# Patient Record
Sex: MALE | Race: WHITE | NOT HISPANIC OR LATINO | Employment: OTHER | URBAN - METROPOLITAN AREA
[De-identification: names, ages, dates, MRNs, and addresses within clinical notes are randomized per-mention and may not be internally consistent; named-entity substitution may affect disease eponyms.]

---

## 2017-01-04 ENCOUNTER — TELEPHONE (OUTPATIENT)
Dept: ORTHOPEDIC SURGERY | Facility: CLINIC | Age: 57
End: 2017-01-04

## 2017-01-04 ENCOUNTER — OFFICE VISIT (OUTPATIENT)
Dept: ORTHOPEDIC SURGERY | Facility: CLINIC | Age: 57
End: 2017-01-04

## 2017-01-04 VITALS — BODY MASS INDEX: 36.54 KG/M2 | HEIGHT: 71 IN | TEMPERATURE: 98.9 F | WEIGHT: 261 LBS

## 2017-01-04 DIAGNOSIS — Z96.652 STATUS POST TOTAL LEFT KNEE REPLACEMENT: Primary | ICD-10-CM

## 2017-01-04 PROCEDURE — 99024 POSTOP FOLLOW-UP VISIT: CPT | Performed by: ORTHOPAEDIC SURGERY

## 2017-01-04 PROCEDURE — 77077 JOINT SURVEY SINGLE VIEW: CPT | Performed by: ORTHOPAEDIC SURGERY

## 2017-01-04 PROCEDURE — 73560 X-RAY EXAM OF KNEE 1 OR 2: CPT | Performed by: ORTHOPAEDIC SURGERY

## 2017-01-04 RX ORDER — OXYCODONE AND ACETAMINOPHEN 10; 325 MG/1; MG/1
1 TABLET ORAL EVERY 4 HOURS PRN
Qty: 50 TABLET | Refills: 0 | Status: SHIPPED | OUTPATIENT
Start: 2017-01-04 | End: 2017-02-01 | Stop reason: SDUPTHER

## 2017-01-04 NOTE — PROGRESS NOTES
Aaron Latif     : 1960     MRN: [id number]     DATE: 2017    DIAGNOSIS: 2 week follow up left TKA    SUBJECTIVE:  Patient returns today for 2 week follow up of left total knee replacement. Patient reports doing well with no unusual complaints. Appears to be progressing appropriately.    OBJECTIVE:     Exam:. The incision is healing appropriately. No sign of infection. Range of motion is progressing but a little slow, 5 to 90. The calf is soft and nontender with a negative Homans sign.    DIAGNOSTIC STUDIES  Xrays: 2 views of the left knee (AP full length and lateral) were ordered and reviewed for evaluation of recent knee replacement. They demonstrate a well positioned, well aligned knee replacement without complicating factors noted. In comparison with previous films there has been interval implant placement.    ASSESSMENT: 2 week status post left knee replacement.    PLAN:   1) Order given for PT   2) Discontinue CURT hose   3) Continue ice PRN   4) aspirin 325 mg orally every day for 6 weeks   5) Follow up in 3 weeks with repeat Xrays of left knee (3views)    Layton Anderson MD  2017

## 2017-01-04 NOTE — MR AVS SNAPSHOT
Aaron Latif   1/4/2017 8:00 AM   Office Visit    Dept Phone:  [phone]   Encounter #:  [id number]    Provider:  Layton Anderson MD   Department:  [facility]                Your Full Care Plan              Today's Medication Changes          These changes are accurate as of: 1/4/17  8:52 AM.  If you have any questions, ask your nurse or doctor.               Medication(s)that have changed:     * aspirin 325 MG EC tablet   Take 1 tablet by mouth Daily.   What changed:  Another medication with the same name was added. Make sure you understand how and when to take each.       * aspirin 325 MG EC tablet   Take 1 tablet by mouth Daily.   What changed:  You were already taking a medication with the same name, and this prescription was added. Make sure you understand how and when to take each.   Changed by:  Layton Anderson MD       * oxyCODONE-acetaminophen 7.5-325 MG per tablet   Commonly known as:  PERCOCET   Take 1-2 tabs po q 4 hours prn pain   What changed:  Another medication with the same name was added. Make sure you understand how and when to take each.   Changed by:  Cielo Brito MA       * oxyCODONE-acetaminophen  MG per tablet   Commonly known as:  PERCOCET   Take 1 tablet by mouth Every 4 (Four) Hours As Needed for moderate pain (4-6).   What changed:  You were already taking a medication with the same name, and this prescription was added. Make sure you understand how and when to take each.   Changed by:  Layton Anderson MD       * Notice:  This list has 4 medication(s) that are the same as other medications prescribed for you. Read the directions carefully, and ask your doctor or other care provider to review them with you.         Where to Get Your Medications      These medications were sent to [address] [place] [address] AT [facility] [address] - [phone]  -  [phone] [place] [address]     Phone:  [phone]     aspirin 325 MG EC tablet         You can get these medications from any pharmacy     Bring a paper prescription for each of these medications     oxyCODONE-acetaminophen  MG per tablet                  Your Updated Medication List          This list is accurate as of: 1/4/17  8:52 AM.  Always use your most recent med list.                * aspirin 325 MG EC tablet   Take 1 tablet by mouth Daily.       * aspirin 325 MG EC tablet   Take 1 tablet by mouth Daily.       atorvastatin 80 MG tablet   Commonly known as:  LIPITOR   Take 1 tablet by mouth daily.        MG capsule   Take 100 mg by mouth 2 (Two) Times a Day.       ergocalciferol 20098 UNITS capsule   Commonly known as:  ERGOCALCIFEROL   Take 2 capsules by mouth 1 (one) time per week.       FARXIGA 10 MG tablet   Generic drug:  Dapagliflozin Propanediol       fenofibrate micronized 130 MG capsule   Commonly known as:  ANTARA   Take 1 capsule by mouth every morning before breakfast.       hydrochlorothiazide 25 MG tablet   Commonly known as:  HYDRODIURIL       Insulin Glargine 300 UNIT/ML solution pen-injector   Commonly known as:  TOUJEO SOLOSTAR   Inject 75 Units under the skin daily.       Insulin Pen Needle 31G X 8 MM misc   Use once a day       KOMBIGLYZE XR 2.5-1000 MG tablet sustained-release 24 hour   Generic drug:  SAXagliptin-MetFORMIN ER       LYRICA 200 MG capsule   Generic drug:  pregabalin       metoprolol succinate XL 50 MG 24 hr tablet   Commonly known as:  TOPROL-XL       ONE TOUCH ULTRA TEST test strip   Generic drug:  glucose blood   TEST 4 TIMES DAILY       ONEANNA DELICA LANCETS 33G misc       * oxyCODONE-acetaminophen 7.5-325 MG per tablet   Commonly known as:  PERCOCET   Take 1-2 tabs po q 4 hours prn pain       * oxyCODONE-acetaminophen  MG per tablet   Commonly known as:  PERCOCET   Take 1 tablet by mouth Every 4 (Four) Hours As Needed  "for moderate pain (4-6).       pioglitazone 30 MG tablet   Commonly known as:  ACTOS   Take 1 tablet by mouth daily.       Syringe/Needle (Disp) 23G X 1\" 3 ML misc       VASCEPA 1 G capsule capsule   Generic drug:  icosapent ethyl       verapamil 240 MG 24 hr capsule   Commonly known as:  VERELAN       * Notice:  This list has 4 medication(s) that are the same as other medications prescribed for you. Read the directions carefully, and ask your doctor or other care provider to review them with you.            We Performed the Following     Ambulatory Referral to Physical Therapy Evaluate and treat     XR Joint Survey AP 2+ Joints     XR Knee 1 or 2 View Left       You Were Diagnosed With        Codes Comments    Status post total left knee replacement    -  Primary ICD-10-CM: Z96.652  ICD-9-CM: V43.65       Instructions     None    Patient Instructions History      Upcoming Appointments     Visit Type Date Time Department    POST-OP 1/4/2017  8:00 AM [facility]    FOLLOW UP 1/27/2017  8:00 AM [facility]    [facility] 5/11/2017  8:10 AM [facility]    OFFICE VISIT 5/25/2017 10:40 AM [facility]      MyChart Signup     Our records indicate that you have declined [employer] signup. If you would like to sign up for [employer], please email [email or web address] or call [phone] to obtain an activation code.             Other Info from Your Visit           Your Appointments     Jan 27, 2017  8:00 AM EST   Follow Up with Layton Anderson MD   [facility] (--)    [address] [place] [address]   [phone]           Arrive 15 minutes prior to appointment.            May 11, 2017  8:10 AM EDT   [facility], [facility]   [facility] ENDOCRINOLOGY (--)    [address] [address] [phone]            May 25, 2017 10:40 AM EDT   Office Visit with " "Parris Luna MD   [facility] ENDOCRINOLOGY (--)    [address] [place] [address] [phone]           Arrive 15 minutes prior to appointment.              Allergies     Codeine  Other (See Comments)    UNKNOWN      Reason for Visit     Left Knee - Post-op Knee, Pain           Vital Signs     Temperature Height Weight Body Mass Index Smoking Status       98.9 °F (37.2 °C) (Temporal Artery ) 71\" (180.3 cm) 261 lb (118 kg) 36.4 kg/m2 Never Smoker       Problems and Diagnoses Noted     Status post total left knee replacement    -  Primary        "

## 2017-01-19 ENCOUNTER — TELEPHONE (OUTPATIENT)
Dept: INTERNAL MEDICINE | Facility: CLINIC | Age: 57
End: 2017-01-19

## 2017-01-19 NOTE — TELEPHONE ENCOUNTER
----- Message from Jeniffer Davenport sent at 1/19/2017 12:10 PM EST -----  Contact: Nolvia @[facility]  Pat @[facility] calling to make us aware the pts frequency for controlled Rx.    Pt was given  OXYCODONE-acetaminophen (PERCOCET) 7.5-325 MG per tablet   Quantity:  100 tablet Refills:  0   Then was given OXYCODONE-acetaminophen (PERCOCET)  MG per tablet QTY 50   By Dr. Layton Anderson  Pt presented today Rx that Dr Hall wrote on 12/29/2016 for OXYCODONE-acetaminophen (PERCOCET) 7.5-325 MG per tablet 100 tablet    They just want to be sure everyone is aware  [place] [phone]

## 2017-01-20 ENCOUNTER — TELEPHONE (OUTPATIENT)
Dept: INTERNAL MEDICINE | Facility: CLINIC | Age: 57
End: 2017-01-20

## 2017-01-20 DIAGNOSIS — R35.1 NOCTURIA: Primary | ICD-10-CM

## 2017-01-20 NOTE — TELEPHONE ENCOUNTER
----- Message from Isabella Guajardo MA sent at 1/20/2017  8:44 AM EST -----  Pt has appt 4/10 but would like a referral to Urologist for frequent urination and nocturia.    Pt#[phone]

## 2017-01-26 ENCOUNTER — TELEPHONE (OUTPATIENT)
Dept: INTERNAL MEDICINE | Facility: CLINIC | Age: 57
End: 2017-01-26

## 2017-01-26 NOTE — TELEPHONE ENCOUNTER
He needs to get pain meds from Dr Anderson only while under his care. He cannot get pain meds from both physicians.

## 2017-01-26 NOTE — TELEPHONE ENCOUNTER
----- Message from America Angulo sent at 1/26/2017 11:25 AM EST -----  Contact: Patient  Patient called requesting refill on     oxyCODONE-acetaminophen (PERCOCET)  MG per tablet    He also requests this be called in for more than a 10-day supply.  Please advise.     Please call when ready to be picked up    [phone]

## 2017-01-27 ENCOUNTER — OFFICE VISIT (OUTPATIENT)
Dept: ORTHOPEDIC SURGERY | Facility: CLINIC | Age: 57
End: 2017-01-27

## 2017-01-27 VITALS — BODY MASS INDEX: 36.54 KG/M2 | TEMPERATURE: 97.2 F | HEIGHT: 71 IN | WEIGHT: 261 LBS

## 2017-01-27 DIAGNOSIS — Z96.652 STATUS POST TOTAL LEFT KNEE REPLACEMENT: Primary | ICD-10-CM

## 2017-01-27 PROCEDURE — 73562 X-RAY EXAM OF KNEE 3: CPT | Performed by: ORTHOPAEDIC SURGERY

## 2017-01-27 PROCEDURE — 99024 POSTOP FOLLOW-UP VISIT: CPT | Performed by: ORTHOPAEDIC SURGERY

## 2017-01-27 RX ORDER — OXYCODONE AND ACETAMINOPHEN 10; 325 MG/1; MG/1
1 TABLET ORAL EVERY 4 HOURS PRN
Qty: 50 TABLET | Refills: 0 | Status: SHIPPED | OUTPATIENT
Start: 2017-01-27 | End: 2017-02-01 | Stop reason: SDUPTHER

## 2017-01-27 RX ORDER — DOCUSATE SODIUM 100 MG/1
100 CAPSULE, LIQUID FILLED ORAL 2 TIMES DAILY
Qty: 60 CAPSULE | Refills: 0 | Status: SHIPPED | OUTPATIENT
Start: 2017-01-27 | End: 2017-07-06

## 2017-01-27 NOTE — PROGRESS NOTES
Aaron Latif : 1960 MRN: [id number] DATE: 2017    DIAGNOSIS: 6 week follow up left TKA      SUBJECTIVE:  Patient returns today for 6 week follow up of left total knee replacement. Patient reports doing well with no unusual complaints. Appears to be progressing appropriately.    Vitals:    17 1024   Temp: 97.2 °F (36.2 °C)         Current Outpatient Prescriptions:   •  aspirin 325 MG EC tablet, Take 1 tablet by mouth Daily., Disp: 30 tablet, Rfl: 0  •  aspirin  MG EC tablet, Take 1 tablet by mouth Daily., Disp: 30 tablet, Rfl: 0  •  atorvastatin (LIPITOR) 80 MG tablet, Take 1 tablet by mouth daily. (Patient taking differently: Take 80 mg by mouth every morning.), Disp: 90 tablet, Rfl: 3  •  Dapagliflozin Propanediol (FARXIGA) 10 MG tablet, Take 10 mg by mouth Every Morning., Disp: , Rfl:   •  docusate sodium (COLACE) 100 MG capsule, Take 1 capsule by mouth 2 (Two) Times a Day., Disp: 60 capsule, Rfl: 0  •  docusate sodium 100 MG capsule, Take 100 mg by mouth 2 (Two) Times a Day., Disp: 60 capsule, Rfl: 0  •  ergocalciferol (ERGOCALCIFEROL) 95887 UNITS capsule, Take 2 capsules by mouth 1 (one) time per week. (Patient taking differently: Take 2 capsules by mouth 1 (one) time per week. ), Disp: 26 capsule, Rfl: 3  •  fenofibrate micronized (ANTARA) 130 MG capsule, Take 1 capsule by mouth every morning before breakfast., Disp: 30 capsule, Rfl: 11  •  hydrochlorothiazide (HYDRODIURIL) 25 MG tablet, Take 25 mg by mouth Every Morning., Disp: , Rfl:   •  icosapent ethyl (VASCEPA) 1 G capsule capsule, Take 2 g by mouth 2 (Two) Times a Day With Meals., Disp: , Rfl:   •  Insulin Glargine (TOUJEO SOLOSTAR) 300 UNIT/ML solution pen-injector, Inject 75 Units under the skin daily. (Patient taking differently: Inject 75 Units under the skin every morning.), Disp: 6 pen, Rfl: 5  •  Insulin Pen Needle 31G X 8 MM misc, Use once a day, Disp: 100 each, Rfl: 0  •  metoprolol succinate XL (TOPROL-XL) 50  "MG 24 hr tablet, Take 50 mg by mouth Every Morning., Disp: , Rfl:   •  ONE TOUCH ULTRA TEST test strip, TEST 4 TIMES DAILY, Disp: 400 each, Rfl: 1  •  ONETOUCH DELICA LANCETS 33G misc, 3 (three) times a day., Disp: , Rfl:   •  oxyCODONE-acetaminophen (PERCOCET)  MG per tablet, Take 1 tablet by mouth Every 4 (Four) Hours As Needed for moderate pain (4-6)., Disp: 50 tablet, Rfl: 0  •  oxyCODONE-acetaminophen (PERCOCET)  MG per tablet, Take 1 tablet by mouth Every 4 (Four) Hours As Needed for moderate pain (4-6)., Disp: 50 tablet, Rfl: 0  •  oxyCODONE-acetaminophen (PERCOCET) 7.5-325 MG per tablet, Take 1-2 tabs po q 4 hours prn pain, Disp: 100 tablet, Rfl: 0  •  pioglitazone (ACTOS) 30 MG tablet, Take 1 tablet by mouth daily. (Patient taking differently: Take 30 mg by mouth every morning.), Disp: 30 tablet, Rfl: 5  •  pregabalin (LYRICA) 200 MG capsule, Take 200 mg by mouth 2 (Two) Times a Day., Disp: , Rfl:   •  SAXagliptin-MetFORMIN ER (KOMBIGLYZE XR) 2.5-1000 MG tablet sustained-release 24 hour, Take 1 tablet by mouth 2 (Two) Times a Day., Disp: , Rfl:   •  Syringe/Needle, Disp, 23G X 1\" 3 ML misc, 4 (four) times a day., Disp: , Rfl:   •  verapamil (VERELAN) 240 MG 24 hr capsule, Take 240 mg by mouth every morning., Disp: , Rfl:     Past Medical History   Diagnosis Date   • Anxiety    • Arthritis    • Diabetic peripheral neuropathy      TYPE 2   • Erectile dysfunction    • Fatigue    • Fracture of rib of right side    • High cholesterol    • Hypertension    • Low testosterone    • Meniscus tear      LEFT  KNEEhad surgery   • Neuropathy    • Sleep apnea      DOES NOT WEAR CPAP   • Type 2 diabetes mellitus    • Vitamin D deficiency        Past Surgical History   Procedure Laterality Date   • Hernia repair     • Achilles tendon repair Left    • Eye surgery Left    • Knee arthroscopy Left 7/26/2016     Procedure: KNEE ARTHROSCOPY, PARTIAL medial MENISCECTOMY;  Surgeon: Layton Anderson MD;  Location: [facility]" OR [facility];  Service:    • Pr total knee arthroplasty Left 12/15/2016     Procedure: TOTAL KNEE ARTHROPLASTY;  Surgeon: Layton Anderson MD;  Location: [facility] MAIN OR;  Service: Orthopedics       Family History   Problem Relation Age of Onset   • Diabetes Mother    • Hypertension Mother    • Heart disease Father    • Diabetes Brother    • Hypertension Brother    • Hypertension Paternal Uncle        Social History     Social History   • Marital status:      Spouse name: N/A   • Number of children: N/A   • Years of education: N/A     Occupational History   • Not on file.     Social History Main Topics   • Smoking status: Never Smoker   • Smokeless tobacco: Never Used   • Alcohol use Yes      Comment: SOCIAL   • Drug use: Yes     Special: Oxycodone      Comment: prescribed by md   • Sexual activity: Defer     Other Topics Concern   • Not on file     Social History Narrative       OBJECTIVE:     Exam:  The incision is well healed. No sign of infection. Range of motion is measured at 5 degrees shy of full extension to 105 flexion. The calf is soft and nontender with a negative Homans sign. Strength is progressing and the patient is ambulating appropriately.    DIAGNOSTIC STUDIES    Xrays: 3 views of the left knee (AP, lateral, and sunrise) were ordered and reviewed for evaluation of recent knee replacement. They demonstrate a well positioned, well aligned knee replacement without complicating factors noted. In comparison with previous films there has been no change.    ASSESSMENT:  6 week status post left knee replacement.    PLAN: 1) Continue with PT exercises as prescribed   2) Follow up in 6 weeks    3)  I have agreed to refill his pain meds.  Risks of the medicine were discussed.    Layton Anderson MD  1/27/2017

## 2017-01-27 NOTE — MR AVS SNAPSHOT
Aaron Latif   1/27/2017 10:00 AM   Office Visit    Dept Phone:  [phone]   Encounter #:  [id number]    Provider:  Layton Anderson MD   Department:  [facility]                Your Full Care Plan              Today's Medication Changes          These changes are accurate as of: 1/27/17 10:55 AM.  If you have any questions, ask your nurse or doctor.               Medication(s)that have changed:     *  MG capsule   Take 100 mg by mouth 2 (Two) Times a Day.   What changed:  Another medication with the same name was added. Make sure you understand how and when to take each.       * docusate sodium 100 MG capsule   Commonly known as:  COLACE   Take 1 capsule by mouth 2 (Two) Times a Day.   What changed:  You were already taking a medication with the same name, and this prescription was added. Make sure you understand how and when to take each.   Changed by:  Layton Anderson MD       * oxyCODONE-acetaminophen 7.5-325 MG per tablet   Commonly known as:  PERCOCET   Take 1-2 tabs po q 4 hours prn pain   What changed:  Another medication with the same name was added. Make sure you understand how and when to take each.   Changed by:  Cielo Brito MA       * oxyCODONE-acetaminophen  MG per tablet   Commonly known as:  PERCOCET   Take 1 tablet by mouth Every 4 (Four) Hours As Needed for moderate pain (4-6).   What changed:  Another medication with the same name was added. Make sure you understand how and when to take each.   Changed by:  Layton Anderson MD       * oxyCODONE-acetaminophen  MG per tablet   Commonly known as:  PERCOCET   Take 1 tablet by mouth Every 4 (Four) Hours As Needed for moderate pain (4-6).   What changed:  You were already taking a medication with the same name, and this prescription was added. Make sure you understand how and when to take each.   Changed by:  Layton Anderson MD       *  Notice:  This list has 5 medication(s) that are the same as other medications prescribed for you. Read the directions carefully, and ask your doctor or other care provider to review them with you.         Where to Get Your Medications      These medications were sent to [facility] [address] [place] [address] - [address] - [phone]  - [phone]   [address] [place] [id number]     Phone:  [phone]     docusate sodium 100 MG capsule         You can get these medications from any pharmacy     Bring a paper prescription for each of these medications     oxyCODONE-acetaminophen  MG per tablet                  Your Updated Medication List          This list is accurate as of: 1/27/17 10:55 AM.  Always use your most recent med list.                * aspirin 325 MG EC tablet   Take 1 tablet by mouth Daily.       * aspirin 325 MG EC tablet   Take 1 tablet by mouth Daily.       atorvastatin 80 MG tablet   Commonly known as:  LIPITOR   Take 1 tablet by mouth daily.       *  MG capsule   Take 100 mg by mouth 2 (Two) Times a Day.       * docusate sodium 100 MG capsule   Commonly known as:  COLACE   Take 1 capsule by mouth 2 (Two) Times a Day.       ergocalciferol 95891 UNITS capsule   Commonly known as:  ERGOCALCIFEROL   Take 2 capsules by mouth 1 (one) time per week.       FARXIGA 10 MG tablet   Generic drug:  Dapagliflozin Propanediol       fenofibrate micronized 130 MG capsule   Commonly known as:  ANTARA   Take 1 capsule by mouth every morning before breakfast.       hydrochlorothiazide 25 MG tablet   Commonly known as:  HYDRODIURIL       Insulin Glargine 300 UNIT/ML solution pen-injector   Commonly known as:  TOUJEO SOLOSTAR   Inject 75 Units under the skin daily.       Insulin Pen Needle 31G X 8 MM misc   Use once a day       KOMBIGLYZE XR 2.5-1000 MG tablet sustained-release 24 hour   Generic drug:  SAXagliptin-MetFORMIN ER       LYRICA  "200 MG capsule   Generic drug:  pregabalin       metoprolol succinate XL 50 MG 24 hr tablet   Commonly known as:  TOPROL-XL       ONE TOUCH ULTRA TEST test strip   Generic drug:  glucose blood   TEST 4 TIMES DAILY       ONETOUCH DELICA LANCETS 33G misc       * oxyCODONE-acetaminophen 7.5-325 MG per tablet   Commonly known as:  PERCOCET   Take 1-2 tabs po q 4 hours prn pain       * oxyCODONE-acetaminophen  MG per tablet   Commonly known as:  PERCOCET   Take 1 tablet by mouth Every 4 (Four) Hours As Needed for moderate pain (4-6).       * oxyCODONE-acetaminophen  MG per tablet   Commonly known as:  PERCOCET   Take 1 tablet by mouth Every 4 (Four) Hours As Needed for moderate pain (4-6).       pioglitazone 30 MG tablet   Commonly known as:  ACTOS   Take 1 tablet by mouth daily.       Syringe/Needle (Disp) 23G X 1\" 3 ML misc       VASCEPA 1 G capsule capsule   Generic drug:  icosapent ethyl       verapamil 240 MG 24 hr capsule   Commonly known as:  VERELAN       * Notice:  This list has 7 medication(s) that are the same as other medications prescribed for you. Read the directions carefully, and ask your doctor or other care provider to review them with you.            We Performed the Following     XR Knee 1 or 2 View Bilateral     XR Knee 1 or 2 View Left       You Were Diagnosed With        Codes Comments    Status post total left knee replacement    -  Primary ICD-10-CM: Z96.652  ICD-9-CM: V43.65       Instructions     None    Patient Instructions History      Upcoming Appointments     Visit Type Date Time Department    FOLLOW UP 1/27/2017 10:00 AM [facility]    POST-OP 3/10/2017  4:10 PM [facility]    FOLLOW UP 4/14/2017  3:00 PM [facility]    [facility] 5/11/2017  8:10 AM [facility]    OFFICE VISIT 5/25/2017 10:40 AM [facility]      MyChart Signup     Our records indicate that you have declined [facility] MyChart signup. If you would like to sign up for [employer], please " "email [email or web address] or call [phone] to obtain an activation code.             Other Info from Your Visit           Your Appointments     Mar 10, 2017  4:10 PM EST   Post-Op with Layton Anderson MD   [facility] (--)    [address] [facility] [address]  [facility] [address]   [phone]            Apr 14, 2017  3:00 PM EDT   Follow Up with Je Hall MD   [facility] INTERNAL MEDICINE (--)    [address] [facility]  [place] [id number] [phone]           Arrive 15 minutes prior to appointment.            May 11, 2017  8:10 AM EDT   [facility]   [facility] ENDOCRINOLOGY (--)    [address] [facility]  [place] [address] [phone]            May 25, 2017 10:40 AM EDT   Office Visit with Parris Luna MD   [facility] ENDOCRINOLOGY (--)    [address] [facility]  [place] [address] [phone]           Arrive 15 minutes prior to appointment.              Allergies     Codeine  Other (See Comments)    UNKNOWN      Reason for Visit     Left Knee - Follow-up           Vital Signs     Temperature Height Weight Body Mass Index Smoking Status       97.2 °F (36.2 °C) 71\" (180.3 cm) 261 lb (118 kg) 36.4 kg/m2 Never Smoker       Problems and Diagnoses Noted     Status post total left knee replacement    -  Primary        "

## 2017-02-01 DIAGNOSIS — Z96.652 STATUS POST TOTAL LEFT KNEE REPLACEMENT: Primary | ICD-10-CM

## 2017-02-01 RX ORDER — OXYCODONE AND ACETAMINOPHEN 10; 325 MG/1; MG/1
1 TABLET ORAL EVERY 6 HOURS PRN
Qty: 50 TABLET | Refills: 0 | Status: SHIPPED | OUTPATIENT
Start: 2017-02-01 | End: 2017-02-01 | Stop reason: DRUGHIGH

## 2017-02-01 RX ORDER — OXYCODONE AND ACETAMINOPHEN 7.5; 325 MG/1; MG/1
1 TABLET ORAL EVERY 4 HOURS PRN
Qty: 40 TABLET | Refills: 0 | Status: SHIPPED | OUTPATIENT
Start: 2017-02-01 | End: 2017-02-07 | Stop reason: SDUPTHER

## 2017-02-07 DIAGNOSIS — Z96.652 STATUS POST TOTAL LEFT KNEE REPLACEMENT: ICD-10-CM

## 2017-02-07 RX ORDER — ASPIRIN 325 MG
TABLET, DELAYED RELEASE (ENTERIC COATED) ORAL
Qty: 30 TABLET | Refills: 0 | Status: SHIPPED | OUTPATIENT
Start: 2017-02-07 | End: 2017-04-14 | Stop reason: SDUPTHER

## 2017-02-08 RX ORDER — OXYCODONE AND ACETAMINOPHEN 7.5; 325 MG/1; MG/1
1 TABLET ORAL EVERY 4 HOURS PRN
Qty: 40 TABLET | Refills: 0 | Status: SHIPPED | OUTPATIENT
Start: 2017-02-08 | End: 2017-02-17 | Stop reason: SDUPTHER

## 2017-02-08 NOTE — TELEPHONE ENCOUNTER
Patient has been notified that pain rx is ready to be picked up from the  with a photo id, rx placed inside narcotic bin/knj

## 2017-02-17 ENCOUNTER — TELEPHONE (OUTPATIENT)
Dept: ORTHOPEDIC SURGERY | Facility: CLINIC | Age: 57
End: 2017-02-17

## 2017-02-17 DIAGNOSIS — Z96.652 STATUS POST TOTAL LEFT KNEE REPLACEMENT: ICD-10-CM

## 2017-02-17 RX ORDER — OXYCODONE AND ACETAMINOPHEN 7.5; 325 MG/1; MG/1
1 TABLET ORAL EVERY 4 HOURS PRN
Qty: 60 TABLET | Refills: 0 | Status: SHIPPED | OUTPATIENT
Start: 2017-02-17 | End: 2017-02-24 | Stop reason: SDUPTHER

## 2017-02-17 RX ORDER — OXYCODONE AND ACETAMINOPHEN 7.5; 325 MG/1; MG/1
1 TABLET ORAL EVERY 4 HOURS PRN
Qty: 40 TABLET | Refills: 0 | Status: SHIPPED | OUTPATIENT
Start: 2017-02-17 | End: 2017-02-17 | Stop reason: SDUPTHER

## 2017-02-17 NOTE — TELEPHONE ENCOUNTER
Rx ready at the ; spoke with pt and informed.  I also told him [facility] would not increase the strength or directions of the med but he did increase the qty so the rx would last longer.

## 2017-02-24 DIAGNOSIS — Z96.652 STATUS POST TOTAL LEFT KNEE REPLACEMENT: ICD-10-CM

## 2017-02-27 RX ORDER — OXYCODONE AND ACETAMINOPHEN 7.5; 325 MG/1; MG/1
1 TABLET ORAL EVERY 4 HOURS PRN
Qty: 60 TABLET | Refills: 0 | Status: SHIPPED | OUTPATIENT
Start: 2017-02-27 | End: 2017-04-07

## 2017-03-01 DIAGNOSIS — N52.8 OTHER MALE ERECTILE DYSFUNCTION: ICD-10-CM

## 2017-03-01 DIAGNOSIS — R79.89 LOW TESTOSTERONE: ICD-10-CM

## 2017-03-01 DIAGNOSIS — E55.9 VITAMIN D DEFICIENCY: ICD-10-CM

## 2017-03-01 DIAGNOSIS — E66.01 MORBID OBESITY DUE TO EXCESS CALORIES (HCC): ICD-10-CM

## 2017-03-01 DIAGNOSIS — E11.9 DIABETES MELLITUS TYPE 2, CONTROLLED, WITHOUT COMPLICATIONS (HCC): ICD-10-CM

## 2017-03-01 DIAGNOSIS — R53.82 CHRONIC FATIGUE: ICD-10-CM

## 2017-03-01 DIAGNOSIS — I10 ACCELERATED HYPERTENSION: ICD-10-CM

## 2017-03-01 DIAGNOSIS — E78.5 DYSLIPIDEMIA: ICD-10-CM

## 2017-03-01 RX ORDER — INSULIN GLARGINE 300 U/ML
INJECTION, SOLUTION SUBCUTANEOUS
Qty: 9 PEN | Refills: 4 | Status: SHIPPED | OUTPATIENT
Start: 2017-03-01 | End: 2017-03-30

## 2017-03-08 DIAGNOSIS — E55.9 VITAMIN D DEFICIENCY: ICD-10-CM

## 2017-03-08 DIAGNOSIS — R53.82 CHRONIC FATIGUE: ICD-10-CM

## 2017-03-08 DIAGNOSIS — N52.8 OTHER MALE ERECTILE DYSFUNCTION: ICD-10-CM

## 2017-03-08 DIAGNOSIS — E11.9 DIABETES MELLITUS TYPE 2, CONTROLLED, WITHOUT COMPLICATIONS (HCC): ICD-10-CM

## 2017-03-08 DIAGNOSIS — R79.89 LOW TESTOSTERONE: ICD-10-CM

## 2017-03-08 DIAGNOSIS — E66.01 MORBID OBESITY DUE TO EXCESS CALORIES (HCC): ICD-10-CM

## 2017-03-08 DIAGNOSIS — E78.5 DYSLIPIDEMIA: ICD-10-CM

## 2017-03-08 DIAGNOSIS — I10 ACCELERATED HYPERTENSION: ICD-10-CM

## 2017-03-09 RX ORDER — FENOFIBRATE 130 MG/1
CAPSULE ORAL
Qty: 30 CAPSULE | Refills: 0 | Status: SHIPPED | OUTPATIENT
Start: 2017-03-09 | End: 2017-04-01 | Stop reason: SDUPTHER

## 2017-03-09 RX ORDER — ERGOCALCIFEROL 1.25 MG/1
CAPSULE ORAL
Qty: 26 CAPSULE | Refills: 2 | Status: SHIPPED | OUTPATIENT
Start: 2017-03-09 | End: 2017-07-06 | Stop reason: SDUPTHER

## 2017-03-09 RX ORDER — ICOSAPENT ETHYL 1000 MG/1
CAPSULE ORAL
Qty: 120 CAPSULE | Refills: 0 | Status: SHIPPED | OUTPATIENT
Start: 2017-03-09 | End: 2017-04-27 | Stop reason: SDUPTHER

## 2017-03-09 RX ORDER — PIOGLITAZONEHYDROCHLORIDE 30 MG/1
TABLET ORAL
Qty: 30 TABLET | Refills: 0 | Status: SHIPPED | OUTPATIENT
Start: 2017-03-09 | End: 2017-04-01 | Stop reason: SDUPTHER

## 2017-03-10 ENCOUNTER — OFFICE VISIT (OUTPATIENT)
Dept: ORTHOPEDIC SURGERY | Facility: CLINIC | Age: 57
End: 2017-03-10

## 2017-03-10 VITALS — BODY MASS INDEX: 36.54 KG/M2 | HEIGHT: 71 IN | WEIGHT: 261 LBS

## 2017-03-10 DIAGNOSIS — Z96.652 STATUS POST TOTAL LEFT KNEE REPLACEMENT: ICD-10-CM

## 2017-03-10 DIAGNOSIS — M17.12 PRIMARY OSTEOARTHRITIS OF LEFT KNEE: Primary | ICD-10-CM

## 2017-03-10 DIAGNOSIS — Z96.651 STATUS POST TOTAL RIGHT KNEE REPLACEMENT: ICD-10-CM

## 2017-03-10 PROCEDURE — 99024 POSTOP FOLLOW-UP VISIT: CPT | Performed by: ORTHOPAEDIC SURGERY

## 2017-03-10 PROCEDURE — 73560 X-RAY EXAM OF KNEE 1 OR 2: CPT | Performed by: ORTHOPAEDIC SURGERY

## 2017-03-10 RX ORDER — MELOXICAM 15 MG/1
15 TABLET ORAL DAILY PRN
Qty: 30 TABLET | Refills: 2 | Status: SHIPPED | OUTPATIENT
Start: 2017-03-10 | End: 2017-05-21 | Stop reason: SDUPTHER

## 2017-03-10 RX ORDER — OXYCODONE AND ACETAMINOPHEN 10; 325 MG/1; MG/1
1 TABLET ORAL EVERY 8 HOURS PRN
Qty: 100 TABLET | Refills: 0 | Status: SHIPPED | OUTPATIENT
Start: 2017-03-10 | End: 2017-04-07

## 2017-03-12 NOTE — PROGRESS NOTES
Mikey is now roughly 3 months out from his left total knee.  He tells me that he still has soreness and swelling.  He does feel that things are steadily getting better though.  He is happy with his motion.  He is ambulatory status is improved although certainly not normal yet.    His incision is healed.  The wound itself looks great.  He still has a mild to moderate effusion.  There is no erythema.  Mild tenderness medially.  Motion is from near full extension to 120° of flexion.  No instability.  His gait is mildly antalgic.    X-rays including AP and lateral views of the left knee are ordered and reviewed.  These are compared to previous x-rays.  The components appear well fixed and appropriately positioned.    Assessment: 3 months status post left total knee arthroplasty    Plan: We discussed options for him.  I keep waiting for him to turn a corner here.  Overall, he is clearly doing better but his progress has been slow.  I counseled him that I don't see any evidence for a complicating process at this point and I think it is just going to take time.  It is encouraging that he is progressing even though his progress has been slow.  His motion is great and at this point it is really just a pain issue.  I have agreed to refill his pain medicine.  I will see him back in another 3 months.  I've encouraged him to try to be patient.  If he notices any changes or any concerning new signs or symptoms, I want to see him back sooner.

## 2017-03-24 ENCOUNTER — TELEPHONE (OUTPATIENT)
Dept: ORTHOPEDIC SURGERY | Facility: CLINIC | Age: 57
End: 2017-03-24

## 2017-03-24 ENCOUNTER — OFFICE VISIT (OUTPATIENT)
Dept: ORTHOPEDIC SURGERY | Facility: CLINIC | Age: 57
End: 2017-03-24

## 2017-03-24 VITALS — WEIGHT: 261 LBS | HEIGHT: 71 IN | BODY MASS INDEX: 36.54 KG/M2

## 2017-03-24 DIAGNOSIS — Z09 SURGERY FOLLOW-UP: Primary | ICD-10-CM

## 2017-03-24 PROCEDURE — 99212 OFFICE O/P EST SF 10 MIN: CPT | Performed by: ORTHOPAEDIC SURGERY

## 2017-03-24 RX ORDER — CYCLOBENZAPRINE HCL 10 MG
10 TABLET ORAL 2 TIMES DAILY PRN
Qty: 30 TABLET | Refills: 0 | Status: SHIPPED | OUTPATIENT
Start: 2017-03-24 | End: 2017-07-06

## 2017-03-24 RX ORDER — OXYCODONE AND ACETAMINOPHEN 10; 325 MG/1; MG/1
1 TABLET ORAL EVERY 4 HOURS PRN
Qty: 100 TABLET | Refills: 0 | Status: SHIPPED | OUTPATIENT
Start: 2017-03-24 | End: 2017-04-07 | Stop reason: SDUPTHER

## 2017-03-24 NOTE — PROGRESS NOTES
"Chief Complaint:  Left knee swelling status post total knee arthroplasty    HPI:  Mikey comes in today for reevaluation of his left knee.  He tells me it has been doing better and better but he started noticing increased swelling yesterday.  He does admit that he was doing quite a bit of walking when this happened.  He has been having increased pain associated with it.  He cannot recall any specific inciting event or factor.  Denies any fevers, chills, sweats, or other associated symptoms.  He tells me the swelling came on rather suddenly.  He has had intermittent swelling ever since surgery.  His pain has never completely subsided but he does assure me that it seems to be getting better and better up until yesterday.    Vitals:    03/24/17 1550   Weight: 261 lb (118 kg)   Height: 71\" (180.3 cm)       Exam:  On exam, his incision is healed.  He does have a moderate effusion along with some increased warmth.  There is no erythema.  His motion is from full extension to 110° of flexion.  Calf is soft.    Imaging:  None taken today    Assessment:  Effusion status post left total knee arthroplasty    Plan:  He does not look acutely infected.  If this is a manifestation of infection, it is likely a chronic infection.  My impression is that he has been quite active and that that is what precipitated his effusion.  I am going to manage him expectantly for now.  We talked about the pros and cons of aspirating the knee and after this discussion, he has elected to hold off on the aspiration for now.  I'm going to bring him back in in a couple of weeks and reevaluate.  At that time, if the effusion is still present, we will plan to aspirate him.  If he starts to develop increasing pain, fevers, or any other evidence for infection, I told him to let me know immediately and I will bring him back in and aspirate the knee.  At this point, I do not feel there is is any rush to aspirate him because, again, if he has an infection, my " impression is that this is a chronic infection.  I have agreed to refill his pain medicine and also add a muscle relaxer.  The risk of these medicines were discussed.    Layton Anderson MD  03/24/2017

## 2017-03-30 RX ORDER — INSULIN DEGLUDEC 200 U/ML
75 INJECTION, SOLUTION SUBCUTANEOUS DAILY
Qty: 6 PEN | Refills: 5 | Status: SHIPPED | OUTPATIENT
Start: 2017-03-30 | End: 2017-07-06

## 2017-04-01 DIAGNOSIS — E66.01 MORBID OBESITY DUE TO EXCESS CALORIES (HCC): ICD-10-CM

## 2017-04-01 DIAGNOSIS — R79.89 LOW TESTOSTERONE: ICD-10-CM

## 2017-04-01 DIAGNOSIS — E11.9 DIABETES MELLITUS TYPE 2, CONTROLLED, WITHOUT COMPLICATIONS (HCC): ICD-10-CM

## 2017-04-01 DIAGNOSIS — E78.5 DYSLIPIDEMIA: ICD-10-CM

## 2017-04-01 DIAGNOSIS — E55.9 VITAMIN D DEFICIENCY: ICD-10-CM

## 2017-04-01 DIAGNOSIS — I10 ACCELERATED HYPERTENSION: ICD-10-CM

## 2017-04-01 DIAGNOSIS — N52.8 OTHER MALE ERECTILE DYSFUNCTION: ICD-10-CM

## 2017-04-01 DIAGNOSIS — R53.82 CHRONIC FATIGUE: ICD-10-CM

## 2017-04-01 RX ORDER — DAPAGLIFLOZIN 10 MG/1
TABLET, FILM COATED ORAL
Qty: 30 TABLET | Refills: 1 | Status: SHIPPED | OUTPATIENT
Start: 2017-04-01 | End: 2017-05-21 | Stop reason: SDUPTHER

## 2017-04-01 RX ORDER — PIOGLITAZONEHYDROCHLORIDE 30 MG/1
TABLET ORAL
Qty: 30 TABLET | Refills: 0 | Status: SHIPPED | OUTPATIENT
Start: 2017-04-01 | End: 2017-04-27 | Stop reason: SDUPTHER

## 2017-04-01 RX ORDER — FENOFIBRATE 130 MG/1
CAPSULE ORAL
Qty: 30 CAPSULE | Refills: 0 | Status: SHIPPED | OUTPATIENT
Start: 2017-04-01 | End: 2017-04-27 | Stop reason: SDUPTHER

## 2017-04-01 RX ORDER — ATORVASTATIN CALCIUM 80 MG/1
TABLET, FILM COATED ORAL
Qty: 90 TABLET | Refills: 0 | Status: SHIPPED | OUTPATIENT
Start: 2017-04-01 | End: 2017-06-17 | Stop reason: SDUPTHER

## 2017-04-03 RX ORDER — HYDROCHLOROTHIAZIDE 25 MG/1
TABLET ORAL
Qty: 30 TABLET | Refills: 1 | Status: SHIPPED | OUTPATIENT
Start: 2017-04-03 | End: 2017-05-21 | Stop reason: SDUPTHER

## 2017-04-03 RX ORDER — VERAPAMIL HYDROCHLORIDE 240 MG/1
TABLET, FILM COATED, EXTENDED RELEASE ORAL
Qty: 30 TABLET | Refills: 1 | Status: SHIPPED | OUTPATIENT
Start: 2017-04-03 | End: 2017-05-21 | Stop reason: SDUPTHER

## 2017-04-07 RX ORDER — OXYCODONE AND ACETAMINOPHEN 10; 325 MG/1; MG/1
1 TABLET ORAL EVERY 4 HOURS PRN
Qty: 100 TABLET | Refills: 0 | Status: SHIPPED | OUTPATIENT
Start: 2017-04-07 | End: 2017-04-28 | Stop reason: SDUPTHER

## 2017-04-11 ENCOUNTER — TELEPHONE (OUTPATIENT)
Dept: ORTHOPEDIC SURGERY | Facility: CLINIC | Age: 57
End: 2017-04-11

## 2017-04-14 ENCOUNTER — OFFICE VISIT (OUTPATIENT)
Dept: ORTHOPEDIC SURGERY | Facility: CLINIC | Age: 57
End: 2017-04-14

## 2017-04-14 VITALS — WEIGHT: 264 LBS | TEMPERATURE: 98.7 F | BODY MASS INDEX: 36.96 KG/M2 | HEIGHT: 71 IN

## 2017-04-14 DIAGNOSIS — Z96.652 STATUS POST TOTAL LEFT KNEE REPLACEMENT: Primary | ICD-10-CM

## 2017-04-14 PROCEDURE — 73560 X-RAY EXAM OF KNEE 1 OR 2: CPT | Performed by: ORTHOPAEDIC SURGERY

## 2017-04-14 PROCEDURE — 99212 OFFICE O/P EST SF 10 MIN: CPT | Performed by: ORTHOPAEDIC SURGERY

## 2017-04-14 RX ORDER — IBUPROFEN 800 MG/1
800 TABLET ORAL 3 TIMES DAILY
Qty: 90 TABLET | Refills: 0 | Status: SHIPPED | OUTPATIENT
Start: 2017-04-14 | End: 2017-07-06

## 2017-04-16 NOTE — PROGRESS NOTES
" Mikey comes in today for follow-up.   He tells me that the knee is modestly improved.  His swelling is better but he still has pain.  Overall, he tells me that his pain continues to get better and better day by day.  Denies any redness, fevers, chills, sweats, or other symptoms.    On exam, his incision is well-healed.  He still has a large effusion.  No erythema.  Slight increased warmth.  Motion is from full extension to 120° of flexion.  No instability.    X-rays including AP and lateral views of the left knee are ordered and reviewed.  The components appear in unchanged position and alignment relative to previous films.  No complicating process noted.    Assessment: Effusion and persistent pain status post left total knee arthroplasty    Plan: I'm concerned about the possibility of a chronic infection.  I recommended an aspiration.  I had a lengthy conversation with him and his wife about this.  They tell me that their daughter is getting  in October and, even if he is infected, they want to hold off on any intervention until after that unless absolutely necessary.  In light of that, they want to hold off on the aspiration.  As he states it \"doc, I just think that I don't want to know the answer to that question right now\".  I will see him back in another 6 weeks unless anything changes.  He understands the risks of waiting and tells me that he will call me if anything changes.    Layton Anderson MD    "

## 2017-04-27 DIAGNOSIS — I10 ACCELERATED HYPERTENSION: ICD-10-CM

## 2017-04-27 DIAGNOSIS — E66.01 MORBID OBESITY DUE TO EXCESS CALORIES (HCC): ICD-10-CM

## 2017-04-27 DIAGNOSIS — N52.8 OTHER MALE ERECTILE DYSFUNCTION: ICD-10-CM

## 2017-04-27 DIAGNOSIS — E78.5 DYSLIPIDEMIA: ICD-10-CM

## 2017-04-27 DIAGNOSIS — R79.89 LOW TESTOSTERONE: ICD-10-CM

## 2017-04-27 DIAGNOSIS — E11.9 DIABETES MELLITUS TYPE 2, CONTROLLED, WITHOUT COMPLICATIONS (HCC): ICD-10-CM

## 2017-04-27 DIAGNOSIS — R53.82 CHRONIC FATIGUE: ICD-10-CM

## 2017-04-27 DIAGNOSIS — E55.9 VITAMIN D DEFICIENCY: ICD-10-CM

## 2017-04-27 RX ORDER — PIOGLITAZONEHYDROCHLORIDE 30 MG/1
TABLET ORAL
Qty: 30 TABLET | Refills: 0 | Status: SHIPPED | OUTPATIENT
Start: 2017-04-27 | End: 2017-05-21 | Stop reason: SDUPTHER

## 2017-04-27 RX ORDER — ICOSAPENT ETHYL 1000 MG/1
CAPSULE ORAL
Qty: 120 CAPSULE | Refills: 0 | Status: SHIPPED | OUTPATIENT
Start: 2017-04-27 | End: 2017-05-21 | Stop reason: SDUPTHER

## 2017-04-27 RX ORDER — FENOFIBRATE 130 MG/1
CAPSULE ORAL
Qty: 30 CAPSULE | Refills: 0 | Status: SHIPPED | OUTPATIENT
Start: 2017-04-27 | End: 2017-05-21 | Stop reason: SDUPTHER

## 2017-04-28 RX ORDER — OXYCODONE AND ACETAMINOPHEN 10; 325 MG/1; MG/1
1 TABLET ORAL EVERY 4 HOURS PRN
Qty: 100 TABLET | Refills: 0 | Status: SHIPPED | OUTPATIENT
Start: 2017-04-28 | End: 2017-06-02 | Stop reason: SDUPTHER

## 2017-05-12 ENCOUNTER — OFFICE VISIT (OUTPATIENT)
Dept: ORTHOPEDIC SURGERY | Facility: CLINIC | Age: 57
End: 2017-05-12

## 2017-05-12 ENCOUNTER — TELEPHONE (OUTPATIENT)
Dept: ORTHOPEDIC SURGERY | Facility: CLINIC | Age: 57
End: 2017-05-12

## 2017-05-12 VITALS — WEIGHT: 262 LBS | TEMPERATURE: 97.5 F | BODY MASS INDEX: 36.68 KG/M2 | HEIGHT: 71 IN

## 2017-05-12 DIAGNOSIS — Z96.652 STATUS POST TOTAL LEFT KNEE REPLACEMENT: Primary | ICD-10-CM

## 2017-05-12 PROCEDURE — 73562 X-RAY EXAM OF KNEE 3: CPT | Performed by: ORTHOPAEDIC SURGERY

## 2017-05-12 PROCEDURE — 87205 SMEAR GRAM STAIN: CPT | Performed by: ORTHOPAEDIC SURGERY

## 2017-05-12 PROCEDURE — 87070 CULTURE OTHR SPECIMN AEROBIC: CPT | Performed by: ORTHOPAEDIC SURGERY

## 2017-05-12 PROCEDURE — 89051 BODY FLUID CELL COUNT: CPT | Performed by: ORTHOPAEDIC SURGERY

## 2017-05-12 PROCEDURE — 20610 DRAIN/INJ JOINT/BURSA W/O US: CPT | Performed by: ORTHOPAEDIC SURGERY

## 2017-05-12 PROCEDURE — 87075 CULTR BACTERIA EXCEPT BLOOD: CPT | Performed by: ORTHOPAEDIC SURGERY

## 2017-05-12 PROCEDURE — 99213 OFFICE O/P EST LOW 20 MIN: CPT | Performed by: ORTHOPAEDIC SURGERY

## 2017-05-12 RX ADMIN — LIDOCAINE HYDROCHLORIDE 2 ML: 10 INJECTION, SOLUTION INFILTRATION; PERINEURAL at 14:19

## 2017-05-14 RX ORDER — LIDOCAINE HYDROCHLORIDE 10 MG/ML
2 INJECTION, SOLUTION INFILTRATION; PERINEURAL
Status: COMPLETED | OUTPATIENT
Start: 2017-05-12 | End: 2017-05-12

## 2017-05-16 ENCOUNTER — RESULTS ENCOUNTER (OUTPATIENT)
Dept: ENDOCRINOLOGY | Age: 57
End: 2017-05-16

## 2017-05-16 DIAGNOSIS — E66.01 MORBID OBESITY DUE TO EXCESS CALORIES (HCC): ICD-10-CM

## 2017-05-16 DIAGNOSIS — R79.89 LOW TESTOSTERONE: ICD-10-CM

## 2017-05-16 DIAGNOSIS — N52.9 ERECTILE DYSFUNCTION, UNSPECIFIED ERECTILE DYSFUNCTION TYPE: ICD-10-CM

## 2017-05-16 DIAGNOSIS — E55.9 VITAMIN D DEFICIENCY: ICD-10-CM

## 2017-05-16 DIAGNOSIS — E11.42 DIABETIC PERIPHERAL NEUROPATHY (HCC): ICD-10-CM

## 2017-05-16 DIAGNOSIS — E78.1 ESSENTIAL HYPERTRIGLYCERIDEMIA: ICD-10-CM

## 2017-05-16 DIAGNOSIS — F52.21 ED (ERECTILE DYSFUNCTION) OF NON-ORGANIC ORIGIN: ICD-10-CM

## 2017-05-16 DIAGNOSIS — E78.5 DYSLIPIDEMIA: ICD-10-CM

## 2017-05-16 DIAGNOSIS — I10 BENIGN ESSENTIAL HTN: ICD-10-CM

## 2017-05-16 DIAGNOSIS — IMO0001 UNCONTROLLED TYPE 2 DIABETES MELLITUS WITHOUT COMPLICATION, WITH LONG-TERM CURRENT USE OF INSULIN: ICD-10-CM

## 2017-05-17 ENCOUNTER — TELEPHONE (OUTPATIENT)
Dept: ORTHOPEDIC SURGERY | Facility: CLINIC | Age: 57
End: 2017-05-17

## 2017-05-17 LAB
APPEARANCE FLD: ABNORMAL
COLOR FLD: ABNORMAL
EOSINOPHIL NFR FLD MANUAL: NORMAL %
LYMPHOCYTES NFR FLD MANUAL: 84 %
METHOD: ABNORMAL
MONOCYTES NFR FLD: 2 %
MONOS+MACROS NFR FLD: 11 %
NEUTROPHILS NFR FLD MANUAL: 3 %
NUC CELL # FLD: 216 /MM3
OTHER CELLS FLUID PER 100/WBCS: 1 /100 WBCS
RBC # FLD AUTO: ABNORMAL /MM3

## 2017-05-21 DIAGNOSIS — R53.82 CHRONIC FATIGUE: ICD-10-CM

## 2017-05-21 DIAGNOSIS — R79.89 LOW TESTOSTERONE: ICD-10-CM

## 2017-05-21 DIAGNOSIS — I10 ACCELERATED HYPERTENSION: ICD-10-CM

## 2017-05-21 DIAGNOSIS — E78.5 DYSLIPIDEMIA: ICD-10-CM

## 2017-05-21 DIAGNOSIS — R52 PAIN: Primary | ICD-10-CM

## 2017-05-21 DIAGNOSIS — E66.01 MORBID OBESITY DUE TO EXCESS CALORIES (HCC): ICD-10-CM

## 2017-05-21 DIAGNOSIS — N52.8 OTHER MALE ERECTILE DYSFUNCTION: ICD-10-CM

## 2017-05-21 DIAGNOSIS — E11.9 DIABETES MELLITUS TYPE 2, CONTROLLED, WITHOUT COMPLICATIONS (HCC): ICD-10-CM

## 2017-05-21 DIAGNOSIS — E55.9 VITAMIN D DEFICIENCY: ICD-10-CM

## 2017-05-22 LAB
BACTERIA SPEC AEROBE CULT: NORMAL
BACTERIA SPEC ANAEROBE CULT: NORMAL
GRAM STN SPEC: NORMAL
GRAM STN SPEC: NORMAL

## 2017-05-22 RX ORDER — VERAPAMIL HYDROCHLORIDE 240 MG/1
TABLET, FILM COATED, EXTENDED RELEASE ORAL
Qty: 30 TABLET | Refills: 0 | Status: SHIPPED | OUTPATIENT
Start: 2017-05-22 | End: 2017-07-01 | Stop reason: SDUPTHER

## 2017-05-22 RX ORDER — MELOXICAM 15 MG/1
TABLET ORAL
Qty: 30 TABLET | Refills: 1 | Status: SHIPPED | OUTPATIENT
Start: 2017-05-22 | End: 2017-07-27 | Stop reason: SDUPTHER

## 2017-05-22 RX ORDER — ICOSAPENT ETHYL 1000 MG/1
CAPSULE ORAL
Qty: 120 CAPSULE | Refills: 0 | Status: SHIPPED | OUTPATIENT
Start: 2017-05-22 | End: 2017-06-17 | Stop reason: SDUPTHER

## 2017-05-22 RX ORDER — SAXAGLIPTIN AND METFORMIN HYDROCHLORIDE 2.5; 1 MG/1; MG/1
TABLET, FILM COATED, EXTENDED RELEASE ORAL
Qty: 60 TABLET | Refills: 2 | Status: SHIPPED | OUTPATIENT
Start: 2017-05-22 | End: 2017-08-27 | Stop reason: SDUPTHER

## 2017-05-22 RX ORDER — PIOGLITAZONEHYDROCHLORIDE 30 MG/1
TABLET ORAL
Qty: 30 TABLET | Refills: 0 | Status: SHIPPED | OUTPATIENT
Start: 2017-05-22 | End: 2017-07-01 | Stop reason: SDUPTHER

## 2017-05-22 RX ORDER — FENOFIBRATE 130 MG/1
CAPSULE ORAL
Qty: 30 CAPSULE | Refills: 0 | Status: SHIPPED | OUTPATIENT
Start: 2017-05-22 | End: 2017-07-01 | Stop reason: SDUPTHER

## 2017-05-22 RX ORDER — HYDROCHLOROTHIAZIDE 25 MG/1
TABLET ORAL
Qty: 30 TABLET | Refills: 0 | Status: SHIPPED | OUTPATIENT
Start: 2017-05-22 | End: 2017-07-01 | Stop reason: SDUPTHER

## 2017-05-22 RX ORDER — DAPAGLIFLOZIN 10 MG/1
TABLET, FILM COATED ORAL
Qty: 30 TABLET | Refills: 0 | Status: SHIPPED | OUTPATIENT
Start: 2017-05-22 | End: 2017-07-01 | Stop reason: SDUPTHER

## 2017-05-23 ENCOUNTER — LAB (OUTPATIENT)
Dept: LAB | Facility: HOSPITAL | Age: 57
End: 2017-05-23

## 2017-05-23 DIAGNOSIS — Z96.652 STATUS POST TOTAL LEFT KNEE REPLACEMENT: ICD-10-CM

## 2017-05-23 LAB
CRP SERPL-MCNC: 0.09 MG/DL (ref 0–0.5)
DEPRECATED RDW RBC AUTO: 49.9 FL (ref 37–54)
EOSINOPHIL # BLD MANUAL: 0.26 10*3/MM3 (ref 0–0.7)
EOSINOPHIL NFR BLD MANUAL: 3 % (ref 0.3–6.2)
ERYTHROCYTE [DISTWIDTH] IN BLOOD BY AUTOMATED COUNT: 15.5 % (ref 11.5–14.5)
ERYTHROCYTE [SEDIMENTATION RATE] IN BLOOD: 5 MM/HR (ref 0–20)
HCT VFR BLD AUTO: 43.5 % (ref 40.4–52.2)
HGB BLD-MCNC: 15.1 G/DL (ref 13.7–17.6)
LYMPHOCYTES # BLD MANUAL: 3.69 10*3/MM3 (ref 0.9–4.8)
LYMPHOCYTES NFR BLD MANUAL: 42 % (ref 19.6–45.3)
LYMPHOCYTES NFR BLD MANUAL: 5 % (ref 5–12)
MCH RBC QN AUTO: 30.8 PG (ref 27–32.7)
MCHC RBC AUTO-ENTMCNC: 34.7 G/DL (ref 32.6–36.4)
MCV RBC AUTO: 88.8 FL (ref 79.8–96.2)
MONOCYTES # BLD AUTO: 0.44 10*3/MM3 (ref 0.2–1.2)
MYELOCYTES NFR BLD MANUAL: 1 % (ref 0–0)
NEUTROPHILS # BLD AUTO: 4.3 10*3/MM3 (ref 1.9–8.1)
NEUTROPHILS NFR BLD MANUAL: 49 % (ref 42.7–76)
PLAT MORPH BLD: NORMAL
PLATELET # BLD AUTO: 229 10*3/MM3 (ref 140–500)
PMV BLD AUTO: 10.1 FL (ref 6–12)
RBC # BLD AUTO: 4.9 10*6/MM3 (ref 4.6–6)
RBC MORPH BLD: NORMAL
WBC MORPH BLD: NORMAL
WBC NRBC COR # BLD: 8.78 10*3/MM3 (ref 4.5–10.7)

## 2017-05-23 PROCEDURE — 85027 COMPLETE CBC AUTOMATED: CPT | Performed by: ORTHOPAEDIC SURGERY

## 2017-05-23 PROCEDURE — 85652 RBC SED RATE AUTOMATED: CPT

## 2017-05-23 PROCEDURE — 86140 C-REACTIVE PROTEIN: CPT

## 2017-05-23 PROCEDURE — 85007 BL SMEAR W/DIFF WBC COUNT: CPT | Performed by: ORTHOPAEDIC SURGERY

## 2017-05-23 PROCEDURE — 36415 COLL VENOUS BLD VENIPUNCTURE: CPT

## 2017-05-24 ENCOUNTER — OFFICE VISIT (OUTPATIENT)
Dept: ORTHOPEDIC SURGERY | Facility: CLINIC | Age: 57
End: 2017-05-24

## 2017-05-24 VITALS — HEIGHT: 71 IN | TEMPERATURE: 97.5 F | BODY MASS INDEX: 36.68 KG/M2 | WEIGHT: 262 LBS

## 2017-05-24 DIAGNOSIS — T84.84XD PAINFUL TOTAL KNEE REPLACEMENT, SUBSEQUENT ENCOUNTER: Primary | ICD-10-CM

## 2017-05-24 DIAGNOSIS — Z96.659 PAINFUL TOTAL KNEE REPLACEMENT, SUBSEQUENT ENCOUNTER: Primary | ICD-10-CM

## 2017-05-24 PROCEDURE — 99213 OFFICE O/P EST LOW 20 MIN: CPT | Performed by: ORTHOPAEDIC SURGERY

## 2017-05-24 RX ORDER — OXYCODONE AND ACETAMINOPHEN 10; 325 MG/1; MG/1
1 TABLET ORAL EVERY 4 HOURS PRN
Qty: 50 TABLET | Refills: 0 | Status: SHIPPED | OUTPATIENT
Start: 2017-05-24 | End: 2017-06-26

## 2017-05-25 ENCOUNTER — OFFICE VISIT (OUTPATIENT)
Dept: ENDOCRINOLOGY | Age: 57
End: 2017-05-25

## 2017-05-25 ENCOUNTER — CONSULT (OUTPATIENT)
Dept: ORTHOPEDIC SURGERY | Facility: CLINIC | Age: 57
End: 2017-05-25

## 2017-05-25 VITALS
RESPIRATION RATE: 16 BRPM | HEIGHT: 70 IN | SYSTOLIC BLOOD PRESSURE: 136 MMHG | DIASTOLIC BLOOD PRESSURE: 84 MMHG | BODY MASS INDEX: 37.71 KG/M2 | WEIGHT: 263.4 LBS

## 2017-05-25 VITALS — HEIGHT: 70 IN | BODY MASS INDEX: 37.22 KG/M2 | WEIGHT: 260 LBS | TEMPERATURE: 98.4 F

## 2017-05-25 DIAGNOSIS — IMO0001 UNCONTROLLED TYPE 2 DIABETES MELLITUS WITHOUT COMPLICATION, WITH LONG-TERM CURRENT USE OF INSULIN: Primary | ICD-10-CM

## 2017-05-25 DIAGNOSIS — N52.9 ERECTILE DYSFUNCTION, UNSPECIFIED ERECTILE DYSFUNCTION TYPE: ICD-10-CM

## 2017-05-25 DIAGNOSIS — E78.5 DYSLIPIDEMIA: ICD-10-CM

## 2017-05-25 DIAGNOSIS — E55.9 VITAMIN D DEFICIENCY: ICD-10-CM

## 2017-05-25 DIAGNOSIS — R79.89 LOW TESTOSTERONE: ICD-10-CM

## 2017-05-25 DIAGNOSIS — E55.9 VITAMIN D DEFICIENCY: Primary | ICD-10-CM

## 2017-05-25 DIAGNOSIS — Z96.652 H/O TOTAL KNEE REPLACEMENT, LEFT: Primary | ICD-10-CM

## 2017-05-25 DIAGNOSIS — I10 BENIGN ESSENTIAL HTN: ICD-10-CM

## 2017-05-25 PROCEDURE — 99214 OFFICE O/P EST MOD 30 MIN: CPT | Performed by: INTERNAL MEDICINE

## 2017-05-25 PROCEDURE — 99214 OFFICE O/P EST MOD 30 MIN: CPT | Performed by: ORTHOPAEDIC SURGERY

## 2017-05-26 LAB — 25(OH)D3+25(OH)D2 SERPL-MCNC: 69.7 NG/ML (ref 30–100)

## 2017-05-27 LAB
ALBUMIN SERPL-MCNC: 4.8 G/DL (ref 3.5–5.2)
ALBUMIN/GLOB SERPL: 1.5 G/DL
ALP SERPL-CCNC: 40 U/L (ref 39–117)
ALT SERPL-CCNC: 67 U/L (ref 1–41)
AST SERPL-CCNC: 70 U/L (ref 1–40)
BILIRUB SERPL-MCNC: 0.7 MG/DL (ref 0.1–1.2)
BUN SERPL-MCNC: 13 MG/DL (ref 6–20)
BUN/CREAT SERPL: 12.1 (ref 7–25)
C PEPTIDE SERPL-MCNC: 6.7 NG/ML (ref 1.1–4.4)
CALCIUM SERPL-MCNC: 10.6 MG/DL (ref 8.6–10.5)
CHLORIDE SERPL-SCNC: 97 MMOL/L (ref 98–107)
CHOLEST SERPL-MCNC: 82 MG/DL (ref 0–200)
CO2 SERPL-SCNC: 29.1 MMOL/L (ref 22–29)
CONV COMMENT: ABNORMAL
CREAT SERPL-MCNC: 1.07 MG/DL (ref 0.76–1.27)
FSH SERPL-ACNC: 11.6 MIU/ML (ref 1.5–12.4)
GLOBULIN SER CALC-MCNC: 3.1 GM/DL
GLUCOSE SERPL-MCNC: 115 MG/DL (ref 65–99)
HBA1C MFR BLD: 8.12 % (ref 4.8–5.6)
HCT VFR BLD AUTO: 46.7 % (ref 40.4–52.2)
HDLC SERPL-MCNC: 35 MG/DL (ref 40–60)
HGB BLD-MCNC: 15.3 G/DL (ref 13.7–17.6)
LDLC SERPL CALC-MCNC: 25 MG/DL (ref 0–100)
LH SERPL-ACNC: 12.8 MIU/ML (ref 1.7–8.6)
MICROALBUMIN UR-MCNC: <3 UG/ML
POTASSIUM SERPL-SCNC: 4.2 MMOL/L (ref 3.5–5.2)
PROT SERPL-MCNC: 7.9 G/DL (ref 6–8.5)
PSA SERPL-MCNC: 0.7 NG/ML (ref 0–4)
SHBG SERPL-SCNC: 61.9 NMOL/L (ref 19.3–76.4)
SODIUM SERPL-SCNC: 142 MMOL/L (ref 136–145)
T4 SERPL-MCNC: 8.49 MCG/DL (ref 4.5–11.7)
TESTOST FREE SERPL-MCNC: 6.1 PG/ML (ref 7.2–24)
TESTOST SERPL-MCNC: 345 NG/DL (ref 348–1197)
TRIGL SERPL-MCNC: 111 MG/DL (ref 0–150)
TSH SERPL DL<=0.005 MIU/L-ACNC: 1.26 MIU/ML (ref 0.27–4.2)
URATE SERPL-MCNC: 5.6 MG/DL (ref 3.4–7)
VLDLC SERPL CALC-MCNC: 22.2 MG/DL (ref 5–40)

## 2017-06-02 RX ORDER — OXYCODONE AND ACETAMINOPHEN 10; 325 MG/1; MG/1
1 TABLET ORAL EVERY 4 HOURS PRN
Qty: 80 TABLET | Refills: 0 | Status: SHIPPED | OUTPATIENT
Start: 2017-06-02 | End: 2017-06-13 | Stop reason: SDUPTHER

## 2017-06-13 DIAGNOSIS — R52 PAIN: Primary | ICD-10-CM

## 2017-06-13 RX ORDER — OXYCODONE AND ACETAMINOPHEN 10; 325 MG/1; MG/1
1 TABLET ORAL EVERY 4 HOURS PRN
Qty: 80 TABLET | Refills: 0 | Status: CANCELLED | OUTPATIENT
Start: 2017-06-13

## 2017-06-13 RX ORDER — OXYCODONE AND ACETAMINOPHEN 10; 325 MG/1; MG/1
1 TABLET ORAL EVERY 4 HOURS PRN
Qty: 80 TABLET | Refills: 0 | Status: SHIPPED | OUTPATIENT
Start: 2017-06-13 | End: 2017-06-26 | Stop reason: SDUPTHER

## 2017-06-13 NOTE — TELEPHONE ENCOUNTER
Called patient letting him know that the requested RX is ready for  @ the  at the [facility] office tomorrow 6/14/17.

## 2017-06-14 ENCOUNTER — TELEPHONE (OUTPATIENT)
Dept: ORTHOPEDIC SURGERY | Facility: CLINIC | Age: 57
End: 2017-06-14

## 2017-06-14 NOTE — TELEPHONE ENCOUNTER
Per [facility], it is ok to fill this RX early.  Spoke with pharmacist and he didn't want to speak with me so I transferred him to [facility] of which he took care of the issue with filling it early.

## 2017-06-17 DIAGNOSIS — E11.9 DIABETES MELLITUS TYPE 2, CONTROLLED, WITHOUT COMPLICATIONS (HCC): ICD-10-CM

## 2017-06-17 DIAGNOSIS — E55.9 VITAMIN D DEFICIENCY: ICD-10-CM

## 2017-06-17 DIAGNOSIS — R53.82 CHRONIC FATIGUE: ICD-10-CM

## 2017-06-17 DIAGNOSIS — R79.89 LOW TESTOSTERONE: ICD-10-CM

## 2017-06-17 DIAGNOSIS — E66.01 MORBID OBESITY DUE TO EXCESS CALORIES (HCC): ICD-10-CM

## 2017-06-17 DIAGNOSIS — E78.5 DYSLIPIDEMIA: ICD-10-CM

## 2017-06-17 DIAGNOSIS — N52.8 OTHER MALE ERECTILE DYSFUNCTION: ICD-10-CM

## 2017-06-17 DIAGNOSIS — I10 ACCELERATED HYPERTENSION: ICD-10-CM

## 2017-06-19 RX ORDER — ATORVASTATIN CALCIUM 80 MG/1
TABLET, FILM COATED ORAL
Qty: 90 TABLET | Refills: 0 | Status: SHIPPED | OUTPATIENT
Start: 2017-06-19 | End: 2017-09-25 | Stop reason: SDUPTHER

## 2017-06-19 RX ORDER — ICOSAPENT ETHYL 1000 MG/1
CAPSULE ORAL
Qty: 120 CAPSULE | Refills: 0 | Status: SHIPPED | OUTPATIENT
Start: 2017-06-19 | End: 2017-07-27 | Stop reason: SDUPTHER

## 2017-06-26 ENCOUNTER — TELEPHONE (OUTPATIENT)
Dept: ORTHOPEDIC SURGERY | Facility: CLINIC | Age: 57
End: 2017-06-26

## 2017-06-26 DIAGNOSIS — R52 PAIN: ICD-10-CM

## 2017-06-26 RX ORDER — OXYCODONE AND ACETAMINOPHEN 10; 325 MG/1; MG/1
1 TABLET ORAL EVERY 4 HOURS PRN
Qty: 80 TABLET | Refills: 0 | Status: SHIPPED | OUTPATIENT
Start: 2017-06-26 | End: 2017-07-05 | Stop reason: SDUPTHER

## 2017-07-01 DIAGNOSIS — I10 ACCELERATED HYPERTENSION: ICD-10-CM

## 2017-07-01 DIAGNOSIS — N52.8 OTHER MALE ERECTILE DYSFUNCTION: ICD-10-CM

## 2017-07-01 DIAGNOSIS — R53.82 CHRONIC FATIGUE: ICD-10-CM

## 2017-07-01 DIAGNOSIS — E11.9 DIABETES MELLITUS TYPE 2, CONTROLLED, WITHOUT COMPLICATIONS (HCC): ICD-10-CM

## 2017-07-01 DIAGNOSIS — E78.5 DYSLIPIDEMIA: ICD-10-CM

## 2017-07-01 DIAGNOSIS — R79.89 LOW TESTOSTERONE: ICD-10-CM

## 2017-07-01 DIAGNOSIS — E66.01 MORBID OBESITY DUE TO EXCESS CALORIES (HCC): ICD-10-CM

## 2017-07-01 DIAGNOSIS — E55.9 VITAMIN D DEFICIENCY: ICD-10-CM

## 2017-07-03 RX ORDER — VERAPAMIL HYDROCHLORIDE 240 MG/1
TABLET, FILM COATED, EXTENDED RELEASE ORAL
Qty: 30 TABLET | Refills: 6 | Status: SHIPPED | OUTPATIENT
Start: 2017-07-03 | End: 2017-12-08

## 2017-07-03 RX ORDER — HYDROCHLOROTHIAZIDE 25 MG/1
TABLET ORAL
Qty: 30 TABLET | Refills: 6 | Status: SHIPPED | OUTPATIENT
Start: 2017-07-03 | End: 2017-12-08

## 2017-07-03 RX ORDER — DAPAGLIFLOZIN 10 MG/1
TABLET, FILM COATED ORAL
Qty: 30 TABLET | Refills: 0 | Status: SHIPPED | OUTPATIENT
Start: 2017-07-03 | End: 2017-07-27 | Stop reason: SDUPTHER

## 2017-07-03 RX ORDER — FENOFIBRATE 130 MG/1
CAPSULE ORAL
Qty: 30 CAPSULE | Refills: 0 | Status: SHIPPED | OUTPATIENT
Start: 2017-07-03 | End: 2017-07-27 | Stop reason: SDUPTHER

## 2017-07-03 RX ORDER — PIOGLITAZONEHYDROCHLORIDE 30 MG/1
TABLET ORAL
Qty: 30 TABLET | Refills: 0 | Status: SHIPPED | OUTPATIENT
Start: 2017-07-03 | End: 2017-07-27 | Stop reason: SDUPTHER

## 2017-07-03 RX ORDER — METOPROLOL SUCCINATE 50 MG/1
TABLET, EXTENDED RELEASE ORAL
Qty: 30 TABLET | Refills: 6 | Status: SHIPPED | OUTPATIENT
Start: 2017-07-03 | End: 2017-12-08

## 2017-07-05 DIAGNOSIS — R52 PAIN: ICD-10-CM

## 2017-07-05 RX ORDER — OXYCODONE AND ACETAMINOPHEN 10; 325 MG/1; MG/1
1 TABLET ORAL EVERY 4 HOURS PRN
Qty: 80 TABLET | Refills: 0 | Status: SHIPPED | OUTPATIENT
Start: 2017-07-05 | End: 2017-07-07 | Stop reason: SDUPTHER

## 2017-07-05 NOTE — TELEPHONE ENCOUNTER
----- Message from America Angulo sent at 7/5/2017  9:15 AM EDT -----  Contact: Patient  Patient requesting refill on     oxyCODONE-acetaminophen (PERCOCET)  MG per tablet    Patient has been rescheduled from Friday to tomorrow, and will be seeing Dr. Caban.  Since Dr. Caban does not know the patient yet, and his script is due, he is asking for refill today to  tomorrow at appt time.  Please advise.      Patient:  [phone]

## 2017-07-05 NOTE — TELEPHONE ENCOUNTER
Last office visit:   10/28/2016  Last fill date:  06/26/2017  Prescribed by Dr. Justin Hung ordered.

## 2017-07-06 ENCOUNTER — OFFICE VISIT (OUTPATIENT)
Dept: INTERNAL MEDICINE | Facility: CLINIC | Age: 57
End: 2017-07-06

## 2017-07-06 VITALS
BODY MASS INDEX: 38.08 KG/M2 | WEIGHT: 266 LBS | DIASTOLIC BLOOD PRESSURE: 84 MMHG | SYSTOLIC BLOOD PRESSURE: 148 MMHG | HEIGHT: 70 IN

## 2017-07-06 DIAGNOSIS — I10 BENIGN ESSENTIAL HTN: ICD-10-CM

## 2017-07-06 DIAGNOSIS — Z96.652 STATUS POST TOTAL KNEE REPLACEMENT, LEFT: Primary | ICD-10-CM

## 2017-07-06 PROCEDURE — 99213 OFFICE O/P EST LOW 20 MIN: CPT | Performed by: FAMILY MEDICINE

## 2017-07-06 NOTE — PROGRESS NOTES
Subjective   Aaron Latif is a 57 y.o. male.   Patient presents with primary complaint of left knee pain.  This has been present since his previous total knee replacement.  This pain is associated with some increased temperature in his left knee.  He has seen Dr. Anderson and Dr. Darling for this problem and they are not sure why he is having this discomfort.  We have discussed possible referral to infectious disease today and options for scanning to rule out occult infection.  History of Present Illness   Patient's present illness is as dictated in the subjective section above.  We have also discussed his hypertension but it is adequately controlled at this time and patient does not desire to change medication.  The following portions of the patient's history were reviewed and updated as appropriate: allergies, current medications, past family history, past medical history, past social history, past surgical history and problem list.    Review of Systems   Constitutional: Negative for activity change, appetite change, fatigue and unexpected weight change.   HENT: Negative for congestion, dental problem, ear pain, hearing loss, mouth sores, postnasal drip, sinus pressure, sore throat, tinnitus and trouble swallowing.    Eyes: Negative for pain, discharge, redness and visual disturbance.   Respiratory: Negative for apnea, cough, shortness of breath, wheezing and stridor.    Cardiovascular: Negative for chest pain, palpitations and leg swelling.   Gastrointestinal: Negative for abdominal distention, abdominal pain, diarrhea, nausea and vomiting.   Endocrine: Negative for cold intolerance, heat intolerance, polydipsia, polyphagia and polyuria.   Genitourinary: Negative for dysuria.   Musculoskeletal: Positive for arthralgias.   Skin: Negative for color change.   Allergic/Immunologic: Negative for environmental allergies and food allergies.   Neurological: Negative for dizziness, weakness and numbness.   Hematological:  Negative for adenopathy.   Psychiatric/Behavioral: Negative for dysphoric mood and sleep disturbance.       Objective   Physical Exam   Constitutional: He is oriented to person, place, and time. He appears well-developed and well-nourished. No distress.   HENT:   Head: Normocephalic.   Nose: Nose normal.   Mouth/Throat: Oropharynx is clear and moist.   Eyes: Conjunctivae are normal. Pupils are equal, round, and reactive to light.   Neck: No JVD present. No tracheal deviation present. No thyromegaly present.   Cardiovascular: Normal rate, regular rhythm and normal heart sounds.  Exam reveals no gallop and no friction rub.    No murmur heard.  Pulmonary/Chest: Effort normal and breath sounds normal. No stridor.   Abdominal: Soft. Bowel sounds are normal. He exhibits no distension. There is no tenderness.   Musculoskeletal: He exhibits no edema or tenderness.   Patient has healed vertical incision in the suprapatellar area and has an area of increased erythema over the medial aspect of the knee.  He has pain on range of motion in any and all direction in his left knee and also has pain with standing.   Lymphadenopathy:     He has no cervical adenopathy.   Neurological: He is alert and oriented to person, place, and time.   Skin: Skin is warm and dry. He is not diaphoretic.   Psychiatric: He has a normal mood and affect.       Assessment/Plan   Aaron was seen today for pain.    Diagnoses and all orders for this visit:    Status post total knee replacement, left  -     Ambulatory Referral to Infectious Disease    Benign essential HTN     patient presents with long-standing pain in the left knee and we need to rule out possible infection in the medial or lateral aspect of his left knee.  We have discussed referral to infectious disease for possible scanning and have advised patient's to follow-up with Dr. Anderson and Dr. Fitzgerald.  We will see him again in 2 weeks.  Zac Caban MD    Addendum to yesterday's note after  extensive chart review on Mr. Latif the patient recently had some laboratory testing which would seem to indicate that there is not an active ongoing infection.  He did have some palpable erythema over the inferior aspect of the left knee.  And I do think that evaluation by infectious disease would be of assistance.  I have also reviewed Dr. Eloy Licea's recent note on Mr. Latif and he suggested repeat aspiration of the knee if problem persists but the patient is concerned about possible introduction of infection with repeat aspiration.  Over with patient and his wife extensively the risks factors of not picking up an infection.  Both patient and his wife stated understanding of these risks and agreed to follow up with infectious disease.  It should also be noted that review of Dr. Fitzgerald's note indicates that he does have some prosthetic implant instability and that at some point a revision procedure would be indicated.  Patient stated yesterday that he would not consider this due to the upcoming upcoming wedding of his daughter.  Both the patient and his wife were encouraged for him to follow closely with us for possible additional testing if his problems persist.  Patient called this morning after above addendum was dictated and another discussion occurred.  He stated that he may want to get a third opinion regarding course of treatment at this time.  We mention several orthopedists in the area that I would consider food choices for a third opinion but have informed him that some orthopedists prefer not to do these type of evaluation.  Patient is encouraged to proceed with further evaluation of his knee at this time.  He stated that this morning that his left knee was warm to touch and he is currently having pain almost all day.  Patient was also encouraged to follow-up with infectious disease due to the erythema in his knee.  He agreed to do so and will call me if he does not hear about the appointment within  the next few days.

## 2017-07-07 DIAGNOSIS — R52 PAIN: ICD-10-CM

## 2017-07-07 RX ORDER — OXYCODONE AND ACETAMINOPHEN 10; 325 MG/1; MG/1
1 TABLET ORAL EVERY 4 HOURS PRN
Qty: 80 TABLET | Refills: 0 | Status: SHIPPED | OUTPATIENT
Start: 2017-07-07 | End: 2017-07-18

## 2017-07-10 ENCOUNTER — TELEPHONE (OUTPATIENT)
Dept: INTERNAL MEDICINE | Facility: CLINIC | Age: 57
End: 2017-07-10

## 2017-07-10 DIAGNOSIS — E55.9 VITAMIN D DEFICIENCY: ICD-10-CM

## 2017-07-10 DIAGNOSIS — E66.01 MORBID OBESITY DUE TO EXCESS CALORIES (HCC): ICD-10-CM

## 2017-07-10 DIAGNOSIS — E78.5 DYSLIPIDEMIA: ICD-10-CM

## 2017-07-10 DIAGNOSIS — R79.89 LOW TESTOSTERONE: ICD-10-CM

## 2017-07-10 DIAGNOSIS — N52.8 OTHER MALE ERECTILE DYSFUNCTION: ICD-10-CM

## 2017-07-10 DIAGNOSIS — I10 ACCELERATED HYPERTENSION: ICD-10-CM

## 2017-07-10 DIAGNOSIS — E11.9 DIABETES MELLITUS TYPE 2, CONTROLLED, WITHOUT COMPLICATIONS (HCC): ICD-10-CM

## 2017-07-10 DIAGNOSIS — R53.82 CHRONIC FATIGUE: ICD-10-CM

## 2017-07-10 RX ORDER — INSULIN GLARGINE 300 U/ML
INJECTION, SOLUTION SUBCUTANEOUS
Qty: 9 PEN | Refills: 3 | Status: SHIPPED | OUTPATIENT
Start: 2017-07-10 | End: 2017-09-25 | Stop reason: SDUPTHER

## 2017-07-10 NOTE — TELEPHONE ENCOUNTER
----- Message from Isabella Guajardo MA sent at 7/10/2017  1:52 PM EDT -----  THIERRY    Pt calling to inform you that his appt with MARCOS Andre is for tomorrow 7/11 @ 1:30  Tosha thanks for the message from the patient.  I have left a phone message with him to let us know what the consultant has to say about his ongoing problem with pain.  I also instructed the patient to inform him that he has some erythema in the knee intermittently and that I am worried about possible occult infection.  It should be noted that Dr. Fitzgerald recommended repeat aspiration of the patient's knee but the patient declined this intervention.  The patient has been fully informed that if infection is missed it can cause life-threatening problems in the long-term.

## 2017-07-11 ENCOUNTER — TELEPHONE (OUTPATIENT)
Dept: INTERNAL MEDICINE | Facility: CLINIC | Age: 57
End: 2017-07-11

## 2017-07-11 ENCOUNTER — OFFICE VISIT (OUTPATIENT)
Dept: INFECTIOUS DISEASES | Facility: CLINIC | Age: 57
End: 2017-07-11

## 2017-07-11 VITALS
SYSTOLIC BLOOD PRESSURE: 113 MMHG | DIASTOLIC BLOOD PRESSURE: 75 MMHG | WEIGHT: 266.2 LBS | HEIGHT: 70 IN | BODY MASS INDEX: 38.11 KG/M2 | HEART RATE: 88 BPM | TEMPERATURE: 97.5 F

## 2017-07-11 DIAGNOSIS — K75.9 HEPATITIS: ICD-10-CM

## 2017-07-11 DIAGNOSIS — E66.09 NON MORBID OBESITY DUE TO EXCESS CALORIES: ICD-10-CM

## 2017-07-11 DIAGNOSIS — Z96.652 STATUS POST TOTAL LEFT KNEE REPLACEMENT: ICD-10-CM

## 2017-07-11 DIAGNOSIS — M25.562 CHRONIC PAIN OF LEFT KNEE: Primary | ICD-10-CM

## 2017-07-11 DIAGNOSIS — G89.29 CHRONIC PAIN OF LEFT KNEE: Primary | ICD-10-CM

## 2017-07-11 PROCEDURE — 99244 OFF/OP CNSLTJ NEW/EST MOD 40: CPT | Performed by: INTERNAL MEDICINE

## 2017-07-11 NOTE — PROGRESS NOTES
Referring Provider: Zac Caban MD  [id number] [address]    Reason for Consultation: left knee pain following arthroplasty    History of present illness:  Mr. Latif is a 56 YO who I am asked to evaluate and give opinion for left knee pain following arthroplasty. History is obtained from the patient, wife, and review of the orthopedics records which I summarize/synthesize as follows: Due to progressive osteoarthritis, on 12/15/16 he underwent left knee replacement by Dr Anderson. He says he has never really become pain free since the surgery. He continues to have some mild-moderate swelling with mild warmth. There is not any erythema. He has had no wound dehiscence. There is no drainage. It was felt that this was unlikely to be infection and likely more related to instability. However, his symptoms persisted so on 5/12/17 he had an arthorecentesis done that had only 216 nucleated cells (3% PMNs). Cultures were negative. CRP around that same time was 0.09 with ESR of only 5. He has not required any antibiotics. He has not had fevers, chills, or sweats. There is the thought he might have to have a revision down the road but I am asked to give an opinion about likelihood of infection causing his pain.    PMH:  DM2  Low T  BC  DM2  Low Vit D  HTN  Rib fracture  ED  HLD    PSH:  Achilles tendor repair  Eye surgery  Hernia surgery  Knee scope  L knee TKA    Social History:    Non smoker    Family History:  No family history of knee infections    Allergies:  Codeine    Medications:    Current Outpatient Prescriptions:   •  aspirin  MG EC tablet, Take 1 tablet by mouth Daily., Disp: 30 tablet, Rfl: 0  •  atorvastatin (LIPITOR) 80 MG tablet, TAKE ONE TABLET BY MOUTH DAILY, Disp: 90 tablet, Rfl: 0  •  ergocalciferol (ERGOCALCIFEROL) 17479 UNITS capsule, Take 2 capsules by mouth 1 (one) time per week. (Patient taking differently: Take 2 capsules by mouth 1 (one) time per week.  "THURSDAYS), Disp: 26 capsule, Rfl: 3  •  FARXIGA 10 MG tablet, TAKE ONE TABLET BY MOUTH DAILY, Disp: 30 tablet, Rfl: 0  •  fenofibrate micronized (ANTARA) 130 MG capsule, TAKE ONE CAPSULE BY MOUTH EVERY MORNING BEFORE BREAKFAST, Disp: 30 capsule, Rfl: 0  •  hydrochlorothiazide (HYDRODIURIL) 25 MG tablet, TAKE ONE TABLET BY MOUTH EVERY MORNING, Disp: 30 tablet, Rfl: 6  •  Insulin Glargine (TOUJEO SOLOSTAR) 300 UNIT/ML solution pen-injector, Inject 75 Units under the skin Every Morning., Disp: , Rfl:   •  Insulin Pen Needle 31G X 8 MM misc, Use once a day, Disp: 100 each, Rfl: 0  •  KOMBIGLYZE XR 2.5-1000 MG tablet sustained-release 24 hour, TAKE ONE TABLET BY MOUTH TWICE DAILY WITH BREAKFAST AND SUPPER, Disp: 60 tablet, Rfl: 2  •  LYRICA 200 MG capsule, TAKE ONE CAPSULE BY MOUTH TWICE A DAY, Disp: 60 capsule, Rfl: 1  •  meloxicam (MOBIC) 15 MG tablet, TAKE ONE TABLET BY MOUTH DAILY AS NEEDED FOR MODERATE PAIN WITH FOOD, Disp: 30 tablet, Rfl: 1  •  metoprolol succinate XL (TOPROL-XL) 50 MG 24 hr tablet, TAKE ONE TABLET BY MOUTH DAILY, Disp: 30 tablet, Rfl: 6  •  ONE TOUCH ULTRA TEST test strip, TEST 4 TIMES DAILY, Disp: 400 each, Rfl: 1  •  ONETOUCH DELICA LANCETS 33G misc, 3 (three) times a day., Disp: , Rfl:   •  oxyCODONE-acetaminophen (PERCOCET)  MG per tablet, Take 1 tablet by mouth Every 4 (Four) Hours As Needed for Moderate Pain (4-6)., Disp: 80 tablet, Rfl: 0  •  pioglitazone (ACTOS) 30 MG tablet, TAKE ONE TABLET BY MOUTH DAILY, Disp: 30 tablet, Rfl: 0  •  Syringe/Needle, Disp, 23G X 1\" 3 ML misc, 4 (four) times a day., Disp: , Rfl:   •  TOUJEO SOLOSTAR 300 UNIT/ML solution pen-injector, INJECT 75 UNITS UNDER THE SKIN DAILY, Disp: 9 pen, Rfl: 3  •  VASCEPA 1 G capsule capsule, TAKE TWO CAPSULES BY MOUTH TWICE A DAY WITH MEALS, Disp: 120 capsule, Rfl: 0  •  verapamil SR (CALAN-SR) 240 MG CR tablet, TAKE ONE TABLET BY MOUTH DAILY, Disp: 30 tablet, Rfl: 6      Review of Systems  All systems were reviewed " "and are negative unless otherwise stated above in the HPI    Objective   Vital Signs     /75  Pulse 88  Temp 97.5 °F (36.4 °C)  Ht 70\" (177.8 cm)  Wt 266 lb 3.2 oz (121 kg)  BMI 38.2 kg/m2      Physical Exam:   General: awake, alert, NAD, very nice  Head: Normocephalic, atraumatic  Eyes: EOMI, no scleral icterus  ENT: MMM, OP clear, no thrush. Good dentition.   Neck: Supple  Cardiovascular: NR,  no LE edema  Respiratory: normal work of breathing on ambient air  GI: Abdomen is obese, soft, non-distended  : no Longoria catheter present  Musculoskeletal: L knee w/ well-healed incision, mild swelling w/ effusion, mild warmth compared to the contralateral side; no erythema; no drainage; abnormal gait  Skin: No rashes, lesions, or embolic phenomenon  Neurological: Alert and oriented x 3, motor strength 5/5 in upper extremities  Psychiatric: Normal mood and affect   Vasc: no cyanosis    Labs:     Lab Results   Component Value Date    WBC 8.78 05/23/2017    HGB 15.3 05/25/2017    HCT 46.7 05/25/2017    MCV 88.8 05/23/2017     05/23/2017       Lab Results   Component Value Date    GLUCOSE 296 (H) 12/06/2016    BUN 13 05/25/2017    CREATININE 1.07 05/25/2017    EGFRIFNONA 71 05/25/2017    EGFRIFAFRI 86 05/25/2017    BCR 12.1 05/25/2017    CO2 29.1 (H) 05/25/2017    CALCIUM 10.6 (H) 05/25/2017    PROTENTOTREF 7.9 05/25/2017    ALBUMIN 4.80 05/25/2017    LABIL2 1.5 05/25/2017    AST 70 (H) 05/25/2017    ALT 67 (H) 05/25/2017     Lab Results   Component Value Date    CREATININE 1.07 05/25/2017     Lab Results   Component Value Date    CRP 0.09 05/23/2017     Lab Results   Component Value Date    SEDRATE 5 05/23/2017     Lab Results   Component Value Date    HGBA1C 8.12 (H) 05/25/2017       Arthrocentesis Labs:  216 nucleated cells (3% PMNs)    Microbiology:  5/12/17 KneeCx:negative    Radiology (personally reviewed images/report):  Knee XR 12/2017: L TKA in place    Assessment/Plan   1. Left knee pain following " total knee arthroplasty  -I think the evidence argues strongly against infection at this time; normal WBC, ESR, CRP, and most importantly there were only 216 nucleated cells (3% PMNs) in his arthrocentesis  -continue follow-up with Coty Anderson and [facility]il[facility]; may need revision in the future for instability per their notes  -he will let me know if he ever has hot, erythematous knee with swelling; in that situation, he should have another arthrocentesis done  -no further labs needed today  -no role for antibiotics    2. Obesity  -I think he would benefit from weight loss    3. Hepatitis  -likely fatty liver; but given birth year, I would advise that his PCP check a Hep C Ab if not done previously    4. Uncontrolled DM2 with A1c > 8%    Thank you for this consult. All of patient's infection-related questions were answered and he has my card to call if further questions arise. RTC as needed

## 2017-07-11 NOTE — TELEPHONE ENCOUNTER
----- Message from America Angulo sent at 7/11/2017  1:12 PM EDT -----  Contact: Patient  Patient called requesting a return call from Dr. Caban.  Patient is wanting to share information from the ID physician.  I did inform him Dr. Caban out of office until Wednesday afternoon, and he's fine with that.  Please advise.  Thanks, Marie    Patient:  [phone]

## 2017-07-12 ENCOUNTER — TELEPHONE (OUTPATIENT)
Dept: INTERNAL MEDICINE | Facility: CLINIC | Age: 57
End: 2017-07-12

## 2017-07-14 ENCOUNTER — TELEPHONE (OUTPATIENT)
Dept: ORTHOPEDIC SURGERY | Facility: CLINIC | Age: 57
End: 2017-07-14

## 2017-07-18 ENCOUNTER — TELEPHONE (OUTPATIENT)
Dept: INTERNAL MEDICINE | Facility: CLINIC | Age: 57
End: 2017-07-18

## 2017-07-18 DIAGNOSIS — Z96.652 STATUS POST TOTAL KNEE REPLACEMENT, LEFT: Primary | ICD-10-CM

## 2017-07-18 RX ORDER — OXYCODONE AND ACETAMINOPHEN 10; 325 MG/1; MG/1
1 TABLET ORAL EVERY 8 HOURS PRN
Qty: 80 TABLET | Refills: 0 | Status: SHIPPED | OUTPATIENT
Start: 2017-07-18 | End: 2017-07-18

## 2017-07-18 RX ORDER — OXYCODONE AND ACETAMINOPHEN 10; 325 MG/1; MG/1
1 TABLET ORAL EVERY 8 HOURS PRN
Qty: 80 TABLET | Refills: 0 | Status: SHIPPED | OUTPATIENT
Start: 2017-07-18 | End: 2017-08-07 | Stop reason: SDUPTHER

## 2017-07-18 NOTE — TELEPHONE ENCOUNTER
PT NEW DR. STATED TO PT TO NOT HAVE A COLOSCOPY DUE TO HIS KNEE PROBLEMS OF SWELLING AND PAIN. PT ASKING IF PT SHOULD WAIT TO DO COLONOSCOPY AFTER KNEE SURGERY?  PLEASE ADVISE     PT WENT TO SEE AN INFECTIOUS DISEASE DR ROMERO  LAST WEEK  AND STATED KNEE IS NOT INFECTED AND REPORT IS IN [facility].

## 2017-07-18 NOTE — TELEPHONE ENCOUNTER
Need to start weaning him off this medicine.  Change to q 8 hours, notify him of the change and let him know that he can pick it up at EP tomorrow.  Please explain that we are going to try to start to taper him off this medicine.  Thanks.

## 2017-07-18 NOTE — TELEPHONE ENCOUNTER
----- Message from Saige Leyva sent at 7/18/2017 10:43 AM EDT -----  Contact:  pt - Dr humphrey' pt - RE: Dr Caban to return call  Pt calling and would like a return call regarding pt's knee. Pt was given recommendations and pt would like to discuss. Could you please call pt to discuss? please advise. Thanks    Pt would like for Dr Caban to return call to pt      Pt # [phone]  Called patient today about his ongoing knee pain.  He tried to get a consultation done at [facility] but was unable to be seen there in a timely fashion.  He has called Dr. Harrell and set up an appointment to see him in 2 weeks to get a second opinion from him and to switch his care to Dr. Harrell.  He feels he will need a revision of his knee replacement but does not want to have this done until after his daughter's wedding in October.  He is advised to keep in contact with us about his ongoing problems and to arrange for ongoing pain medications from Dr. Harrell and to arrange for ongoing pain medications from Dr. Harrell.

## 2017-07-27 DIAGNOSIS — E11.9 DIABETES MELLITUS TYPE 2, CONTROLLED, WITHOUT COMPLICATIONS (HCC): ICD-10-CM

## 2017-07-27 DIAGNOSIS — I10 ACCELERATED HYPERTENSION: ICD-10-CM

## 2017-07-27 DIAGNOSIS — E66.01 MORBID OBESITY DUE TO EXCESS CALORIES (HCC): ICD-10-CM

## 2017-07-27 DIAGNOSIS — R52 PAIN: ICD-10-CM

## 2017-07-27 DIAGNOSIS — R79.89 LOW TESTOSTERONE: ICD-10-CM

## 2017-07-27 DIAGNOSIS — N52.8 OTHER MALE ERECTILE DYSFUNCTION: ICD-10-CM

## 2017-07-27 DIAGNOSIS — E55.9 VITAMIN D DEFICIENCY: ICD-10-CM

## 2017-07-27 DIAGNOSIS — E78.5 DYSLIPIDEMIA: ICD-10-CM

## 2017-07-27 DIAGNOSIS — R53.82 CHRONIC FATIGUE: ICD-10-CM

## 2017-07-28 RX ORDER — PIOGLITAZONEHYDROCHLORIDE 30 MG/1
TABLET ORAL
Qty: 30 TABLET | Refills: 0 | Status: SHIPPED | OUTPATIENT
Start: 2017-07-28 | End: 2017-08-29 | Stop reason: SDUPTHER

## 2017-07-28 RX ORDER — ICOSAPENT ETHYL 1000 MG/1
CAPSULE ORAL
Qty: 120 CAPSULE | Refills: 0 | Status: SHIPPED | OUTPATIENT
Start: 2017-07-28 | End: 2017-08-27 | Stop reason: SDUPTHER

## 2017-07-28 RX ORDER — MELOXICAM 15 MG/1
TABLET ORAL
Qty: 30 TABLET | Refills: 0 | Status: SHIPPED | OUTPATIENT
Start: 2017-07-28 | End: 2017-08-29 | Stop reason: SDUPTHER

## 2017-07-28 RX ORDER — FENOFIBRATE 130 MG/1
CAPSULE ORAL
Qty: 30 CAPSULE | Refills: 0 | Status: SHIPPED | OUTPATIENT
Start: 2017-07-28 | End: 2017-08-29 | Stop reason: SDUPTHER

## 2017-07-28 RX ORDER — DAPAGLIFLOZIN 10 MG/1
TABLET, FILM COATED ORAL
Qty: 30 TABLET | Refills: 0 | Status: SHIPPED | OUTPATIENT
Start: 2017-07-28 | End: 2017-08-29 | Stop reason: SDUPTHER

## 2017-08-01 ENCOUNTER — TELEPHONE (OUTPATIENT)
Dept: INTERNAL MEDICINE | Facility: CLINIC | Age: 57
End: 2017-08-01

## 2017-08-01 NOTE — TELEPHONE ENCOUNTER
----- Message from Saige Leyva sent at 8/1/2017  8:35 AM EDT -----  Contact: pt - Dr Hall' pt - RE: discuss med's  Pt calling and would like a return call regarding pain med's. Pt would like a return call to discuss. Could you please return call to pt? Please advise. Thanks        Pt # [phone]

## 2017-08-07 ENCOUNTER — TELEPHONE (OUTPATIENT)
Dept: ORTHOPEDIC SURGERY | Facility: CLINIC | Age: 57
End: 2017-08-07

## 2017-08-07 DIAGNOSIS — R52 PAIN: Primary | ICD-10-CM

## 2017-08-07 RX ORDER — OXYCODONE AND ACETAMINOPHEN 10; 325 MG/1; MG/1
1 TABLET ORAL EVERY 8 HOURS PRN
Qty: 30 TABLET | Refills: 0 | Status: SHIPPED | OUTPATIENT
Start: 2017-08-07 | End: 2017-08-07 | Stop reason: SDUPTHER

## 2017-08-07 RX ORDER — OXYCODONE AND ACETAMINOPHEN 10; 325 MG/1; MG/1
1 TABLET ORAL EVERY 4 HOURS PRN
Qty: 80 TABLET | Refills: 0 | Status: SHIPPED | OUTPATIENT
Start: 2017-08-07 | End: 2017-08-21 | Stop reason: SDUPTHER

## 2017-08-18 ENCOUNTER — TELEPHONE (OUTPATIENT)
Dept: ORTHOPEDIC SURGERY | Facility: CLINIC | Age: 57
End: 2017-08-18

## 2017-08-18 NOTE — TELEPHONE ENCOUNTER
I spoke with him and had a lengthy discussion.  I counseled him that we need to try to wean him off of the pain medicine.  Our plan at this point is that I will agree to refill his Percocet 10 mg to take every 8 hours for 1 month.  We will then go down to every 12 hours.  We will then switch him to the 7.5's followed by the 5 mg.  After that, he will come off of the medicine.  He agrees to this plan.  He will  a prescription on Monday.

## 2017-08-21 ENCOUNTER — TELEPHONE (OUTPATIENT)
Dept: ORTHOPEDIC SURGERY | Facility: CLINIC | Age: 57
End: 2017-08-21

## 2017-08-21 DIAGNOSIS — R52 PAIN: Primary | ICD-10-CM

## 2017-08-21 RX ORDER — OXYCODONE AND ACETAMINOPHEN 10; 325 MG/1; MG/1
1 TABLET ORAL EVERY 8 HOURS PRN
Qty: 90 TABLET | Refills: 0 | Status: SHIPPED | OUTPATIENT
Start: 2017-08-21 | End: 2017-09-11 | Stop reason: SDUPTHER

## 2017-08-28 RX ORDER — SAXAGLIPTIN AND METFORMIN HYDROCHLORIDE 2.5; 1 MG/1; MG/1
TABLET, FILM COATED, EXTENDED RELEASE ORAL
Qty: 60 TABLET | Refills: 1 | Status: SHIPPED | OUTPATIENT
Start: 2017-08-28 | End: 2017-09-25 | Stop reason: SDUPTHER

## 2017-08-28 RX ORDER — ICOSAPENT ETHYL 1000 MG/1
CAPSULE ORAL
Qty: 120 CAPSULE | Refills: 0 | Status: SHIPPED | OUTPATIENT
Start: 2017-08-28 | End: 2017-09-25 | Stop reason: SDUPTHER

## 2017-08-29 DIAGNOSIS — R52 PAIN: ICD-10-CM

## 2017-08-29 DIAGNOSIS — N52.8 OTHER MALE ERECTILE DYSFUNCTION: ICD-10-CM

## 2017-08-29 DIAGNOSIS — E78.5 DYSLIPIDEMIA: ICD-10-CM

## 2017-08-29 DIAGNOSIS — R79.89 LOW TESTOSTERONE: ICD-10-CM

## 2017-08-29 DIAGNOSIS — R53.82 CHRONIC FATIGUE: ICD-10-CM

## 2017-08-29 DIAGNOSIS — E66.01 MORBID OBESITY DUE TO EXCESS CALORIES (HCC): ICD-10-CM

## 2017-08-29 DIAGNOSIS — E55.9 VITAMIN D DEFICIENCY: ICD-10-CM

## 2017-08-29 DIAGNOSIS — E11.9 DIABETES MELLITUS TYPE 2, CONTROLLED, WITHOUT COMPLICATIONS (HCC): ICD-10-CM

## 2017-08-29 DIAGNOSIS — I10 ACCELERATED HYPERTENSION: ICD-10-CM

## 2017-08-29 RX ORDER — FENOFIBRATE 130 MG/1
CAPSULE ORAL
Qty: 30 CAPSULE | Refills: 0 | Status: SHIPPED | OUTPATIENT
Start: 2017-08-29 | End: 2017-09-25 | Stop reason: SDUPTHER

## 2017-08-29 RX ORDER — PIOGLITAZONEHYDROCHLORIDE 30 MG/1
TABLET ORAL
Qty: 30 TABLET | Refills: 0 | Status: SHIPPED | OUTPATIENT
Start: 2017-08-29 | End: 2017-09-25 | Stop reason: SDUPTHER

## 2017-08-29 RX ORDER — DAPAGLIFLOZIN 10 MG/1
TABLET, FILM COATED ORAL
Qty: 30 TABLET | Refills: 0 | Status: SHIPPED | OUTPATIENT
Start: 2017-08-29 | End: 2017-09-25 | Stop reason: SDUPTHER

## 2017-08-29 RX ORDER — MELOXICAM 15 MG/1
TABLET ORAL
Qty: 30 TABLET | Refills: 0 | Status: SHIPPED | OUTPATIENT
Start: 2017-08-29 | End: 2017-09-23 | Stop reason: SDUPTHER

## 2017-08-29 NOTE — TELEPHONE ENCOUNTER
Pt would like a refill on Mobic Dr. [facility] in OR today can you approve and refill this medication for patient? Please advise thanks-lck

## 2017-09-11 ENCOUNTER — TELEPHONE (OUTPATIENT)
Dept: ORTHOPEDIC SURGERY | Facility: CLINIC | Age: 57
End: 2017-09-11

## 2017-09-11 RX ORDER — OXYCODONE AND ACETAMINOPHEN 10; 325 MG/1; MG/1
1 TABLET ORAL EVERY 12 HOURS
Qty: 80 TABLET | Refills: 0 | Status: SHIPPED | OUTPATIENT
Start: 2017-09-11 | End: 2017-10-26

## 2017-09-11 NOTE — TELEPHONE ENCOUNTER
Incoming call:  Patient request refill: Percocet 10/325mg., si po q 8hrs. prn pain, #90 written on . Says will run out of rx on Friday 9/15.         [facility] LAST FILL OXYCODONE APAP 10/325MG QTY 90

## 2017-09-12 ENCOUNTER — LAB (OUTPATIENT)
Dept: ENDOCRINOLOGY | Age: 57
End: 2017-09-12

## 2017-09-12 DIAGNOSIS — I10 BENIGN ESSENTIAL HTN: ICD-10-CM

## 2017-09-12 DIAGNOSIS — R79.89 LOW TESTOSTERONE: ICD-10-CM

## 2017-09-12 DIAGNOSIS — E78.5 DYSLIPIDEMIA: ICD-10-CM

## 2017-09-12 DIAGNOSIS — E55.9 VITAMIN D DEFICIENCY: ICD-10-CM

## 2017-09-12 DIAGNOSIS — N52.9 ERECTILE DYSFUNCTION, UNSPECIFIED ERECTILE DYSFUNCTION TYPE: ICD-10-CM

## 2017-09-12 DIAGNOSIS — IMO0001 UNCONTROLLED TYPE 2 DIABETES MELLITUS WITHOUT COMPLICATION, WITH LONG-TERM CURRENT USE OF INSULIN: ICD-10-CM

## 2017-09-12 DIAGNOSIS — IMO0002 DIABETES MELLITUS TYPE 2, UNCONTROLLED: ICD-10-CM

## 2017-09-14 LAB
25(OH)D3+25(OH)D2 SERPL-MCNC: 43.4 NG/ML (ref 30–100)
ALBUMIN SERPL-MCNC: 4.5 G/DL (ref 3.5–5.2)
ALBUMIN/GLOB SERPL: 2 G/DL
ALP SERPL-CCNC: 36 U/L (ref 39–117)
ALT SERPL-CCNC: 51 U/L (ref 1–41)
AST SERPL-CCNC: 34 U/L (ref 1–40)
BASOPHILS # BLD AUTO: 0.04 10*3/MM3 (ref 0–0.2)
BASOPHILS NFR BLD AUTO: 0.5 % (ref 0–1.5)
BILIRUB SERPL-MCNC: 0.5 MG/DL (ref 0.1–1.2)
BUN SERPL-MCNC: 18 MG/DL (ref 6–20)
BUN/CREAT SERPL: 17.3 (ref 7–25)
C PEPTIDE SERPL-MCNC: 5.9 NG/ML (ref 1.1–4.4)
CALCIUM SERPL-MCNC: 10 MG/DL (ref 8.6–10.5)
CHLORIDE SERPL-SCNC: 96 MMOL/L (ref 98–107)
CHOLEST SERPL-MCNC: 92 MG/DL (ref 0–200)
CO2 SERPL-SCNC: 27.3 MMOL/L (ref 22–29)
CREAT SERPL-MCNC: 1.04 MG/DL (ref 0.76–1.27)
EOSINOPHIL # BLD AUTO: 0.31 10*3/MM3 (ref 0–0.7)
EOSINOPHIL NFR BLD AUTO: 4.1 % (ref 0.3–6.2)
ERYTHROCYTE [DISTWIDTH] IN BLOOD BY AUTOMATED COUNT: 15.4 % (ref 11.5–14.5)
GLOBULIN SER CALC-MCNC: 2.3 GM/DL
GLUCOSE SERPL-MCNC: 298 MG/DL (ref 65–99)
HBA1C MFR BLD: 8.2 % (ref 4.8–5.6)
HCT VFR BLD AUTO: 44.4 % (ref 40.4–52.2)
HDLC SERPL-MCNC: 27 MG/DL (ref 40–60)
HGB BLD-MCNC: 14.6 G/DL (ref 13.7–17.6)
IMM GRANULOCYTES # BLD: 0.04 10*3/MM3 (ref 0–0.03)
IMM GRANULOCYTES NFR BLD: 0.5 % (ref 0–0.5)
LDLC SERPL CALC-MCNC: 20 MG/DL (ref 0–100)
LYMPHOCYTES # BLD AUTO: 2.95 10*3/MM3 (ref 0.9–4.8)
LYMPHOCYTES NFR BLD AUTO: 38.6 % (ref 19.6–45.3)
MCH RBC QN AUTO: 31.2 PG (ref 27–32.7)
MCHC RBC AUTO-ENTMCNC: 32.9 G/DL (ref 32.6–36.4)
MCV RBC AUTO: 94.9 FL (ref 79.8–96.2)
MICROALBUMIN UR-MCNC: 4 UG/ML
MONOCYTES # BLD AUTO: 0.64 10*3/MM3 (ref 0.2–1.2)
MONOCYTES NFR BLD AUTO: 8.4 % (ref 5–12)
NEUTROPHILS # BLD AUTO: 3.67 10*3/MM3 (ref 1.9–8.1)
NEUTROPHILS NFR BLD AUTO: 47.9 % (ref 42.7–76)
PLATELET # BLD AUTO: 227 10*3/MM3 (ref 140–500)
POTASSIUM SERPL-SCNC: 4.2 MMOL/L (ref 3.5–5.2)
PROT SERPL-MCNC: 6.8 G/DL (ref 6–8.5)
RBC # BLD AUTO: 4.68 10*6/MM3 (ref 4.6–6)
SHBG SERPL-SCNC: 46.4 NMOL/L (ref 19.3–76.4)
SODIUM SERPL-SCNC: 141 MMOL/L (ref 136–145)
T3FREE SERPL-MCNC: 2.8 PG/ML (ref 2–4.4)
T4 FREE SERPL-MCNC: 1.13 NG/DL (ref 0.93–1.7)
T4 SERPL-MCNC: 7.02 MCG/DL (ref 4.5–11.7)
TESTOST FREE SERPL-MCNC: 4.7 PG/ML (ref 7.2–24)
TESTOST SERPL-MCNC: 259 NG/DL (ref 264–916)
TRIGL SERPL-MCNC: 225 MG/DL (ref 0–150)
TSH SERPL DL<=0.005 MIU/L-ACNC: 2.33 MIU/ML (ref 0.27–4.2)
URATE SERPL-MCNC: 5.8 MG/DL (ref 3.4–7)
VLDLC SERPL CALC-MCNC: 45 MG/DL (ref 5–40)
WBC # BLD AUTO: 7.65 10*3/MM3 (ref 4.5–10.7)

## 2017-09-23 DIAGNOSIS — E78.5 DYSLIPIDEMIA: ICD-10-CM

## 2017-09-23 DIAGNOSIS — R53.82 CHRONIC FATIGUE: ICD-10-CM

## 2017-09-23 DIAGNOSIS — E55.9 VITAMIN D DEFICIENCY: ICD-10-CM

## 2017-09-23 DIAGNOSIS — N52.8 OTHER MALE ERECTILE DYSFUNCTION: ICD-10-CM

## 2017-09-23 DIAGNOSIS — E66.01 MORBID OBESITY DUE TO EXCESS CALORIES (HCC): ICD-10-CM

## 2017-09-23 DIAGNOSIS — I10 ACCELERATED HYPERTENSION: ICD-10-CM

## 2017-09-23 DIAGNOSIS — R52 PAIN: ICD-10-CM

## 2017-09-23 DIAGNOSIS — R79.89 LOW TESTOSTERONE: ICD-10-CM

## 2017-09-23 DIAGNOSIS — E11.9 DIABETES MELLITUS TYPE 2, CONTROLLED, WITHOUT COMPLICATIONS (HCC): ICD-10-CM

## 2017-09-23 RX ORDER — FENOFIBRATE 130 MG/1
CAPSULE ORAL
Qty: 30 CAPSULE | Refills: 0 | Status: CANCELLED | OUTPATIENT
Start: 2017-09-23

## 2017-09-23 RX ORDER — ICOSAPENT ETHYL 1000 MG/1
CAPSULE ORAL
Qty: 120 CAPSULE | Refills: 0 | Status: CANCELLED | OUTPATIENT
Start: 2017-09-23

## 2017-09-23 RX ORDER — ATORVASTATIN CALCIUM 80 MG/1
TABLET, FILM COATED ORAL
Qty: 90 TABLET | Refills: 0 | Status: CANCELLED | OUTPATIENT
Start: 2017-09-23

## 2017-09-23 RX ORDER — PIOGLITAZONEHYDROCHLORIDE 30 MG/1
TABLET ORAL
Qty: 30 TABLET | Refills: 0 | Status: CANCELLED | OUTPATIENT
Start: 2017-09-23

## 2017-09-23 RX ORDER — DAPAGLIFLOZIN 10 MG/1
TABLET, FILM COATED ORAL
Qty: 30 TABLET | Refills: 0 | Status: CANCELLED | OUTPATIENT
Start: 2017-09-23

## 2017-09-25 ENCOUNTER — OFFICE VISIT (OUTPATIENT)
Dept: ENDOCRINOLOGY | Age: 57
End: 2017-09-25

## 2017-09-25 VITALS
BODY MASS INDEX: 38.48 KG/M2 | RESPIRATION RATE: 16 BRPM | HEIGHT: 70 IN | DIASTOLIC BLOOD PRESSURE: 70 MMHG | SYSTOLIC BLOOD PRESSURE: 118 MMHG | WEIGHT: 268.8 LBS

## 2017-09-25 DIAGNOSIS — I10 ACCELERATED HYPERTENSION: ICD-10-CM

## 2017-09-25 DIAGNOSIS — R79.89 LOW TESTOSTERONE: ICD-10-CM

## 2017-09-25 DIAGNOSIS — R53.82 CHRONIC FATIGUE: ICD-10-CM

## 2017-09-25 DIAGNOSIS — E55.9 VITAMIN D DEFICIENCY: ICD-10-CM

## 2017-09-25 DIAGNOSIS — E66.01 MORBID OBESITY DUE TO EXCESS CALORIES (HCC): ICD-10-CM

## 2017-09-25 DIAGNOSIS — E78.1 ESSENTIAL HYPERTRIGLYCERIDEMIA: ICD-10-CM

## 2017-09-25 DIAGNOSIS — I10 BENIGN ESSENTIAL HTN: ICD-10-CM

## 2017-09-25 DIAGNOSIS — E78.5 DYSLIPIDEMIA: ICD-10-CM

## 2017-09-25 DIAGNOSIS — N52.8 OTHER MALE ERECTILE DYSFUNCTION: ICD-10-CM

## 2017-09-25 DIAGNOSIS — E11.42 DIABETIC PERIPHERAL NEUROPATHY (HCC): ICD-10-CM

## 2017-09-25 DIAGNOSIS — IMO0001 UNCONTROLLED TYPE 2 DIABETES MELLITUS WITHOUT COMPLICATION, WITH LONG-TERM CURRENT USE OF INSULIN: Primary | ICD-10-CM

## 2017-09-25 DIAGNOSIS — Z79.4 CONTROLLED TYPE 2 DIABETES MELLITUS WITHOUT COMPLICATION, WITH LONG-TERM CURRENT USE OF INSULIN (HCC): ICD-10-CM

## 2017-09-25 DIAGNOSIS — E11.9 CONTROLLED TYPE 2 DIABETES MELLITUS WITHOUT COMPLICATION, WITH LONG-TERM CURRENT USE OF INSULIN (HCC): ICD-10-CM

## 2017-09-25 PROCEDURE — 99214 OFFICE O/P EST MOD 30 MIN: CPT | Performed by: INTERNAL MEDICINE

## 2017-09-25 RX ORDER — TESTOSTERONE CYPIONATE 200 MG/ML
200 INJECTION, SOLUTION INTRAMUSCULAR WEEKLY
Qty: 10 ML | Refills: 3 | Status: SHIPPED | OUTPATIENT
Start: 2017-09-25 | End: 2018-03-26 | Stop reason: SDUPTHER

## 2017-09-25 RX ORDER — SYRINGE W-NEEDLE,DISPOSAB,3 ML 25GX5/8"
SYRINGE, EMPTY DISPOSABLE MISCELLANEOUS
Qty: 10 EACH | Refills: 3 | Status: SHIPPED | OUTPATIENT
Start: 2017-09-25 | End: 2017-09-26 | Stop reason: SDUPTHER

## 2017-09-25 RX ORDER — FENOFIBRATE 130 MG/1
130 CAPSULE ORAL
Qty: 30 CAPSULE | Refills: 5 | Status: SHIPPED | OUTPATIENT
Start: 2017-09-25 | End: 2018-03-26 | Stop reason: SDUPTHER

## 2017-09-25 RX ORDER — ATORVASTATIN CALCIUM 80 MG/1
80 TABLET, FILM COATED ORAL NIGHTLY
Qty: 90 TABLET | Refills: 3 | Status: SHIPPED | OUTPATIENT
Start: 2017-09-25 | End: 2017-12-19 | Stop reason: SDUPTHER

## 2017-09-25 RX ORDER — SAXAGLIPTIN AND METFORMIN HYDROCHLORIDE 2.5; 1 MG/1; MG/1
TABLET, FILM COATED, EXTENDED RELEASE ORAL
Qty: 60 TABLET | Refills: 1 | Status: SHIPPED | OUTPATIENT
Start: 2017-09-25 | End: 2017-12-28 | Stop reason: SDUPTHER

## 2017-09-25 RX ORDER — DAPAGLIFLOZIN 10 MG/1
TABLET, FILM COATED ORAL
Qty: 30 TABLET | Refills: 5 | Status: SHIPPED | OUTPATIENT
Start: 2017-09-25 | End: 2018-03-26 | Stop reason: SDUPTHER

## 2017-09-25 RX ORDER — ICOSAPENT ETHYL 1000 MG/1
2 CAPSULE ORAL 2 TIMES DAILY WITH MEALS
Qty: 120 CAPSULE | Refills: 5 | Status: SHIPPED | OUTPATIENT
Start: 2017-09-25 | End: 2018-02-05

## 2017-09-25 RX ORDER — MELOXICAM 15 MG/1
TABLET ORAL
Qty: 90 TABLET | Refills: 3 | Status: SHIPPED | OUTPATIENT
Start: 2017-09-25 | End: 2017-12-08

## 2017-09-25 RX ORDER — LANCETS 33 GAUGE
EACH MISCELLANEOUS
Qty: 100 EACH | Refills: 5 | Status: SHIPPED | OUTPATIENT
Start: 2017-09-25 | End: 2018-11-01 | Stop reason: SDUPTHER

## 2017-09-25 RX ORDER — QUETIAPINE FUMARATE 25 MG/1
25 TABLET, FILM COATED ORAL NIGHTLY
Qty: 90 TABLET | Refills: 3 | Status: SHIPPED | OUTPATIENT
Start: 2017-09-25 | End: 2017-12-08

## 2017-09-25 RX ORDER — PIOGLITAZONEHYDROCHLORIDE 30 MG/1
30 TABLET ORAL DAILY
Qty: 30 TABLET | Refills: 5 | Status: SHIPPED | OUTPATIENT
Start: 2017-09-25 | End: 2017-10-30 | Stop reason: SDUPTHER

## 2017-09-25 RX ORDER — ERGOCALCIFEROL 1.25 MG/1
2 CAPSULE ORAL WEEKLY
Qty: 26 CAPSULE | Refills: 3 | Status: SHIPPED | OUTPATIENT
Start: 2017-09-25 | End: 2018-11-01 | Stop reason: SDUPTHER

## 2017-09-25 NOTE — PROGRESS NOTES
Subjective   Aaron Latif is a 57 y.o. male seen for follow up for DM2, hyperlipidemia, HTN, vit d deficiency, lab review. Patient is checking BG once a day in the AM and states that his BG is running . He denies any problems or concerns.      History of Present Illness this is a 57-year-old gentleman known patient with type II diabetes hypertension and dyslipidemia as well as vitamin D deficiency and low testosterone and chronic fatigue.  Over the course of last 6 months he has had no significant health problems for which to go to the emergency room or hospital.  He has not taken any testosterone and complains of being very tired and depressed and lacking to zeal and energy to get things done.       Allergies   Allergen Reactions   • Codeine Other (See Comments)     UNKNOWN       Current Outpatient Prescriptions:   •  aspirin  MG EC tablet, Take 1 tablet by mouth Daily., Disp: 30 tablet, Rfl: 0  •  atorvastatin (LIPITOR) 80 MG tablet, TAKE ONE TABLET BY MOUTH DAILY, Disp: 90 tablet, Rfl: 0  •  ergocalciferol (ERGOCALCIFEROL) 82933 UNITS capsule, Take 2 capsules by mouth 1 (one) time per week. (Patient taking differently: Take 2 capsules by mouth 1 (one) time per week. THURSDAYS), Disp: 26 capsule, Rfl: 3  •  FARXIGA 10 MG tablet, TAKE ONE TABLET BY MOUTH DAILY, Disp: 30 tablet, Rfl: 0  •  fenofibrate micronized (ANTARA) 130 MG capsule, TAKE ONE CAPSULE BY MOUTH EVERY MORNING BEFORE BREAKFAST, Disp: 30 capsule, Rfl: 0  •  hydrochlorothiazide (HYDRODIURIL) 25 MG tablet, TAKE ONE TABLET BY MOUTH EVERY MORNING, Disp: 30 tablet, Rfl: 6  •  Insulin Glargine (TOUJEO SOLOSTAR) 300 UNIT/ML solution pen-injector, Inject 75 Units under the skin Every Morning., Disp: , Rfl:   •  Insulin Pen Needle 31G X 8 MM misc, Use once a day, Disp: 100 each, Rfl: 0  •  KOMBIGLYZE XR 2.5-1000 MG tablet sustained-release 24 hour, TAKE ONE TABLET BY MOUTH TWICE A DAY WITH BREAKFAST AND SUPPER, Disp: 60 tablet, Rfl: 1  •  LYRICA  "200 MG capsule, TAKE ONE CAPSULE BY MOUTH TWICE A DAY, Disp: 60 capsule, Rfl: 1  •  meloxicam (MOBIC) 15 MG tablet, TAKE ONE TABLET BY MOUTH DAILY WITH FOOD AS NEEDED FOR MODERATE PAIN, Disp: 90 tablet, Rfl: 3  •  metoprolol succinate XL (TOPROL-XL) 50 MG 24 hr tablet, TAKE ONE TABLET BY MOUTH DAILY, Disp: 30 tablet, Rfl: 6  •  ONE TOUCH ULTRA TEST test strip, TEST 4 TIMES DAILY, Disp: 400 each, Rfl: 1  •  ONETOUCH DELICA LANCETS 33G misc, 3 (three) times a day., Disp: , Rfl:   •  oxyCODONE-acetaminophen (PERCOCET)  MG per tablet, Take 1 tablet by mouth Every 12 (Twelve) Hours., Disp: 80 tablet, Rfl: 0  •  pioglitazone (ACTOS) 30 MG tablet, TAKE ONE TABLET BY MOUTH DAILY, Disp: 30 tablet, Rfl: 0  •  Syringe/Needle, Disp, 23G X 1\" 3 ML misc, 4 (four) times a day., Disp: , Rfl:   •  TOUJEO SOLOSTAR 300 UNIT/ML solution pen-injector, INJECT 75 UNITS UNDER THE SKIN DAILY, Disp: 9 pen, Rfl: 3  •  VASCEPA 1 g capsule capsule, TAKE TWO CAPSULES BY MOUTH TWICE A DAY WITH MEALS, Disp: 120 capsule, Rfl: 0  •  verapamil SR (CALAN-SR) 240 MG CR tablet, TAKE ONE TABLET BY MOUTH DAILY, Disp: 30 tablet, Rfl: 6      The following portions of the patient's history were reviewed and updated as appropriate: allergies, current medications, past family history, past medical history, past social history, past surgical history and problem list.    Review of Systems   Constitutional: Negative.    HENT: Negative.    Eyes: Negative.    Respiratory: Negative.    Cardiovascular: Negative.    Gastrointestinal: Negative.    Endocrine: Negative.    Genitourinary: Negative.    Musculoskeletal: Negative.    Skin: Negative.    Allergic/Immunologic: Negative.    Neurological: Negative.    Hematological: Negative.    Psychiatric/Behavioral: Negative.        Objective   Physical Exam   Constitutional: He appears well-developed and well-nourished. No distress.   HENT:   Head: Normocephalic and atraumatic.   Right Ear: External ear normal.   Left " Ear: External ear normal.   Nose: Nose normal.   Mouth/Throat: Oropharynx is clear and moist. No oropharyngeal exudate.   Eyes: Conjunctivae and EOM are normal. Pupils are equal, round, and reactive to light. Right eye exhibits no discharge. Left eye exhibits no discharge. No scleral icterus.   Neck: Normal range of motion. Neck supple. No JVD present. No tracheal deviation present. No thyromegaly present.   Cardiovascular: Normal rate, regular rhythm, normal heart sounds and intact distal pulses.  Exam reveals no gallop and no friction rub.    No murmur heard.  Pulmonary/Chest: Effort normal and breath sounds normal. No stridor. No respiratory distress. He has no wheezes. He has no rales. He exhibits no tenderness.   Abdominal: Soft. Bowel sounds are normal. He exhibits no distension and no mass. There is no tenderness. There is no rebound and no guarding. No hernia.   Musculoskeletal: Normal range of motion. He exhibits no edema, tenderness or deformity.   Healed the scar of the knee surgery in the anterior aspect of the left knee.   Lymphadenopathy:     He has no cervical adenopathy.   Neurological: He is alert. He has normal reflexes. He displays normal reflexes. No cranial nerve deficit. He exhibits normal muscle tone. Coordination normal.   Skin: Skin is warm and dry. No rash noted. He is not diaphoretic. No erythema. No pallor.   Psychiatric: He has a normal mood and affect. His behavior is normal. Judgment and thought content normal.   Nursing note and vitals reviewed.    Results for orders placed or performed in visit on 09/12/17   T3, Free   Result Value Ref Range    T3, Free 2.8 2.0 - 4.4 pg/mL   T4 & TSH ([facility])   Result Value Ref Range    TSH 2.330 0.270 - 4.200 mIU/mL    T4, Total 7.02 4.50 - 11.70 mcg/dL   T4, Free   Result Value Ref Range    Free T4 1.13 0.93 - 1.70 ng/dL   TestT+TestF+SHBG   Result Value Ref Range    Testosterone, Total 259 (L) 264 - 916 ng/dL    Testosterone, Free 4.7 (L) 7.2 -  24.0 pg/mL    Sex Hormone Binding Globulin 46.4 19.3 - 76.4 nmol/L   Uric Acid   Result Value Ref Range    Uric Acid 5.8 3.4 - 7.0 mg/dL   Vitamin D 25 Hydroxy   Result Value Ref Range    25 Hydroxy, Vitamin D 43.4 30.0 - 100.0 ng/mL   Comprehensive Metabolic Panel   Result Value Ref Range    Glucose 298 (H) 65 - 99 mg/dL    BUN 18 6 - 20 mg/dL    Creatinine 1.04 0.76 - 1.27 mg/dL    eGFR Non African Am 73 >60 mL/min/1.73    eGFR African Am 89 >60 mL/min/1.73    BUN/Creatinine Ratio 17.3 7.0 - 25.0    Sodium 141 136 - 145 mmol/L    Potassium 4.2 3.5 - 5.2 mmol/L    Chloride 96 (L) 98 - 107 mmol/L    Total CO2 27.3 22.0 - 29.0 mmol/L    Calcium 10.0 8.6 - 10.5 mg/dL    Total Protein 6.8 6.0 - 8.5 g/dL    Albumin 4.50 3.50 - 5.20 g/dL    Globulin 2.3 gm/dL    A/G Ratio 2.0 g/dL    Total Bilirubin 0.5 0.1 - 1.2 mg/dL    Alkaline Phosphatase 36 (L) 39 - 117 U/L    AST (SGOT) 34 1 - 40 U/L    ALT (SGPT) 51 (H) 1 - 41 U/L   C-Peptide   Result Value Ref Range    C-Peptide 5.9 (H) 1.1 - 4.4 ng/mL   Hemoglobin A1c   Result Value Ref Range    Hemoglobin A1C 8.20 (H) 4.80 - 5.60 %   Lipid Panel   Result Value Ref Range    Total Cholesterol 92 0 - 200 mg/dL    Triglycerides 225 (H) 0 - 150 mg/dL    HDL Cholesterol 27 (L) 40 - 60 mg/dL    VLDL Cholesterol 45 (H) 5 - 40 mg/dL    LDL Cholesterol  20 0 - 100 mg/dL   MicroAlbumin, Urine, Random   Result Value Ref Range    Microalbumin, Urine 4.0 Not Estab. ug/mL   CBC & Differential   Result Value Ref Range    WBC 7.65 4.50 - 10.70 10*3/mm3    RBC 4.68 4.60 - 6.00 10*6/mm3    Hemoglobin 14.6 13.7 - 17.6 g/dL    Hematocrit 44.4 40.4 - 52.2 %    MCV 94.9 79.8 - 96.2 fL    MCH 31.2 27.0 - 32.7 pg    MCHC 32.9 32.6 - 36.4 g/dL    RDW 15.4 (H) 11.5 - 14.5 %    Platelets 227 140 - 500 10*3/mm3    Neutrophil Rel % 47.9 42.7 - 76.0 %    Lymphocyte Rel % 38.6 19.6 - 45.3 %    Monocyte Rel % 8.4 5.0 - 12.0 %    Eosinophil Rel % 4.1 0.3 - 6.2 %    Basophil Rel % 0.5 0.0 - 1.5 %    Neutrophils  Absolute 3.67 1.90 - 8.10 10*3/mm3    Lymphocytes Absolute 2.95 0.90 - 4.80 10*3/mm3    Monocytes Absolute 0.64 0.20 - 1.20 10*3/mm3    Eosinophils Absolute 0.31 0.00 - 0.70 10*3/mm3    Basophils Absolute 0.04 0.00 - 0.20 10*3/mm3    Immature Granulocyte Rel % 0.5 0.0 - 0.5 %    Immature Grans Absolute 0.04 (H) 0.00 - 0.03 10*3/mm3       Assessment/Plan   Diagnoses and all orders for this visit:    Uncontrolled type 2 diabetes mellitus without complication, with long-term current use of insulin  -     T4 & TSH ([facility]); Future  -     Uric Acid; Future  -     TestT+TestF+SHBG; Future  -     Comprehensive Metabolic Panel; Future  -     C-Peptide; Future  -     Hemoglobin A1c; Future  -     Lipid Panel; Future  -     MicroAlbumin, Urine, Random; Future  -     PSA; Future  -     Hemoglobin & Hematocrit, Blood; Future    Diabetic peripheral neuropathy  -     T4 & TSH ([facility]); Future  -     Uric Acid; Future  -     TestT+TestF+SHBG; Future  -     Comprehensive Metabolic Panel; Future  -     C-Peptide; Future  -     Hemoglobin A1c; Future  -     Lipid Panel; Future  -     MicroAlbumin, Urine, Random; Future  -     PSA; Future  -     Hemoglobin & Hematocrit, Blood; Future    Low testosterone  -     Insulin Glargine (TOUJEO SOLOSTAR) 300 UNIT/ML solution pen-injector; Inject 80 Units under the skin Daily.  -     pioglitazone (ACTOS) 30 MG tablet; Take 1 tablet by mouth Daily.  -     fenofibrate micronized (ANTARA) 130 MG capsule; Take 1 capsule by mouth Every Morning Before Breakfast.  -     ergocalciferol (ERGOCALCIFEROL) 78823 units capsule; Take 2 capsules by mouth 1 (One) Time Per Week.  -     atorvastatin (LIPITOR) 80 MG tablet; Take 1 tablet by mouth Every Night.  -     T4 & TSH ([facility]); Future  -     Uric Acid; Future  -     TestT+TestF+SHBG; Future  -     Comprehensive Metabolic Panel; Future  -     C-Peptide; Future  -     Hemoglobin A1c; Future  -     Lipid Panel; Future  -     MicroAlbumin, Urine, Random;  Future  -     PSA; Future  -     Hemoglobin & Hematocrit, Blood; Future    Benign essential HTN  -     T4 & TSH ([facility]); Future  -     Uric Acid; Future  -     TestT+TestF+SHBG; Future  -     Comprehensive Metabolic Panel; Future  -     C-Peptide; Future  -     Hemoglobin A1c; Future  -     Lipid Panel; Future  -     MicroAlbumin, Urine, Random; Future  -     PSA; Future  -     Hemoglobin & Hematocrit, Blood; Future    Essential hypertriglyceridemia  -     T4 & TSH ([facility]); Future  -     Uric Acid; Future  -     TestT+TestF+SHBG; Future  -     Comprehensive Metabolic Panel; Future  -     C-Peptide; Future  -     Hemoglobin A1c; Future  -     Lipid Panel; Future  -     MicroAlbumin, Urine, Random; Future  -     PSA; Future  -     Hemoglobin & Hematocrit, Blood; Future    Morbid obesity due to excess calories  -     Insulin Glargine (TOUJEO SOLOSTAR) 300 UNIT/ML solution pen-injector; Inject 80 Units under the skin Daily.  -     pioglitazone (ACTOS) 30 MG tablet; Take 1 tablet by mouth Daily.  -     fenofibrate micronized (ANTARA) 130 MG capsule; Take 1 capsule by mouth Every Morning Before Breakfast.  -     ergocalciferol (ERGOCALCIFEROL) 48388 units capsule; Take 2 capsules by mouth 1 (One) Time Per Week.  -     atorvastatin (LIPITOR) 80 MG tablet; Take 1 tablet by mouth Every Night.  -     T4 & TSH ([facility]); Future  -     Uric Acid; Future  -     TestT+TestF+SHBG; Future  -     Comprehensive Metabolic Panel; Future  -     C-Peptide; Future  -     Hemoglobin A1c; Future  -     Lipid Panel; Future  -     MicroAlbumin, Urine, Random; Future  -     PSA; Future  -     Hemoglobin & Hematocrit, Blood; Future    Vitamin D deficiency  -     Insulin Glargine (TOUJEO SOLOSTAR) 300 UNIT/ML solution pen-injector; Inject 80 Units under the skin Daily.  -     pioglitazone (ACTOS) 30 MG tablet; Take 1 tablet by mouth Daily.  -     fenofibrate micronized (ANTARA) 130 MG capsule; Take 1 capsule by mouth Every Morning Before  Breakfast.  -     ergocalciferol (ERGOCALCIFEROL) 96786 units capsule; Take 2 capsules by mouth 1 (One) Time Per Week.  -     atorvastatin (LIPITOR) 80 MG tablet; Take 1 tablet by mouth Every Night.  -     T4 & TSH ([facility]); Future  -     Uric Acid; Future  -     TestT+TestF+SHBG; Future  -     Comprehensive Metabolic Panel; Future  -     C-Peptide; Future  -     Hemoglobin A1c; Future  -     Lipid Panel; Future  -     MicroAlbumin, Urine, Random; Future  -     PSA; Future  -     Hemoglobin & Hematocrit, Blood; Future    Chronic fatigue  -     Insulin Glargine (TOUJEO SOLOSTAR) 300 UNIT/ML solution pen-injector; Inject 80 Units under the skin Daily.  -     pioglitazone (ACTOS) 30 MG tablet; Take 1 tablet by mouth Daily.  -     fenofibrate micronized (ANTARA) 130 MG capsule; Take 1 capsule by mouth Every Morning Before Breakfast.  -     ergocalciferol (ERGOCALCIFEROL) 14554 units capsule; Take 2 capsules by mouth 1 (One) Time Per Week.  -     atorvastatin (LIPITOR) 80 MG tablet; Take 1 tablet by mouth Every Night.  -     T4 & TSH ([facility]); Future  -     Uric Acid; Future  -     TestT+TestF+SHBG; Future  -     Comprehensive Metabolic Panel; Future  -     C-Peptide; Future  -     Hemoglobin A1c; Future  -     Lipid Panel; Future  -     MicroAlbumin, Urine, Random; Future  -     PSA; Future  -     Hemoglobin & Hematocrit, Blood; Future    Controlled type 2 diabetes mellitus without complication, with long-term current use of insulin  -     Insulin Glargine (TOUJEO SOLOSTAR) 300 UNIT/ML solution pen-injector; Inject 80 Units under the skin Daily.  -     pioglitazone (ACTOS) 30 MG tablet; Take 1 tablet by mouth Daily.  -     fenofibrate micronized (ANTARA) 130 MG capsule; Take 1 capsule by mouth Every Morning Before Breakfast.  -     ergocalciferol (ERGOCALCIFEROL) 90282 units capsule; Take 2 capsules by mouth 1 (One) Time Per Week.  -     atorvastatin (LIPITOR) 80 MG tablet; Take 1 tablet by mouth Every Night.  -      T4 & TSH ([facility]); Future  -     Uric Acid; Future  -     TestT+TestF+SHBG; Future  -     Comprehensive Metabolic Panel; Future  -     C-Peptide; Future  -     Hemoglobin A1c; Future  -     Lipid Panel; Future  -     MicroAlbumin, Urine, Random; Future  -     PSA; Future  -     Hemoglobin & Hematocrit, Blood; Future    Accelerated hypertension  -     Insulin Glargine (TOUJEO SOLOSTAR) 300 UNIT/ML solution pen-injector; Inject 80 Units under the skin Daily.  -     pioglitazone (ACTOS) 30 MG tablet; Take 1 tablet by mouth Daily.  -     fenofibrate micronized (ANTARA) 130 MG capsule; Take 1 capsule by mouth Every Morning Before Breakfast.  -     ergocalciferol (ERGOCALCIFEROL) 43378 units capsule; Take 2 capsules by mouth 1 (One) Time Per Week.  -     atorvastatin (LIPITOR) 80 MG tablet; Take 1 tablet by mouth Every Night.  -     T4 & TSH ([facility]); Future  -     Uric Acid; Future  -     TestT+TestF+SHBG; Future  -     Comprehensive Metabolic Panel; Future  -     C-Peptide; Future  -     Hemoglobin A1c; Future  -     Lipid Panel; Future  -     MicroAlbumin, Urine, Random; Future  -     PSA; Future  -     Hemoglobin & Hematocrit, Blood; Future    Dyslipidemia  -     Insulin Glargine (TOUJEO SOLOSTAR) 300 UNIT/ML solution pen-injector; Inject 80 Units under the skin Daily.  -     pioglitazone (ACTOS) 30 MG tablet; Take 1 tablet by mouth Daily.  -     fenofibrate micronized (ANTARA) 130 MG capsule; Take 1 capsule by mouth Every Morning Before Breakfast.  -     ergocalciferol (ERGOCALCIFEROL) 79637 units capsule; Take 2 capsules by mouth 1 (One) Time Per Week.  -     atorvastatin (LIPITOR) 80 MG tablet; Take 1 tablet by mouth Every Night.  -     T4 & TSH ([facility]); Future  -     Uric Acid; Future  -     TestT+TestF+SHBG; Future  -     Comprehensive Metabolic Panel; Future  -     C-Peptide; Future  -     Hemoglobin A1c; Future  -     Lipid Panel; Future  -     MicroAlbumin, Urine, Random; Future  -     PSA; Future  -      "Hemoglobin & Hematocrit, Blood; Future    Other male erectile dysfunction  -     Insulin Glargine (TOUJEO SOLOSTAR) 300 UNIT/ML solution pen-injector; Inject 80 Units under the skin Daily.  -     pioglitazone (ACTOS) 30 MG tablet; Take 1 tablet by mouth Daily.  -     fenofibrate micronized (ANTARA) 130 MG capsule; Take 1 capsule by mouth Every Morning Before Breakfast.  -     ergocalciferol (ERGOCALCIFEROL) 98524 units capsule; Take 2 capsules by mouth 1 (One) Time Per Week.  -     atorvastatin (LIPITOR) 80 MG tablet; Take 1 tablet by mouth Every Night.  -     T4 & TSH ([facility]); Future  -     Uric Acid; Future  -     TestT+TestF+SHBG; Future  -     Comprehensive Metabolic Panel; Future  -     C-Peptide; Future  -     Hemoglobin A1c; Future  -     Lipid Panel; Future  -     MicroAlbumin, Urine, Random; Future  -     PSA; Future  -     Hemoglobin & Hematocrit, Blood; Future    Other orders  -     VASCEPA 1 g capsule capsule; Take 2 g by mouth 2 (Two) Times a Day With Meals.  -     ONETOUCH DELICA LANCETS 33G misc; Check blood glucose 3 times daily  -     glucose blood (ONE TOUCH ULTRA TEST) test strip; 1 each by Other route 3 (Three) Times a Day. Use as instructed  -     LYRICA 200 MG capsule; Take 1 capsule by mouth 2 (Two) Times a Day.  -     KOMBIGLYZE XR 2.5-1000 MG tablet sustained-release 24 hour; 2 tablets by mouth with supper  -     FARXIGA 10 MG tablet; 1 tablet every morning by mouth  -     Testosterone Cypionate (DEPOTESTOTERONE CYPIONATE) 200 MG/ML injection; Inject 1 mL into the shoulder, thigh, or buttocks 1 (One) Time Per Week.  -     Syringe 23G X 1\" 3 ML misc; Use weekly for insulin injection  -     QUEtiapine (SEROQUEL) 25 MG tablet; Take 1 tablet by mouth Every Night.               In summary I saw and examined this 57-year-old gentleman for above-mentioned problems.  I reviewed his laboratory evaluation of the 09/12/2017 and provided him with a hard copy of it. with the exception of low " testosterone and elevated hemoglobin A1c he is overall clinically and metabolically stable and therefore we will increase the dose of his Toujeo to 80 units every morning and start him on testosterone 200 mg or 1 mL once weekly.  I will see him in 4 months or sooner if needed with laboratory evaluation prior to each office visit.

## 2017-09-26 RX ORDER — SYRINGE W-NEEDLE,DISPOSAB,3 ML 25GX5/8"
SYRINGE, EMPTY DISPOSABLE MISCELLANEOUS
Qty: 10 EACH | Refills: 3 | Status: SHIPPED | OUTPATIENT
Start: 2017-09-26 | End: 2018-03-26 | Stop reason: SDUPTHER

## 2017-10-20 ENCOUNTER — OFFICE VISIT (OUTPATIENT)
Dept: ORTHOPEDIC SURGERY | Facility: CLINIC | Age: 57
End: 2017-10-20

## 2017-10-20 VITALS — BODY MASS INDEX: 37.94 KG/M2 | TEMPERATURE: 98.5 F | WEIGHT: 265 LBS | HEIGHT: 70 IN

## 2017-10-20 DIAGNOSIS — Z96.652 STATUS POST TOTAL LEFT KNEE REPLACEMENT: Primary | ICD-10-CM

## 2017-10-20 PROCEDURE — 99212 OFFICE O/P EST SF 10 MIN: CPT | Performed by: ORTHOPAEDIC SURGERY

## 2017-10-20 PROCEDURE — 73562 X-RAY EXAM OF KNEE 3: CPT | Performed by: ORTHOPAEDIC SURGERY

## 2017-10-20 RX ORDER — OXYCODONE AND ACETAMINOPHEN 10; 325 MG/1; MG/1
1 TABLET ORAL 2 TIMES DAILY PRN
Qty: 80 TABLET | Refills: 0 | Status: SHIPPED | OUTPATIENT
Start: 2017-10-20 | End: 2017-11-17 | Stop reason: SDUPTHER

## 2017-10-20 NOTE — PROGRESS NOTES
Chief complaint: Painful left total knee arthroplasty    Mikey comes in today again for follow-up of the left knee.  He continues to have moderate constant aching pain.  He struggles going up and down stairs and even walking is difficult for him.  He says that things are no better than when I last saw him.  He tells me that he cannot continue to live like this and has expressed interest in pursuing surgical options.  He has seen both Dr. Harrell and Dr. Fitzgerald for this and both told him that they recommend revision for flexion instability.  He comes to me wanting to discuss getting that surgery scheduled.    His incision over the left knee is healed.  Mild tenderness over both the medial and lateral joint line.  He still has a moderate effusion but no erythema or increased warmth.  Motion remains quite good.  PCL laxity is unchanged.    Bilateral standing AP views of the knees, bilateral merchants and a left knee lateral x-ray are ordered and reviewed.  I do not see any changes relative to previous films.  The components appear well fixed and appropriately positioned.    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  He tells me that he is interested in pursuing surgical options.  I'm going to discuss this with Dr. Fitzgerald and then come up with a plan.  Of note, I did agree to refill his Norco.  The risks were again discussed.  I explained that I very much want to get him off of this medicine.  He tells me that he is trying to taper off of this medicine but has been unable to do so thus far.  I did explain that I cannot keep him on this indefinitely.

## 2017-10-26 ENCOUNTER — CONSULT (OUTPATIENT)
Dept: ORTHOPEDIC SURGERY | Facility: CLINIC | Age: 57
End: 2017-10-26

## 2017-10-26 VITALS — TEMPERATURE: 97.6 F | WEIGHT: 265 LBS | BODY MASS INDEX: 37.94 KG/M2 | HEIGHT: 70 IN

## 2017-10-26 DIAGNOSIS — M17.12 PRIMARY OSTEOARTHRITIS OF LEFT KNEE: Primary | ICD-10-CM

## 2017-10-26 PROCEDURE — 99214 OFFICE O/P EST MOD 30 MIN: CPT | Performed by: ORTHOPAEDIC SURGERY

## 2017-10-30 DIAGNOSIS — I10 ACCELERATED HYPERTENSION: ICD-10-CM

## 2017-10-30 DIAGNOSIS — E55.9 VITAMIN D DEFICIENCY: ICD-10-CM

## 2017-10-30 DIAGNOSIS — Z79.4 CONTROLLED TYPE 2 DIABETES MELLITUS WITHOUT COMPLICATION, WITH LONG-TERM CURRENT USE OF INSULIN (HCC): ICD-10-CM

## 2017-10-30 DIAGNOSIS — R53.82 CHRONIC FATIGUE: ICD-10-CM

## 2017-10-30 DIAGNOSIS — E11.9 CONTROLLED TYPE 2 DIABETES MELLITUS WITHOUT COMPLICATION, WITH LONG-TERM CURRENT USE OF INSULIN (HCC): ICD-10-CM

## 2017-10-30 DIAGNOSIS — E78.5 DYSLIPIDEMIA: ICD-10-CM

## 2017-10-30 DIAGNOSIS — R79.89 LOW TESTOSTERONE: ICD-10-CM

## 2017-10-30 DIAGNOSIS — E66.01 MORBID OBESITY DUE TO EXCESS CALORIES (HCC): ICD-10-CM

## 2017-10-30 DIAGNOSIS — N52.8 OTHER MALE ERECTILE DYSFUNCTION: ICD-10-CM

## 2017-10-30 RX ORDER — PIOGLITAZONEHYDROCHLORIDE 30 MG/1
30 TABLET ORAL DAILY
Qty: 90 TABLET | Refills: 1 | Status: SHIPPED | OUTPATIENT
Start: 2017-10-30 | End: 2018-03-26

## 2017-11-01 ENCOUNTER — TELEPHONE (OUTPATIENT)
Dept: ORTHOPEDIC SURGERY | Facility: CLINIC | Age: 57
End: 2017-11-01

## 2017-11-05 RX ORDER — MELOXICAM 7.5 MG/1
15 TABLET ORAL ONCE
Status: CANCELLED | OUTPATIENT
Start: 2017-12-20 | End: 2017-11-05

## 2017-11-05 RX ORDER — PREGABALIN 25 MG/1
150 CAPSULE ORAL ONCE
Status: CANCELLED | OUTPATIENT
Start: 2017-12-20 | End: 2017-11-05

## 2017-11-07 PROBLEM — M17.12 PRIMARY OSTEOARTHRITIS OF LEFT KNEE: Status: ACTIVE | Noted: 2017-11-07

## 2017-11-17 DIAGNOSIS — E66.01 MORBID OBESITY DUE TO EXCESS CALORIES (HCC): ICD-10-CM

## 2017-11-17 DIAGNOSIS — I10 ACCELERATED HYPERTENSION: ICD-10-CM

## 2017-11-17 DIAGNOSIS — R79.89 LOW TESTOSTERONE: ICD-10-CM

## 2017-11-17 DIAGNOSIS — E78.5 DYSLIPIDEMIA: ICD-10-CM

## 2017-11-17 DIAGNOSIS — E55.9 VITAMIN D DEFICIENCY: ICD-10-CM

## 2017-11-17 DIAGNOSIS — R53.82 CHRONIC FATIGUE: ICD-10-CM

## 2017-11-17 DIAGNOSIS — N52.8 OTHER MALE ERECTILE DYSFUNCTION: ICD-10-CM

## 2017-11-17 DIAGNOSIS — E11.9 DIABETES MELLITUS TYPE 2, CONTROLLED, WITHOUT COMPLICATIONS (HCC): ICD-10-CM

## 2017-11-17 RX ORDER — OXYCODONE AND ACETAMINOPHEN 10; 325 MG/1; MG/1
1 TABLET ORAL 2 TIMES DAILY PRN
Qty: 80 TABLET | Refills: 0 | Status: ON HOLD | OUTPATIENT
Start: 2017-11-17 | End: 2017-12-20

## 2017-11-20 RX ORDER — INSULIN GLARGINE 300 U/ML
INJECTION, SOLUTION SUBCUTANEOUS
Qty: 9 PEN | Refills: 2 | Status: SHIPPED | OUTPATIENT
Start: 2017-11-20 | End: 2017-12-08

## 2017-11-22 NOTE — TELEPHONE ENCOUNTER
Patient called stating that his RX is a 30 day supply and he cannot get it filled again until the 28 day kary, which would be Tuesday Nov 28 and asked if we would call the pharmacy to authorize them to fill the RX sooner.     I explained to the patient that if he is needing the RX sooner than allowed then that means he is taking too much medication and not taking it as prescribed. He said YES, sometimes his pain is more severe and he has to take more than prescribed. I explained to him that if he is taking it more than prescribed then we cannot accommodate that and override it. He is to be taking the RX as prescribed.     He explained that [facility] has done this for him once before and that he is aware. I explained to him that I will call the pharmacy, but that he needs to know that we will not be able to do this again because he should not be taking more than prescribed. Patient understands and thanked me for my call.

## 2017-12-07 ENCOUNTER — TELEPHONE (OUTPATIENT)
Dept: ENDOCRINOLOGY | Age: 57
End: 2017-12-07

## 2017-12-07 ENCOUNTER — TELEPHONE (OUTPATIENT)
Dept: INTERNAL MEDICINE | Facility: CLINIC | Age: 57
End: 2017-12-07

## 2017-12-07 NOTE — TELEPHONE ENCOUNTER
Pt called today to ask if there had been any cancellations on Dr Hall schedule  Pt has complaints of arm tingling and previously made an appt for 12/21  I advised pt he could schedule with our NP or come in to acute clinic but pt insists on only seeing Dr Hall.  Pt was advised of stroke symptoms and referred to emergency service if they should occur.  Pt verbalized he understood

## 2017-12-07 NOTE — TELEPHONE ENCOUNTER
Patient called in regards to a weight loss drug he would like to start but did not have the name.    I instructed the patient to get the name and he could discuss it during next office visit when medication changes will be made.

## 2017-12-08 ENCOUNTER — APPOINTMENT (OUTPATIENT)
Dept: PREADMISSION TESTING | Facility: HOSPITAL | Age: 57
End: 2017-12-08

## 2017-12-08 VITALS
OXYGEN SATURATION: 96 % | WEIGHT: 278 LBS | BODY MASS INDEX: 39.8 KG/M2 | TEMPERATURE: 98 F | SYSTOLIC BLOOD PRESSURE: 116 MMHG | HEIGHT: 70 IN | RESPIRATION RATE: 16 BRPM | DIASTOLIC BLOOD PRESSURE: 79 MMHG | HEART RATE: 82 BPM

## 2017-12-08 DIAGNOSIS — M17.12 PRIMARY OSTEOARTHRITIS OF LEFT KNEE: ICD-10-CM

## 2017-12-08 LAB
ANION GAP SERPL CALCULATED.3IONS-SCNC: 14.1 MMOL/L
BILIRUB UR QL STRIP: NEGATIVE
BUN BLD-MCNC: 17 MG/DL (ref 6–20)
BUN/CREAT SERPL: 16 (ref 7–25)
CALCIUM SPEC-SCNC: 9.8 MG/DL (ref 8.6–10.5)
CHLORIDE SERPL-SCNC: 98 MMOL/L (ref 98–107)
CLARITY UR: CLEAR
CO2 SERPL-SCNC: 27.9 MMOL/L (ref 22–29)
COLOR UR: YELLOW
CREAT BLD-MCNC: 1.06 MG/DL (ref 0.76–1.27)
DEPRECATED RDW RBC AUTO: 53.2 FL (ref 37–54)
ERYTHROCYTE [DISTWIDTH] IN BLOOD BY AUTOMATED COUNT: 15 % (ref 11.5–14.5)
GFR SERPL CREATININE-BSD FRML MDRD: 72 ML/MIN/1.73
GLUCOSE BLD-MCNC: 214 MG/DL (ref 65–99)
GLUCOSE UR STRIP-MCNC: ABNORMAL MG/DL
HCT VFR BLD AUTO: 50.2 % (ref 40.4–52.2)
HGB BLD-MCNC: 16 G/DL (ref 13.7–17.6)
HGB UR QL STRIP.AUTO: NEGATIVE
KETONES UR QL STRIP: NEGATIVE
LEUKOCYTE ESTERASE UR QL STRIP.AUTO: NEGATIVE
MCH RBC QN AUTO: 30.9 PG (ref 27–32.7)
MCHC RBC AUTO-ENTMCNC: 31.9 G/DL (ref 32.6–36.4)
MCV RBC AUTO: 96.9 FL (ref 79.8–96.2)
NITRITE UR QL STRIP: NEGATIVE
PH UR STRIP.AUTO: 5.5 [PH] (ref 5–8)
PLATELET # BLD AUTO: 221 10*3/MM3 (ref 140–500)
PMV BLD AUTO: 10.6 FL (ref 6–12)
POTASSIUM BLD-SCNC: 3.9 MMOL/L (ref 3.5–5.2)
PROT UR QL STRIP: NEGATIVE
RBC # BLD AUTO: 5.18 10*6/MM3 (ref 4.6–6)
SODIUM BLD-SCNC: 140 MMOL/L (ref 136–145)
SP GR UR STRIP: >=1.03 (ref 1–1.03)
UROBILINOGEN UR QL STRIP: ABNORMAL
WBC NRBC COR # BLD: 7.99 10*3/MM3 (ref 4.5–10.7)

## 2017-12-08 PROCEDURE — 93010 ELECTROCARDIOGRAM REPORT: CPT | Performed by: INTERNAL MEDICINE

## 2017-12-08 PROCEDURE — 81003 URINALYSIS AUTO W/O SCOPE: CPT | Performed by: ORTHOPAEDIC SURGERY

## 2017-12-08 PROCEDURE — 85027 COMPLETE CBC AUTOMATED: CPT | Performed by: ORTHOPAEDIC SURGERY

## 2017-12-08 PROCEDURE — 80048 BASIC METABOLIC PNL TOTAL CA: CPT | Performed by: ORTHOPAEDIC SURGERY

## 2017-12-08 PROCEDURE — 93005 ELECTROCARDIOGRAM TRACING: CPT

## 2017-12-08 PROCEDURE — 36415 COLL VENOUS BLD VENIPUNCTURE: CPT

## 2017-12-08 RX ORDER — HYDROCHLOROTHIAZIDE 25 MG/1
25 TABLET ORAL EVERY MORNING
COMMUNITY
End: 2018-01-26 | Stop reason: SDUPTHER

## 2017-12-08 RX ORDER — VERAPAMIL HYDROCHLORIDE 240 MG/1
240 TABLET, FILM COATED, EXTENDED RELEASE ORAL EVERY MORNING
COMMUNITY
End: 2018-01-26 | Stop reason: SDUPTHER

## 2017-12-08 RX ORDER — METOPROLOL TARTRATE 50 MG/1
50 TABLET, FILM COATED ORAL EVERY MORNING
COMMUNITY
End: 2018-01-26 | Stop reason: SDUPTHER

## 2017-12-08 RX ORDER — MELOXICAM 15 MG/1
15 TABLET ORAL EVERY MORNING
COMMUNITY
End: 2018-05-22 | Stop reason: HOSPADM

## 2017-12-08 ASSESSMENT — KOOS JR
KOOS JR SCORE: 21
KOOS JR SCORE: 34.174

## 2017-12-08 NOTE — DISCHARGE INSTRUCTIONS
SURGERY  12-20-17  ARRIVAL TIME 7:30    Take the following medications the morning of surgery with a small sip of water:    LYRICA, METOPROLOL AND VERAPAMIL        General Instructions:  • Do not eat solid food after midnight the night before surgery.  • You may drink clear liquids day of surgery but must stop at least one hour before your hospital arrival time.  • It is beneficial for you to have a clear drink that contains carbohydrates the day of surgery.  We suggest a 12 to 20 ounce bottle of Gatorade or Powerade for non-diabetic patients or a 12 to 20 ounce bottle of G2 or Powerade Zero for diabetic patients. (Pediatric patients, are not advised to drink a 12 to 20 ounce carbohydrate drink)    Clear liquids are liquids you can see through.  Nothing red in color.     Plain water                               Sports drinks  Sodas                                   Gelatin (Jell-O)  Fruit juices without pulp such as white grape juice and apple juice  Popsicles that contain no fruit or yogurt  Tea or coffee (no cream or milk added)  Gatorade / Powerade  G2 / Powerade Zero    • Infants may have breast milk up to four hours before surgery.  • Infants drinking formula may drink formula up to six hours before surgery.   • Patients who avoid smoking, chewing tobacco and alcohol for 4 weeks prior to surgery have a reduced risk of post-operative complications.  Quit smoking as many days before surgery as you can.  • Do not smoke, use chewing tobacco or drink alcohol the day of surgery.   • If applicable bring your C-PAP/ BI-PAP machine.  • Bring any papers given to you in the doctor’s office.  • Wear clean comfortable clothes and socks.  • Do not wear contact lenses or make-up.  Bring a case for your glasses.   • Bring crutches or walker if applicable.  • Remove all piercings.  Leave jewelry and any other valuables at home.  • The Pre-Admission Testing nurse will instruct you to bring medications if unable to obtain an  accurate list in Pre-Admission Testing.        If you were given a blood bank ID arm band remember to bring it with you the day of surgery.    Preventing a Surgical Site Infection:  • For 2 to 3 days before surgery, avoid shaving with a razor because the razor can irritate skin and make it easier to develop an infection.  • The night prior to surgery sleep in a clean bed with clean clothing.  Do not allow pets to sleep with you.  • Shower on the morning of surgery using a fresh bar of anti-bacterial soap (such as Dial) and clean washcloth.  Dry with a clean towel and dress in clean clothing.  • Ask your surgeon if you will be receiving antibiotics prior to surgery.  • Make sure you, your family, and all healthcare providers clean their hands with soap and water or an alcohol based hand  before caring for you or your wound.    Day of surgery:  Upon arrival, a Pre-op nurse and Anesthesiologist will review your health history, obtain vital signs, and answer questions you may have.  The only belongings needed at this time will be your home medications and if applicable your C-PAP/BI-PAP machine.  If you are staying overnight your family can leave the rest of your belongings in the car and bring them to your room later.  A Pre-op nurse will start an IV and you may receive medication in preparation for surgery, including something to help you relax.  Your family will be able to see you in the Pre-op area.  While you are in surgery your family should notify the waiting room  if they leave the waiting room area and provide a contact phone number.    Please be aware that surgery does come with discomfort.  We want to make every effort to control your discomfort so please discuss any uncontrolled symptoms with your nurse.   Your doctor will most likely have prescribed pain medications.      If you are going home after surgery you will receive individualized written care instructions before being  discharged.  A responsible adult must drive you to and from the hospital on the day of your surgery and stay with you for 24 hours.    If you are staying overnight following surgery, you will be transported to your hospital room following the recovery period.  [facility] has all private rooms.    If you have any questions please call Pre-Admission Testing at [phone].  Deductibles and co-payments are collected on the day of service. Please be prepared to pay the required co-pay, deductible or deposit on the day of service as defined by your plan.USE AS DIRECTED    2% CHLORAHEXIDINE GLUCONATE* CLOTH  Preparing or “prepping” skin before surgery can reduce the risk of infection at the surgical site. To make the process easier, [facility] has chosen disposable cloths moistened with a rinse-free, 2% Chlorhexidine Gluconate (CHG) antiseptic solution. The steps below outline the prepping process and should be carefully followed.        Use the prep cloth on the area that is circled in the diagram             Directions Night before Surgery  1) Shower using a fresh bar of anti-bacterial soap (such as Dial) and clean washcloth.  Use a clean towel to completely dry your skin.  2) Do not use any lotions, oils or creams on your skin.  3) Open the package and remove 1 cloth, wipe your skin for 30 seconds in a circular motion.  Allow to dry for 3 minutes.  4) Repeat #3 with second cloth.  5) Do not touch your eyes, ears, or mouth with the prep cloth.  6) Allow the wet prep solution to air dry.  7) Discard the prep cloth and wash your hands with soap and water.   8) Dress in clean bed clothes and sleep on fresh clean bed sheets.   9) You may experience some temporary itching after the prep.    Directions Day of Surgery  1) Repeat steps 1,2,3,4,5,6,7, and 9.   2) Dress in clean clothes before coming to the hospital.    BACTROBAN NASAL OINTMENT  There are many germs normally in your nose. Bactroban is  an ointment that will help reduce these germs. Please follow these instructions for Bactroban use:    ____Two days before surgery in the evening Date________    ____The day before surgery in the morning  Date________    ____The day before surgery in the evening              Date________    ____The day of surgery in the morning    Date________    **Squirt ½ package of Bactroban Ointment onto a cotton applicator and apply to inside of 1st nostril.  Squirt the remaining Bactroban and apply to the inside of the other nostril.

## 2017-12-14 ENCOUNTER — OFFICE VISIT (OUTPATIENT)
Dept: ORTHOPEDIC SURGERY | Facility: CLINIC | Age: 57
End: 2017-12-14

## 2017-12-14 ENCOUNTER — LAB (OUTPATIENT)
Dept: LAB | Facility: HOSPITAL | Age: 57
End: 2017-12-14

## 2017-12-14 VITALS
HEIGHT: 70 IN | SYSTOLIC BLOOD PRESSURE: 130 MMHG | DIASTOLIC BLOOD PRESSURE: 78 MMHG | BODY MASS INDEX: 39.8 KG/M2 | TEMPERATURE: 98.1 F | WEIGHT: 278 LBS

## 2017-12-14 DIAGNOSIS — Z96.652 HISTORY OF TOTAL KNEE ARTHROPLASTY, LEFT: ICD-10-CM

## 2017-12-14 DIAGNOSIS — Z96.652 HISTORY OF TOTAL KNEE ARTHROPLASTY, LEFT: Primary | ICD-10-CM

## 2017-12-14 LAB — HBA1C MFR BLD: 7.5 % (ref 4.8–5.6)

## 2017-12-14 PROCEDURE — 36415 COLL VENOUS BLD VENIPUNCTURE: CPT

## 2017-12-14 PROCEDURE — S0260 H&P FOR SURGERY: HCPCS | Performed by: NURSE PRACTITIONER

## 2017-12-14 PROCEDURE — 77077 JOINT SURVEY SINGLE VIEW: CPT | Performed by: ORTHOPAEDIC SURGERY

## 2017-12-14 PROCEDURE — 83036 HEMOGLOBIN GLYCOSYLATED A1C: CPT

## 2017-12-14 NOTE — H&P
Patient: Aaron Latif    Date of Admission: 12/20/17    YOB: 1960    Medical Record Number: [id number]    Admitting Physician: Dr. Eloy Fitzgerald    Reason for Admission: End Stage Left Knee OA    History of Present Illness: 57 y.o. male presents with severe end stage knee osteoarthritis which has not been responsive to the full compliment of conservative measures. Despite conservative attempts, there is still severe, constant activity limiting pain. Given the severity of the pain, the patient has elected to proceed with knee replacement.    Allergies:   Allergies   Allergen Reactions   • Codeine Other (See Comments)     UNKNOWN         Current Medications:  Scheduled Meds:  PRN Meds:.    PMH:     Past Medical History:   Diagnosis Date   • Anxiety    • Arthritis    • Diabetic peripheral neuropathy     TYPE 2   • Erectile dysfunction    • Fatigue    • Fracture of rib of right side    • High cholesterol    • Hypertension    • Low testosterone    • Neuropathy    • Sleep apnea     DOES NOT WEAR CPAP   • Type 2 diabetes mellitus    • Vitamin D deficiency        PF/Surg/Soc Hx:     Past Surgical History:   Procedure Laterality Date   • ACHILLES TENDON REPAIR Left    • EYE SURGERY Left    • EYE SURGERY Left     BASKETBALL INJURY YEARS AGO   • HERNIA REPAIR     • KNEE ARTHROSCOPY Left 7/26/2016    Procedure: KNEE ARTHROSCOPY, PARTIAL medial MENISCECTOMY;  Surgeon: Layton Anderson MD;  Location: [facility];  Service:    • NV TOTAL KNEE ARTHROPLASTY Left 12/15/2016    Procedure: TOTAL KNEE ARTHROPLASTY;  Surgeon: Layton Anderson MD;  Location: [facility]  Service: Orthopedics        Social History     Occupational History   • Not on file.     Social History Main Topics   • Smoking status: Never Smoker   • Smokeless tobacco: Never Used   • Alcohol use Yes      Comment: SOCIAL   • Drug use: No      Comment: prescribed by md   • Sexual activity: Not on file      Social History     Social History Narrative  "       Family History   Problem Relation Age of Onset   • Diabetes Mother    • Hypertension Mother    • Heart disease Father    • Diabetes Brother    • Hypertension Brother    • Hypertension Paternal Uncle    • Malig Hyperthermia Neg Hx          Review of Systems:   A 14 point review of systems was performed, pertinent positives discussed above, all other systems are negative    Physical Exam: 57 y.o. male  Vital Signs :   Vitals:    12/14/17 1327   BP: 130/78   BP Location: Left arm   Patient Position: Sitting   Temp: 98.1 °F (36.7 °C)   TempSrc: Temporal Artery    Weight: 126 kg (278 lb)   Height: 177.8 cm (70\")     General: Alert and Oriented x 3, No acute distress.  Psych: mood and affect appropriate; recent and remote memory intact  Eyes: conjunctiva clear; pupils equally round and reactive, sclera nonicteric  CV: no peripheral edema  Resp: normal respiratory effort  Skin: no rashes or wounds; normal turgor  Musculosketetal; pain and crepitance with knee range of motion  Vascular: palpable distal pulses    Xrays:  -3 views (AP, lateral, and sunrise) were reviewed demonstrating end-stage OA with bone on bone articulation.  -A full length AP xray was ordered today for purposes of operative alignment demonstrating end stage arthritic findings. There are no previous full length films for review    Assessment:  End-stage Left knee osteoarthritis. Conservative measures have failed.      Plan:  The plan is to proceed with Left Total Knee Replacement. The patient voiced understanding of the risks, benefits, and alternative forms of treatment that were discussed with Dr Fitzgerald at the time of scheduling. Patient is planning going home with home health next day.  However upon review of prevention testing patient had not had a hemoglobin A1c and over 3 months and last one was greater than 8%.  I advised the patient to Dr. Licea requires his patients hemoglobin A1c be less than 8% in order to proceed with elective surgery.  " Patient verbalized understanding.  I placed an order and he is having to the hospital right now for that.  In addition patient reports occasional numbness tingling in his left arm and his had intermittent episodes of dyspnea over the last couple of months.  Obviously this is concerning given the patient's history of diabetes.  He denies any outright chest pain.  He did see a cardiologist many years ago for similar episode.  Again the patient is completely asymptomatic today chest pain or shortness of breath.  Patient did call his primary care physician's office and is gone to be seen tomorrow for clearance.  In the meantime the patient was advised if he develops any further episodes of shortness of breath he is to call 911 immediately.    Della Carrillo, APRN  12/14/2017

## 2017-12-15 ENCOUNTER — OFFICE VISIT (OUTPATIENT)
Dept: INTERNAL MEDICINE | Facility: CLINIC | Age: 57
End: 2017-12-15

## 2017-12-15 VITALS
DIASTOLIC BLOOD PRESSURE: 82 MMHG | WEIGHT: 278 LBS | BODY MASS INDEX: 39.8 KG/M2 | OXYGEN SATURATION: 98 % | SYSTOLIC BLOOD PRESSURE: 124 MMHG | HEART RATE: 85 BPM | HEIGHT: 70 IN

## 2017-12-15 DIAGNOSIS — E78.5 DYSLIPIDEMIA: ICD-10-CM

## 2017-12-15 DIAGNOSIS — E11.42 DIABETIC PERIPHERAL NEUROPATHY (HCC): ICD-10-CM

## 2017-12-15 DIAGNOSIS — I10 BENIGN ESSENTIAL HTN: ICD-10-CM

## 2017-12-15 DIAGNOSIS — E11.9 CONTROLLED TYPE 2 DIABETES MELLITUS WITHOUT COMPLICATION, WITHOUT LONG-TERM CURRENT USE OF INSULIN (HCC): ICD-10-CM

## 2017-12-15 DIAGNOSIS — E11.9 CONTROLLED TYPE 2 DIABETES MELLITUS WITHOUT COMPLICATION, WITH LONG-TERM CURRENT USE OF INSULIN (HCC): ICD-10-CM

## 2017-12-15 DIAGNOSIS — M17.12 PRIMARY OSTEOARTHRITIS OF LEFT KNEE: Primary | ICD-10-CM

## 2017-12-15 DIAGNOSIS — Z79.4 CONTROLLED TYPE 2 DIABETES MELLITUS WITHOUT COMPLICATION, WITH LONG-TERM CURRENT USE OF INSULIN (HCC): ICD-10-CM

## 2017-12-15 DIAGNOSIS — M54.2 CERVICALGIA: ICD-10-CM

## 2017-12-15 PROCEDURE — 99214 OFFICE O/P EST MOD 30 MIN: CPT | Performed by: NURSE PRACTITIONER

## 2017-12-19 ENCOUNTER — TELEPHONE (OUTPATIENT)
Dept: INTERNAL MEDICINE | Facility: CLINIC | Age: 57
End: 2017-12-19

## 2017-12-19 RX ORDER — ATORVASTATIN CALCIUM 80 MG/1
80 TABLET, FILM COATED ORAL NIGHTLY
Qty: 90 TABLET | Refills: 3
Start: 2017-12-19 | End: 2017-12-29 | Stop reason: SDUPTHER

## 2017-12-19 NOTE — PROGRESS NOTES
Subjective   Aaron Latif is a 57 y.o. male who presents for f/u regarding DM2, left knee pain and intermittent numbness of left arm.    HPI Comments: He is scheduled for left knee replacement 12/20/2017 by Dr. Eloy Fitzgerald. He has a longstanding history of DM2 which has been poorly controlled in the past and was therefore referred to endo. His recent (12/14/17) A1C was 7.5 with no acute abnormalities on labs other than a glucose of 214 and glucosuria. He has a f/u appt with Dr. Luna.  He c/o intermittent numbness of his left arm over the past few days which has been recurrent and has only occurred at night, when lying in awkward positions. He denies chest pain, no shortness of breath. ECG 12/8/17 did not show any acute abnormalities.    Hypertension   This is a chronic problem. The current episode started more than 1 year ago. The problem is unchanged. The problem is controlled. Associated symptoms include anxiety, chest pain (atypical), headaches (occasional), neck pain and shortness of breath (rarely). Pertinent negatives include no blurred vision, orthopnea, palpitations or peripheral edema. There are no associated agents to hypertension. Risk factors for coronary artery disease include diabetes mellitus, family history, male gender, obesity and sedentary lifestyle. Past treatments include ACE inhibitors and calcium channel blockers. The current treatment provides significant improvement. There are no compliance problems.  Hypertensive end-organ damage includes kidney disease. There is no history of CVA or heart failure. Identifiable causes of hypertension include chronic renal disease.   Arthritis   Presents for follow-up visit. He complains of pain and stiffness. He reports no joint swelling. Pertinent negatives include no diarrhea, dysuria, fatigue, fever or weight loss.   Diabetes   He presents for his follow-up diabetic visit. He has type 2 diabetes mellitus. His disease course has been improving.  Hypoglycemia symptoms include headaches (occasional). Associated symptoms include chest pain (atypical). Pertinent negatives for diabetes include no blurred vision, no fatigue, no weakness and no weight loss. Pertinent negatives for diabetic complications include no CVA.        The following portions of the patient's history were reviewed and updated as appropriate: allergies, current medications, past social history and problem list.    Past Medical History:   Diagnosis Date   • Anxiety    • Arthritis    • Diabetic peripheral neuropathy     TYPE 2   • Erectile dysfunction    • Fatigue    • Fracture of rib of right side    • High cholesterol    • Hypertension    • Low testosterone    • Neuropathy    • Sleep apnea     DOES NOT WEAR CPAP   • Type 2 diabetes mellitus    • Vitamin D deficiency          Current Outpatient Prescriptions:   •  atorvastatin (LIPITOR) 80 MG tablet, Take 1 tablet by mouth Every Night., Disp: 90 tablet, Rfl: 3  •  Chlorhexidine Gluconate 2 % pads, Apply  topically. AS DIRECTED, Disp: , Rfl:   •  ergocalciferol (ERGOCALCIFEROL) 98054 units capsule, Take 2 capsules by mouth 1 (One) Time Per Week. (Patient taking differently: Take 2 capsules by mouth 1 (One) Time Per Week. thursday), Disp: 26 capsule, Rfl: 3  •  FARXIGA 10 MG tablet, 1 tablet every morning by mouth (Patient taking differently: Take 1 tablet by mouth Every Morning. 1 tablet every morning by mouth), Disp: 30 tablet, Rfl: 5  •  fenofibrate micronized (ANTARA) 130 MG capsule, Take 1 capsule by mouth Every Morning Before Breakfast., Disp: 30 capsule, Rfl: 5  •  glucose blood (ONE TOUCH ULTRA TEST) test strip, 1 each by Other route 3 (Three) Times a Day. Use as instructed, Disp: 400 each, Rfl: 1  •  hydrochlorothiazide (HYDRODIURIL) 25 MG tablet, Take 25 mg by mouth Every Morning., Disp: , Rfl:   •  Insulin Glargine (TOUJEO SOLOSTAR) 300 UNIT/ML solution pen-injector, Inject 75 Units under the skin Every Morning., Disp: , Rfl:   •   "Insulin Pen Needle 31G X 8 MM misc, Use once a day, Disp: 100 each, Rfl: 0  •  KOMBIGLYZE XR 2.5-1000 MG tablet sustained-release 24 hour, 2 tablets by mouth with supper (Patient taking differently: Take 2 tablets by mouth Every Evening. 2 tablets by mouth with supper), Disp: 60 tablet, Rfl: 1  •  LYRICA 200 MG capsule, Take 1 capsule by mouth 2 (Two) Times a Day., Disp: 60 capsule, Rfl: 5  •  meloxicam (MOBIC) 15 MG tablet, Take 15 mg by mouth Every Morning. TO STOP 1 WK MN SURG, Disp: , Rfl:   •  metoprolol tartrate (LOPRESSOR) 50 MG tablet, Take 50 mg by mouth Every Morning., Disp: , Rfl:   •  ONETOUCH DELICA LANCETS 33G misc, Check blood glucose 3 times daily, Disp: 100 each, Rfl: 5  •  oxyCODONE-acetaminophen (PERCOCET)  MG per tablet, Take 1 tablet by mouth 2 (Two) Times a Day As Needed for Moderate Pain ., Disp: 80 tablet, Rfl: 0  •  pioglitazone (ACTOS) 30 MG tablet, Take 1 tablet by mouth Daily. (Patient taking differently: Take 30 mg by mouth Every Morning.), Disp: 90 tablet, Rfl: 1  •  Syringe 23G X 1\" 3 ML misc, Use weekly for testosterone injection, Disp: 10 each, Rfl: 3  •  Testosterone Cypionate (DEPOTESTOTERONE CYPIONATE) 200 MG/ML injection, Inject 1 mL into the shoulder, thigh, or buttocks 1 (One) Time Per Week. (Patient taking differently: Inject 200 mg into the shoulder, thigh, or buttocks 1 (One) Time Per Week. SATURDAY), Disp: 10 mL, Rfl: 3  •  VASCEPA 1 g capsule capsule, Take 2 g by mouth 2 (Two) Times a Day With Meals., Disp: 120 capsule, Rfl: 5  •  verapamil SR (CALAN-SR) 240 MG CR tablet, Take 240 mg by mouth Every Morning., Disp: , Rfl:     Allergies   Allergen Reactions   • Codeine Other (See Comments)     UNKNOWN       Review of Systems   Constitutional: Negative for chills, fatigue, fever, unexpected weight change and weight loss.   HENT: Negative for congestion, ear pain, postnasal drip, sinus pressure, sore throat and trouble swallowing.    Eyes: Negative for blurred vision and " "visual disturbance.   Respiratory: Positive for shortness of breath (rarely). Negative for cough, chest tightness and wheezing.    Cardiovascular: Positive for chest pain (atypical). Negative for palpitations, orthopnea and leg swelling.   Gastrointestinal: Negative for abdominal pain, blood in stool, diarrhea, nausea and vomiting.   Endocrine: Negative for cold intolerance and heat intolerance.   Genitourinary: Negative for dysuria, frequency and urgency.   Musculoskeletal: Positive for arthritis, neck pain, neck stiffness and stiffness. Negative for arthralgias and joint swelling.   Skin: Negative for color change.   Allergic/Immunologic: Negative for immunocompromised state.   Neurological: Positive for numbness and headaches (occasional). Negative for syncope and weakness.   Hematological: Does not bruise/bleed easily.       Objective   Vitals:    12/15/17 0957   BP: 124/82   BP Location: Left arm   Patient Position: Sitting   Cuff Size: Adult   Pulse: 85   SpO2: 98%   Weight: 126 kg (278 lb)   Height: 177.8 cm (70\")     Physical Exam   Constitutional: He is oriented to person, place, and time. He appears well-developed and well-nourished. No distress.   HENT:   Head: Normocephalic and atraumatic.   Right Ear: External ear normal.   Left Ear: External ear normal.   Eyes: Conjunctivae are normal.   Neck: Normal range of motion. Neck supple. Muscular tenderness present. Normal range of motion present.   Cardiovascular: Normal rate, regular rhythm, normal heart sounds and intact distal pulses.  Exam reveals no gallop and no friction rub.    No murmur heard.  Pulmonary/Chest: Effort normal and breath sounds normal. No respiratory distress.   Lymphadenopathy:     He has no cervical adenopathy.   Neurological: He is alert and oriented to person, place, and time. No sensory deficit.   Skin: Skin is warm and dry. No rash noted. No erythema.   Psychiatric: He has a normal mood and affect. His behavior is normal. "   Nursing note and vitals reviewed.      Assessment/Plan   Aaron was seen today for surgical clearance.    Diagnoses and all orders for this visit:    Primary osteoarthritis of left knee  Comments:  left knee replacement scheduled for 12/20/17    Benign essential HTN  Comments:  stable on current meds    Controlled type 2 diabetes mellitus without complication, without long-term current use of insulin  Comments:  recent A1c 7.5, has f/u with endo    Diabetic peripheral neuropathy  Comments:  managed on Lyrica    Cervicalgia  Comments:  most likely cause of intermittent numbness of left arm (worse with certain positions while lying)    Dyslipidemia  Comments:  denies myalgias with Atorvastatin  Orders:  -     atorvastatin (LIPITOR) 80 MG tablet; Take 1 tablet by mouth Every Night.    Controlled type 2 diabetes mellitus without complication, with long-term current use of insulin    Dyslipidemia  -     atorvastatin (LIPITOR) 80 MG tablet; Take 1 tablet by mouth Every Night.    Reviewed pre-operative labs with patient, he is cleared for surgical procedure 12/2017 by Dr. Fitzgerald.

## 2017-12-19 NOTE — TELEPHONE ENCOUNTER
Faxed OV from 12/15 with clearance note to Dr. Eloy Fitzgerald.  Received confirmation at 10:35 a.m.

## 2017-12-20 ENCOUNTER — ANESTHESIA EVENT (OUTPATIENT)
Dept: PERIOP | Facility: HOSPITAL | Age: 57
End: 2017-12-20

## 2017-12-20 ENCOUNTER — HOSPITAL ENCOUNTER (INPATIENT)
Facility: HOSPITAL | Age: 57
LOS: 1 days | Discharge: HOME | End: 2017-12-20
Attending: ORTHOPAEDIC SURGERY | Admitting: ORTHOPAEDIC SURGERY

## 2017-12-20 ENCOUNTER — APPOINTMENT (OUTPATIENT)
Dept: GENERAL RADIOLOGY | Facility: HOSPITAL | Age: 57
End: 2017-12-20

## 2017-12-20 ENCOUNTER — ANESTHESIA (OUTPATIENT)
Dept: PERIOP | Facility: HOSPITAL | Age: 57
End: 2017-12-20

## 2017-12-20 VITALS
HEART RATE: 71 BPM | DIASTOLIC BLOOD PRESSURE: 75 MMHG | RESPIRATION RATE: 16 BRPM | OXYGEN SATURATION: 98 % | TEMPERATURE: 96.7 F | SYSTOLIC BLOOD PRESSURE: 114 MMHG

## 2017-12-20 DIAGNOSIS — M17.12 PRIMARY OSTEOARTHRITIS OF LEFT KNEE: ICD-10-CM

## 2017-12-20 PROBLEM — M17.9 OA (OSTEOARTHRITIS) OF KNEE: Status: ACTIVE | Noted: 2017-12-20

## 2017-12-20 LAB
APPEARANCE FLD: ABNORMAL
COLOR FLD: YELLOW
GLUCOSE BLDC GLUCOMTR-MCNC: 100 MG/DL (ref 70–130)
GLUCOSE BLDC GLUCOMTR-MCNC: 153 MG/DL (ref 70–130)
LYMPHOCYTES NFR FLD MANUAL: 64 %
MONOCYTES NFR FLD: 21 %
MONOS+MACROS NFR FLD: 14 %
NEUTROPHILS NFR FLD MANUAL: 1 %
RBC # FLD AUTO: ABNORMAL /MM3
WBC # FLD: 90 /MM3

## 2017-12-20 PROCEDURE — 25010000002 DEXAMETHASONE PER 1 MG: Performed by: NURSE ANESTHETIST, CERTIFIED REGISTERED

## 2017-12-20 PROCEDURE — 25010000002 VANCOMYCIN 10 G RECONSTITUTED SOLUTION: Performed by: ORTHOPAEDIC SURGERY

## 2017-12-20 PROCEDURE — 0SUW09Z SUPPLEMENT LEFT KNEE JOINT, TIBIAL SURFACE WITH LINER, OPEN APPROACH: ICD-10-PCS | Performed by: ORTHOPAEDIC SURGERY

## 2017-12-20 PROCEDURE — 27486 REVISE/REPLACE KNEE JOINT: CPT | Performed by: ORTHOPAEDIC SURGERY

## 2017-12-20 PROCEDURE — 25010000002 PROMETHAZINE PER 50 MG: Performed by: NURSE ANESTHETIST, CERTIFIED REGISTERED

## 2017-12-20 PROCEDURE — 97161 PT EVAL LOW COMPLEX 20 MIN: CPT

## 2017-12-20 PROCEDURE — 25010000002 PROPOFOL 10 MG/ML EMULSION: Performed by: NURSE ANESTHETIST, CERTIFIED REGISTERED

## 2017-12-20 PROCEDURE — 25010000002 FENTANYL CITRATE (PF) 100 MCG/2ML SOLUTION: Performed by: NURSE ANESTHETIST, CERTIFIED REGISTERED

## 2017-12-20 PROCEDURE — 25010000002 KETOROLAC TROMETHAMINE PER 15 MG: Performed by: ORTHOPAEDIC SURGERY

## 2017-12-20 PROCEDURE — 87075 CULTR BACTERIA EXCEPT BLOOD: CPT | Performed by: ORTHOPAEDIC SURGERY

## 2017-12-20 PROCEDURE — 25010000002 MIDAZOLAM PER 1 MG: Performed by: ANESTHESIOLOGY

## 2017-12-20 PROCEDURE — 25010000002 FENTANYL CITRATE (PF) 100 MCG/2ML SOLUTION: Performed by: ANESTHESIOLOGY

## 2017-12-20 PROCEDURE — 82962 GLUCOSE BLOOD TEST: CPT

## 2017-12-20 PROCEDURE — 25010000003 CEFAZOLIN PER 500 MG: Performed by: ORTHOPAEDIC SURGERY

## 2017-12-20 PROCEDURE — 25010000002 ONDANSETRON PER 1 MG: Performed by: NURSE ANESTHETIST, CERTIFIED REGISTERED

## 2017-12-20 PROCEDURE — 87206 SMEAR FLUORESCENT/ACID STAI: CPT | Performed by: ORTHOPAEDIC SURGERY

## 2017-12-20 PROCEDURE — 97110 THERAPEUTIC EXERCISES: CPT

## 2017-12-20 PROCEDURE — C1776 JOINT DEVICE (IMPLANTABLE): HCPCS | Performed by: ORTHOPAEDIC SURGERY

## 2017-12-20 PROCEDURE — 0SPD09Z REMOVAL OF LINER FROM LEFT KNEE JOINT, OPEN APPROACH: ICD-10-PCS | Performed by: ORTHOPAEDIC SURGERY

## 2017-12-20 PROCEDURE — 89051 BODY FLUID CELL COUNT: CPT | Performed by: ORTHOPAEDIC SURGERY

## 2017-12-20 PROCEDURE — 73560 X-RAY EXAM OF KNEE 1 OR 2: CPT

## 2017-12-20 PROCEDURE — 87015 SPECIMEN INFECT AGNT CONCNTJ: CPT | Performed by: ORTHOPAEDIC SURGERY

## 2017-12-20 PROCEDURE — 87070 CULTURE OTHR SPECIMN AEROBIC: CPT | Performed by: ORTHOPAEDIC SURGERY

## 2017-12-20 PROCEDURE — 87205 SMEAR GRAM STAIN: CPT | Performed by: ORTHOPAEDIC SURGERY

## 2017-12-20 PROCEDURE — 87116 MYCOBACTERIA CULTURE: CPT | Performed by: ORTHOPAEDIC SURGERY

## 2017-12-20 DEVICE — GENESIS II DISHED ARTICULAR INSERT                                    SIZE 7-8 9MM
Type: IMPLANTABLE DEVICE | Site: KNEE | Status: FUNCTIONAL
Brand: GENESIS II

## 2017-12-20 RX ORDER — PSEUDOEPHEDRINE HCL 30 MG
100 TABLET ORAL 2 TIMES DAILY
Qty: 28 CAPSULE | Refills: 0 | Status: SHIPPED | OUTPATIENT
Start: 2017-12-20 | End: 2018-01-03

## 2017-12-20 RX ORDER — HYDROCHLOROTHIAZIDE 25 MG/1
25 TABLET ORAL DAILY
Status: DISCONTINUED | OUTPATIENT
Start: 2017-12-20 | End: 2017-12-20 | Stop reason: HOSPADM

## 2017-12-20 RX ORDER — MELOXICAM 7.5 MG/1
15 TABLET ORAL ONCE
Status: COMPLETED | OUTPATIENT
Start: 2017-12-20 | End: 2017-12-20

## 2017-12-20 RX ORDER — NALOXONE HCL 0.4 MG/ML
0.2 VIAL (ML) INJECTION AS NEEDED
Status: DISCONTINUED | OUTPATIENT
Start: 2017-12-20 | End: 2017-12-20 | Stop reason: HOSPADM

## 2017-12-20 RX ORDER — PREGABALIN 25 MG/1
150 CAPSULE ORAL ONCE
Status: DISCONTINUED | OUTPATIENT
Start: 2017-12-20 | End: 2017-12-20 | Stop reason: HOSPADM

## 2017-12-20 RX ORDER — ONDANSETRON 4 MG/1
4 TABLET, ORALLY DISINTEGRATING ORAL EVERY 6 HOURS PRN
Status: DISCONTINUED | OUTPATIENT
Start: 2017-12-20 | End: 2017-12-20 | Stop reason: HOSPADM

## 2017-12-20 RX ORDER — DIPHENHYDRAMINE HYDROCHLORIDE 50 MG/ML
12.5 INJECTION INTRAMUSCULAR; INTRAVENOUS
Status: DISCONTINUED | OUTPATIENT
Start: 2017-12-20 | End: 2017-12-20 | Stop reason: HOSPADM

## 2017-12-20 RX ORDER — DOCUSATE SODIUM 100 MG/1
100 CAPSULE, LIQUID FILLED ORAL 2 TIMES DAILY
Status: DISCONTINUED | OUTPATIENT
Start: 2017-12-20 | End: 2017-12-20 | Stop reason: HOSPADM

## 2017-12-20 RX ORDER — LIDOCAINE HYDROCHLORIDE 20 MG/ML
INJECTION, SOLUTION INFILTRATION; PERINEURAL AS NEEDED
Status: DISCONTINUED | OUTPATIENT
Start: 2017-12-20 | End: 2017-12-20 | Stop reason: SURG

## 2017-12-20 RX ORDER — HYDROCODONE BITARTRATE AND ACETAMINOPHEN 7.5; 325 MG/1; MG/1
1 TABLET ORAL EVERY 4 HOURS PRN
Status: DISCONTINUED | OUTPATIENT
Start: 2017-12-20 | End: 2017-12-20 | Stop reason: HOSPADM

## 2017-12-20 RX ORDER — UREA 10 %
1 LOTION (ML) TOPICAL NIGHTLY PRN
Status: DISCONTINUED | OUTPATIENT
Start: 2017-12-20 | End: 2017-12-20 | Stop reason: HOSPADM

## 2017-12-20 RX ORDER — ONDANSETRON 4 MG/1
4 TABLET, FILM COATED ORAL EVERY 6 HOURS PRN
Status: DISCONTINUED | OUTPATIENT
Start: 2017-12-20 | End: 2017-12-20 | Stop reason: HOSPADM

## 2017-12-20 RX ORDER — HYDROCODONE BITARTRATE AND ACETAMINOPHEN 7.5; 325 MG/1; MG/1
2 TABLET ORAL EVERY 4 HOURS PRN
Status: DISCONTINUED | OUTPATIENT
Start: 2017-12-20 | End: 2017-12-20 | Stop reason: SDUPTHER

## 2017-12-20 RX ORDER — PROMETHAZINE HYDROCHLORIDE 25 MG/1
12.5 TABLET ORAL ONCE AS NEEDED
Status: DISCONTINUED | OUTPATIENT
Start: 2017-12-20 | End: 2017-12-20 | Stop reason: HOSPADM

## 2017-12-20 RX ORDER — DEXAMETHASONE SODIUM PHOSPHATE 10 MG/ML
INJECTION INTRAMUSCULAR; INTRAVENOUS AS NEEDED
Status: DISCONTINUED | OUTPATIENT
Start: 2017-12-20 | End: 2017-12-20 | Stop reason: SURG

## 2017-12-20 RX ORDER — FENTANYL CITRATE 50 UG/ML
50 INJECTION, SOLUTION INTRAMUSCULAR; INTRAVENOUS
Status: DISCONTINUED | OUTPATIENT
Start: 2017-12-20 | End: 2017-12-20 | Stop reason: HOSPADM

## 2017-12-20 RX ORDER — EPHEDRINE SULFATE 50 MG/ML
5 INJECTION, SOLUTION INTRAVENOUS ONCE AS NEEDED
Status: DISCONTINUED | OUTPATIENT
Start: 2017-12-20 | End: 2017-12-20 | Stop reason: HOSPADM

## 2017-12-20 RX ORDER — SODIUM CHLORIDE, SODIUM LACTATE, POTASSIUM CHLORIDE, CALCIUM CHLORIDE 600; 310; 30; 20 MG/100ML; MG/100ML; MG/100ML; MG/100ML
9 INJECTION, SOLUTION INTRAVENOUS CONTINUOUS
Status: DISCONTINUED | OUTPATIENT
Start: 2017-12-20 | End: 2017-12-20 | Stop reason: HOSPADM

## 2017-12-20 RX ORDER — FLUMAZENIL 0.1 MG/ML
0.2 INJECTION INTRAVENOUS AS NEEDED
Status: DISCONTINUED | OUTPATIENT
Start: 2017-12-20 | End: 2017-12-20 | Stop reason: HOSPADM

## 2017-12-20 RX ORDER — TRANEXAMIC ACID 100 MG/ML
INJECTION, SOLUTION INTRAVENOUS AS NEEDED
Status: DISCONTINUED | OUTPATIENT
Start: 2017-12-20 | End: 2017-12-20 | Stop reason: SURG

## 2017-12-20 RX ORDER — EPHEDRINE SULFATE 50 MG/ML
INJECTION, SOLUTION INTRAVENOUS AS NEEDED
Status: DISCONTINUED | OUTPATIENT
Start: 2017-12-20 | End: 2017-12-20 | Stop reason: SURG

## 2017-12-20 RX ORDER — ONDANSETRON 2 MG/ML
4 INJECTION INTRAMUSCULAR; INTRAVENOUS EVERY 6 HOURS PRN
Status: DISCONTINUED | OUTPATIENT
Start: 2017-12-20 | End: 2017-12-20 | Stop reason: HOSPADM

## 2017-12-20 RX ORDER — PROMETHAZINE HYDROCHLORIDE 25 MG/1
25 TABLET ORAL ONCE AS NEEDED
Status: COMPLETED | OUTPATIENT
Start: 2017-12-20 | End: 2017-12-20

## 2017-12-20 RX ORDER — ASPIRIN 325 MG
325 TABLET, DELAYED RELEASE (ENTERIC COATED) ORAL 2 TIMES DAILY
Status: DISCONTINUED | OUTPATIENT
Start: 2017-12-21 | End: 2017-12-20 | Stop reason: HOSPADM

## 2017-12-20 RX ORDER — FAMOTIDINE 10 MG/ML
20 INJECTION, SOLUTION INTRAVENOUS ONCE
Status: COMPLETED | OUTPATIENT
Start: 2017-12-20 | End: 2017-12-20

## 2017-12-20 RX ORDER — CEFAZOLIN SODIUM 2 G/100ML
2 INJECTION, SOLUTION INTRAVENOUS EVERY 8 HOURS
Status: DISCONTINUED | OUTPATIENT
Start: 2017-12-20 | End: 2017-12-20 | Stop reason: HOSPADM

## 2017-12-20 RX ORDER — FENTANYL CITRATE 50 UG/ML
INJECTION, SOLUTION INTRAMUSCULAR; INTRAVENOUS AS NEEDED
Status: DISCONTINUED | OUTPATIENT
Start: 2017-12-20 | End: 2017-12-20 | Stop reason: SURG

## 2017-12-20 RX ORDER — ACETAMINOPHEN 10 MG/ML
1000 INJECTION, SOLUTION INTRAVENOUS ONCE
Status: COMPLETED | OUTPATIENT
Start: 2017-12-20 | End: 2017-12-20

## 2017-12-20 RX ORDER — MAGNESIUM HYDROXIDE 1200 MG/15ML
LIQUID ORAL AS NEEDED
Status: DISCONTINUED | OUTPATIENT
Start: 2017-12-20 | End: 2017-12-20 | Stop reason: HOSPADM

## 2017-12-20 RX ORDER — MIDAZOLAM HYDROCHLORIDE 1 MG/ML
1 INJECTION INTRAMUSCULAR; INTRAVENOUS
Status: DISCONTINUED | OUTPATIENT
Start: 2017-12-20 | End: 2017-12-20 | Stop reason: HOSPADM

## 2017-12-20 RX ORDER — PROMETHAZINE HYDROCHLORIDE 25 MG/ML
12.5 INJECTION, SOLUTION INTRAMUSCULAR; INTRAVENOUS ONCE AS NEEDED
Status: COMPLETED | OUTPATIENT
Start: 2017-12-20 | End: 2017-12-20

## 2017-12-20 RX ORDER — PROPOFOL 10 MG/ML
VIAL (ML) INTRAVENOUS AS NEEDED
Status: DISCONTINUED | OUTPATIENT
Start: 2017-12-20 | End: 2017-12-20 | Stop reason: SURG

## 2017-12-20 RX ORDER — MELOXICAM 15 MG/1
15 TABLET ORAL DAILY
Status: DISCONTINUED | OUTPATIENT
Start: 2017-12-21 | End: 2017-12-20 | Stop reason: HOSPADM

## 2017-12-20 RX ORDER — ONDANSETRON 2 MG/ML
4 INJECTION INTRAMUSCULAR; INTRAVENOUS ONCE AS NEEDED
Status: DISCONTINUED | OUTPATIENT
Start: 2017-12-20 | End: 2017-12-20 | Stop reason: HOSPADM

## 2017-12-20 RX ORDER — HYDROCODONE BITARTRATE AND ACETAMINOPHEN 7.5; 325 MG/1; MG/1
1 TABLET ORAL EVERY 4 HOURS PRN
Status: DISCONTINUED | OUTPATIENT
Start: 2017-12-20 | End: 2017-12-20 | Stop reason: SDUPTHER

## 2017-12-20 RX ORDER — MIDAZOLAM HYDROCHLORIDE 1 MG/ML
2 INJECTION INTRAMUSCULAR; INTRAVENOUS
Status: DISCONTINUED | OUTPATIENT
Start: 2017-12-20 | End: 2017-12-20 | Stop reason: HOSPADM

## 2017-12-20 RX ORDER — PIOGLITAZONEHYDROCHLORIDE 30 MG/1
30 TABLET ORAL
Status: DISCONTINUED | OUTPATIENT
Start: 2017-12-21 | End: 2017-12-20 | Stop reason: HOSPADM

## 2017-12-20 RX ORDER — OXYCODONE AND ACETAMINOPHEN 10; 325 MG/1; MG/1
TABLET ORAL
Qty: 100 TABLET | Refills: 0 | Status: SHIPPED | OUTPATIENT
Start: 2017-12-20 | End: 2018-01-02 | Stop reason: SDUPTHER

## 2017-12-20 RX ORDER — ONDANSETRON 2 MG/ML
INJECTION INTRAMUSCULAR; INTRAVENOUS AS NEEDED
Status: DISCONTINUED | OUTPATIENT
Start: 2017-12-20 | End: 2017-12-20 | Stop reason: SURG

## 2017-12-20 RX ORDER — HYDRALAZINE HYDROCHLORIDE 20 MG/ML
5 INJECTION INTRAMUSCULAR; INTRAVENOUS
Status: DISCONTINUED | OUTPATIENT
Start: 2017-12-20 | End: 2017-12-20 | Stop reason: HOSPADM

## 2017-12-20 RX ORDER — ACETAMINOPHEN 325 MG/1
325 TABLET ORAL EVERY 4 HOURS PRN
Status: DISCONTINUED | OUTPATIENT
Start: 2017-12-20 | End: 2017-12-20 | Stop reason: HOSPADM

## 2017-12-20 RX ORDER — PROMETHAZINE HYDROCHLORIDE 25 MG/1
25 SUPPOSITORY RECTAL ONCE AS NEEDED
Status: COMPLETED | OUTPATIENT
Start: 2017-12-20 | End: 2017-12-20

## 2017-12-20 RX ORDER — METOPROLOL TARTRATE 50 MG/1
50 TABLET, FILM COATED ORAL DAILY
Status: DISCONTINUED | OUTPATIENT
Start: 2017-12-21 | End: 2017-12-20 | Stop reason: HOSPADM

## 2017-12-20 RX ORDER — VERAPAMIL HYDROCHLORIDE 240 MG/1
240 TABLET, FILM COATED, EXTENDED RELEASE ORAL DAILY
Status: DISCONTINUED | OUTPATIENT
Start: 2017-12-21 | End: 2017-12-20 | Stop reason: HOSPADM

## 2017-12-20 RX ORDER — KETOROLAC TROMETHAMINE 30 MG/ML
30 INJECTION, SOLUTION INTRAMUSCULAR; INTRAVENOUS EVERY 8 HOURS
Status: DISCONTINUED | OUTPATIENT
Start: 2017-12-20 | End: 2017-12-20 | Stop reason: HOSPADM

## 2017-12-20 RX ORDER — PREGABALIN 100 MG/1
200 CAPSULE ORAL DAILY
Status: DISCONTINUED | OUTPATIENT
Start: 2017-12-21 | End: 2017-12-20 | Stop reason: HOSPADM

## 2017-12-20 RX ORDER — LABETALOL HYDROCHLORIDE 5 MG/ML
5 INJECTION, SOLUTION INTRAVENOUS
Status: DISCONTINUED | OUTPATIENT
Start: 2017-12-20 | End: 2017-12-20 | Stop reason: HOSPADM

## 2017-12-20 RX ORDER — HYDROCODONE BITARTRATE AND ACETAMINOPHEN 7.5; 325 MG/1; MG/1
2 TABLET ORAL EVERY 4 HOURS PRN
Status: DISCONTINUED | OUTPATIENT
Start: 2017-12-20 | End: 2017-12-20 | Stop reason: HOSPADM

## 2017-12-20 RX ORDER — SODIUM CHLORIDE 0.9 % (FLUSH) 0.9 %
1-10 SYRINGE (ML) INJECTION AS NEEDED
Status: DISCONTINUED | OUTPATIENT
Start: 2017-12-20 | End: 2017-12-20 | Stop reason: HOSPADM

## 2017-12-20 RX ADMIN — EPHEDRINE SULFATE 5 MG: 50 INJECTION INTRAMUSCULAR; INTRAVENOUS; SUBCUTANEOUS at 13:20

## 2017-12-20 RX ADMIN — ACETAMINOPHEN 1000 MG: 10 INJECTION, SOLUTION INTRAVENOUS at 12:36

## 2017-12-20 RX ADMIN — SODIUM CHLORIDE, POTASSIUM CHLORIDE, SODIUM LACTATE AND CALCIUM CHLORIDE: 600; 310; 30; 20 INJECTION, SOLUTION INTRAVENOUS at 13:30

## 2017-12-20 RX ADMIN — PROMETHAZINE HYDROCHLORIDE 12.5 MG: 25 INJECTION INTRAMUSCULAR; INTRAVENOUS at 14:35

## 2017-12-20 RX ADMIN — SODIUM CHLORIDE, POTASSIUM CHLORIDE, SODIUM LACTATE AND CALCIUM CHLORIDE: 600; 310; 30; 20 INJECTION, SOLUTION INTRAVENOUS at 12:22

## 2017-12-20 RX ADMIN — KETOROLAC TROMETHAMINE 30 MG: 30 INJECTION, SOLUTION INTRAMUSCULAR at 17:14

## 2017-12-20 RX ADMIN — CEFAZOLIN SODIUM 2 G: 1 INJECTION, POWDER, FOR SOLUTION INTRAMUSCULAR; INTRAVENOUS at 12:29

## 2017-12-20 RX ADMIN — FENTANYL CITRATE 25 MCG: 50 INJECTION INTRAMUSCULAR; INTRAVENOUS at 13:03

## 2017-12-20 RX ADMIN — TRANEXAMIC ACID 1000 MG: 100 INJECTION, SOLUTION INTRAVENOUS at 12:54

## 2017-12-20 RX ADMIN — ONDANSETRON 4 MG: 2 INJECTION INTRAMUSCULAR; INTRAVENOUS at 13:12

## 2017-12-20 RX ADMIN — FENTANYL CITRATE 50 MCG: 50 INJECTION INTRAMUSCULAR; INTRAVENOUS at 14:32

## 2017-12-20 RX ADMIN — FAMOTIDINE 20 MG: 10 INJECTION, SOLUTION INTRAVENOUS at 10:30

## 2017-12-20 RX ADMIN — MELOXICAM 15 MG: 15 TABLET ORAL at 10:14

## 2017-12-20 RX ADMIN — SODIUM CHLORIDE, POTASSIUM CHLORIDE, SODIUM LACTATE AND CALCIUM CHLORIDE 9 ML/HR: 600; 310; 30; 20 INJECTION, SOLUTION INTRAVENOUS at 10:46

## 2017-12-20 RX ADMIN — FENTANYL CITRATE 50 MCG: 50 INJECTION INTRAMUSCULAR; INTRAVENOUS at 12:33

## 2017-12-20 RX ADMIN — LIDOCAINE HYDROCHLORIDE 100 MG: 20 INJECTION, SOLUTION INFILTRATION; PERINEURAL at 12:28

## 2017-12-20 RX ADMIN — FENTANYL CITRATE 50 MCG: 50 INJECTION INTRAMUSCULAR; INTRAVENOUS at 14:45

## 2017-12-20 RX ADMIN — FENTANYL CITRATE 50 MCG: 50 INJECTION INTRAMUSCULAR; INTRAVENOUS at 12:38

## 2017-12-20 RX ADMIN — HYDROCODONE BITARTRATE AND ACETAMINOPHEN 2 TABLET: 7.5; 325 TABLET ORAL at 17:14

## 2017-12-20 RX ADMIN — Medication 2 MG: at 10:29

## 2017-12-20 RX ADMIN — PROPOFOL 150 MG: 10 INJECTION, EMULSION INTRAVENOUS at 12:28

## 2017-12-20 RX ADMIN — DEXAMETHASONE SODIUM PHOSPHATE 8 MG: 10 INJECTION INTRAMUSCULAR; INTRAVENOUS at 12:28

## 2017-12-20 RX ADMIN — FENTANYL CITRATE 50 MCG: 50 INJECTION INTRAMUSCULAR; INTRAVENOUS at 10:30

## 2017-12-20 RX ADMIN — FENTANYL CITRATE 25 MCG: 50 INJECTION INTRAMUSCULAR; INTRAVENOUS at 13:05

## 2017-12-20 RX ADMIN — VANCOMYCIN HYDROCHLORIDE 1750 MG: 100 INJECTION, POWDER, LYOPHILIZED, FOR SOLUTION INTRAVENOUS at 10:10

## 2017-12-20 NOTE — ANESTHESIA POSTPROCEDURE EVALUATION
Patient: Aaron Latif    Procedure Summary     Date Anesthesia Start Anesthesia Stop Room / Location    12/20/17 1222 1356 [facility] / [facility]       Procedure Diagnosis Surgeon Provider    LEFT TOTAL KNEE ARTHROPLASTY poly change (Left Knee) Primary osteoarthritis of left knee  (Primary osteoarthritis of left knee [M17.12]) MD Edgar Diane MD          Anesthesia Type: general  Last vitals  BP   119/74 (12/20/17 1500)   Temp   36.7 °C (98 °F) (12/20/17 1354)   Pulse   68 (12/20/17 1500)   Resp   16 (12/20/17 1500)     SpO2   98 % (12/20/17 1500)     Post Anesthesia Care and Evaluation    Patient location during evaluation: bedside  Patient participation: complete - patient participated  Level of consciousness: awake and alert  Pain management: adequate  Airway patency: patent  Anesthetic complications: No anesthetic complications    Cardiovascular status: acceptable  Respiratory status: acceptable  Hydration status: acceptable    Comments: /74  Pulse 68  Temp 36.7 °C (98 °F) (Oral)   Resp 16  SpO2 98%

## 2017-12-20 NOTE — H&P (VIEW-ONLY)
Patient: Aaron Latif    Date of Admission: 12/20/17    YOB: 1960    Medical Record Number: [id number]    Admitting Physician: Dr. Eloy Fitzgerald    Reason for Admission: End Stage Left Knee OA    History of Present Illness: 57 y.o. male presents with severe end stage knee osteoarthritis which has not been responsive to the full compliment of conservative measures. Despite conservative attempts, there is still severe, constant activity limiting pain. Given the severity of the pain, the patient has elected to proceed with knee replacement.    Allergies:   Allergies   Allergen Reactions   • Codeine Other (See Comments)     UNKNOWN         Current Medications:  Scheduled Meds:  PRN Meds:.    PMH:     Past Medical History:   Diagnosis Date   • Anxiety    • Arthritis    • Diabetic peripheral neuropathy     TYPE 2   • Erectile dysfunction    • Fatigue    • Fracture of rib of right side    • High cholesterol    • Hypertension    • Low testosterone    • Neuropathy    • Sleep apnea     DOES NOT WEAR CPAP   • Type 2 diabetes mellitus    • Vitamin D deficiency        PF/Surg/Soc Hx:     Past Surgical History:   Procedure Laterality Date   • ACHILLES TENDON REPAIR Left    • EYE SURGERY Left    • EYE SURGERY Left     BASKETBALL INJURY YEARS AGO   • HERNIA REPAIR     • KNEE ARTHROSCOPY Left 7/26/2016    Procedure: KNEE ARTHROSCOPY, PARTIAL medial MENISCECTOMY;  Surgeon: Layton Anderson MD;  Location: [facility];  Service:    • MT TOTAL KNEE ARTHROPLASTY Left 12/15/2016    Procedure: TOTAL KNEE ARTHROPLASTY;  Surgeon: Layton Anderson MD;  Location: [facility];  Service: Orthopedics        Social History     Occupational History   • Not on file.     Social History Main Topics   • Smoking status: Never Smoker   • Smokeless tobacco: Never Used   • Alcohol use Yes      Comment: SOCIAL   • Drug use: No      Comment: prescribed by md   • Sexual activity: Not on file      Social History     Social History Narrative  "       Family History   Problem Relation Age of Onset   • Diabetes Mother    • Hypertension Mother    • Heart disease Father    • Diabetes Brother    • Hypertension Brother    • Hypertension Paternal Uncle    • Malig Hyperthermia Neg Hx          Review of Systems:   A 14 point review of systems was performed, pertinent positives discussed above, all other systems are negative    Physical Exam: 57 y.o. male  Vital Signs :   Vitals:    12/14/17 1327   BP: 130/78   BP Location: Left arm   Patient Position: Sitting   Temp: 98.1 °F (36.7 °C)   TempSrc: Temporal Artery    Weight: 126 kg (278 lb)   Height: 177.8 cm (70\")     General: Alert and Oriented x 3, No acute distress.  Psych: mood and affect appropriate; recent and remote memory intact  Eyes: conjunctiva clear; pupils equally round and reactive, sclera nonicteric  CV: no peripheral edema  Resp: normal respiratory effort  Skin: no rashes or wounds; normal turgor  Musculosketetal; pain and crepitance with knee range of motion  Vascular: palpable distal pulses    Xrays:  -3 views (AP, lateral, and sunrise) were reviewed demonstrating end-stage OA with bone on bone articulation.  -A full length AP xray was ordered today for purposes of operative alignment demonstrating end stage arthritic findings. There are no previous full length films for review    Assessment:  End-stage Left knee osteoarthritis. Conservative measures have failed.      Plan:  The plan is to proceed with Left Total Knee Replacement. The patient voiced understanding of the risks, benefits, and alternative forms of treatment that were discussed with Dr Fitzgerald at the time of scheduling. Patient is planning going home with home health next day.  However upon review of prevention testing patient had not had a hemoglobin A1c and over 3 months and last one was greater than 8%.  I advised the patient to Dr. Licea requires his patients hemoglobin A1c be less than 8% in order to proceed with elective surgery.  " Patient verbalized understanding.  I placed an order and he is having to the hospital right now for that.  In addition patient reports occasional numbness tingling in his left arm and his had intermittent episodes of dyspnea over the last couple of months.  Obviously this is concerning given the patient's history of diabetes.  He denies any outright chest pain.  He did see a cardiologist many years ago for similar episode.  Again the patient is completely asymptomatic today chest pain or shortness of breath.  Patient did call his primary care physician's office and is gone to be seen tomorrow for clearance.  In the meantime the patient was advised if he develops any further episodes of shortness of breath he is to call 911 immediately.    Della Carrillo, APRN  12/14/2017

## 2017-12-20 NOTE — ANESTHESIA PREPROCEDURE EVALUATION
Anesthesia Evaluation     Patient summary reviewed and Nursing notes reviewed   no history of anesthetic complications:  NPO Solid Status: > 6 hours  NPO Liquid Status: > 6 hours     Airway   Mallampati: II  TM distance: >3 FB  Neck ROM: full  no difficulty expected  Dental - normal exam     Pulmonary - normal exam    breath sounds clear to auscultation  (+) shortness of breath, sleep apnea,   (-) rhonchi, decreased breath sounds, wheezes, rales, stridor  Cardiovascular - normal exam    ECG reviewed  Rhythm: regular  Rate: normal    (+) hypertension, hyperlipidemia  (-) murmur, weak pulses, friction rub, systolic click, carotid bruits, JVD, peripheral edema      Neuro/Psych- negative ROS  GI/Hepatic/Renal/Endo    (+) obesity, morbid obesity, diabetes mellitus type 2 well controlled,     Musculoskeletal     Abdominal  - normal exam    Abdomen: soft.   Substance History - negative use     OB/GYN negative ob/gyn ROS         Other   (+) arthritis                                     Anesthesia Plan    ASA 3     general   (AC/Femoral nerve Block  )  intravenous induction   Anesthetic plan and risks discussed with patient.

## 2017-12-20 NOTE — PLAN OF CARE
Problem: Patient Care Overview (Adult)  Goal: Plan of Care Review   12/20/17 3324   Outcome Evaluation   Outcome Summary/Follow up Plan patient presents s/p L TKR poly change. Patient anticipates d/c home this evening with follow up in outpatient PT tomorrow. Patient demonstrates good strength and ROM in L knee, able to ambulate with rwx without difficulty. Patient will benefit from continued skilled PT to address strength deficits in L LE and enable patient to return to independent function.

## 2017-12-20 NOTE — ANESTHESIA PROCEDURE NOTES
Peripheral Block    Patient location during procedure: pre-op  Reason for block: at surgeon's request and post-op pain management  Performed by  Anesthesiologist: JEANCARLOS GONZALEZ  Preanesthetic Checklist  Completed: patient identified, site marked, surgical consent, pre-op evaluation, timeout performed, IV checked, risks and benefits discussed and monitors and equipment checked  Prep:  Pt Position: supine  Sterile barriers:cap, gloves and mask  Prep: ChloraPrep  Patient monitoring: blood pressure monitoring, continuous pulse oximetry and EKG  Procedure  Sedation:yes  Performed under: local infiltration  Guidance:ultrasound guided  ULTRASOUND INTERPRETATION. Using ultrasound guidance a 22 G gauge needle was placed in close proximity to the nerve, at which point, under ultrasound guidance anesthetic was injected in the area of the nerve and spread of the anesthesia was seen on ultrasound in close proximity thereto.  There were no abnormalities seen on ultrasound; a digital image was taken; and the patient tolerated the procedure with no complications. Images:still images obtained    Laterality:left  Block Type:adductor canal block and femoral  Injection Technique:single-shot  Needle Type:echogenic  Needle Gauge:22 G  Resistance on Injection: none  Medications  Local Injected:ropivacaine 0.2% and lidocaine 2% with epinephrine Local Amount Injected:25mL  Post Assessment  Injection Assessment: negative aspiration for heme, no paresthesia on injection and incremental injection  Patient Tolerance:comfortable throughout block  Complications:no

## 2017-12-20 NOTE — ANESTHESIA PROCEDURE NOTES
Airway  Urgency: elective    Airway not difficult    General Information and Staff    Patient location during procedure: OR  Anesthesiologist: SABINA WINSLOW  CRNA: ANASTASIA BOWLING    Indications and Patient Condition  Indications for airway management: airway protection    Preoxygenated: yes  MILS maintained throughout  Mask difficulty assessment: 0 - not attempted    Final Airway Details  Final airway type: supraglottic airway      Successful airway: unique  Size 5    Number of attempts at approach: 1    Additional Comments  Dentition as preop. Atraumatic.

## 2017-12-20 NOTE — PLAN OF CARE
Problem: Perioperative Period (Adult)  Goal: Signs and Symptoms of Listed Potential Problems Will be Absent or Manageable (Perioperative Period)  Outcome: Ongoing (interventions implemented as appropriate)   12/20/17 1354   Perioperative Period   Problems Assessed (Perioperative Period) all   Problems Present (Perioperative Period) none

## 2017-12-20 NOTE — OP NOTE
Name: Aaron Latif  YOB: 1960    DATE OF SURGERY:12/20/2017    PREOPERATIVE DIAGNOSIS: Left total knee polyethylene wear    POSTOPERATIVE DIAGNOSIS: Left total knee polyethylene wear    PROCEDURE PERFORMED: Left total knee polyethylene liner exchange    SURGEON: Eloy Fitzgerald M.D.    ASSISTANT: DONAL BROOKS    IMPLANTS:     Implant Name Type Inv. Item Serial No.  Lot No. LRB No. Used   SRINIVASA II DISHED ARTICULAR INSERT SIZE 7-8 9MM       [employer] [id number] Left 1       Estimated Blood Loss: 100cc  Specimens : none  Complications: none    DESCRIPTION OF PROCEDURE: The patient was taken to the operating room and placed in the supine position. A sequential compression device was carefully placed on the non-operative leg. Preoperative antibiotics were administered. Surgical time out was performed. After adequate induction of anesthesia, the leg was prepped and draped in the usual sterile fashion, exsanguinated with an Esmarch bandage and the tourniquet inflated to 250 mmHg. A midline incision was performed through the previous incision followed by a medial parapatellar arthrotomy. There was a small amount of clear noninfectious appearing fluid within the joint. There was evidence of mild inflammatory synovitis And the quad tendon and medial retinaculum appeared somewhat scarred and fairly thickened and relatively noncompliant. The medial retinaculum was then carefully released from the proximal medial tibia with electrocautery staying directly on bone.The patellar tendon scar tissue was gently released from a portion of the proximal tibia down to the level of the patellar tendon insertion to allow for patellar mobilization. The patella was subluxed laterally. The femoral and tibial components appeared to be without damage and appeared well fixed to the bone.    Examination of the knee stability then took place.  The knee came to full extension and appeared stable with a varus  valgus / stress.  In flexion there was moderate flexion instability. We then placed the trial deep dish polyethylene spacer which resulted in full extension and excellent flexion-extension balance.  The flexion stability appeared clearly improved. We placed the final polyethylene spacer. There was excellent stability in both flexion and extension   The knee was then copiously irrigated. The tourniquet was then released. There was excellent hemostasis. We placed a one-eighth inch Hemovac drain. We closed the knee in multiple layers in standard fashion. Sterile dressing were applied. At the end of the case, the sponge and needle counts were reported as being correct. There were no known complications. The patient was then transported to the recovery room.      Eloy Fitzgerald M.D.  12/20/2017

## 2017-12-20 NOTE — THERAPY DISCHARGE NOTE
Acute Care - Physical Therapy Initial Eval/Discharge  [facility]     Patient Name: Aaron Latif  : 1960  MRN: [id number]  Today's Date: 2017   Onset of Illness/Injury or Date of Surgery Date: 17     Referring Physician: Amilcar      Admit Date: 2017    Visit Dx:    ICD-10-CM ICD-9-CM   1. Primary osteoarthritis of left knee M17.12 715.16     Patient Active Problem List   Diagnosis   • Chronic fatigue   • Erectile dysfunction   • Low testosterone   • Morbid obesity due to excess calories   • Vitamin D deficiency   • Dyslipidemia   • Shortness of breath   • Benign essential HTN   • Pityriasis versicolor   • Obstructive sleep apnea   • Umbilical hernia without obstruction or gangrene   • Diabetes mellitus type 2, uncontrolled   • Diabetic peripheral neuropathy   • ED (erectile dysfunction) of non-organic origin   • Essential hypertriglyceridemia   • Osteoarthritis of left knee   • Status post total knee replacement, left   • Primary osteoarthritis of left knee   • OA (osteoarthritis) of knee     Past Medical History:   Diagnosis Date   • Anxiety    • Arthritis    • Diabetic peripheral neuropathy     TYPE 2   • Erectile dysfunction    • Fatigue    • Fracture of rib of right side    • High cholesterol    • Hypertension    • Low testosterone    • Neuropathy    • Sleep apnea     DOES NOT WEAR CPAP   • Type 2 diabetes mellitus    • Vitamin D deficiency      Past Surgical History:   Procedure Laterality Date   • ACHILLES TENDON REPAIR Left    • EYE SURGERY Left    • EYE SURGERY Left     BASKETBALL INJURY YEARS AGO   • HERNIA REPAIR     • KNEE ARTHROSCOPY Left 2016    Procedure: KNEE ARTHROSCOPY, PARTIAL medial MENISCECTOMY;  Surgeon: Layton Anderson MD;  Location: [facility];  Service:    • NE TOTAL KNEE ARTHROPLASTY Left 12/15/2016    Procedure: TOTAL KNEE ARTHROPLASTY;  Surgeon: Layton Anderson MD;  Location: [facility] OR;  Service: Orthopedics          PT ASSESSMENT (last 72 hours)       PT Evaluation       12/20/17 1623 12/20/17 1017    Rehab Evaluation    Document Type evaluation  -EM     Subjective Information agree to therapy;no complaints  -EM     General Information    Onset of Illness/Injury or Date of Surgery Date 12/20/17  -EM     Referring Physician Amilcar  -EM     General Observations male in supine, awake and alert  -EM     Pertinent History Of Current Problem L knee poly change  -EM     Prior Level of Function independent:;community mobility  -EM     Equipment Currently Used at Home none  -EM none  -KM    Living Environment    Lives With spouse;child(nuha), adult  -EM spouse;child(nuha), adult  -KM    Living Arrangements house  -EM house  -KM    Home Accessibility stairs to enter home  -EM     Number of Stairs to Enter Home 4  -EM     Stair Railings at Home outside, present at both sides  -EM     Transportation Available  car  -KM    Clinical Impression    Patient/Family Goals Statement go home   -EM     Criteria for Skilled Therapeutic Interventions Met yes;treatment indicated  -EM     Impairments Found (describe specific impairments) gait, locomotion, and balance;joint integrity and mobility  -EM     Rehab Potential good, to achieve stated therapy goals  -EM     Pain Assessment    Pain Assessment 0-10  -EM     Pain Score 2  -EM     Pain Location Knee  -EM     Pain Orientation Left  -EM     Pain Intervention(s) Medication (See MAR);Cold applied  -EM     Cognitive Assessment/Intervention    Current Cognitive/Communication Assessment functional  -EM     Orientation Status oriented x 4  -EM     Follows Commands/Answers Questions 100% of the time  -EM     Personal Safety WNL/WFL  -EM     ROM (Range of Motion)    General ROM no range of motion deficits identified   x L knee  -EM     MMT (Manual Muscle Testing)    General MMT Assessment no strength deficits identified   generalized post op weakness LLE  -EM     Mobility Assessment/Training    Extremity Weight-Bearing Status left lower  "extremity  -EM     Left Lower Extremity Weight-Bearing weight-bearing as tolerated  -EM     Bed Mobility, Assessment/Treatment    Bed Mob, Supine to Sit, [place] conditional independence  -EM     Transfer Assessment/Treatment    Transfers, Sit-Stand [place] conditional independence  -EM     Transfers, Stand-Sit [place] supervision required  -EM     Transfers, Sit-Stand-Sit, Assist Device rolling walker  -EM     Gait Assessment/Treatment    Gait, [place] Level contact guard assist  -EM     Gait, Assistive Device rolling walker  -EM     Gait, Distance (Feet) 120  -EM     Gait, Gait Deviations forward flexed posture  -EM     Gait, Comment pt c/o feeling \"a little dizzy\" towards end of ambulation   -EM     Stairs Assessment/Treatment    Stairs, Comment pt reviewed technique for stairs  -EM     Therapy Exercises    Exercise Protocols total knee  -EM     Total Knee Exercises left:;10 reps;completed protocol  -EM     Positioning and Restraints    Pre-Treatment Position in bed  -EM     Post Treatment Position chair  -EM     In Chair reclined;call light within reach;with family/caregiver  -EM       User Key  (r) = Recorded By, (t) = Taken By, (c) = Cosigned By    Initials Name Provider Type    EM Jody Amaya, PT Physical Therapist     Ronna Stuart, RN Registered Nurse          Physical Therapy Education     Title: PT OT SLP Therapies (Active)     Topic: Physical Therapy (Active)     Point: Mobility training (Done)    Learning Progress Summary    Learner Readiness Method Response Comment Documented by Status   Patient Acceptance E NIKA CUELLAR  EM 12/20/17 1628 Done               Point: Home exercise program (Done)    Learning Progress Summary    Learner Readiness Method Response Comment Documented by Status   Patient Acceptance E NIKA CUELLAR  EM 12/20/17 1628 Done                      User Key     Initials Effective Dates Name Provider Type Discipline    EM 12/01/15 -  Jody Amaya, PT Physical " Therapist PT                PT Recommendation and Plan  Anticipated Discharge Disposition: home with outpatient services  Demonstrates Need for Referral to Another Service: dietitian  PT Frequency: evaluation only (anticipate d/c home this evening, will f/u with outpt PT )  Plan of Care Review  Outcome Summary/Follow up Plan: patient presents s/p L TKR poly change. Patient anticipates d/c home this evening with follow up in outpatient PT tomorrow. Patient demonstrates good strength and ROM in L knee, able to ambulate with rwx without difficulty. Patient will benefit from continued skilled PT to address strength deficits in L LE and enable patient to return to independent function.               Outcome Measures       12/20/17 1600          How much help from another person do you currently need...    Turning from your back to your side while in flat bed without using bedrails? 4  -EM      Moving from lying on back to sitting on the side of a flat bed without bedrails? 4  -EM      Moving to and from a bed to a chair (including a wheelchair)? 3  -EM      Standing up from a chair using your arms (e.g., wheelchair, bedside chair)? 3  -EM      Climbing 3-5 steps with a railing? 3  -EM      To walk in hospital room? 3  -EM      AM-PAC 6 Clicks Score 20  -EM      Functional Assessment    Outcome Measure Options AM-PAC 6 Clicks Basic Mobility (PT)  -EM        User Key  (r) = Recorded By, (t) = Taken By, (c) = Cosigned By    Initials Name Provider Type    MARITZA Amaya PT Physical Therapist           Time Calculation:         PT Charges       12/20/17 1641 12/20/17 1633       Time Calculation    Start Time  1620  -EM     Stop Time 1641  -EM      PT Received On  12/20/17  -EM       User Key  (r) = Recorded By, (t) = Taken By, (c) = Cosigned By    Initials Name Provider Type    MARITZA Amaya PT Physical Therapist          Therapy Charges for Today     Code Description Service Date Service Provider Modifiers  Qty    65172922420 HC PT EVAL LOW COMPLEXITY 1 12/20/2017 Jody Amaya, PT GP 1    01281510894 HC PT THER PROC EA 15 MIN 12/20/2017 Jody Amaya, PT GP 1          PT G-Codes  Outcome Measure Options: AM-PAC 6 Clicks Basic Mobility (PT)    PT Discharge Summary  Anticipated Discharge Disposition: home with outpatient services    Jody Amaya, PT  12/20/2017

## 2017-12-21 ENCOUNTER — TELEPHONE (OUTPATIENT)
Dept: ORTHOPEDIC SURGERY | Facility: CLINIC | Age: 57
End: 2017-12-21

## 2017-12-21 ENCOUNTER — OFFICE VISIT (OUTPATIENT)
Dept: ORTHOPEDIC SURGERY | Facility: CLINIC | Age: 57
End: 2017-12-21

## 2017-12-21 ENCOUNTER — HOSPITAL ENCOUNTER (EMERGENCY)
Facility: HOSPITAL | Age: 57
Discharge: HOME | End: 2017-12-21
Attending: EMERGENCY MEDICINE | Admitting: EMERGENCY MEDICINE

## 2017-12-21 VITALS
TEMPERATURE: 96.4 F | DIASTOLIC BLOOD PRESSURE: 69 MMHG | BODY MASS INDEX: 38.65 KG/M2 | OXYGEN SATURATION: 95 % | WEIGHT: 270 LBS | RESPIRATION RATE: 16 BRPM | SYSTOLIC BLOOD PRESSURE: 122 MMHG | HEART RATE: 82 BPM | HEIGHT: 70 IN

## 2017-12-21 VITALS — HEIGHT: 71 IN | TEMPERATURE: 99.5 F | WEIGHT: 275 LBS | BODY MASS INDEX: 38.5 KG/M2

## 2017-12-21 DIAGNOSIS — Z96.652 STATUS POST LEFT KNEE REPLACEMENT: Primary | ICD-10-CM

## 2017-12-21 DIAGNOSIS — Z48.89 ENCOUNTER FOR POSTOPERATIVE WOUND CHECK: ICD-10-CM

## 2017-12-21 DIAGNOSIS — G89.18 POST-OP PAIN: Primary | ICD-10-CM

## 2017-12-21 PROCEDURE — 99282 EMERGENCY DEPT VISIT SF MDM: CPT

## 2017-12-21 PROCEDURE — 99024 POSTOP FOLLOW-UP VISIT: CPT | Performed by: ORTHOPAEDIC SURGERY

## 2017-12-21 RX ORDER — SULFAMETHOXAZOLE AND TRIMETHOPRIM 800; 160 MG/1; MG/1
1 TABLET ORAL 2 TIMES DAILY
Qty: 14 TABLET | Refills: 0 | Status: SHIPPED | OUTPATIENT
Start: 2017-12-21 | End: 2017-12-28

## 2017-12-21 NOTE — ED PROVIDER NOTES
Pt presents after L knee surgery done yesterday with bleeding from his around his incision. On exam, his L knee has an incision with staples in plae and a large subcutaneous hematoma. Expressed about 150 mL of blood and clots. Irrigated with saline and betadine. Redressed with pressure dressing and ace bandage and gauze. He will be discharged home with abx.         I supervised care provided by the midlevel provider.   We have discussed this patient's history, physical exam, and treatment plan.  I have reviewed the note and personally saw and examined the patient and agree with the plan of care. See attending note.  Documentation assistance provided by lizy Sousa for Dr. Mireles.  Information recorded by the scribe was done at my direction and has been verified and validated by me.         Octavia Sousa  12/21/17 0103       Mark Anthony Mireles MD  12/21/17 7970

## 2017-12-21 NOTE — ED PROVIDER NOTES
EMERGENCY DEPARTMENT ENCOUNTER    CHIEF COMPLAINT  Chief Complaint: post op problem  History given by: patient, spouse  History limited by: nothing  Room Number: 06/06  PMD: Je Hall MD  Orthopedic Surgeon: Dr. Fitzgerald    HPI:  Pt is a 57 y.o. male, who presents 1 day s/p revision of his L total knee replacement, complaining of increased bleeding from the surgical incision x2 hours. Pt denies any trauma or injury to the incision and states the bleeding spontaneously started while he was sitting on the couch. Pt denies increased pain but states he has been taking percocet as directed since the surgery. Pt has no other complaints at this time.     Duration:  2 hours  Onset: gradual  Timing: constant  Location: L knee  Intensity/Severity: moderate  Progression: unchanged  Associated Symptoms: none  Aggravating Factors: none  Alleviating Factors: none  Previous Episodes: none  Treatment before arrival: none    PAST MEDICAL HISTORY  Active Ambulatory Problems     Diagnosis Date Noted   • Chronic fatigue 02/12/2016   • Erectile dysfunction 02/12/2016   • Low testosterone 02/12/2016   • Morbid obesity due to excess calories 02/12/2016   • Vitamin D deficiency 02/12/2016   • Dyslipidemia 02/12/2016   • Shortness of breath 04/26/2016   • Benign essential HTN 04/26/2016   • Pityriasis versicolor 04/26/2016   • Obstructive sleep apnea 04/26/2016   • Umbilical hernia without obstruction or gangrene 04/26/2016   • Diabetes mellitus type 2, uncontrolled 06/04/2016   • Diabetic peripheral neuropathy 06/04/2016   • ED (erectile dysfunction) of non-organic origin 06/04/2016   • Essential hypertriglyceridemia 06/04/2016   • Osteoarthritis of left knee 12/15/2016   • Status post total knee replacement, left 07/06/2017   • Primary osteoarthritis of left knee 11/07/2017   • OA (osteoarthritis) of knee 12/20/2017     Resolved Ambulatory Problems     Diagnosis Date Noted   • Accelerated hypertension 02/12/2016   • Diabetes  mellitus type 2, controlled, without complications 02/12/2016   • Acute bronchitis 02/12/2016   • 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     Past Medical History:   Diagnosis Date   • Anxiety    • Arthritis    • Diabetic peripheral neuropathy    • Erectile dysfunction    • Fatigue    • Fracture of rib of right side    • High cholesterol    • Hypertension    • Low testosterone    • Neuropathy    • Sleep apnea    • Type 2 diabetes mellitus    • Vitamin D deficiency        PAST SURGICAL HISTORY  Past Surgical History:   Procedure Laterality Date   • ACHILLES TENDON REPAIR Left    • EYE SURGERY Left    • EYE SURGERY Left     BASKETBALL INJURY YEARS AGO   • HERNIA REPAIR     • KNEE ARTHROSCOPY Left 7/26/2016    Procedure: KNEE ARTHROSCOPY, PARTIAL medial MENISCECTOMY;  Surgeon: Layton Anderson MD;  Location: [facility];  Service:    • WI TOTAL KNEE ARTHROPLASTY Left 12/15/2016    Procedure: TOTAL KNEE ARTHROPLASTY;  Surgeon: Layton Anderson MD;  Location: [facility] OR;  Service: Orthopedics       FAMILY HISTORY  Family History   Problem Relation Age of Onset   • Diabetes Mother    • Hypertension Mother    • Heart disease Father    • Diabetes Brother    • Hypertension Brother    • Hypertension Paternal Uncle    • Malig Hyperthermia Neg Hx        SOCIAL HISTORY  Social History     Social History   • Marital status:      Spouse name: N/A   • Number of children: N/A   • Years of education: N/A     Occupational History   • Not on file.     Social History Main Topics   • Smoking status: Never Smoker   • Smokeless tobacco: Never Used   • Alcohol use Yes      Comment: SOCIAL   • Drug use: No      Comment: prescribed by md   • Sexual activity: Not on file     Other Topics Concern   • Not on file     Social History Narrative       ALLERGIES  Codeine    REVIEW OF SYSTEMS  Review of Systems   Constitutional: Negative for chills and fever.   HENT: Negative for sore throat.    Gastrointestinal: Negative for nausea and vomiting.    Genitourinary: Negative for dysuria.   Musculoskeletal: Negative for back pain.   Skin: Positive for wound (stapled incision to L anterior knee with active bleeding). Negative for rash.   Psychiatric/Behavioral: The patient is not nervous/anxious.        PHYSICAL EXAM  ED Triage Vitals   Temp Heart Rate Resp BP SpO2   12/21/17 0022 12/21/17 0022 12/21/17 0024 12/21/17 0105 12/21/17 0022   96.4 °F (35.8 °C) 96 16 115/67 95 %      Temp src Heart Rate Source Patient Position BP Location FiO2 (%)   -- -- -- -- --              Physical Exam   Constitutional: No distress.   HENT:   Head: Normocephalic and atraumatic.   Eyes: EOM are normal.   Neck: Normal range of motion.   Cardiovascular: Normal heart sounds.    Pulmonary/Chest: No respiratory distress.   Abdominal: There is no tenderness.   Musculoskeletal: He exhibits no edema.   Neurological: He is alert.   Skin: Skin is warm and dry.   Stapled incision to the anterior L knee with blood soaked bandages. Hematoma and bleeding noted at inferior margin of incision. No dehiscence, erythema or warmth.   Nursing note and vitals reviewed.      LAB RESULTS  Lab Results (last 24 hours)     Procedure Component Value Units Date/Time    POC Glucose Once [[id number]]  (Normal) Collected:  12/20/17 1001    Specimen:  Blood Updated:  12/20/17 1002     Glucose 100 mg/dL     Narrative:       Meter: [id number] : [id number]    Body Fluid Cell Count With Differential - Body Fluid, Knee, Left [[id number]] Collected:  12/20/17 1305    Specimen:  Body Fluid from Knee, Left Updated:  12/20/17 1429    Narrative:       The following orders were created for panel order Body Fluid Cell Count With Differential - Body Fluid, Knee, Left.  Procedure                               Abnormality         Status                     ---------                               -----------         ------                     Body fluid cell count - ...[[id number]]  Abnormal            Final result                Body fluid differential ...[[id number]]                      Final result                 Please view results for these tests on the individual orders.    Body fluid differential - Body Fluid, [[id number]] Collected:  12/20/17 1305    Specimen:  Body Fluid from Knee, Left Updated:  12/20/17 1429     Neutrophils, Fluid 1 %      Lymphocytes, Fluid 64 %      Monocytes, Fluid 21 %      Mononuclear, Fluid 14 %     Anaerobic Culture - Body Fluid, Knee, Left [[id number]] Collected:  12/20/17 1305    Specimen:  Body Fluid from Knee, Left Updated:  12/20/17 1318    Body Fluid Culture - Body Fluid, Knee, Left [[id number]] Collected:  12/20/17 1305    Specimen:  Body Fluid from Knee, Left Updated:  12/20/17 1318    AFB Culture - Body Fluid, Knee, Left [[id number]] Collected:  12/20/17 1305    Specimen:  Body Fluid from Knee, Left Updated:  12/20/17 1318    Body fluid cell count - Body Fluid, [[id number]]  (Abnormal) Collected:  12/20/17 1305    Specimen:  Body Fluid from Knee, Left Updated:  12/20/17 1416     Color, Fluid Yellow     Appearance, Fluid Cloudy (A)     WBC, Fluid 90 /mm3      RBC, Fluid 54713 /mm3     POC Glucose Once [[id number]]  (Abnormal) Collected:  12/20/17 1651    Specimen:  Blood Updated:  12/20/17 1654     Glucose 153 (H) mg/dL     Narrative:       Meter: [id number] : [id number] Chace BENITEZ I ordered the above labs and reviewed the results      PROCEDURES  Procedures      PROGRESS AND CONSULTS  ED Course     2:17 AM  Discussed with Pt/family that bleeding is likely related to a hematoma from surgery and assured Pt that there is no acute abnormality seen at this time that requires further treatment. Discussed plan to re-wrap knee with pressure dressing and advised Pt to continue post operative care as directed by Dr. Fitzgerald. Pt will be discharged.     2:21 AM  Discussed Pt's case with Dr. Mireles who agrees with the plan of care.       MEDICAL DECISION MAKING  Results were  reviewed/discussed with the patient and they were also made aware of online access. Pt also made aware that some labs, such as cultures, will not be resulted during ER visit and follow up with PMD is necessary.     MDM  Number of Diagnoses or Management Options  Patient Progress  Patient progress: stable         DIAGNOSIS  Final diagnoses:   Post-op pain   Encounter for postoperative wound check       DISPOSITION  Disposition: Discharged.    Patient discharged in stable condition.    Reviewed implications of results, diagnosis, meds, responsibility to follow up, warning signs and symptoms of possible worsening, potential complications and reasons to return to ER.    Patient/Family voiced understanding of above instructions.    Discussed plan for discharge, as there is no emergent indication for admission.  Pt/family is agreeable and understands need for follow up and repeat testing.  Pt is aware that discharge does not mean that nothing is wrong but it indicates no emergency is present and they must continue care with follow-up as given below or physician of their choice.     FOLLOW-UP  Eloy Fitzgerald MD  [address] [address]  [phone]    Go to  as scheduled         Medication List      New Prescriptions          sulfamethoxazole-trimethoprim 800-160 MG per tablet   Commonly known as:  BACTRIM DS,SEPTRA DS   Take 1 tablet by mouth 2 (Two) Times a Day.         Changed          ergocalciferol 01944 units capsule   Commonly known as:  ERGOCALCIFEROL   Take 2 capsules by mouth 1 (One) Time Per Week.   What changed:  additional instructions       FARXIGA 10 MG tablet   Generic drug:  Dapagliflozin Propanediol   1 tablet every morning by mouth   What changed:    - how much to take  - how to take this  - when to take this  - additional instructions       KOMBIGLYZE XR 2.5-1000 MG tablet sustained-release 24 hour   Generic drug:  SAXagliptin-MetFORMIN ER   2 tablets by mouth with supper   What  changed:    - how much to take  - how to take this  - when to take this  - additional instructions       pioglitazone 30 MG tablet   Commonly known as:  ACTOS   Take 1 tablet by mouth Daily.   What changed:  when to take this       Testosterone Cypionate 200 MG/ML injection   Commonly known as:  DEPOTESTOTERONE CYPIONATE   Inject 1 mL into the shoulder, thigh, or buttocks 1 (One) Time Per Week.   What changed:  additional instructions             Latest Documented Vital Signs:  As of 3:42 AM  BP- 122/69 HR- 82 Temp- 96.4 °F (35.8 °C) O2 sat- 95%    --  Documentation assistance provided by lizy Urban for Porter Francisco PA-C.  Information recorded by the scribe was done at my direction and has been verified and validated by me.     Velma Urban  12/21/17 0313       YENNY Sahu  12/21/17 0343

## 2017-12-21 NOTE — PROGRESS NOTES
Mr. Latif presented today.  He is 24 hours out from his left knee poly-change and he had bleeding from the wound which saturated his dressing.  He actually went to the emergency department last night and they performed a dressing change and he is now here.  Overall is feeling okay not lightheaded dizzy or short of breath.    I removed his Ace wraps which hadn't a moderate amount of blood within them.  The incision shows a small amount of drainage at the distal extent of the wound.  There is dark red blood consistent with hematoma.  The calf is soft with a negative Homans sign.    I carefully cleaned the wound with peroxide and applied sterile gauze and a number of AVD pads.  I placed him in a knee immobilizer over the next 24 hours.  I gave my number of recommended that he follow up tomorrow he doesn't feel he can make tomorrow.  If he has any issues she'll call me otherwise I'm going to see him back in 2 weeks.

## 2017-12-21 NOTE — DISCHARGE INSTRUCTIONS
Wear dressing, elevate leg, pain medicine as needed, call first thing in the morning to schedule follow up with your orthopedist.

## 2017-12-25 LAB
BACTERIA FLD CULT: NORMAL
BACTERIA SPEC ANAEROBE CULT: NORMAL
GRAM STN SPEC: NORMAL

## 2017-12-26 DIAGNOSIS — R52 PAIN: Primary | ICD-10-CM

## 2017-12-28 ENCOUNTER — OFFICE VISIT (OUTPATIENT)
Dept: ORTHOPEDIC SURGERY | Facility: CLINIC | Age: 57
End: 2017-12-28

## 2017-12-28 VITALS — BODY MASS INDEX: 38.37 KG/M2 | WEIGHT: 268 LBS | HEIGHT: 70 IN | TEMPERATURE: 99.1 F

## 2017-12-28 DIAGNOSIS — Z96.652 HISTORY OF TOTAL KNEE ARTHROPLASTY, LEFT: Primary | ICD-10-CM

## 2017-12-28 PROCEDURE — 99024 POSTOP FOLLOW-UP VISIT: CPT | Performed by: NURSE PRACTITIONER

## 2017-12-28 RX ORDER — OXYCODONE AND ACETAMINOPHEN 10; 325 MG/1; MG/1
TABLET ORAL
Qty: 100 TABLET | Refills: 0 | OUTPATIENT
Start: 2017-12-28

## 2017-12-28 RX ORDER — SAXAGLIPTIN AND METFORMIN HYDROCHLORIDE 2.5; 1 MG/1; MG/1
TABLET, FILM COATED, EXTENDED RELEASE ORAL
Qty: 60 TABLET | Refills: 0 | Status: SHIPPED | OUTPATIENT
Start: 2017-12-28 | End: 2018-01-24 | Stop reason: SDUPTHER

## 2017-12-28 NOTE — PROGRESS NOTES
Patient: Aaron Latif  YOB: 1960 57 y.o. male  Medical Record Number: [id number]    Chief Complaints:   Chief Complaint   Patient presents with   • Left Knee - Post-op, Pain, Edema       History of Present Illness:Aaron Latif is a 57 y.o. male who presents for follow-up of  Left knee poly-exchange.  Patient's out 8 days from surgery, initially had some bloody drainage coming from the incision which has completely resolved.  Denies any fever chills, motion is improving area    Allergies:   Allergies   Allergen Reactions   • Codeine Other (See Comments)     UNKNOWN       Medications:   Current Outpatient Prescriptions   Medication Sig Dispense Refill   • aspirin  MG EC tablet Take 1 tablet by mouth 2 (Two) Times a Day for 60 doses. 60 tablet 0   • atorvastatin (LIPITOR) 80 MG tablet Take 1 tablet by mouth Every Night. 90 tablet 3   • docusate sodium 100 MG capsule Take 100 mg by mouth 2 (Two) Times a Day for 28 doses. 28 capsule 0   • ergocalciferol (ERGOCALCIFEROL) 26794 units capsule Take 2 capsules by mouth 1 (One) Time Per Week. (Patient taking differently: Take 2 capsules by mouth 1 (One) Time Per Week. thursday) 26 capsule 3   • FARXIGA 10 MG tablet 1 tablet every morning by mouth (Patient taking differently: Take 1 tablet by mouth Every Morning. 1 tablet every morning by mouth) 30 tablet 5   • fenofibrate micronized (ANTARA) 130 MG capsule Take 1 capsule by mouth Every Morning Before Breakfast. 30 capsule 5   • glucose blood (ONE TOUCH ULTRA TEST) test strip 1 each by Other route 3 (Three) Times a Day. Use as instructed 400 each 1   • hydrochlorothiazide (HYDRODIURIL) 25 MG tablet Take 25 mg by mouth Every Morning.     • Insulin Glargine (TOUJEO SOLOSTAR) 300 UNIT/ML solution pen-injector Inject 75 Units under the skin Every Morning.     • Insulin Pen Needle 31G X 8 MM misc Use once a day 100 each 0   • KOMBIGLYZE XR 2.5-1000 MG tablet sustained-release 24 hour 2 tablets by mouth with  "supper (Patient taking differently: Take 2 tablets by mouth Every Evening. 2 tablets by mouth with supper) 60 tablet 1   • LYRICA 200 MG capsule Take 1 capsule by mouth 2 (Two) Times a Day. 60 capsule 5   • meloxicam (MOBIC) 15 MG tablet Take 15 mg by mouth Every Morning. TO STOP 1 WK CT SURG     • metoprolol tartrate (LOPRESSOR) 50 MG tablet Take 50 mg by mouth Every Morning.     • ONETOUCH DELICA LANCETS 33G misc Check blood glucose 3 times daily 100 each 5   • oxyCODONE-acetaminophen (PERCOCET)  MG per tablet 1-2 po q4-6 hr prn pain 100 tablet 0   • pioglitazone (ACTOS) 30 MG tablet Take 1 tablet by mouth Daily. (Patient taking differently: Take 30 mg by mouth Every Morning.) 90 tablet 1   • Syringe 23G X 1\" 3 ML misc Use weekly for testosterone injection 10 each 3   • Testosterone Cypionate (DEPOTESTOTERONE CYPIONATE) 200 MG/ML injection Inject 1 mL into the shoulder, thigh, or buttocks 1 (One) Time Per Week. (Patient taking differently: Inject 200 mg into the shoulder, thigh, or buttocks 1 (One) Time Per Week. SATURDAY) 10 mL 3   • VASCEPA 1 g capsule capsule Take 2 g by mouth 2 (Two) Times a Day With Meals. 120 capsule 5   • verapamil SR (CALAN-SR) 240 MG CR tablet Take 240 mg by mouth Every Morning.       No current facility-administered medications for this visit.          The following portions of the patient's history were reviewed and updated as appropriate: allergies, current medications, past family history, past medical history, past social history, past surgical history and problem list.    Review of Systems:   A 14 point review of systems was performed. All systems negative except pertinent positives/negative listed in HPI above    Physical Exam:   Vitals:    12/28/17 1031   Temp: 99.1 °F (37.3 °C)   TempSrc: Temporal Artery    Weight: 122 kg (268 lb)   Height: 177.8 cm (70\")       General: A and O x 3, ASA, NAD    SCLERA:    Normal    DENTITION:   Normal  Left knee patient has a well-healing " midline surgical incision noted Gordo someplace, he has some mild swelling.  Otherwise good range of motion, calf is soft and nontender.  No signs of drainage or infection.    Assessment/Plan:  Status post left knee poly-exchange    Patient will return the office next week for follow-up to have staples removed.  He will continue with physical therapy, ice, light compression as needed for swelling, and will let us know if he has any problems in the meantime

## 2017-12-29 DIAGNOSIS — E78.5 DYSLIPIDEMIA: ICD-10-CM

## 2017-12-29 RX ORDER — ATORVASTATIN CALCIUM 80 MG/1
80 TABLET, FILM COATED ORAL NIGHTLY
Qty: 90 TABLET | Refills: 1 | Status: SHIPPED | OUTPATIENT
Start: 2017-12-29 | End: 2018-03-26 | Stop reason: SDUPTHER

## 2018-01-02 ENCOUNTER — OFFICE VISIT (OUTPATIENT)
Dept: ORTHOPEDIC SURGERY | Facility: CLINIC | Age: 58
End: 2018-01-02

## 2018-01-02 VITALS — TEMPERATURE: 98.3 F | HEIGHT: 70 IN | BODY MASS INDEX: 38.37 KG/M2 | WEIGHT: 268 LBS

## 2018-01-02 DIAGNOSIS — Z96.652 STATUS POST TOTAL KNEE REPLACEMENT, LEFT: Primary | ICD-10-CM

## 2018-01-02 PROCEDURE — 99024 POSTOP FOLLOW-UP VISIT: CPT | Performed by: NURSE PRACTITIONER

## 2018-01-02 RX ORDER — OXYCODONE AND ACETAMINOPHEN 10; 325 MG/1; MG/1
TABLET ORAL
Qty: 100 TABLET | Refills: 0 | Status: SHIPPED | OUTPATIENT
Start: 2018-01-02 | End: 2018-01-17 | Stop reason: SDUPTHER

## 2018-01-02 NOTE — PATIENT INSTRUCTIONS
DIAGNOSIS: 2 week follow up left total knee arthroplasty    SUBJECTIVE:Patient returns today for 2 week follow up of left total knee replacement. Patient reports doing well with no unusual complaints. Appears to be progressing appropriately.    OBJECTIVE:   Exam:. The incision is healing appropriately. No sign of infection. Range of motion is progressing as expected -4 /108. The calf is soft and nontender with a negative Homans sign.    DIAGNOSTIC STUDIES  Xrays: 2 views of the left knee (AP full length and lateral) were ordered and images were reviewed for evaluation of recent knee replacement. They demonstrate a well positioned, well aligned knee replacement without complicating factors noted. In comparison with previous films there has been interval implant placement.    ASSESSMENT: 2 week status post left knee replacement expected healing.    PLAN: 1) Staples removed and steri strips applied   2) Order given for PT and pain medicine (as needed)   3) Continue CURT hose for four weeks   4) Continue ice PRN   5) Continue EC Aspirin 325mg by mouth once a day until 6 weeks post op.   6) Follow up in 4 weeks with repeat Xrays of left knee (2 views)   7) Continue with antibiotic prophylaxis with dental procedures for 2 yrs post total joint replacement.    Denise Hairston, APRN  1/2/2018     Pain Medications utilized after surgery are narcotics and the law requires that the following information be given to all patients that are prescribed narcotics:    CLASSIFICATION: Pain medications are called Opioids and are narcotics  LEGALITIES: It is illegal to share narcotics with others and to drive within 4 hours of taking narcotics  POTENTIAL SIDE EFFECTS: Potential side effects of opioids include: nausea, vomiting, itching, dizziness, drowsiness, dry mouth, constipation, and difficulty urinating.  POTENTIAL ADVERSE EFFECTS:   Opioid tolerance can develop with use of pain medications and this simply means that it requires more  and more of the medication to control pain; however, this is seen more in patients that use opioids for longer periods of time.  Opioid dependence can develop with use of Opioids and this simply means that to stop the medication can cause withdrawal symptoms so wean gradually (do not stop all at once); however, this is seen more with patients that use opioids for longer periods of time.  Opioid addiction can develop with use of Opioids and the incidence of this is very unlikely in patients who take the medications as ordered and stop the medications as instructed.  Opioid overdose can be dangerous, but is unlikely when the medication is taken as ordered and stopped when ordered. It is important not to mix opioids with alcohol or with and type of sedative such as Benadryl as this can lead to over sedation and respiratory difficulty.    DOSAGE:   Pain medications will need to be taken consistently for the first week to decrease pain and promote adequate pain relief and participation in physical therapy.    After the initial surgical pain begins to resolve, you may begin to decrease the pain medication. By the end of 8 weeks, you should be off of pain medications.    Refills will not be given by the office during evening hours, on weekends.  To seek refills on pain medications during the initial 6 week post-operative period, you must call the office 48 hours in advance to request the refill. The office will then notify you when to  the prescription. DO NOT wait until you are out of the medication to request a refill.

## 2018-01-02 NOTE — PROGRESS NOTES
Aaron Latif : 1960 MRN: [id number] DATE: 2018  Body mass index is 38.45 kg/(m^2).  Vitals:    18 1045   Temp: 98.3 °F (36.8 °C)       DIAGNOSIS: 2 week follow up left total knee arthroplasty    SUBJECTIVE:Patient returns today for 2 week follow up of left total knee replacement. Patient reports doing well with no unusual complaints. Appears to be progressing appropriately.    OBJECTIVE:   Exam:. The incision is healing appropriately. No sign of infection. Range of motion is progressing as expected -. The calf is soft and nontender with a negative Homans sign.    ASSESSMENT: 2 week status post left knee replacement expected healing.    PLAN: 1) Staples removed and steri strips applied   2) Order given for PT and pain medicine (as needed)   3) Continue CURT hose for four weeks   4) Continue ice PRN   5) Continue EC Aspirin 325mg by mouth once a day until 6 weeks post op.   6) Follow up in 4 weeks with repeat Xrays of left knee (2 views)   7) Continue with antibiotic prophylaxis with dental procedures for 2 yrs post total joint replacement.    Denise Hairston, APRN  2018     Pain Medications utilized after surgery are narcotics and the law requires that the following information be given to all patients that are prescribed narcotics:    CLASSIFICATION: Pain medications are called Opioids and are narcotics  LEGALITIES: It is illegal to share narcotics with others and to drive within 4 hours of taking narcotics  POTENTIAL SIDE EFFECTS: Potential side effects of opioids include: nausea, vomiting, itching, dizziness, drowsiness, dry mouth, constipation, and difficulty urinating.  POTENTIAL ADVERSE EFFECTS:   Opioid tolerance can develop with use of pain medications and this simply means that it requires more and more of the medication to control pain; however, this is seen more in patients that use opioids for longer periods of time.  Opioid dependence can develop with use of Opioids and this  simply means that to stop the medication can cause withdrawal symptoms so wean gradually (do not stop all at once); however, this is seen more with patients that use opioids for longer periods of time.  Opioid addiction can develop with use of Opioids and the incidence of this is very unlikely in patients who take the medications as ordered and stop the medications as instructed.  Opioid overdose can be dangerous, but is unlikely when the medication is taken as ordered and stopped when ordered. It is important not to mix opioids with alcohol or with and type of sedative such as Benadryl as this can lead to over sedation and respiratory difficulty.    DOSAGE:   Pain medications will need to be taken consistently for the first week to decrease pain and promote adequate pain relief and participation in physical therapy.    After the initial surgical pain begins to resolve, you may begin to decrease the pain medication. By the end of 8 weeks, you should be off of pain medications.    Refills will not be given by the office during evening hours, on weekends.  To seek refills on pain medications during the initial 6 week post-operative period, you must call the office 48 hours in advance to request the refill. The office will then notify you when to  the prescription. DO NOT wait until you are out of the medication to request a refill.

## 2018-01-10 ENCOUNTER — OFFICE VISIT (OUTPATIENT)
Dept: INTERNAL MEDICINE | Facility: CLINIC | Age: 58
End: 2018-01-10

## 2018-01-10 VITALS
DIASTOLIC BLOOD PRESSURE: 80 MMHG | TEMPERATURE: 98.1 F | BODY MASS INDEX: 39.46 KG/M2 | HEART RATE: 81 BPM | OXYGEN SATURATION: 98 % | WEIGHT: 275 LBS | SYSTOLIC BLOOD PRESSURE: 128 MMHG

## 2018-01-10 DIAGNOSIS — R51.9 ACUTE INTRACTABLE HEADACHE, UNSPECIFIED HEADACHE TYPE: Primary | ICD-10-CM

## 2018-01-10 LAB — ERYTHROCYTE [SEDIMENTATION RATE] IN BLOOD BY WESTERGREN METHOD: 3 MM/HR (ref 0–20)

## 2018-01-10 PROCEDURE — 99213 OFFICE O/P EST LOW 20 MIN: CPT | Performed by: NURSE PRACTITIONER

## 2018-01-10 RX ORDER — GABAPENTIN 300 MG/1
300 CAPSULE ORAL EVERY 8 HOURS PRN
Qty: 30 CAPSULE | Refills: 0 | Status: CANCELLED | OUTPATIENT
Start: 2018-01-10

## 2018-01-10 NOTE — PATIENT INSTRUCTIONS
General Headache Without Cause  A headache is pain or discomfort felt around the head or neck area. The specific cause of a headache may not be found. There are many causes and types of headaches. A few common ones are:  · Tension headaches.  · Migraine headaches.  · Cluster headaches.  · Chronic daily headaches.  HOME CARE INSTRUCTIONS   Watch your condition for any changes. Take these steps to help with your condition:  Managing Pain  · Take over-the-counter and prescription medicines only as told by your health care provider.  · Lie down in a dark, quiet room when you have a headache.  · If directed, apply ice to the head and neck area:  ¨ Put ice in a plastic bag.  ¨ Place a towel between your skin and the bag.  ¨ Leave the ice on for 20 minutes, 2-3 times per day.  · Use a heating pad or hot shower to apply heat to the head and neck area as told by your health care provider.  · Keep lights dim if bright lights bother you or make your headaches worse.  Eating and Drinking  · Eat meals on a regular schedule.  · Limit alcohol use.  · Decrease the amount of caffeine you drink, or stop drinking caffeine.  General Instructions  · Keep all follow-up visits as told by your health care provider. This is important.  · Keep a headache journal to help find out what may trigger your headaches. For example, write down:    What you eat and drink.    How much sleep you get.    Any change to your diet or medicines.  · Try massage or other relaxation techniques.  · Limit stress.  · Sit up straight, and do not tense your muscles.  · Do not use tobacco products, including cigarettes, chewing tobacco, or e-cigarettes. If you need help quitting, ask your health care provider.  · Exercise regularly as told by your health care provider.  · Sleep on a regular schedule. Get 7-9 hours of sleep, or the amount recommended by your health care provider.  SEEK MEDICAL CARE IF:   · Your symptoms are not helped by medicine.  · You have a  headache that is different from the usual headache.  · You have nausea or you vomit.  · You have a fever.  SEEK IMMEDIATE MEDICAL CARE IF:   · Your headache becomes severe.  · You have repeated vomiting.  · You have a stiff neck.  · You have a loss of vision.  · You have problems with speech.  · You have pain in the eye or ear.  · You have muscular weakness or loss of muscle control.  · You lose your balance or have trouble walking.  · You feel faint or pass out.  · You have confusion.     This information is not intended to replace advice given to you by your health care provider. Make sure you discuss any questions you have with your health care provider.     Document Released: 12/18/2006 Document Revised: 09/07/2016 Document Reviewed: 04/11/2016  [employer] Interactive Patient Education ©2017 [employer]

## 2018-01-10 NOTE — PROGRESS NOTES
Subjective   Aaron Latif is a 57 y.o. male.     HPI Comments: It has been several years ago since his last eye exam. He started having symptoms about one week ago. At that time he would have episodes of sharp/stapping head pain once or twice a day. In the last 24 hour the frequency and intensity of pain has increased (occuring several times a hour last several seconds).     Headache    This is a new problem. The current episode started 1 to 4 weeks ago. The pain is located in the frontal region. The pain does not radiate. The pain quality is not similar to prior headaches. The quality of the pain is described as shooting, sharp and stabbing. The pain is at a severity of 9/10. The pain is severe. Associated symptoms include dizziness (started about one month ago ) and muscle aches. Pertinent negatives include no blurred vision, ear pain, eye pain, eye redness, eye watering, fever, hearing loss, loss of balance, nausea, neck pain, numbness, phonophobia, photophobia, sinus pressure, tingling, vomiting or weakness. Nothing aggravates the symptoms. Treatments tried: rest  There is no history of migraines in the family, recent head traumas or sinus disease.        The following portions of the patient's history were reviewed and updated as appropriate: allergies, current medications and problem list.    Review of Systems   Constitutional: Negative for fever.   HENT: Negative for ear pain, hearing loss and sinus pressure.    Eyes: Negative for blurred vision, photophobia, pain and redness.   Gastrointestinal: Negative for nausea and vomiting.   Musculoskeletal: Positive for arthralgias (left knee, recent surgery ). Negative for neck pain and neck stiffness.   Neurological: Positive for dizziness (started about one month ago ) and headaches (sharp intermittent frontal ). Negative for tingling, tremors, speech difficulty, weakness, numbness and loss of balance.       Objective   Physical Exam   Constitutional: He is  oriented to person, place, and time. He appears well-developed and well-nourished.   HENT:   Head: Normocephalic.   Nose: Nose normal.   Eyes: Conjunctivae, EOM and lids are normal. Pupils are equal, round, and reactive to light.   Neck: Full passive range of motion without pain. Carotid bruit is not present. No thyroid mass and no thyromegaly present.   Cardiovascular: Regular rhythm and normal heart sounds.  Exam reveals no S3 and no S4.    No murmur heard.  No pedal edema    Pulmonary/Chest: Effort normal and breath sounds normal. He has no decreased breath sounds. He has no wheezes. He has no rhonchi. He has no rales.   Musculoskeletal: He exhibits no edema.        Left knee: He exhibits decreased range of motion and swelling. Tenderness found.   Left knee with vertical incision with stable    Neurological: He is alert and oriented to person, place, and time. No cranial nerve deficit or sensory deficit. Gait abnormal.   Reflex Scores:       Brachioradialis reflexes are 2+ on the right side and 2+ on the left side.  Increased sensitivity to left scalp   Skin: Skin is warm and dry.   Psychiatric: He has a normal mood and affect.       Assessment/Plan   Aaron was seen today for headache.    Diagnoses and all orders for this visit:    Acute intractable headache, unspecified headache type  -     Sedimentation Rate; Future  -     Cancel: CT angiogram head w contrast; Future  -     Sedimentation Rate  -     MRI brain w wo contrast; Future    Other orders  -     Cancel: gabapentin (NEURONTIN) 300 MG capsule; Take 1 capsule by mouth Every 8 (Eight) Hours As Needed (headache).    Will obtain imaging. He will take current pain medication for treatment. He will to ER if any neurological changes. He is accompanied by spouse.

## 2018-01-13 ENCOUNTER — HOSPITAL ENCOUNTER (OUTPATIENT)
Dept: MRI IMAGING | Facility: HOSPITAL | Age: 58
Discharge: HOME | End: 2018-01-13
Admitting: NURSE PRACTITIONER

## 2018-01-13 DIAGNOSIS — R51.9 ACUTE INTRACTABLE HEADACHE, UNSPECIFIED HEADACHE TYPE: ICD-10-CM

## 2018-01-13 PROCEDURE — 0 GADOBENATE DIMEGLUMINE 529 MG/ML SOLUTION: Performed by: NURSE PRACTITIONER

## 2018-01-13 PROCEDURE — 70553 MRI BRAIN STEM W/O & W/DYE: CPT

## 2018-01-13 PROCEDURE — A9577 INJ MULTIHANCE: HCPCS | Performed by: NURSE PRACTITIONER

## 2018-01-13 PROCEDURE — 82565 ASSAY OF CREATININE: CPT

## 2018-01-13 RX ADMIN — GADOBENATE DIMEGLUMINE 20 ML: 529 INJECTION, SOLUTION INTRAVENOUS at 11:45

## 2018-01-15 ENCOUNTER — TELEPHONE (OUTPATIENT)
Dept: INTERNAL MEDICINE | Facility: CLINIC | Age: 58
End: 2018-01-15

## 2018-01-15 DIAGNOSIS — Z96.652 STATUS POST TOTAL KNEE REPLACEMENT, LEFT: ICD-10-CM

## 2018-01-15 NOTE — TELEPHONE ENCOUNTER
I called patient to inform him of mri findings (no acute findings) . He reports the frequency of pains in head have diminished. He declines any visual changes or nausea or vomiting. He would like to cancel appt for this week. He will return if any changes. He requested percocet however he was recently prescribed by ortho. I explained that I could provide short term only, however he declined at this time.

## 2018-01-16 ENCOUNTER — RESULTS ENCOUNTER (OUTPATIENT)
Dept: ENDOCRINOLOGY | Age: 58
End: 2018-01-16

## 2018-01-16 DIAGNOSIS — I10 BENIGN ESSENTIAL HTN: ICD-10-CM

## 2018-01-16 DIAGNOSIS — E55.9 VITAMIN D DEFICIENCY: ICD-10-CM

## 2018-01-16 DIAGNOSIS — E78.1 ESSENTIAL HYPERTRIGLYCERIDEMIA: ICD-10-CM

## 2018-01-16 DIAGNOSIS — I10 ACCELERATED HYPERTENSION: ICD-10-CM

## 2018-01-16 DIAGNOSIS — Z79.4 CONTROLLED TYPE 2 DIABETES MELLITUS WITHOUT COMPLICATION, WITH LONG-TERM CURRENT USE OF INSULIN (HCC): ICD-10-CM

## 2018-01-16 DIAGNOSIS — R79.89 LOW TESTOSTERONE: ICD-10-CM

## 2018-01-16 DIAGNOSIS — R53.82 CHRONIC FATIGUE: ICD-10-CM

## 2018-01-16 DIAGNOSIS — E66.01 MORBID OBESITY DUE TO EXCESS CALORIES (HCC): ICD-10-CM

## 2018-01-16 DIAGNOSIS — E78.5 DYSLIPIDEMIA: ICD-10-CM

## 2018-01-16 DIAGNOSIS — IMO0001 UNCONTROLLED TYPE 2 DIABETES MELLITUS WITHOUT COMPLICATION, WITH LONG-TERM CURRENT USE OF INSULIN: ICD-10-CM

## 2018-01-16 DIAGNOSIS — E11.42 DIABETIC PERIPHERAL NEUROPATHY (HCC): ICD-10-CM

## 2018-01-16 DIAGNOSIS — E11.9 CONTROLLED TYPE 2 DIABETES MELLITUS WITHOUT COMPLICATION, WITH LONG-TERM CURRENT USE OF INSULIN (HCC): ICD-10-CM

## 2018-01-16 DIAGNOSIS — N52.8 OTHER MALE ERECTILE DYSFUNCTION: ICD-10-CM

## 2018-01-16 LAB — CREAT BLDA-MCNC: 1 MG/DL (ref 0.6–1.3)

## 2018-01-17 DIAGNOSIS — Z96.652 STATUS POST TOTAL KNEE REPLACEMENT, LEFT: ICD-10-CM

## 2018-01-17 RX ORDER — OXYCODONE AND ACETAMINOPHEN 10; 325 MG/1; MG/1
TABLET ORAL
Qty: 100 TABLET | Refills: 0 | Status: CANCELLED | OUTPATIENT
Start: 2018-01-17

## 2018-01-17 NOTE — TELEPHONE ENCOUNTER
Closing out this encounter, will open a new one for [facility] to sign d/t it not being answered when he was in the office @ [facility]

## 2018-01-18 DIAGNOSIS — Z96.652 STATUS POST TOTAL KNEE REPLACEMENT, LEFT: ICD-10-CM

## 2018-01-18 RX ORDER — OXYCODONE AND ACETAMINOPHEN 10; 325 MG/1; MG/1
TABLET ORAL
Qty: 100 TABLET | Refills: 0 | Status: SHIPPED | OUTPATIENT
Start: 2018-01-18 | End: 2018-02-01 | Stop reason: SDUPTHER

## 2018-01-18 RX ORDER — OXYCODONE AND ACETAMINOPHEN 10; 325 MG/1; MG/1
TABLET ORAL
Qty: 100 TABLET | Refills: 0 | Status: SHIPPED | OUTPATIENT
Start: 2018-01-18 | End: 2018-01-18 | Stop reason: SDUPTHER

## 2018-01-25 RX ORDER — SAXAGLIPTIN AND METFORMIN HYDROCHLORIDE 2.5; 1 MG/1; MG/1
TABLET, FILM COATED, EXTENDED RELEASE ORAL
Qty: 60 TABLET | Refills: 0 | Status: SHIPPED | OUTPATIENT
Start: 2018-01-25 | End: 2018-03-12 | Stop reason: SDUPTHER

## 2018-01-26 RX ORDER — VERAPAMIL HYDROCHLORIDE 240 MG/1
240 TABLET, FILM COATED, EXTENDED RELEASE ORAL EVERY MORNING
Qty: 90 TABLET | Refills: 1 | Status: SHIPPED | OUTPATIENT
Start: 2018-01-26 | End: 2018-01-30 | Stop reason: SDUPTHER

## 2018-01-26 RX ORDER — HYDROCHLOROTHIAZIDE 25 MG/1
25 TABLET ORAL EVERY MORNING
Qty: 90 TABLET | Refills: 1 | Status: SHIPPED | OUTPATIENT
Start: 2018-01-26 | End: 2018-07-29 | Stop reason: SDUPTHER

## 2018-01-26 RX ORDER — METOPROLOL TARTRATE 50 MG/1
50 TABLET, FILM COATED ORAL EVERY MORNING
Qty: 90 TABLET | Refills: 1 | Status: SHIPPED | OUTPATIENT
Start: 2018-01-26 | End: 2018-01-30 | Stop reason: SDUPTHER

## 2018-01-29 ENCOUNTER — PRIOR AUTHORIZATION (OUTPATIENT)
Dept: ENDOCRINOLOGY | Age: 58
End: 2018-01-29

## 2018-01-30 ENCOUNTER — TELEPHONE (OUTPATIENT)
Dept: INTERNAL MEDICINE | Facility: CLINIC | Age: 58
End: 2018-01-30

## 2018-01-30 RX ORDER — VERAPAMIL HYDROCHLORIDE 240 MG/1
240 TABLET, FILM COATED, EXTENDED RELEASE ORAL EVERY MORNING
Qty: 90 TABLET | Refills: 1 | Status: SHIPPED | OUTPATIENT
Start: 2018-01-30 | End: 2018-02-01 | Stop reason: SDUPTHER

## 2018-01-30 RX ORDER — METOPROLOL TARTRATE 50 MG/1
50 TABLET, FILM COATED ORAL EVERY MORNING
Qty: 90 TABLET | Refills: 1 | Status: SHIPPED | OUTPATIENT
Start: 2018-01-30 | End: 2018-02-01 | Stop reason: SDUPTHER

## 2018-01-30 NOTE — TELEPHONE ENCOUNTER
----- Message from Winter Levy sent at 1/30/2018  9:31 AM EST -----  Contact: pt  Pt is calling for a refill     FOR  metoprolol tartrate (LOPRESSOR) 50 MG tablet  verapamil SR (CALAN-SR) 240 MG CR tablet    Patient requests RX SENT TO   [facility] [facility] [address] - [phone]  - [phone]      Caller# [phone]    Please and thank you.

## 2018-01-31 LAB
MYCOBACTERIUM SPEC CULT: NORMAL
NIGHT BLUE STAIN TISS: NORMAL

## 2018-02-01 ENCOUNTER — OFFICE VISIT (OUTPATIENT)
Dept: ORTHOPEDIC SURGERY | Facility: CLINIC | Age: 58
End: 2018-02-01

## 2018-02-01 VITALS — BODY MASS INDEX: 38.65 KG/M2 | HEIGHT: 70 IN | TEMPERATURE: 97.9 F | WEIGHT: 270 LBS

## 2018-02-01 DIAGNOSIS — Z96.652 STATUS POST TOTAL KNEE REPLACEMENT, LEFT: Primary | ICD-10-CM

## 2018-02-01 DIAGNOSIS — Z96.652 STATUS POST TOTAL KNEE REPLACEMENT, LEFT: ICD-10-CM

## 2018-02-01 PROCEDURE — 99024 POSTOP FOLLOW-UP VISIT: CPT | Performed by: ORTHOPAEDIC SURGERY

## 2018-02-01 PROCEDURE — 20610 DRAIN/INJ JOINT/BURSA W/O US: CPT | Performed by: ORTHOPAEDIC SURGERY

## 2018-02-01 PROCEDURE — 73562 X-RAY EXAM OF KNEE 3: CPT | Performed by: ORTHOPAEDIC SURGERY

## 2018-02-01 RX ORDER — METOPROLOL TARTRATE 50 MG/1
50 TABLET, FILM COATED ORAL EVERY MORNING
Qty: 90 TABLET | Refills: 1 | Status: SHIPPED | OUTPATIENT
Start: 2018-02-01 | End: 2018-02-06

## 2018-02-01 RX ORDER — OXYCODONE AND ACETAMINOPHEN 10; 325 MG/1; MG/1
TABLET ORAL
Qty: 40 TABLET | Refills: 0 | Status: SHIPPED | OUTPATIENT
Start: 2018-02-01 | End: 2018-02-12 | Stop reason: SDUPTHER

## 2018-02-01 RX ORDER — OXYCODONE AND ACETAMINOPHEN 10; 325 MG/1; MG/1
TABLET ORAL
Qty: 40 TABLET | Refills: 0 | Status: SHIPPED | OUTPATIENT
Start: 2018-02-01 | End: 2018-02-01 | Stop reason: SDUPTHER

## 2018-02-01 RX ORDER — LIDOCAINE HYDROCHLORIDE 10 MG/ML
2 INJECTION, SOLUTION EPIDURAL; INFILTRATION; INTRACAUDAL; PERINEURAL
Status: COMPLETED | OUTPATIENT
Start: 2018-02-01 | End: 2018-02-01

## 2018-02-01 RX ORDER — VERAPAMIL HYDROCHLORIDE 240 MG/1
240 TABLET, FILM COATED, EXTENDED RELEASE ORAL EVERY MORNING
Qty: 90 TABLET | Refills: 1 | Status: SHIPPED | OUTPATIENT
Start: 2018-02-01 | End: 2018-03-26

## 2018-02-01 RX ADMIN — LIDOCAINE HYDROCHLORIDE 2 ML: 10 INJECTION, SOLUTION EPIDURAL; INFILTRATION; INTRACAUDAL; PERINEURAL at 08:30

## 2018-02-01 NOTE — PROGRESS NOTES
Patient: Aaron Latif  YOB: 1960 57 y.o. male  Medical Record Number: [id number]    Chief Complaints:   Chief Complaint   Patient presents with   • Left Knee - Post-op, Follow-up       History of Present Illness:Aaron Latif is a 57 y.o. male who presents for follow-up of  Left knee poly-change and excision of scar tissue.  He is nearing 6 weeks.  He is making some progress with therapy and continues to work with the therapist but he still having problems with knee flexion and has considerable discomfort still even with sedentary activities such as sitting or driving.  He is having difficulties driving for any long period time due to the pain which is positional.    Allergies:   Allergies   Allergen Reactions   • Codeine Other (See Comments)     UNKNOWN       Medications:   Current Outpatient Prescriptions   Medication Sig Dispense Refill   • atorvastatin (LIPITOR) 80 MG tablet Take 1 tablet by mouth Every Night. 90 tablet 1   • ergocalciferol (ERGOCALCIFEROL) 86143 units capsule Take 2 capsules by mouth 1 (One) Time Per Week. (Patient taking differently: Take 2 capsules by mouth 1 (One) Time Per Week. thursday) 26 capsule 3   • FARXIGA 10 MG tablet 1 tablet every morning by mouth (Patient taking differently: Take 1 tablet by mouth Every Morning. 1 tablet every morning by mouth) 30 tablet 5   • fenofibrate micronized (ANTARA) 130 MG capsule Take 1 capsule by mouth Every Morning Before Breakfast. 30 capsule 5   • glucose blood (ONE TOUCH ULTRA TEST) test strip 1 each by Other route 3 (Three) Times a Day. Use as instructed 400 each 1   • hydrochlorothiazide (HYDRODIURIL) 25 MG tablet Take 1 tablet by mouth Every Morning. 90 tablet 1   • Insulin Glargine (TOUJEO SOLOSTAR) 300 UNIT/ML solution pen-injector Inject 75 Units under the skin Every Morning.     • Insulin Pen Needle 31G X 8 MM misc Use once a day 100 each 0   • KOMBIGLYZE XR 2.5-1000 MG tablet sustained-release 24 hour TAKE TWO TABLETS BY  "MOUTH DAILY WITH SUPPER 60 tablet 0   • LYRICA 200 MG capsule Take 1 capsule by mouth 2 (Two) Times a Day. 60 capsule 5   • meloxicam (MOBIC) 15 MG tablet Take 15 mg by mouth Every Morning. TO STOP 1 WK OR SURG     • metoprolol tartrate (LOPRESSOR) 50 MG tablet Take 1 tablet by mouth Every Morning. 90 tablet 1   • ONETOUCH DELICA LANCETS 33G misc Check blood glucose 3 times daily 100 each 5   • oxyCODONE-acetaminophen (PERCOCET)  MG per tablet 1-2 po q4-6 hr prn pain 100 tablet 0   • pioglitazone (ACTOS) 30 MG tablet Take 1 tablet by mouth Daily. (Patient taking differently: Take 30 mg by mouth Every Morning.) 90 tablet 1   • Syringe 23G X 1\" 3 ML misc Use weekly for testosterone injection 10 each 3   • Testosterone Cypionate (DEPOTESTOTERONE CYPIONATE) 200 MG/ML injection Inject 1 mL into the shoulder, thigh, or buttocks 1 (One) Time Per Week. (Patient taking differently: Inject 200 mg into the shoulder, thigh, or buttocks 1 (One) Time Per Week. SATURDAY) 10 mL 3   • VASCEPA 1 g capsule capsule Take 2 g by mouth 2 (Two) Times a Day With Meals. 120 capsule 5   • verapamil SR (CALAN-SR) 240 MG CR tablet Take 1 tablet by mouth Every Morning. 90 tablet 1     No current facility-administered medications for this visit.          The following portions of the patient's history were reviewed and updated as appropriate: allergies, current medications, past family history, past medical history, past social history, past surgical history and problem list.    Review of Systems:   A 14 point review of systems was performed. All systems negative except pertinent positives/negative listed in HPI above    Physical Exam:   Vitals:    02/01/18 0816   Temp: 97.9 °F (36.6 °C)   Weight: 122 kg (270 lb)   Height: 177.8 cm (70\")       General: A and O x 3, ASA, NAD    SCLERA:    Normal    DENTITION:   Normal  Knee range of motion is 0-119.  The he still has fairly large amount of prepatellar bursitis.  Incision is nicely healed and " I cannot appreciate any knee joint effusion.  His flexion and extension stability looked good    Radiology:  Xrays 3views left knee (ap,lateral, sunrise) were ordered and reviewed for evaluation of knee pain demonstratinga well positioned knee replacement without evidence of wear, loosening or osteolysis  todays xrays were compared to previous xrays and demonstrate no change    Assessment/Plan:  6 weeks out from left knee poly-change slowly improving.  He does have considerable prepatellar bursitis and I have aspirated this area and removed about 40 cc of serous blood-tinged fluid it was noninfectious in appearance.  Unfortunately from a work standpoint I don't think he is near being able to return to anything which requires bending or flexing and in fact she's having problems with sedentary duties as well giving his pain even while seated or still.  I do think this will improve over time but I think it could be a fairly prolonged recovery process and I don't have an endpoint and site at this time.    Large Joint Arthrocentesis  Date/Time: 2/1/2018 8:30 AM  Procedure Details  Medications administered: 2 mL lidocaine PF 1% 1 %  Aspirate amount: 40 mL  Aspirate: serous

## 2018-02-05 ENCOUNTER — TELEPHONE (OUTPATIENT)
Dept: INTERNAL MEDICINE | Facility: CLINIC | Age: 58
End: 2018-02-05

## 2018-02-05 RX ORDER — OMEGA-3-ACID ETHYL ESTERS 1 G/1
2 CAPSULE, LIQUID FILLED ORAL 2 TIMES DAILY
Qty: 120 CAPSULE | Refills: 5 | Status: SHIPPED | OUTPATIENT
Start: 2018-02-05 | End: 2018-02-12 | Stop reason: SDUPTHER

## 2018-02-05 NOTE — TELEPHONE ENCOUNTER
----- Message from Winter Levy sent at 2/5/2018 10:14 AM EST -----  Contact: pt  Pt has just had   metoprolol tartrate (LOPRESSOR) 50 MG tablet     prescribed instead of     metoprolol succinate XL (TOPROL-XL) 50 MG 24 hr tablet     pt has been use to and was wondering why the medication was changed.    Phone:  [phone]

## 2018-02-05 NOTE — TELEPHONE ENCOUNTER
telephone encounter from 1/30 requests Metoprolol tartrate.  I do see where he has taken the Succinate previously.      I left message w/pharmacy to ask when he had filled succinate.

## 2018-02-05 NOTE — TELEPHONE ENCOUNTER
Please update meds and refill Metoprolol succinate in chart that he was previously taking. Thanks.

## 2018-02-06 RX ORDER — METOPROLOL SUCCINATE 50 MG/1
50 TABLET, EXTENDED RELEASE ORAL DAILY
Qty: 90 TABLET | Refills: 1 | Status: SHIPPED | OUTPATIENT
Start: 2018-02-06 | End: 2018-03-26 | Stop reason: SDUPTHER

## 2018-02-12 DIAGNOSIS — Z96.652 STATUS POST TOTAL KNEE REPLACEMENT, LEFT: ICD-10-CM

## 2018-02-12 RX ORDER — OMEGA-3-ACID ETHYL ESTERS 1 G/1
2 CAPSULE, LIQUID FILLED ORAL 2 TIMES DAILY
Qty: 120 CAPSULE | Refills: 5 | Status: SHIPPED | OUTPATIENT
Start: 2018-02-12 | End: 2018-07-29 | Stop reason: SDUPTHER

## 2018-02-12 NOTE — TELEPHONE ENCOUNTER
12/20/2017 SX LEFT TOTAL KNEE ARTHROPLASTY poly change    PER DANY- PT LAST FILLED OXYCODONE-APAP 10/325MG QTY 40 02/01/18    LAST APPT W/ [facility] 02/01/18    KOF

## 2018-02-13 RX ORDER — OXYCODONE AND ACETAMINOPHEN 10; 325 MG/1; MG/1
TABLET ORAL
Qty: 40 TABLET | Refills: 0 | Status: SHIPPED | OUTPATIENT
Start: 2018-02-13 | End: 2018-02-23 | Stop reason: SDUPTHER

## 2018-02-19 ENCOUNTER — TELEPHONE (OUTPATIENT)
Dept: ORTHOPEDIC SURGERY | Facility: CLINIC | Age: 58
End: 2018-02-19

## 2018-02-23 DIAGNOSIS — Z96.652 STATUS POST TOTAL KNEE REPLACEMENT, LEFT: ICD-10-CM

## 2018-02-23 RX ORDER — OXYCODONE AND ACETAMINOPHEN 10; 325 MG/1; MG/1
TABLET ORAL
Qty: 40 TABLET | Refills: 0 | Status: SHIPPED | OUTPATIENT
Start: 2018-02-23 | End: 2018-03-12 | Stop reason: DRUGHIGH

## 2018-02-25 DIAGNOSIS — N52.8 OTHER MALE ERECTILE DYSFUNCTION: ICD-10-CM

## 2018-02-25 DIAGNOSIS — E78.5 DYSLIPIDEMIA: ICD-10-CM

## 2018-02-25 DIAGNOSIS — E11.9 CONTROLLED TYPE 2 DIABETES MELLITUS WITHOUT COMPLICATION, WITH LONG-TERM CURRENT USE OF INSULIN (HCC): ICD-10-CM

## 2018-02-25 DIAGNOSIS — R53.82 CHRONIC FATIGUE: ICD-10-CM

## 2018-02-25 DIAGNOSIS — E55.9 VITAMIN D DEFICIENCY: ICD-10-CM

## 2018-02-25 DIAGNOSIS — R79.89 LOW TESTOSTERONE: ICD-10-CM

## 2018-02-25 DIAGNOSIS — E66.01 MORBID OBESITY DUE TO EXCESS CALORIES (HCC): ICD-10-CM

## 2018-02-25 DIAGNOSIS — Z79.4 CONTROLLED TYPE 2 DIABETES MELLITUS WITHOUT COMPLICATION, WITH LONG-TERM CURRENT USE OF INSULIN (HCC): ICD-10-CM

## 2018-02-25 DIAGNOSIS — I10 ACCELERATED HYPERTENSION: ICD-10-CM

## 2018-02-26 RX ORDER — PIOGLITAZONEHYDROCHLORIDE 30 MG/1
TABLET ORAL
Qty: 30 TABLET | Refills: 0 | Status: SHIPPED | OUTPATIENT
Start: 2018-02-26 | End: 2018-03-26 | Stop reason: SDUPTHER

## 2018-03-01 ENCOUNTER — OFFICE VISIT (OUTPATIENT)
Dept: ORTHOPEDIC SURGERY | Facility: CLINIC | Age: 58
End: 2018-03-01

## 2018-03-01 VITALS — TEMPERATURE: 99.1 F | BODY MASS INDEX: 38.38 KG/M2 | WEIGHT: 268.1 LBS | HEIGHT: 70 IN

## 2018-03-01 DIAGNOSIS — M25.562 PAIN AND SWELLING OF KNEE, LEFT: Primary | ICD-10-CM

## 2018-03-01 DIAGNOSIS — M25.462 PAIN AND SWELLING OF KNEE, LEFT: Primary | ICD-10-CM

## 2018-03-01 DIAGNOSIS — Z96.652 STATUS POST TOTAL LEFT KNEE REPLACEMENT: ICD-10-CM

## 2018-03-01 PROCEDURE — 99024 POSTOP FOLLOW-UP VISIT: CPT | Performed by: ORTHOPAEDIC SURGERY

## 2018-03-01 PROCEDURE — 73562 X-RAY EXAM OF KNEE 3: CPT | Performed by: ORTHOPAEDIC SURGERY

## 2018-03-01 RX ORDER — OXYCODONE AND ACETAMINOPHEN 7.5; 325 MG/1; MG/1
1 TABLET ORAL EVERY 4 HOURS PRN
Qty: 30 TABLET | Refills: 0 | Status: SHIPPED | OUTPATIENT
Start: 2018-03-01 | End: 2018-03-01 | Stop reason: SDUPTHER

## 2018-03-01 RX ORDER — OXYCODONE AND ACETAMINOPHEN 7.5; 325 MG/1; MG/1
1 TABLET ORAL EVERY 4 HOURS PRN
Qty: 60 TABLET | Refills: 0 | Status: SHIPPED | OUTPATIENT
Start: 2018-03-01 | End: 2018-03-13 | Stop reason: SDUPTHER

## 2018-03-01 NOTE — PROGRESS NOTES
Patient: Aaron Latif  YOB: 1960 58 y.o. male  Medical Record Number: [id number]    Chief Complaints:   Chief Complaint   Patient presents with   • Left Knee - Post-op, Follow-up, Pain       History of Present Illness:Aaron Latif is a 58 y.o. male who presents for follow-up of  Left knee polyethylene exchange which was performed roughly 8 weeks ago.  He is in today still having persistent severe pain.  He is using a cane.  He is unable to work.  He is unable to perform basic activities of daily living and having difficulty walking even short distances without the cane.  He is still taking pain medication due to the inflammation and irritation.    Allergies:   Allergies   Allergen Reactions   • Codeine Other (See Comments)     UNKNOWN       Medications:   Current Outpatient Prescriptions   Medication Sig Dispense Refill   • atorvastatin (LIPITOR) 80 MG tablet Take 1 tablet by mouth Every Night. 90 tablet 1   • ergocalciferol (ERGOCALCIFEROL) 44811 units capsule Take 2 capsules by mouth 1 (One) Time Per Week. (Patient taking differently: Take 2 capsules by mouth 1 (One) Time Per Week. thursday) 26 capsule 3   • FARXIGA 10 MG tablet 1 tablet every morning by mouth (Patient taking differently: Take 1 tablet by mouth Every Morning. 1 tablet every morning by mouth) 30 tablet 5   • fenofibrate micronized (ANTARA) 130 MG capsule Take 1 capsule by mouth Every Morning Before Breakfast. 30 capsule 5   • glucose blood (ONE TOUCH ULTRA TEST) test strip 1 each by Other route 3 (Three) Times a Day. Use as instructed 400 each 1   • hydrochlorothiazide (HYDRODIURIL) 25 MG tablet Take 1 tablet by mouth Every Morning. 90 tablet 1   • Insulin Glargine (TOUJEO SOLOSTAR) 300 UNIT/ML solution pen-injector Inject 75 Units under the skin Every Morning.     • Insulin Pen Needle 31G X 8 MM misc Use once a day 100 each 0   • KOMBIGLYZE XR 2.5-1000 MG tablet sustained-release 24 hour TAKE TWO TABLETS BY MOUTH DAILY WITH  "SUPPER 60 tablet 0   • LYRICA 200 MG capsule Take 1 capsule by mouth 2 (Two) Times a Day. 60 capsule 5   • meloxicam (MOBIC) 15 MG tablet Take 15 mg by mouth Every Morning. TO STOP 1 WK CO SURG     • metoprolol succinate XL (TOPROL-XL) 50 MG 24 hr tablet Take 1 tablet by mouth Daily. 90 tablet 1   • omega-3 acid ethyl esters (LOVAZA) 1 g capsule Take 2 capsules by mouth 2 (Two) Times a Day. 120 capsule 5   • ONETOUCH DELICA LANCETS 33G misc Check blood glucose 3 times daily 100 each 5   • oxyCODONE-acetaminophen (PERCOCET)  MG per tablet 1-2 po q4-6 hr prn pain 40 tablet 0   • pioglitazone (ACTOS) 30 MG tablet Take 1 tablet by mouth Daily. (Patient taking differently: Take 30 mg by mouth Every Morning.) 90 tablet 1   • pioglitazone (ACTOS) 30 MG tablet TAKE ONE TABLET BY MOUTH DAILY 30 tablet 0   • Syringe 23G X 1\" 3 ML misc Use weekly for testosterone injection 10 each 3   • Testosterone Cypionate (DEPOTESTOTERONE CYPIONATE) 200 MG/ML injection Inject 1 mL into the shoulder, thigh, or buttocks 1 (One) Time Per Week. (Patient taking differently: Inject 200 mg into the shoulder, thigh, or buttocks 1 (One) Time Per Week. SATURDAY) 10 mL 3   • verapamil SR (CALAN-SR) 240 MG CR tablet Take 1 tablet by mouth Every Morning. 90 tablet 1     No current facility-administered medications for this visit.          The following portions of the patient's history were reviewed and updated as appropriate: allergies, current medications, past family history, past medical history, past social history, past surgical history and problem list.    Review of Systems:   A 14 point review of systems was performed. All systems negative except pertinent positives/negative listed in HPI above    Physical Exam:   Vitals:    03/01/18 0759   Temp: 99.1 °F (37.3 °C)   Weight: 122 kg (268 lb 1.6 oz)   Height: 177.8 cm (70\")       General: A and O x 3, ASA, NAD    SCLERA:    Normal    DENTITION:   Normal  His knee shows range of motion from " around 2-115.  He has diffuse anterior soft tissue swelling most prevalent around the anterolateral joint.  There is no appreciable effusion.  He has mild AP laxity at about 90° of flexion but no gross instability.  There is no instability in extension.  He walks with an obvious antalgic gait with a short stride length and uses his cane.  The patella appears to track midline.    Radiology:  Xrays 3views left knee (ap,lateral, sunrise) were ordered and reviewed for evaluation of knee pain demonstratinga well positioned knee replacement without evidence of wear, loosening or osteolysis  todays xrays were compared to previous xrays and demonstrate no change    Assessment/Plan:  Persistent severe pain following left knee replacement and subsequent polyethylene exchange to a constrained insert.  He is about 2 months out and clearly has demonstrated an aggressive soft tissue inflammatory component.  He has elements of what may be RSD.  I explained him that I don't feel further surgery at this point would be helpful in fact it may be harmful in worsening his soft tissue inflammatory issues.  I think we need to continue with physical therapy and time.  Unfortunately I don't think he is anywhere near being able to return to work and I anticipate this will be a fairly long-term recovery process.  I also explained him that I don't anticipate he will have a normal knee but I do expect it to improve.  At this point I have concerns about his ability to return to work anytime in the near future.  I would like to check him back in 3 months.  He will increase his physical therapy frequency to twice a week.  I've instructed ice and quad strengthening.  He'll continue on the meloxicam.  Additionally have given him another prescription for Percocet but have explained the issues with addiction intolerance with narcotics and that we need uses very sparingly and slowly wean off of this over the next month or so if possible.  I'd like to  check him back in 2 months we'll repeat x-rays at that time.

## 2018-03-12 ENCOUNTER — LAB (OUTPATIENT)
Dept: ENDOCRINOLOGY | Age: 58
End: 2018-03-12

## 2018-03-12 DIAGNOSIS — N52.8 OTHER MALE ERECTILE DYSFUNCTION: ICD-10-CM

## 2018-03-12 DIAGNOSIS — R53.82 CHRONIC FATIGUE: ICD-10-CM

## 2018-03-12 DIAGNOSIS — I10 ACCELERATED HYPERTENSION: ICD-10-CM

## 2018-03-12 DIAGNOSIS — E78.5 DYSLIPIDEMIA: ICD-10-CM

## 2018-03-12 DIAGNOSIS — E55.9 VITAMIN D DEFICIENCY: ICD-10-CM

## 2018-03-12 DIAGNOSIS — E11.42 DIABETIC PERIPHERAL NEUROPATHY (HCC): ICD-10-CM

## 2018-03-12 DIAGNOSIS — E66.01 MORBID OBESITY DUE TO EXCESS CALORIES (HCC): ICD-10-CM

## 2018-03-12 DIAGNOSIS — Z79.4 CONTROLLED TYPE 2 DIABETES MELLITUS WITHOUT COMPLICATION, WITH LONG-TERM CURRENT USE OF INSULIN (HCC): ICD-10-CM

## 2018-03-12 DIAGNOSIS — E11.9 CONTROLLED TYPE 2 DIABETES MELLITUS WITHOUT COMPLICATION, WITH LONG-TERM CURRENT USE OF INSULIN (HCC): ICD-10-CM

## 2018-03-12 DIAGNOSIS — IMO0001 UNCONTROLLED TYPE 2 DIABETES MELLITUS WITHOUT COMPLICATION, WITH LONG-TERM CURRENT USE OF INSULIN: ICD-10-CM

## 2018-03-12 DIAGNOSIS — I10 BENIGN ESSENTIAL HTN: ICD-10-CM

## 2018-03-12 DIAGNOSIS — E78.1 ESSENTIAL HYPERTRIGLYCERIDEMIA: ICD-10-CM

## 2018-03-12 DIAGNOSIS — R79.89 LOW TESTOSTERONE: ICD-10-CM

## 2018-03-12 RX ORDER — SAXAGLIPTIN AND METFORMIN HYDROCHLORIDE 2.5; 1 MG/1; MG/1
TABLET, FILM COATED, EXTENDED RELEASE ORAL
Qty: 60 TABLET | Refills: 0 | Status: SHIPPED | OUTPATIENT
Start: 2018-03-12 | End: 2018-03-26 | Stop reason: CLARIF

## 2018-03-12 RX ORDER — OXYCODONE AND ACETAMINOPHEN 7.5; 325 MG/1; MG/1
1 TABLET ORAL EVERY 4 HOURS PRN
Qty: 60 TABLET | Refills: 0 | Status: CANCELLED | OUTPATIENT
Start: 2018-03-12

## 2018-03-13 ENCOUNTER — PRIOR AUTHORIZATION (OUTPATIENT)
Dept: ENDOCRINOLOGY | Age: 58
End: 2018-03-13

## 2018-03-13 RX ORDER — OXYCODONE AND ACETAMINOPHEN 7.5; 325 MG/1; MG/1
1 TABLET ORAL EVERY 4 HOURS PRN
Qty: 40 TABLET | Refills: 0 | Status: SHIPPED | OUTPATIENT
Start: 2018-03-13 | End: 2018-03-27 | Stop reason: SDUPTHER

## 2018-03-13 NOTE — TELEPHONE ENCOUNTER
Patient request refill: Oxycodone 7.5/325mg., si po q 4hrs. prn pain, #60 written on 3/1. Patient says has #4 tabs left.      Last filled

## 2018-03-13 NOTE — TELEPHONE ENCOUNTER
PT'S PA FOR KOMBIGLYZE WAS SUBMITTED TO [facility] ON 3/13/18 AND WAS DENIED ON 3/26/18. PROVIDER WAS NOTIFIED OF ALTERNATIVES.

## 2018-03-13 NOTE — TELEPHONE ENCOUNTER
Closing this encounter to send to [facility] from [facility] office so that RX will print at [facility] and can be signed.

## 2018-03-13 NOTE — TELEPHONE ENCOUNTER
Patient has been notified that RX is approved, signed, and ready to be picked up at our [facility] office.

## 2018-03-14 LAB
ALBUMIN SERPL-MCNC: 4.2 G/DL (ref 3.5–5.2)
ALBUMIN/GLOB SERPL: 1.4 G/DL
ALP SERPL-CCNC: 34 U/L (ref 39–117)
ALT SERPL-CCNC: 47 U/L (ref 1–41)
AST SERPL-CCNC: 31 U/L (ref 1–40)
BILIRUB SERPL-MCNC: 0.6 MG/DL (ref 0.1–1.2)
BUN SERPL-MCNC: 18 MG/DL (ref 8–23)
BUN/CREAT SERPL: 17.3 (ref 7–25)
C PEPTIDE SERPL-MCNC: 5.5 NG/ML (ref 1.1–4.4)
CALCIUM SERPL-MCNC: 9.8 MG/DL (ref 8.6–10.5)
CHLORIDE SERPL-SCNC: 95 MMOL/L (ref 98–107)
CHOLEST SERPL-MCNC: 73 MG/DL (ref 0–200)
CO2 SERPL-SCNC: 29.7 MMOL/L (ref 22–29)
CREAT SERPL-MCNC: 1.04 MG/DL (ref 0.76–1.27)
GFR SERPLBLD CREATININE-BSD FMLA CKD-EPI: 73 ML/MIN/1.73
GFR SERPLBLD CREATININE-BSD FMLA CKD-EPI: 88 ML/MIN/1.73
GLOBULIN SER CALC-MCNC: 2.9 GM/DL
GLUCOSE SERPL-MCNC: 293 MG/DL (ref 65–99)
HBA1C MFR BLD: 7.7 % (ref 4.8–5.6)
HCT VFR BLD AUTO: 48 % (ref 40.4–52.2)
HDLC SERPL-MCNC: 24 MG/DL (ref 40–60)
HGB BLD-MCNC: 15.6 G/DL (ref 13.7–17.6)
INTERPRETATION: NORMAL
LDLC SERPL CALC-MCNC: 12 MG/DL (ref 0–100)
Lab: NORMAL
MICROALBUMIN UR-MCNC: <3 UG/ML
POTASSIUM SERPL-SCNC: 4.3 MMOL/L (ref 3.5–5.2)
PROT SERPL-MCNC: 7.1 G/DL (ref 6–8.5)
PSA SERPL-MCNC: 0.86 NG/ML (ref 0–4)
SHBG SERPL-SCNC: 35 NMOL/L (ref 19.3–76.4)
SODIUM SERPL-SCNC: 140 MMOL/L (ref 136–145)
T4 SERPL-MCNC: 6.19 MCG/DL (ref 4.5–11.7)
TESTOST FREE SERPL-MCNC: 10.7 PG/ML (ref 6.6–18.1)
TESTOST SERPL-MCNC: 424 NG/DL (ref 264–916)
TRIGL SERPL-MCNC: 183 MG/DL (ref 0–150)
TSH SERPL DL<=0.005 MIU/L-ACNC: 1.86 MIU/ML (ref 0.27–4.2)
URATE SERPL-MCNC: 6.2 MG/DL (ref 3.4–7)
VLDLC SERPL CALC-MCNC: 36.6 MG/DL (ref 5–40)

## 2018-03-21 ENCOUNTER — TELEPHONE (OUTPATIENT)
Dept: ORTHOPEDIC SURGERY | Facility: CLINIC | Age: 58
End: 2018-03-21

## 2018-03-21 NOTE — TELEPHONE ENCOUNTER
JANET STEWART CALLED THE PATIENT ASKED TO SPEAK TO THE DENTAL TECH, LUIS @ DR. DA SILVA, [phone].    PER LUIS-  PATIENT DOES NOT NEED [facility] TO ORDER THE ANTIBIOTIC THE DENTAL OFFICE PROVIDED THE MEDICATION TO THE PATIENT.     AMOXICILLIN 500 MG  QTY 4 R F0   TAKE 4 TAB 1 HOUR PRIOR TO DENTAL PROCEDURE    TAKEN BY THE PATIENT @ 1030    SPOKE TO PATIENT HE CONFIRMED TAKING THE MEDICATION.     LARS

## 2018-03-22 RX ORDER — OXYCODONE AND ACETAMINOPHEN 7.5; 325 MG/1; MG/1
1 TABLET ORAL EVERY 4 HOURS PRN
Qty: 40 TABLET | Refills: 0 | Status: CANCELLED | OUTPATIENT
Start: 2018-03-22

## 2018-03-23 NOTE — TELEPHONE ENCOUNTER
SPOKE WITH PT IN ERROR ABOUT HIS RX. IT WAS NOT READY FOR .   THEN SPOKE WITH MARY TO HAVE [facility] APPROVE THIS AND THE PATIENT CONFIRMED THIS WAS OK AND HE WOULD PICK IT UP ON Monday IF READY. MG

## 2018-03-26 ENCOUNTER — OFFICE VISIT (OUTPATIENT)
Dept: ENDOCRINOLOGY | Age: 58
End: 2018-03-26

## 2018-03-26 VITALS
BODY MASS INDEX: 38.94 KG/M2 | SYSTOLIC BLOOD PRESSURE: 128 MMHG | RESPIRATION RATE: 16 BRPM | WEIGHT: 272 LBS | HEIGHT: 70 IN | DIASTOLIC BLOOD PRESSURE: 70 MMHG

## 2018-03-26 DIAGNOSIS — R53.82 CHRONIC FATIGUE: ICD-10-CM

## 2018-03-26 DIAGNOSIS — E78.5 DYSLIPIDEMIA: ICD-10-CM

## 2018-03-26 DIAGNOSIS — Z79.4 CONTROLLED TYPE 2 DIABETES MELLITUS WITHOUT COMPLICATION, WITH LONG-TERM CURRENT USE OF INSULIN (HCC): ICD-10-CM

## 2018-03-26 DIAGNOSIS — E66.01 MORBID OBESITY DUE TO EXCESS CALORIES (HCC): ICD-10-CM

## 2018-03-26 DIAGNOSIS — F52.21 ED (ERECTILE DYSFUNCTION) OF NON-ORGANIC ORIGIN: ICD-10-CM

## 2018-03-26 DIAGNOSIS — I10 ACCELERATED HYPERTENSION: ICD-10-CM

## 2018-03-26 DIAGNOSIS — E11.9 CONTROLLED TYPE 2 DIABETES MELLITUS WITHOUT COMPLICATION, WITH LONG-TERM CURRENT USE OF INSULIN (HCC): ICD-10-CM

## 2018-03-26 DIAGNOSIS — R79.89 LOW TESTOSTERONE: ICD-10-CM

## 2018-03-26 DIAGNOSIS — I10 BENIGN ESSENTIAL HTN: ICD-10-CM

## 2018-03-26 DIAGNOSIS — E55.9 VITAMIN D DEFICIENCY: ICD-10-CM

## 2018-03-26 DIAGNOSIS — E78.1 ESSENTIAL HYPERTRIGLYCERIDEMIA: ICD-10-CM

## 2018-03-26 DIAGNOSIS — N52.8 OTHER MALE ERECTILE DYSFUNCTION: ICD-10-CM

## 2018-03-26 DIAGNOSIS — IMO0001 UNCONTROLLED TYPE 2 DIABETES MELLITUS WITHOUT COMPLICATION, WITH LONG-TERM CURRENT USE OF INSULIN: Primary | ICD-10-CM

## 2018-03-26 PROCEDURE — 99214 OFFICE O/P EST MOD 30 MIN: CPT | Performed by: INTERNAL MEDICINE

## 2018-03-26 RX ORDER — FENOFIBRATE 130 MG/1
130 CAPSULE ORAL
Qty: 30 CAPSULE | Refills: 5 | Status: SHIPPED | OUTPATIENT
Start: 2018-03-26 | End: 2018-09-27 | Stop reason: SDUPTHER

## 2018-03-26 RX ORDER — AMLODIPINE BESYLATE 10 MG/1
10 TABLET ORAL DAILY
Qty: 30 TABLET | Refills: 5 | Status: SHIPPED | OUTPATIENT
Start: 2018-03-26 | End: 2018-11-01 | Stop reason: SDUPTHER

## 2018-03-26 RX ORDER — DAPAGLIFLOZIN 10 MG/1
TABLET, FILM COATED ORAL
Qty: 30 TABLET | Refills: 5 | Status: SHIPPED | OUTPATIENT
Start: 2018-03-26 | End: 2018-09-27 | Stop reason: SDUPTHER

## 2018-03-26 RX ORDER — SITAGLIPTIN AND METFORMIN HYDROCHLORIDE 1000; 50 MG/1; MG/1
2 TABLET, FILM COATED, EXTENDED RELEASE ORAL
Qty: 60 TABLET | Refills: 5 | Status: SHIPPED | OUTPATIENT
Start: 2018-03-26 | End: 2018-09-29 | Stop reason: SDUPTHER

## 2018-03-26 RX ORDER — ATORVASTATIN CALCIUM 80 MG/1
80 TABLET, FILM COATED ORAL NIGHTLY
Qty: 90 TABLET | Refills: 3 | Status: SHIPPED | OUTPATIENT
Start: 2018-03-26 | End: 2018-11-01

## 2018-03-26 RX ORDER — AVANAFIL 200 MG/1
TABLET ORAL
Qty: 10 TABLET | Refills: 5 | Status: SHIPPED | OUTPATIENT
Start: 2018-03-26 | End: 2018-05-18

## 2018-03-26 RX ORDER — METOPROLOL SUCCINATE 50 MG/1
50 TABLET, EXTENDED RELEASE ORAL DAILY
Qty: 90 TABLET | Refills: 1 | Status: SHIPPED | OUTPATIENT
Start: 2018-03-26 | End: 2018-11-01 | Stop reason: SDUPTHER

## 2018-03-26 RX ORDER — AMLODIPINE BESYLATE 10 MG/1
10 TABLET ORAL DAILY
Qty: 30 TABLET | Refills: 11 | Status: SHIPPED | OUTPATIENT
Start: 2018-03-26 | End: 2018-03-26 | Stop reason: SDUPTHER

## 2018-03-26 RX ORDER — PIOGLITAZONEHYDROCHLORIDE 30 MG/1
30 TABLET ORAL DAILY
Qty: 30 TABLET | Refills: 5 | Status: SHIPPED | OUTPATIENT
Start: 2018-03-26 | End: 2018-05-30 | Stop reason: SDUPTHER

## 2018-03-26 RX ORDER — SYRINGE W-NEEDLE,DISPOSAB,3 ML 25GX5/8"
SYRINGE, EMPTY DISPOSABLE MISCELLANEOUS
Qty: 10 EACH | Refills: 3 | Status: SHIPPED | OUTPATIENT
Start: 2018-03-26 | End: 2018-12-03 | Stop reason: SDUPTHER

## 2018-03-26 RX ORDER — TESTOSTERONE CYPIONATE 200 MG/ML
300 INJECTION, SOLUTION INTRAMUSCULAR WEEKLY
Qty: 10 ML | Refills: 5 | Status: SHIPPED | OUTPATIENT
Start: 2018-03-26 | End: 2018-11-01 | Stop reason: SDUPTHER

## 2018-03-26 NOTE — ADDENDUM NOTE
Addended by: SAE GONSALES on: 3/26/2018 03:30 PM     Modules accepted: Orders     Capillary refill less/equal to 2 seconds/Strong peripheral pulses

## 2018-03-26 NOTE — PROGRESS NOTES
"Subjective   Aaron Latif is a 58 y.o. male seen for follow up for DM2, HTN, hyperlipidemia, vit d deficiency, lab review. He is checking BG once a day fasting in the AM. No BG readings. He denies any problems or concerns.     History of Present Illness this is a 58-year-old gentleman known patient with type II diabetes hypertension and dyslipidemia as well as vitamin D deficiency and erectile dysfunction and hypogonadism.  Over the course of last 6 months he has had no significant health problem for which to go to the emergency room or hospital.    /70   Resp 16   Ht 177.8 cm (70\")   Wt 123 kg (272 lb)   BMI 39.03 kg/m²     Allergies   Allergen Reactions   • Codeine Other (See Comments)     UNKNOWN       Current Outpatient Prescriptions:   •  atorvastatin (LIPITOR) 80 MG tablet, Take 1 tablet by mouth Every Night., Disp: 90 tablet, Rfl: 1  •  ergocalciferol (ERGOCALCIFEROL) 28899 units capsule, Take 2 capsules by mouth 1 (One) Time Per Week. (Patient taking differently: Take 2 capsules by mouth 1 (One) Time Per Week. thursday), Disp: 26 capsule, Rfl: 3  •  FARXIGA 10 MG tablet, 1 tablet every morning by mouth (Patient taking differently: Take 1 tablet by mouth Every Morning. 1 tablet every morning by mouth), Disp: 30 tablet, Rfl: 5  •  fenofibrate micronized (ANTARA) 130 MG capsule, Take 1 capsule by mouth Every Morning Before Breakfast., Disp: 30 capsule, Rfl: 5  •  glucose blood (ONE TOUCH ULTRA TEST) test strip, 1 each by Other route 3 (Three) Times a Day. Use as instructed, Disp: 400 each, Rfl: 1  •  hydrochlorothiazide (HYDRODIURIL) 25 MG tablet, Take 1 tablet by mouth Every Morning., Disp: 90 tablet, Rfl: 1  •  Insulin Glargine (TOUJEO SOLOSTAR) 300 UNIT/ML solution pen-injector, Inject 75 Units under the skin Every Morning., Disp: , Rfl:   •  Insulin Pen Needle 31G X 8 MM misc, Use once a day, Disp: 100 each, Rfl: 0  •  JANUMET XR  MG tablet, Take 2 tablets by mouth Daily With Dinner., " "Disp: 60 tablet, Rfl: 5  •  LYRICA 200 MG capsule, Take 1 capsule by mouth 2 (Two) Times a Day., Disp: 60 capsule, Rfl: 5  •  meloxicam (MOBIC) 15 MG tablet, Take 15 mg by mouth Every Morning. TO STOP 1 WK AR SURG, Disp: , Rfl:   •  metoprolol succinate XL (TOPROL-XL) 50 MG 24 hr tablet, Take 1 tablet by mouth Daily., Disp: 90 tablet, Rfl: 1  •  omega-3 acid ethyl esters (LOVAZA) 1 g capsule, Take 2 capsules by mouth 2 (Two) Times a Day., Disp: 120 capsule, Rfl: 5  •  ONETOUCH DELICA LANCETS 33G misc, Check blood glucose 3 times daily, Disp: 100 each, Rfl: 5  •  oxyCODONE-acetaminophen (PERCOCET) 7.5-325 MG per tablet, Take 1 tablet by mouth Every 4 (Four) Hours As Needed for Moderate Pain  for up to 40 doses., Disp: 40 tablet, Rfl: 0  •  pioglitazone (ACTOS) 30 MG tablet, Take 1 tablet by mouth Daily. (Patient taking differently: Take 30 mg by mouth Every Morning.), Disp: 90 tablet, Rfl: 1  •  pioglitazone (ACTOS) 30 MG tablet, TAKE ONE TABLET BY MOUTH DAILY, Disp: 30 tablet, Rfl: 0  •  Syringe 23G X 1\" 3 ML misc, Use weekly for testosterone injection, Disp: 10 each, Rfl: 3  •  Testosterone Cypionate (DEPOTESTOTERONE CYPIONATE) 200 MG/ML injection, Inject 1 mL into the shoulder, thigh, or buttocks 1 (One) Time Per Week. (Patient taking differently: Inject 200 mg into the shoulder, thigh, or buttocks 1 (One) Time Per Week. SATURDAY), Disp: 10 mL, Rfl: 3  •  verapamil SR (CALAN-SR) 240 MG CR tablet, Take 1 tablet by mouth Every Morning., Disp: 90 tablet, Rfl: 1      The following portions of the patient's history were reviewed and updated as appropriate: allergies, current medications, past family history, past medical history, past social history, past surgical history and problem list.    Review of Systems   Constitutional: Negative.    HENT: Negative.    Eyes: Negative.    Respiratory: Negative.    Cardiovascular: Negative.    Gastrointestinal: Negative.    Endocrine: Negative.    Genitourinary: Negative.  "   Musculoskeletal: Negative.    Skin: Negative.    Allergic/Immunologic: Negative.    Neurological: Negative.    Hematological: Negative.    Psychiatric/Behavioral: Negative.        Objective   Physical Exam   Constitutional: He appears well-developed and well-nourished. No distress.   HENT:   Head: Normocephalic and atraumatic.   Right Ear: External ear normal.   Left Ear: External ear normal.   Nose: Nose normal.   Mouth/Throat: Oropharynx is clear and moist. No oropharyngeal exudate.   Eyes: Conjunctivae and EOM are normal. Pupils are equal, round, and reactive to light. Right eye exhibits no discharge. Left eye exhibits no discharge. No scleral icterus.   Neck: Normal range of motion. Neck supple. No JVD present. No tracheal deviation present. No thyromegaly present.   Cardiovascular: Normal rate, regular rhythm, normal heart sounds and intact distal pulses.  Exam reveals no gallop and no friction rub.    No murmur heard.  Pulmonary/Chest: Effort normal and breath sounds normal. No stridor. No respiratory distress. He has no wheezes. He has no rales. He exhibits no tenderness.   Abdominal: Soft. Bowel sounds are normal. He exhibits no distension and no mass. There is no tenderness. There is no rebound and no guarding. No hernia.   Musculoskeletal: Normal range of motion. He exhibits no edema, tenderness or deformity.   Healed the scar of the knee surgery in the anterior aspect of the left knee.   Lymphadenopathy:     He has no cervical adenopathy.   Neurological: He is alert. He has normal reflexes. He displays normal reflexes. No cranial nerve deficit. He exhibits normal muscle tone. Coordination normal.   Skin: Skin is warm and dry. No rash noted. He is not diaphoretic. No erythema. No pallor.   Psychiatric: He has a normal mood and affect. His behavior is normal. Judgment and thought content normal.   Nursing note and vitals reviewed.    Results for orders placed or performed in visit on 01/16/18   T4 & TSH  ([facility])   Result Value Ref Range    TSH 1.860 0.270 - 4.200 mIU/mL    T4, Total 6.19 4.50 - 11.70 mcg/dL   Uric Acid   Result Value Ref Range    Uric Acid 6.2 3.4 - 7.0 mg/dL   TestT+TestF+SHBG   Result Value Ref Range    Testosterone, Total 424 264 - 916 ng/dL    Testosterone, Free 10.7 6.6 - 18.1 pg/mL    Sex Hormone Binding Globulin 35.0 19.3 - 76.4 nmol/L   Comprehensive Metabolic Panel   Result Value Ref Range    Glucose 293 (H) 65 - 99 mg/dL    BUN 18 8 - 23 mg/dL    Creatinine 1.04 0.76 - 1.27 mg/dL    eGFR Non African Am 73 >60 mL/min/1.73    eGFR African Am 88 >60 mL/min/1.73    BUN/Creatinine Ratio 17.3 7.0 - 25.0    Sodium 140 136 - 145 mmol/L    Potassium 4.3 3.5 - 5.2 mmol/L    Chloride 95 (L) 98 - 107 mmol/L    Total CO2 29.7 (H) 22.0 - 29.0 mmol/L    Calcium 9.8 8.6 - 10.5 mg/dL    Total Protein 7.1 6.0 - 8.5 g/dL    Albumin 4.20 3.50 - 5.20 g/dL    Globulin 2.9 gm/dL    A/G Ratio 1.4 g/dL    Total Bilirubin 0.6 0.1 - 1.2 mg/dL    Alkaline Phosphatase 34 (L) 39 - 117 U/L    AST (SGOT) 31 1 - 40 U/L    ALT (SGPT) 47 (H) 1 - 41 U/L   C-Peptide   Result Value Ref Range    C-Peptide 5.5 (H) 1.1 - 4.4 ng/mL   Hemoglobin A1c   Result Value Ref Range    Hemoglobin A1C 7.70 (H) 4.80 - 5.60 %   Lipid Panel   Result Value Ref Range    Total Cholesterol 73 0 - 200 mg/dL    Triglycerides 183 (H) 0 - 150 mg/dL    HDL Cholesterol 24 (L) 40 - 60 mg/dL    VLDL Cholesterol 36.6 5 - 40 mg/dL    LDL Cholesterol  12 0 - 100 mg/dL   PSA   Result Value Ref Range    PSA 0.859 0.000 - 4.000 ng/mL   Hemoglobin & Hematocrit, Blood   Result Value Ref Range    Hemoglobin 15.6 13.7 - 17.6 g/dL    Hematocrit 48.0 40.4 - 52.2 %   MicroAlbumin, Urine, Random   Result Value Ref Range    Microalbumin, Urine <3.0 Not Estab. ug/mL   Cardiovascular Risk Assessment   Result Value Ref Range    Interpretation Note    Diabetes Patient Education   Result Value Ref Range    PDF Image Not applicable          Assessment/Plan   Diagnoses and  all orders for this visit:    Uncontrolled type 2 diabetes mellitus without complication, with long-term current use of insulin  -     T4 & TSH ([facility]); Future  -     TestT+TestF+SHBG; Future  -     Uric Acid; Future  -     Vitamin D 25 Hydroxy; Future  -     Comprehensive Metabolic Panel; Future  -     C-Peptide; Future  -     Hemoglobin A1c; Future  -     Lipid Panel; Future  -     MicroAlbumin, Urine, Random - Urine, Clean Catch; Future  -     Hemoglobin & Hematocrit, Blood; Future    Benign essential HTN  -     T4 & TSH ([facility]); Future  -     TestT+TestF+SHBG; Future  -     Uric Acid; Future  -     Vitamin D 25 Hydroxy; Future  -     Comprehensive Metabolic Panel; Future  -     C-Peptide; Future  -     Hemoglobin A1c; Future  -     Lipid Panel; Future  -     MicroAlbumin, Urine, Random - Urine, Clean Catch; Future  -     Hemoglobin & Hematocrit, Blood; Future    Essential hypertriglyceridemia  -     T4 & TSH ([facility]); Future  -     TestT+TestF+SHBG; Future  -     Uric Acid; Future  -     Vitamin D 25 Hydroxy; Future  -     Comprehensive Metabolic Panel; Future  -     C-Peptide; Future  -     Hemoglobin A1c; Future  -     Lipid Panel; Future  -     MicroAlbumin, Urine, Random - Urine, Clean Catch; Future  -     Hemoglobin & Hematocrit, Blood; Future    Vitamin D deficiency  -     T4 & TSH ([facility]); Future  -     TestT+TestF+SHBG; Future  -     Uric Acid; Future  -     Vitamin D 25 Hydroxy; Future  -     Comprehensive Metabolic Panel; Future  -     C-Peptide; Future  -     Hemoglobin A1c; Future  -     Lipid Panel; Future  -     MicroAlbumin, Urine, Random - Urine, Clean Catch; Future  -     Hemoglobin & Hematocrit, Blood; Future  -     fenofibrate micronized (ANTARA) 130 MG capsule; Take 1 capsule by mouth Every Morning Before Breakfast.  -     pioglitazone (ACTOS) 30 MG tablet; Take 1 tablet by mouth Daily.    Low testosterone  -     T4 & TSH ([facility]); Future  -     TestT+TestF+SHBG; Future  -     Uric  Acid; Future  -     Vitamin D 25 Hydroxy; Future  -     Comprehensive Metabolic Panel; Future  -     C-Peptide; Future  -     Hemoglobin A1c; Future  -     Lipid Panel; Future  -     MicroAlbumin, Urine, Random - Urine, Clean Catch; Future  -     Hemoglobin & Hematocrit, Blood; Future  -     fenofibrate micronized (ANTARA) 130 MG capsule; Take 1 capsule by mouth Every Morning Before Breakfast.  -     pioglitazone (ACTOS) 30 MG tablet; Take 1 tablet by mouth Daily.    Chronic fatigue  -     T4 & TSH ([facility]); Future  -     TestT+TestF+SHBG; Future  -     Uric Acid; Future  -     Vitamin D 25 Hydroxy; Future  -     Comprehensive Metabolic Panel; Future  -     C-Peptide; Future  -     Hemoglobin A1c; Future  -     Lipid Panel; Future  -     MicroAlbumin, Urine, Random - Urine, Clean Catch; Future  -     Hemoglobin & Hematocrit, Blood; Future  -     fenofibrate micronized (ANTARA) 130 MG capsule; Take 1 capsule by mouth Every Morning Before Breakfast.  -     pioglitazone (ACTOS) 30 MG tablet; Take 1 tablet by mouth Daily.    Dyslipidemia  -     T4 & TSH ([facility]); Future  -     TestT+TestF+SHBG; Future  -     Uric Acid; Future  -     Vitamin D 25 Hydroxy; Future  -     Comprehensive Metabolic Panel; Future  -     C-Peptide; Future  -     Hemoglobin A1c; Future  -     Lipid Panel; Future  -     MicroAlbumin, Urine, Random - Urine, Clean Catch; Future  -     Hemoglobin & Hematocrit, Blood; Future  -     atorvastatin (LIPITOR) 80 MG tablet; Take 1 tablet by mouth Every Night.  -     fenofibrate micronized (ANTARA) 130 MG capsule; Take 1 capsule by mouth Every Morning Before Breakfast.  -     pioglitazone (ACTOS) 30 MG tablet; Take 1 tablet by mouth Daily.    ED (erectile dysfunction) of non-organic origin  -     T4 & TSH ([facility]); Future  -     TestT+TestF+SHBG; Future  -     Uric Acid; Future  -     Vitamin D 25 Hydroxy; Future  -     Comprehensive Metabolic Panel; Future  -     C-Peptide; Future  -     Hemoglobin A1c;  Future  -     Lipid Panel; Future  -     MicroAlbumin, Urine, Random - Urine, Clean Catch; Future  -     Hemoglobin & Hematocrit, Blood; Future    Dyslipidemia  Comments:  denies myalgias with Atorvastatin  Orders:  -     T4 & TSH ([facility]); Future  -     TestT+TestF+SHBG; Future  -     Uric Acid; Future  -     Vitamin D 25 Hydroxy; Future  -     Comprehensive Metabolic Panel; Future  -     C-Peptide; Future  -     Hemoglobin A1c; Future  -     Lipid Panel; Future  -     MicroAlbumin, Urine, Random - Urine, Clean Catch; Future  -     Hemoglobin & Hematocrit, Blood; Future  -     atorvastatin (LIPITOR) 80 MG tablet; Take 1 tablet by mouth Every Night.  -     fenofibrate micronized (ANTARA) 130 MG capsule; Take 1 capsule by mouth Every Morning Before Breakfast.  -     pioglitazone (ACTOS) 30 MG tablet; Take 1 tablet by mouth Daily.    Controlled type 2 diabetes mellitus without complication, with long-term current use of insulin  -     fenofibrate micronized (ANTARA) 130 MG capsule; Take 1 capsule by mouth Every Morning Before Breakfast.  -     pioglitazone (ACTOS) 30 MG tablet; Take 1 tablet by mouth Daily.    Accelerated hypertension  -     fenofibrate micronized (ANTARA) 130 MG capsule; Take 1 capsule by mouth Every Morning Before Breakfast.  -     pioglitazone (ACTOS) 30 MG tablet; Take 1 tablet by mouth Daily.    Morbid obesity due to excess calories  -     fenofibrate micronized (ANTARA) 130 MG capsule; Take 1 capsule by mouth Every Morning Before Breakfast.  -     pioglitazone (ACTOS) 30 MG tablet; Take 1 tablet by mouth Daily.    Other male erectile dysfunction  -     fenofibrate micronized (ANTARA) 130 MG capsule; Take 1 capsule by mouth Every Morning Before Breakfast.  -     pioglitazone (ACTOS) 30 MG tablet; Take 1 tablet by mouth Daily.    Other orders  -     Testosterone Cypionate (DEPOTESTOTERONE CYPIONATE) 200 MG/ML injection; Inject 1.5 mL into the shoulder, thigh, or buttocks 1 (One) Time Per Week.  -  "    FARXIGA 10 MG tablet; 1 tablet every morning by mouth  -     metoprolol succinate XL (TOPROL-XL) 50 MG 24 hr tablet; Take 1 tablet by mouth Daily.  -     Syringe 23G X 1\" 3 ML misc; Use weekly for testosterone injection  -     amLODIPine (NORVASC) 10 MG tablet; Take 1 tablet by mouth Daily.  -     STENDRA 200 MG tablet; 1 tablet prior to planned intercourse.               In summary I saw and examined this 58-year-old gentleman for above-mentioned problems.  I reviewed his laboratory evaluation of 03/12/2018 and provided him and his wife who was present during this office visit with a hard copy of it.  Overall he is clinically and metabolically stable and therefore we will go ahead and continue all his current prescriptions.  Because his libido is is still very low I am going to go ahead and increase his testosterone to 300 mg once weekly.  I will see him in 6 months or sooner if needed with laboratory evaluation prior to each office visit.  "

## 2018-03-27 ENCOUNTER — TELEPHONE (OUTPATIENT)
Dept: ORTHOPEDIC SURGERY | Facility: CLINIC | Age: 58
End: 2018-03-27

## 2018-03-27 RX ORDER — OXYCODONE AND ACETAMINOPHEN 7.5; 325 MG/1; MG/1
1 TABLET ORAL EVERY 8 HOURS PRN
Qty: 40 TABLET | Refills: 0 | Status: SHIPPED | OUTPATIENT
Start: 2018-03-27 | End: 2018-04-12 | Stop reason: SDUPTHER

## 2018-03-27 NOTE — TELEPHONE ENCOUNTER
Spoke with Sheree, she is going to look and see if the RX is in the Bin at [facility],if not then I will enter in a refill request to [facility] for approval and if approved it will be at [facility] once the patient is notified.

## 2018-03-27 NOTE — TELEPHONE ENCOUNTER
Called patient letting him know that the requested RX is ready for  @ the  @ [facility].  Also that the qty was reduced to 30 and q8h.

## 2018-04-09 RX ORDER — INSULIN GLARGINE 300 U/ML
INJECTION, SOLUTION SUBCUTANEOUS
Qty: 9 PEN | Refills: 0 | Status: SHIPPED | OUTPATIENT
Start: 2018-04-09 | End: 2018-05-18

## 2018-04-12 DIAGNOSIS — M25.562 CHRONIC PAIN OF LEFT KNEE: Primary | ICD-10-CM

## 2018-04-12 DIAGNOSIS — G89.29 CHRONIC PAIN OF LEFT KNEE: Primary | ICD-10-CM

## 2018-04-12 RX ORDER — OXYCODONE AND ACETAMINOPHEN 7.5; 325 MG/1; MG/1
1 TABLET ORAL EVERY 8 HOURS PRN
Qty: 50 TABLET | Refills: 0 | Status: SHIPPED | OUTPATIENT
Start: 2018-04-12 | End: 2018-04-30

## 2018-04-12 NOTE — TELEPHONE ENCOUNTER
Order entered for last refill of pain medication and referral for pain management has been entered.

## 2018-04-24 ENCOUNTER — TELEPHONE (OUTPATIENT)
Dept: ENDOCRINOLOGY | Age: 58
End: 2018-04-24

## 2018-04-24 NOTE — TELEPHONE ENCOUNTER
SPOKE WITH PATIENT AND EXPLAINED WHY MEDICATION WAS DIFFERENT FOR HIM AND HIS WIFE. HE EXPRESSED UNDERSTANDING.       ----- Message from Kelly Glaser MA sent at 4/23/2018  3:39 PM EDT -----  Patient states that he is under his wife's insurance policy and his Toujeo was approved and her's was not (Marily Latif). He would like this to be looked into because if one is approved then he said the other should be too. They are both on the same insurance plan under his wife. Patient wants a call once you speak to the insurance company.

## 2018-04-30 ENCOUNTER — OFFICE VISIT (OUTPATIENT)
Dept: ORTHOPEDIC SURGERY | Facility: CLINIC | Age: 58
End: 2018-04-30

## 2018-04-30 ENCOUNTER — TELEPHONE (OUTPATIENT)
Dept: ORTHOPEDIC SURGERY | Facility: CLINIC | Age: 58
End: 2018-04-30

## 2018-04-30 VITALS — HEIGHT: 70 IN | TEMPERATURE: 97.8 F | BODY MASS INDEX: 37.94 KG/M2 | WEIGHT: 265 LBS

## 2018-04-30 DIAGNOSIS — M79.672 CHRONIC HEEL PAIN, LEFT: ICD-10-CM

## 2018-04-30 DIAGNOSIS — M65.28 CALCIFIC ACHILLES TENDONITIS: Primary | ICD-10-CM

## 2018-04-30 DIAGNOSIS — M92.62 HAGLUND'S DEFORMITY OF LEFT HEEL: ICD-10-CM

## 2018-04-30 DIAGNOSIS — G89.29 CHRONIC HEEL PAIN, LEFT: ICD-10-CM

## 2018-04-30 PROCEDURE — 73650 X-RAY EXAM OF HEEL: CPT | Performed by: ORTHOPAEDIC SURGERY

## 2018-04-30 PROCEDURE — 99214 OFFICE O/P EST MOD 30 MIN: CPT | Performed by: ORTHOPAEDIC SURGERY

## 2018-04-30 NOTE — PROGRESS NOTES
New Patient Complaint      Patient: Aaron Latif  YOB: 1960 58 y.o. male  Medical Record Number: [id number]    Chief Complaints: My heel hurts    History of Present Illness: Patient describes a several month history with acute exacerbation of last week of severe intermittent stabbing pain with bruising and swelling in the posterior aspect of the left heel.  Symptoms are worse with standing and walking.  He does not recall any specific injury but has a remote history of similar problems on the right that eventually required surgical treatment in 2006          HPI    Allergies:   Allergies   Allergen Reactions   • Codeine Other (See Comments)     UNKNOWN       Medications:   Current Outpatient Prescriptions on File Prior to Visit   Medication Sig   • amLODIPine (NORVASC) 10 MG tablet Take 1 tablet by mouth Daily.   • atorvastatin (LIPITOR) 80 MG tablet Take 1 tablet by mouth Every Night.   • ergocalciferol (ERGOCALCIFEROL) 51164 units capsule Take 2 capsules by mouth 1 (One) Time Per Week. (Patient taking differently: Take 2 capsules by mouth 1 (One) Time Per Week. thursday)   • FARXIGA 10 MG tablet 1 tablet every morning by mouth   • fenofibrate micronized (ANTARA) 130 MG capsule Take 1 capsule by mouth Every Morning Before Breakfast.   • glucose blood (ONE TOUCH ULTRA TEST) test strip 1 each by Other route 3 (Three) Times a Day. Use as instructed   • hydrochlorothiazide (HYDRODIURIL) 25 MG tablet Take 1 tablet by mouth Every Morning.   • Insulin Pen Needle 31G X 8 MM misc Use once a day   • JANUMET XR  MG tablet Take 2 tablets by mouth Daily With Dinner.   • LYRICA 200 MG capsule Take 1 capsule by mouth 2 (Two) Times a Day.   • meloxicam (MOBIC) 15 MG tablet Take 15 mg by mouth Every Morning. TO STOP 1 WK MD SURG   • metoprolol succinate XL (TOPROL-XL) 50 MG 24 hr tablet Take 1 tablet by mouth Daily.   • omega-3 acid ethyl esters (LOVAZA) 1 g capsule Take 2 capsules by mouth 2 (Two) Times a  "Day.   • ONETOUCH DELICA LANCETS 33G misc Check blood glucose 3 times daily   • pioglitazone (ACTOS) 30 MG tablet Take 1 tablet by mouth Daily.   • STENDRA 200 MG tablet 1 tablet prior to planned intercourse.   • Syringe 23G X 1\" 3 ML misc Use weekly for testosterone injection   • Testosterone Cypionate (DEPOTESTOTERONE CYPIONATE) 200 MG/ML injection Inject 1.5 mL into the shoulder, thigh, or buttocks 1 (One) Time Per Week.   • TOUJEO SOLOSTAR 300 UNIT/ML solution pen-injector INJECT 75 UNITS UNDER THE SKIN DAILY   • [DISCONTINUED] oxyCODONE-acetaminophen (PERCOCET) 7.5-325 MG per tablet Take 1 tablet by mouth Every 8 (Eight) Hours As Needed for Moderate Pain  for up to 30 doses.     No current facility-administered medications on file prior to visit.        Past Medical History:   Diagnosis Date   • Anxiety    • Arthritis    • Diabetic peripheral neuropathy     TYPE 2   • Erectile dysfunction    • Fatigue    • Fracture of rib of right side    • High cholesterol    • Hypertension    • Low testosterone    • Neuropathy    • Sleep apnea     DOES NOT WEAR CPAP   • Type 2 diabetes mellitus    • Vitamin D deficiency      Past Surgical History:   Procedure Laterality Date   • ACHILLES TENDON REPAIR Left    • EYE SURGERY Left    • EYE SURGERY Left     BASKETBALL INJURY YEARS AGO   • HERNIA REPAIR     • JOINT REPLACEMENT     • KNEE ARTHROSCOPY Left 7/26/2016    Procedure: KNEE ARTHROSCOPY, PARTIAL medial MENISCECTOMY;  Surgeon: Layton Anderson MD;  Location: [facility];  Service:    • KNEE MINI REVISION Left 12/20/2017    Procedure: LEFT TOTAL KNEE ARTHROPLASTY poly change;  Surgeon: Eloy Fitzgerald MD;  Location: [facility] OR;  Service:    • KNEE SURGERY     • AK TOTAL KNEE ARTHROPLASTY Left 12/15/2016    Procedure: TOTAL KNEE ARTHROPLASTY;  Surgeon: Layton Anderson MD;  Location: [facility] OR;  Service: Orthopedics     Social History     Occupational History   • Not on file.     Social History Main Topics   • " "Smoking status: Never Smoker   • Smokeless tobacco: Never Used   • Alcohol use Yes      Comment: SOCIAL   • Drug use: No      Comment: prescribed by md   • Sexual activity: Not on file      Social History     Social History Narrative   • No narrative on file     Family History   Problem Relation Age of Onset   • Diabetes Mother    • Hypertension Mother    • Heart disease Father    • Diabetes Brother    • Hypertension Brother    • Hypertension Paternal Uncle    • Malig Hyperthermia Neg Hx        Review of Systems: 14 point review of systems performed, positive pertinent findings identified in HPI. All remaining systems negative except dizziness and headaches    Review of Systems      Physical Exam:   Vitals:    04/30/18 1255   Temp: 97.8 °F (36.6 °C)   TempSrc: Temporal Artery    Weight: 120 kg (265 lb)   Height: 177.8 cm (70\")     Physical Exam   Constitutional: pleasant, well developed   Eyes: sclera non icteric  Hearing : adequate for exam  Cardiovascular: palpable pulses in left foot, left calf/ thigh NT without sign of DVT  Respiratoy: breathing unlabored   Neurological: grossly sensate to LT throughout left LE  Psychiatric: oriented with normal mood and affect.   Lymphatic: No palpable popliteal lymphadenopathy left LE  Skin: intact throughout left leg/foot  Musculoskeletal: Antalgic gait.  There is significant discomfort palpation over the insertion of the Achilles with moderate bony prominence and swelling but no overlying warmth erythema or breakdown.  There are no palpable defects.  Pain with resisted plantarflexion.  Physical Exam  Ortho Exam    Radiology: 2 views left heel were ordered to evaluate pain reviewed with him and no prior x-rays immediately available for comparison.  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    He was fitted with a boot with double stage heel lift for protected weightbearing using a walker.  He's already on meloxicam and will continue on such.  We'll get an MRI of his left hindfoot to evaluate involvement to his Achilles as this is likely to require surgical treatment.    I recommend that he see his primary care physician for non-orthopedic related issues outlined in review of symptoms    He will follow-up in 10-14 days no x-rays

## 2018-05-03 ENCOUNTER — OFFICE VISIT (OUTPATIENT)
Dept: ORTHOPEDIC SURGERY | Facility: CLINIC | Age: 58
End: 2018-05-03

## 2018-05-03 ENCOUNTER — TELEPHONE (OUTPATIENT)
Dept: ORTHOPEDIC SURGERY | Facility: CLINIC | Age: 58
End: 2018-05-03

## 2018-05-03 VITALS — BODY MASS INDEX: 37.8 KG/M2 | HEIGHT: 71 IN | WEIGHT: 270 LBS | TEMPERATURE: 97.7 F

## 2018-05-03 DIAGNOSIS — Z96.652 STATUS POST LEFT KNEE REPLACEMENT: Primary | ICD-10-CM

## 2018-05-03 PROCEDURE — 73562 X-RAY EXAM OF KNEE 3: CPT | Performed by: ORTHOPAEDIC SURGERY

## 2018-05-03 PROCEDURE — 99213 OFFICE O/P EST LOW 20 MIN: CPT | Performed by: ORTHOPAEDIC SURGERY

## 2018-05-03 NOTE — PROGRESS NOTES
Patient: Aaron Latif  YOB: 1960 58 y.o. male  Medical Record Number: [id number]    Chief Complaints:   Chief Complaint   Patient presents with   • Left Knee - Post-op, Pain       History of Present Illness:Aaron Latif is a 58 y.o. male who presents for follow-up of  Left knee pain.  He still has persistent pain and is quite disabled by it.  Unfortunately he now has a left ankle issue and is in a boot and this has placed increased stress on the knee.  He describes pain with weightbearing and activity and has intermittent stabbing episodes throughout the entire anterior lateral knee.    Allergies:   Allergies   Allergen Reactions   • Codeine Other (See Comments)     UNKNOWN       Medications:   Current Outpatient Prescriptions   Medication Sig Dispense Refill   • amLODIPine (NORVASC) 10 MG tablet Take 1 tablet by mouth Daily. 30 tablet 5   • atorvastatin (LIPITOR) 80 MG tablet Take 1 tablet by mouth Every Night. 90 tablet 3   • ergocalciferol (ERGOCALCIFEROL) 67148 units capsule Take 2 capsules by mouth 1 (One) Time Per Week. (Patient taking differently: Take 2 capsules by mouth 1 (One) Time Per Week. thursday) 26 capsule 3   • FARXIGA 10 MG tablet 1 tablet every morning by mouth 30 tablet 5   • fenofibrate micronized (ANTARA) 130 MG capsule Take 1 capsule by mouth Every Morning Before Breakfast. 30 capsule 5   • glucose blood (ONE TOUCH ULTRA TEST) test strip 1 each by Other route 3 (Three) Times a Day. Use as instructed 400 each 1   • hydrochlorothiazide (HYDRODIURIL) 25 MG tablet Take 1 tablet by mouth Every Morning. 90 tablet 1   • Insulin Pen Needle 31G X 8 MM misc Use once a day 100 each 0   • JANUMET XR  MG tablet Take 2 tablets by mouth Daily With Dinner. 60 tablet 5   • LYRICA 200 MG capsule Take 1 capsule by mouth 2 (Two) Times a Day. 60 capsule 5   • meloxicam (MOBIC) 15 MG tablet Take 15 mg by mouth Every Morning. TO STOP 1 WK GA SURG     • metoprolol succinate XL (TOPROL-XL) 50  "MG 24 hr tablet Take 1 tablet by mouth Daily. 90 tablet 1   • omega-3 acid ethyl esters (LOVAZA) 1 g capsule Take 2 capsules by mouth 2 (Two) Times a Day. 120 capsule 5   • pioglitazone (ACTOS) 30 MG tablet Take 1 tablet by mouth Daily. 30 tablet 5   • STENDRA 200 MG tablet 1 tablet prior to planned intercourse. 10 tablet 5   • Syringe 23G X 1\" 3 ML misc Use weekly for testosterone injection 10 each 3   • Testosterone Cypionate (DEPOTESTOTERONE CYPIONATE) 200 MG/ML injection Inject 1.5 mL into the shoulder, thigh, or buttocks 1 (One) Time Per Week. 10 mL 5   • TOUJEO SOLOSTAR 300 UNIT/ML solution pen-injector INJECT 75 UNITS UNDER THE SKIN DAILY 9 pen 0   • ONETOUCH DELICA LANCETS 33G misc Check blood glucose 3 times daily 100 each 5     No current facility-administered medications for this visit.          The following portions of the patient's history were reviewed and updated as appropriate: allergies, current medications, past family history, past medical history, past social history, past surgical history and problem list.    Review of Systems:   A 14 point review of systems was performed. All systems negative except pertinent positives/negative listed in HPI above    Physical Exam:   Vitals:    05/03/18 0803   Temp: 97.7 °F (36.5 °C)   TempSrc: Temporal Artery    Weight: 122 kg (270 lb)   Height: 180.3 cm (71\")       General: A and O x 3, ASA, NAD    SCLERA:    Normal    DENTITION:   Normal  There is moderate flexion instability he has a mild-to-moderate effusion and diffuse soft tissue swelling.  His range of motion looks good from about 0-125    Radiology:  Xrays 3views left knee  (ap,lateral, sunrise) were ordered and reviewed for evaluation of knee pain demonstratinga well positioned knee replacement without evidence of wear, loosening or osteolysis  todays xrays were compared to previous xrays and demonstrate no change    Assessment/Plan:  Persistent left knee pain following a polyethylene exchange for " flexion instability.  He appears to have a soft tissue component to his pain and has intermittent stabbing episodes.  During our exam I noted him having these episodes roughly every minute or so where he describes shooting burning pain localized in the soft tissues.  He may have a component of RSD.  It may be that we have to consider a complete revision to a constrained device and even then I explained him that it may not take care of his pain as she has some unusual soft tissue component which would not be addressed with the procedure.  He will continue workup for his ankle with Dr. Chawla.  I will see him back in December for 1 year visit with repeat x-rays.  If we have to consider revision we'll talk about it at that time although I explained him that I could not give him any guarantees about response following that procedure

## 2018-05-10 ENCOUNTER — APPOINTMENT (OUTPATIENT)
Dept: MRI IMAGING | Facility: HOSPITAL | Age: 58
End: 2018-05-10
Attending: ORTHOPAEDIC SURGERY

## 2018-05-16 ENCOUNTER — TELEPHONE (OUTPATIENT)
Dept: ORTHOPEDIC SURGERY | Facility: CLINIC | Age: 58
End: 2018-05-16

## 2018-05-16 ENCOUNTER — OFFICE VISIT (OUTPATIENT)
Dept: ORTHOPEDIC SURGERY | Facility: CLINIC | Age: 58
End: 2018-05-16

## 2018-05-16 VITALS — BODY MASS INDEX: 37.8 KG/M2 | HEIGHT: 71 IN | WEIGHT: 270 LBS | TEMPERATURE: 97.8 F

## 2018-05-16 DIAGNOSIS — S86.012A ACHILLES TENDON TEAR, LEFT, INITIAL ENCOUNTER: ICD-10-CM

## 2018-05-16 DIAGNOSIS — M92.62 HAGLUND'S DEFORMITY OF LEFT HEEL: ICD-10-CM

## 2018-05-16 DIAGNOSIS — M65.28 CALCIFIC ACHILLES TENDONITIS: Primary | ICD-10-CM

## 2018-05-16 PROCEDURE — 99213 OFFICE O/P EST LOW 20 MIN: CPT | Performed by: ORTHOPAEDIC SURGERY

## 2018-05-16 RX ORDER — CEFAZOLIN SODIUM 2 G/100ML
2 INJECTION, SOLUTION INTRAVENOUS ONCE
Status: CANCELLED | OUTPATIENT
Start: 2018-05-22 | End: 2018-05-22

## 2018-05-16 NOTE — TELEPHONE ENCOUNTER
"I returned patient's phone call he like to go ahead and get the surgery done as soon as possible I counseled him that with his upcoming trip in mid July I wouldn't anticipate that without complications he should be into a boot at that time obviously this may be changed if we encounter any postoperative complications.    All of his questions regarding postoperative protocol were answered and I reviewed all this with him in detail to his full satisfaction.  He's going to check with his insurance company about coverage for a \"knee scooter\"    Encouraged him to call if he has any other questions prior to surgery  "

## 2018-05-16 NOTE — PROGRESS NOTES
"Ankle Follow Up      Patient: Aaron Latif    YOB: 1960 58 y.o. male    Chief Complaints: Ankle pain    History of Present Illness: Patient was seen on 4/30/19 with a one-week acute exacerbation of a several month history of severe intermittent stabbing pain with bruising and swelling in the posterior aspect of the left hindfoot.  He was placed into a boot and sent for MRI.  Still complains of significant pain to that area as outlined above with only slight improvement since previous exam.  Symptoms initially were not associated with any specific injury.    HPI    ROS: ankle pain, No numbness or tingling  Past Medical History:   Diagnosis Date   • Anxiety    • Arthritis    • Diabetic peripheral neuropathy     TYPE 2   • Erectile dysfunction    • Fatigue    • Fracture of rib of right side    • High cholesterol    • Hypertension    • Low testosterone    • Neuropathy    • Sleep apnea     DOES NOT WEAR CPAP   • Type 2 diabetes mellitus    • Vitamin D deficiency        Physical Exam:   Vitals:    05/16/18 0804   Temp: 97.8 °F (36.6 °C)   Weight: 122 kg (270 lb)   Height: 180.3 cm (71\")     Well developed with normal mood.Exam is with his wife.  There is moderate bony prominence at the insertion of the Achilles with diffuse discomfort palpation.  Positive Silverskiold test and pain with 5 out of 5 plantarflexion.  He is actively able to plantarflex the great toe.  No palpable defects of the Achilles.  Stable ankle ligamentous exam      Radiology: MRI films and report of the left hindfoot dated 5/10/18 from [facility] show high-grade near complete insertional tear involving at least 90% of the tendon with significant bony deformity including spurring within the Achilles and Tosin deformity.  There is a thickened ATFL and CFL as well as deltoid.      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  2.  Left Tosin deformity    We discussed " operative and nonoperative treatment options including continued use of the boot, stretching, physical therapy etc.  He like to proceed with operative treatment and although no guarantees could be given mutually decision was made to proceed with operative treatment.    We discussed the need for excisional debridement of the Achilles with probable gastroc recession (lengthening at the calf) as well as removal of the bony spurs about the insertion and at the calcaneus as well as probable need for FHL tendon transfer from the great toe into the calcaneus to augment strength.    Risks benefits potential outcomes and complications were reviewed and can include but are not limited to heart attack stroke death pneumonia infection bleeding damage to blood vessels and nerves or tendons blood clots pulmonary embolism persistent or worsening pain stiffness rupture and failure to return to presurgery and precondition levels of activity as well as wound healing complications that could result in long-term wound care or plastics reconstruction or possible even loss of limb.  Also discussed with them at length the need to continue keeping his blood sugars under good control as he says he has been    He understands that this may take up to a year or longer to recover and he will be nonweightbearing for at least 4-6 weeks.    All of he and his  wife's questions were answered to their full satisfaction and we'll likely schedule this sometime later in July after a trip where they are going to see their daughter.    He will continue with his boot in the interim and continue with gentle stretching exercises.  If he decides to do the surgery sooner be happy to accommodate him.    They're encouraged to call if they have any questions prior to surgery

## 2018-05-18 ENCOUNTER — APPOINTMENT (OUTPATIENT)
Dept: PREADMISSION TESTING | Facility: HOSPITAL | Age: 58
End: 2018-05-18

## 2018-05-18 VITALS
WEIGHT: 215.5 LBS | DIASTOLIC BLOOD PRESSURE: 77 MMHG | TEMPERATURE: 97.4 F | RESPIRATION RATE: 16 BRPM | HEIGHT: 70 IN | SYSTOLIC BLOOD PRESSURE: 125 MMHG | OXYGEN SATURATION: 96 % | BODY MASS INDEX: 30.85 KG/M2 | HEART RATE: 88 BPM

## 2018-05-18 LAB
ANION GAP SERPL CALCULATED.3IONS-SCNC: 16.9 MMOL/L
BUN BLD-MCNC: 19 MG/DL (ref 8–23)
BUN/CREAT SERPL: 21.6 (ref 7–25)
CALCIUM SPEC-SCNC: 9.6 MG/DL (ref 8.6–10.5)
CHLORIDE SERPL-SCNC: 98 MMOL/L (ref 98–107)
CO2 SERPL-SCNC: 25.1 MMOL/L (ref 22–29)
CREAT BLD-MCNC: 0.88 MG/DL (ref 0.76–1.27)
DEPRECATED RDW RBC AUTO: 53 FL (ref 37–54)
ERYTHROCYTE [DISTWIDTH] IN BLOOD BY AUTOMATED COUNT: 15.5 % (ref 11.5–14.5)
GFR SERPL CREATININE-BSD FRML MDRD: 88 ML/MIN/1.73
GLUCOSE BLD-MCNC: 285 MG/DL (ref 65–99)
HCT VFR BLD AUTO: 48.2 % (ref 40.4–52.2)
HGB BLD-MCNC: 16.1 G/DL (ref 13.7–17.6)
MCH RBC QN AUTO: 31.6 PG (ref 27–32.7)
MCHC RBC AUTO-ENTMCNC: 33.4 G/DL (ref 32.6–36.4)
MCV RBC AUTO: 94.7 FL (ref 79.8–96.2)
PLATELET # BLD AUTO: 222 10*3/MM3 (ref 140–500)
PMV BLD AUTO: 11.1 FL (ref 6–12)
POTASSIUM BLD-SCNC: 3.9 MMOL/L (ref 3.5–5.2)
RBC # BLD AUTO: 5.09 10*6/MM3 (ref 4.6–6)
SODIUM BLD-SCNC: 140 MMOL/L (ref 136–145)
WBC NRBC COR # BLD: 8.85 10*3/MM3 (ref 4.5–10.7)

## 2018-05-18 PROCEDURE — 80048 BASIC METABOLIC PNL TOTAL CA: CPT | Performed by: ORTHOPAEDIC SURGERY

## 2018-05-18 PROCEDURE — 85027 COMPLETE CBC AUTOMATED: CPT | Performed by: ORTHOPAEDIC SURGERY

## 2018-05-18 PROCEDURE — 36415 COLL VENOUS BLD VENIPUNCTURE: CPT

## 2018-05-18 NOTE — DISCHARGE INSTRUCTIONS
Take the following medications the morning of surgery with a small sip of water:    AMLODIPINE, METOPROLOL, LYRICA    General Instructions:  • Do not eat solid food after midnight the night before surgery.  • You may drink clear liquids day of surgery but must stop at least one hour before your hospital arrival time.  • It is beneficial for you to have a clear drink that contains carbohydrates the day of surgery.  We suggest a 12 to 20 ounce bottle of Gatorade or Powerade for non-diabetic patients or a 12 to 20 ounce bottle of G2 or Powerade Zero for diabetic patients. (Pediatric patients, are not advised to drink a 12 to 20 ounce carbohydrate drink)    Clear liquids are liquids you can see through.  Nothing red in color.     Plain water                               Sports drinks  Sodas                                   Gelatin (Jell-O)  Fruit juices without pulp such as white grape juice and apple juice  Popsicles that contain no fruit or yogurt  Tea or coffee (no cream or milk added)  Gatorade / Powerade  G2 / Powerade Zero    • Infants may have breast milk up to four hours before surgery.  • Infants drinking formula may drink formula up to six hours before surgery.   • Patients who avoid smoking, chewing tobacco and alcohol for 4 weeks prior to surgery have a reduced risk of post-operative complications.  Quit smoking as many days before surgery as you can.  • Do not smoke, use chewing tobacco or drink alcohol the day of surgery.   • If applicable bring your C-PAP/ BI-PAP machine.  • Bring any papers given to you in the doctor’s office.  • Wear clean comfortable clothes and socks.  • Do not wear contact lenses or make-up.  Bring a case for your glasses.   • Bring crutches or walker if applicable.  • Remove all piercings.  Leave jewelry and any other valuables at home.  • Hair extensions with metal clips must be removed prior to surgery.  • The Pre-Admission Testing nurse will instruct you to bring medications if  unable to obtain an accurate list in Pre-Admission Testing.        If you were given a blood bank ID arm band remember to bring it with you the day of surgery.    Preventing a Surgical Site Infection:  • For 2 to 3 days before surgery, avoid shaving with a razor because the razor can irritate skin and make it easier to develop an infection.  • The night prior to surgery sleep in a clean bed with clean clothing.  Do not allow pets to sleep with you.  • Shower on the morning of surgery using a fresh bar of anti-bacterial soap (such as Dial) and clean washcloth.  Dry with a clean towel and dress in clean clothing.  • Ask your surgeon if you will be receiving antibiotics prior to surgery.  • Make sure you, your family, and all healthcare providers clean their hands with soap and water or an alcohol based hand  before caring for you or your wound.    Day of surgery:  Upon arrival, a Pre-op nurse and Anesthesiologist will review your health history, obtain vital signs, and answer questions you may have.  The only belongings needed at this time will be your home medications and if applicable your C-PAP/BI-PAP machine.  If you are staying overnight your family can leave the rest of your belongings in the car and bring them to your room later.  A Pre-op nurse will start an IV and you may receive medication in preparation for surgery, including something to help you relax.  Your family will be able to see you in the Pre-op area.  While you are in surgery your family should notify the waiting room  if they leave the waiting room area and provide a contact phone number.    Please be aware that surgery does come with discomfort.  We want to make every effort to control your discomfort so please discuss any uncontrolled symptoms with your nurse.   Your doctor will most likely have prescribed pain medications.      If you are going home after surgery you will receive individualized written care instructions  before being discharged.  A responsible adult must drive you to and from the hospital on the day of your surgery and stay with you for 24 hours.    If you are staying overnight following surgery, you will be transported to your hospital room following the recovery period.  [facility] has all private rooms.    If you have any questions please call Pre-Admission Testing at [phone].  Deductibles and co-payments are collected on the day of service. Please be prepared to pay the required co-pay, deductible or deposit on the day of service as defined by your plan.

## 2018-05-22 ENCOUNTER — ANESTHESIA EVENT (OUTPATIENT)
Dept: PERIOP | Facility: HOSPITAL | Age: 58
End: 2018-05-22

## 2018-05-22 ENCOUNTER — ANESTHESIA (OUTPATIENT)
Dept: PERIOP | Facility: HOSPITAL | Age: 58
End: 2018-05-22

## 2018-05-22 ENCOUNTER — TELEPHONE (OUTPATIENT)
Dept: ORTHOPEDIC SURGERY | Facility: CLINIC | Age: 58
End: 2018-05-22

## 2018-05-22 ENCOUNTER — HOSPITAL ENCOUNTER (OUTPATIENT)
Facility: HOSPITAL | Age: 58
Setting detail: HOSPITAL OUTPATIENT SURGERY
Discharge: HOME | End: 2018-05-22
Attending: ORTHOPAEDIC SURGERY | Admitting: ORTHOPAEDIC SURGERY

## 2018-05-22 VITALS
RESPIRATION RATE: 18 BRPM | TEMPERATURE: 97.2 F | OXYGEN SATURATION: 96 % | DIASTOLIC BLOOD PRESSURE: 82 MMHG | SYSTOLIC BLOOD PRESSURE: 110 MMHG | HEART RATE: 75 BPM

## 2018-05-22 DIAGNOSIS — M65.28 CALCIFIC ACHILLES TENDONITIS: ICD-10-CM

## 2018-05-22 DIAGNOSIS — S86.012A ACHILLES TENDON TEAR, LEFT, INITIAL ENCOUNTER: ICD-10-CM

## 2018-05-22 DIAGNOSIS — M92.62 HAGLUND'S DEFORMITY OF LEFT HEEL: ICD-10-CM

## 2018-05-22 LAB
GLUCOSE BLDC GLUCOMTR-MCNC: 131 MG/DL (ref 70–130)
GLUCOSE BLDC GLUCOMTR-MCNC: 177 MG/DL (ref 70–130)

## 2018-05-22 PROCEDURE — C1713 ANCHOR/SCREW BN/BN,TIS/BN: HCPCS | Performed by: ORTHOPAEDIC SURGERY

## 2018-05-22 PROCEDURE — 27691 REVISE LOWER LEG TENDON: CPT | Performed by: ORTHOPAEDIC SURGERY

## 2018-05-22 PROCEDURE — 25010000002 KETOROLAC TROMETHAMINE PER 15 MG: Performed by: NURSE ANESTHETIST, CERTIFIED REGISTERED

## 2018-05-22 PROCEDURE — 25010000002 FENTANYL CITRATE (PF) 100 MCG/2ML SOLUTION: Performed by: ANESTHESIOLOGY

## 2018-05-22 PROCEDURE — 25010000002 ROPIVACAINE PER 1 MG: Performed by: ANESTHESIOLOGY

## 2018-05-22 PROCEDURE — 25010000003 CEFAZOLIN IN DEXTROSE 2-4 GM/100ML-% SOLUTION: Performed by: ORTHOPAEDIC SURGERY

## 2018-05-22 PROCEDURE — 25010000002 PROPOFOL 10 MG/ML EMULSION: Performed by: NURSE ANESTHETIST, CERTIFIED REGISTERED

## 2018-05-22 PROCEDURE — 25010000002 ONDANSETRON PER 1 MG: Performed by: NURSE ANESTHETIST, CERTIFIED REGISTERED

## 2018-05-22 PROCEDURE — C1713 ANCHOR/SCREW BN/BN,TIS/BN: HCPCS

## 2018-05-22 PROCEDURE — 27654 REPAIR OF ACHILLES TENDON: CPT | Performed by: ORTHOPAEDIC SURGERY

## 2018-05-22 PROCEDURE — 27687 REVISION OF CALF TENDON: CPT | Performed by: ORTHOPAEDIC SURGERY

## 2018-05-22 PROCEDURE — 25010000002 DEXAMETHASONE PER 1 MG: Performed by: ANESTHESIOLOGY

## 2018-05-22 PROCEDURE — 28120 PART REMOVAL OF ANKLE/HEEL: CPT | Performed by: ORTHOPAEDIC SURGERY

## 2018-05-22 PROCEDURE — 25010000002 MIDAZOLAM PER 1 MG: Performed by: ANESTHESIOLOGY

## 2018-05-22 PROCEDURE — 25010000002 PROMETHAZINE PER 50 MG: Performed by: ANESTHESIOLOGY

## 2018-05-22 PROCEDURE — 25010000002 ONDANSETRON PER 1 MG: Performed by: ANESTHESIOLOGY

## 2018-05-22 PROCEDURE — 82962 GLUCOSE BLOOD TEST: CPT

## 2018-05-22 DEVICE — IMPLANTABLE DEVICE: Type: IMPLANTABLE DEVICE | Status: FUNCTIONAL

## 2018-05-22 DEVICE — SYS SUT/ANCH ACHILLES SPEEDBRIDGE BIOCOMP: Type: IMPLANTABLE DEVICE | Site: ACHILLES TENDON | Status: FUNCTIONAL

## 2018-05-22 RX ORDER — ONDANSETRON 2 MG/ML
INJECTION INTRAMUSCULAR; INTRAVENOUS AS NEEDED
Status: DISCONTINUED | OUTPATIENT
Start: 2018-05-22 | End: 2018-05-22 | Stop reason: SURG

## 2018-05-22 RX ORDER — MAGNESIUM HYDROXIDE 1200 MG/15ML
LIQUID ORAL AS NEEDED
Status: DISCONTINUED | OUTPATIENT
Start: 2018-05-22 | End: 2018-05-22 | Stop reason: HOSPADM

## 2018-05-22 RX ORDER — ACETAMINOPHEN 650 MG
TABLET, EXTENDED RELEASE ORAL AS NEEDED
Status: DISCONTINUED | OUTPATIENT
Start: 2018-05-22 | End: 2018-05-22 | Stop reason: HOSPADM

## 2018-05-22 RX ORDER — DEXAMETHASONE SODIUM PHOSPHATE 4 MG/ML
INJECTION, SOLUTION INTRA-ARTICULAR; INTRALESIONAL; INTRAMUSCULAR; INTRAVENOUS; SOFT TISSUE AS NEEDED
Status: DISCONTINUED | OUTPATIENT
Start: 2018-05-22 | End: 2018-05-22 | Stop reason: SURG

## 2018-05-22 RX ORDER — ROCURONIUM BROMIDE 10 MG/ML
INJECTION, SOLUTION INTRAVENOUS AS NEEDED
Status: DISCONTINUED | OUTPATIENT
Start: 2018-05-22 | End: 2018-05-22 | Stop reason: SURG

## 2018-05-22 RX ORDER — FLUMAZENIL 0.1 MG/ML
0.2 INJECTION INTRAVENOUS AS NEEDED
Status: DISCONTINUED | OUTPATIENT
Start: 2018-05-22 | End: 2018-05-22 | Stop reason: HOSPADM

## 2018-05-22 RX ORDER — PROPOFOL 10 MG/ML
VIAL (ML) INTRAVENOUS AS NEEDED
Status: DISCONTINUED | OUTPATIENT
Start: 2018-05-22 | End: 2018-05-22 | Stop reason: SURG

## 2018-05-22 RX ORDER — HYDRALAZINE HYDROCHLORIDE 20 MG/ML
5 INJECTION INTRAMUSCULAR; INTRAVENOUS
Status: DISCONTINUED | OUTPATIENT
Start: 2018-05-22 | End: 2018-05-22 | Stop reason: HOSPADM

## 2018-05-22 RX ORDER — DIPHENHYDRAMINE HYDROCHLORIDE 50 MG/ML
6.25 INJECTION INTRAMUSCULAR; INTRAVENOUS
Status: DISCONTINUED | OUTPATIENT
Start: 2018-05-22 | End: 2018-05-22 | Stop reason: HOSPADM

## 2018-05-22 RX ORDER — SODIUM CHLORIDE 0.9 % (FLUSH) 0.9 %
1-10 SYRINGE (ML) INJECTION AS NEEDED
Status: DISCONTINUED | OUTPATIENT
Start: 2018-05-22 | End: 2018-05-22 | Stop reason: HOSPADM

## 2018-05-22 RX ORDER — PROMETHAZINE HYDROCHLORIDE 25 MG/ML
6.25 INJECTION, SOLUTION INTRAMUSCULAR; INTRAVENOUS ONCE AS NEEDED
Status: COMPLETED | OUTPATIENT
Start: 2018-05-22 | End: 2018-05-22

## 2018-05-22 RX ORDER — HYDROCODONE BITARTRATE AND ACETAMINOPHEN 7.5; 325 MG/1; MG/1
1 TABLET ORAL ONCE AS NEEDED
Status: DISCONTINUED | OUTPATIENT
Start: 2018-05-22 | End: 2018-05-22 | Stop reason: HOSPADM

## 2018-05-22 RX ORDER — PROMETHAZINE HYDROCHLORIDE 25 MG/1
25 TABLET ORAL ONCE AS NEEDED
Status: COMPLETED | OUTPATIENT
Start: 2018-05-22 | End: 2018-05-22

## 2018-05-22 RX ORDER — GLYCOPYRROLATE 0.2 MG/ML
INJECTION INTRAMUSCULAR; INTRAVENOUS AS NEEDED
Status: DISCONTINUED | OUTPATIENT
Start: 2018-05-22 | End: 2018-05-22 | Stop reason: SURG

## 2018-05-22 RX ORDER — HYDROMORPHONE HYDROCHLORIDE 1 MG/ML
0.5 INJECTION, SOLUTION INTRAMUSCULAR; INTRAVENOUS; SUBCUTANEOUS
Status: DISCONTINUED | OUTPATIENT
Start: 2018-05-22 | End: 2018-05-22 | Stop reason: HOSPADM

## 2018-05-22 RX ORDER — SODIUM CHLORIDE, SODIUM LACTATE, POTASSIUM CHLORIDE, CALCIUM CHLORIDE 600; 310; 30; 20 MG/100ML; MG/100ML; MG/100ML; MG/100ML
9 INJECTION, SOLUTION INTRAVENOUS CONTINUOUS
Status: DISCONTINUED | OUTPATIENT
Start: 2018-05-22 | End: 2018-05-22 | Stop reason: HOSPADM

## 2018-05-22 RX ORDER — CEFAZOLIN SODIUM 2 G/100ML
2 INJECTION, SOLUTION INTRAVENOUS ONCE
Status: COMPLETED | OUTPATIENT
Start: 2018-05-22 | End: 2018-05-22

## 2018-05-22 RX ORDER — MIDAZOLAM HYDROCHLORIDE 1 MG/ML
2 INJECTION INTRAMUSCULAR; INTRAVENOUS
Status: DISCONTINUED | OUTPATIENT
Start: 2018-05-22 | End: 2018-05-22 | Stop reason: HOSPADM

## 2018-05-22 RX ORDER — CEPHALEXIN 500 MG/1
500 CAPSULE ORAL EVERY 6 HOURS
Qty: 8 CAPSULE | Refills: 0 | Status: SHIPPED | OUTPATIENT
Start: 2018-05-22 | End: 2018-05-24

## 2018-05-22 RX ORDER — FAMOTIDINE 10 MG/ML
20 INJECTION, SOLUTION INTRAVENOUS ONCE
Status: COMPLETED | OUTPATIENT
Start: 2018-05-22 | End: 2018-05-22

## 2018-05-22 RX ORDER — LIDOCAINE HYDROCHLORIDE 10 MG/ML
0.5 INJECTION, SOLUTION EPIDURAL; INFILTRATION; INTRACAUDAL; PERINEURAL ONCE AS NEEDED
Status: DISCONTINUED | OUTPATIENT
Start: 2018-05-22 | End: 2018-05-22 | Stop reason: HOSPADM

## 2018-05-22 RX ORDER — OXYCODONE HYDROCHLORIDE AND ACETAMINOPHEN 5; 325 MG/1; MG/1
2 TABLET ORAL ONCE AS NEEDED
Status: DISCONTINUED | OUTPATIENT
Start: 2018-05-22 | End: 2018-05-22 | Stop reason: HOSPADM

## 2018-05-22 RX ORDER — OXYCODONE HYDROCHLORIDE AND ACETAMINOPHEN 5; 325 MG/1; MG/1
1 TABLET ORAL ONCE AS NEEDED
Status: DISCONTINUED | OUTPATIENT
Start: 2018-05-22 | End: 2018-05-22 | Stop reason: HOSPADM

## 2018-05-22 RX ORDER — LIDOCAINE HYDROCHLORIDE 20 MG/ML
INJECTION, SOLUTION INFILTRATION; PERINEURAL AS NEEDED
Status: DISCONTINUED | OUTPATIENT
Start: 2018-05-22 | End: 2018-05-22 | Stop reason: SURG

## 2018-05-22 RX ORDER — FENTANYL CITRATE 50 UG/ML
100 INJECTION, SOLUTION INTRAMUSCULAR; INTRAVENOUS
Status: DISCONTINUED | OUTPATIENT
Start: 2018-05-22 | End: 2018-05-22 | Stop reason: HOSPADM

## 2018-05-22 RX ORDER — KETOROLAC TROMETHAMINE 30 MG/ML
INJECTION, SOLUTION INTRAMUSCULAR; INTRAVENOUS AS NEEDED
Status: DISCONTINUED | OUTPATIENT
Start: 2018-05-22 | End: 2018-05-22 | Stop reason: SURG

## 2018-05-22 RX ORDER — MIDAZOLAM HYDROCHLORIDE 1 MG/ML
1 INJECTION INTRAMUSCULAR; INTRAVENOUS
Status: DISCONTINUED | OUTPATIENT
Start: 2018-05-22 | End: 2018-05-22 | Stop reason: HOSPADM

## 2018-05-22 RX ORDER — ONDANSETRON 2 MG/ML
4 INJECTION INTRAMUSCULAR; INTRAVENOUS ONCE AS NEEDED
Status: COMPLETED | OUTPATIENT
Start: 2018-05-22 | End: 2018-05-22

## 2018-05-22 RX ORDER — PROMETHAZINE HYDROCHLORIDE 25 MG/1
25 SUPPOSITORY RECTAL ONCE AS NEEDED
Status: COMPLETED | OUTPATIENT
Start: 2018-05-22 | End: 2018-05-22

## 2018-05-22 RX ORDER — LABETALOL HYDROCHLORIDE 5 MG/ML
5 INJECTION, SOLUTION INTRAVENOUS
Status: DISCONTINUED | OUTPATIENT
Start: 2018-05-22 | End: 2018-05-22 | Stop reason: HOSPADM

## 2018-05-22 RX ORDER — EPHEDRINE SULFATE 50 MG/ML
5 INJECTION, SOLUTION INTRAVENOUS ONCE AS NEEDED
Status: DISCONTINUED | OUTPATIENT
Start: 2018-05-22 | End: 2018-05-22 | Stop reason: HOSPADM

## 2018-05-22 RX ORDER — OXYCODONE HYDROCHLORIDE AND ACETAMINOPHEN 5; 325 MG/1; MG/1
1-2 TABLET ORAL EVERY 4 HOURS PRN
Qty: 80 TABLET | Refills: 0 | Status: SHIPPED | OUTPATIENT
Start: 2018-05-22 | End: 2018-06-01

## 2018-05-22 RX ORDER — ROPIVACAINE HYDROCHLORIDE 5 MG/ML
INJECTION, SOLUTION EPIDURAL; INFILTRATION; PERINEURAL AS NEEDED
Status: DISCONTINUED | OUTPATIENT
Start: 2018-05-22 | End: 2018-05-22 | Stop reason: SURG

## 2018-05-22 RX ORDER — ONDANSETRON 4 MG/1
4 TABLET, FILM COATED ORAL EVERY 8 HOURS PRN
Qty: 40 TABLET | Refills: 1 | Status: SHIPPED | OUTPATIENT
Start: 2018-05-22 | End: 2018-06-01

## 2018-05-22 RX ORDER — FENTANYL CITRATE 50 UG/ML
50 INJECTION, SOLUTION INTRAMUSCULAR; INTRAVENOUS
Status: DISCONTINUED | OUTPATIENT
Start: 2018-05-22 | End: 2018-05-22 | Stop reason: HOSPADM

## 2018-05-22 RX ADMIN — LIDOCAINE HYDROCHLORIDE 40 MG: 20 INJECTION, SOLUTION INFILTRATION; PERINEURAL at 10:18

## 2018-05-22 RX ADMIN — FENTANYL CITRATE 50 MCG: 50 INJECTION INTRAMUSCULAR; INTRAVENOUS at 08:18

## 2018-05-22 RX ADMIN — ROPIVACAINE HYDROCHLORIDE 25 ML: 5 INJECTION, SOLUTION EPIDURAL; INFILTRATION; PERINEURAL at 08:26

## 2018-05-22 RX ADMIN — SODIUM CHLORIDE, POTASSIUM CHLORIDE, SODIUM LACTATE AND CALCIUM CHLORIDE 9 ML/HR: 600; 310; 30; 20 INJECTION, SOLUTION INTRAVENOUS at 08:10

## 2018-05-22 RX ADMIN — KETOROLAC TROMETHAMINE 30 MG: 30 INJECTION, SOLUTION INTRAMUSCULAR; INTRAVENOUS at 10:39

## 2018-05-22 RX ADMIN — ONDANSETRON 4 MG: 2 INJECTION INTRAMUSCULAR; INTRAVENOUS at 10:39

## 2018-05-22 RX ADMIN — FENTANYL CITRATE 50 MCG: 50 INJECTION INTRAMUSCULAR; INTRAVENOUS at 08:16

## 2018-05-22 RX ADMIN — ROCURONIUM BROMIDE 50 MG: 10 INJECTION INTRAVENOUS at 10:20

## 2018-05-22 RX ADMIN — PROMETHAZINE HYDROCHLORIDE 6.25 MG: 25 INJECTION INTRAMUSCULAR; INTRAVENOUS at 13:30

## 2018-05-22 RX ADMIN — GLYCOPYRROLATE 0.2 MG: 0.2 INJECTION INTRAMUSCULAR; INTRAVENOUS at 10:40

## 2018-05-22 RX ADMIN — DEXAMETHASONE SODIUM PHOSPHATE 4 MG: 4 INJECTION, SOLUTION INTRAMUSCULAR; INTRAVENOUS at 10:39

## 2018-05-22 RX ADMIN — MIDAZOLAM 2 MG: 1 INJECTION INTRAMUSCULAR; INTRAVENOUS at 08:16

## 2018-05-22 RX ADMIN — FENTANYL CITRATE 100 MCG: 50 INJECTION INTRAMUSCULAR; INTRAVENOUS at 12:51

## 2018-05-22 RX ADMIN — DEXAMETHASONE SODIUM PHOSPHATE 4 MG: 4 INJECTION, SOLUTION INTRAMUSCULAR; INTRAVENOUS at 08:26

## 2018-05-22 RX ADMIN — SUGAMMADEX 300 MG: 100 INJECTION, SOLUTION INTRAVENOUS at 12:33

## 2018-05-22 RX ADMIN — PROPOFOL 250 MG: 10 INJECTION, EMULSION INTRAVENOUS at 10:18

## 2018-05-22 RX ADMIN — FENTANYL CITRATE 100 MCG: 50 INJECTION INTRAMUSCULAR; INTRAVENOUS at 10:27

## 2018-05-22 RX ADMIN — SODIUM CHLORIDE, POTASSIUM CHLORIDE, SODIUM LACTATE AND CALCIUM CHLORIDE: 600; 310; 30; 20 INJECTION, SOLUTION INTRAVENOUS at 12:22

## 2018-05-22 RX ADMIN — ONDANSETRON 4 MG: 2 INJECTION INTRAMUSCULAR; INTRAVENOUS at 13:33

## 2018-05-22 RX ADMIN — FAMOTIDINE 20 MG: 10 INJECTION, SOLUTION INTRAVENOUS at 08:27

## 2018-05-22 RX ADMIN — CEFAZOLIN SODIUM 2 G: 2 INJECTION, SOLUTION INTRAVENOUS at 10:18

## 2018-05-22 NOTE — ANESTHESIA PROCEDURE NOTES
Airway  Urgency: elective    Airway not difficult    General Information and Staff    Patient location during procedure: OR  Anesthesiologist: SABINA BOLIVAR  CRNA: BEV MARCOS    Indications and Patient Condition  Indications for airway management: airway protection    Preoxygenated: yes  Mask difficulty assessment: 2 - vent by mask + OA or adjuvant +/- NMBA (oral airway 5. atraumatic placement)    Final Airway Details  Final airway type: endotracheal airway      Successful airway: ETT  Cuffed: yes   Successful intubation technique: direct laryngoscopy  Endotracheal tube insertion site: oral  Blade: Davis  Blade size: #4  ETT size: 7.5 mm  Cormack-Lehane Classification: grade I - full view of glottis  Placement verified by: chest auscultation and capnometry   Measured from: lips  ETT to lips (cm): 23  Number of attempts at approach: 1    Additional Comments  Smooth IV/mask induction/intubation. Easy bag-mask ventilation. Orally intubated, easy, atraumatic, lips/teeth/mouth left intact, as preop. Direct visual of vocal cords. +ETCO2, bilateral breath sounds and equal.

## 2018-05-22 NOTE — ANESTHESIA POSTPROCEDURE EVALUATION
Patient: Aaron Latif    Procedure Summary     Date:  05/22/18 Room / Location:   [facility]  /  [facility]    Anesthesia Start:  1015 Anesthesia Stop:  1310    Procedure:  Left Achilles debridement and repair, Achilles tendon lengthening at the calf, partial excision of calcaneus and tendon transfer from great toe to calcaneus (Left Ankle) Diagnosis:       Calcific Achilles tendonitis      Achilles tendon tear, left, initial encounter      Tosin's deformity of left heel      (Calcific Achilles tendonitis [M65.28])      (Achilles tendon tear, left, initial encounter [S86.012A])      (Tosin's deformity of left heel [M92.62])    Surgeon:  Maycol Chawla MD Provider:  Porter Salinas MD    Anesthesia Type:  general ASA Status:  3          Anesthesia Type: general  Last vitals  BP   113/78 (05/22/18 1400)   Temp   36.2 °C (97.2 °F) (05/22/18 1400)   Pulse   72 (05/22/18 1400)   Resp   18 (05/22/18 1400)     SpO2   98 % (05/22/18 1400)     Post Anesthesia Care and Evaluation    Patient location during evaluation: bedside  Patient participation: complete - patient participated  Level of consciousness: awake  Pain management: adequate  Airway patency: patent    Cardiovascular status: acceptable  Respiratory status: acceptable  Hydration status: acceptable

## 2018-05-22 NOTE — ANESTHESIA PREPROCEDURE EVALUATION
Anesthesia Evaluation     Patient summary reviewed and Nursing notes reviewed   NPO Solid Status: > 8 hours             Airway   Mallampati: II  TM distance: >3 FB  Neck ROM: full  no difficulty expected  Dental - normal exam     Pulmonary - normal exam   (+) sleep apnea,   Cardiovascular - normal exam    (+) hypertension,       Neuro/Psych- negative ROS  GI/Hepatic/Renal/Endo    (+) obesity,   diabetes mellitus,     Musculoskeletal (-) negative ROS    Abdominal  - normal exam   Substance History - negative use     OB/GYN negative ob/gyn ROS         Other                        Anesthesia Plan    ASA 3     general   (Sci / fem popc)  intravenous induction   Anesthetic plan and risks discussed with patient.    Plan discussed with CRNA.

## 2018-05-22 NOTE — ANESTHESIA PROCEDURE NOTES
Peripheral Block    Patient location during procedure: pre-op  Start time: 5/22/2018 8:15 AM  Stop time: 5/22/2018 8:27 AM  Reason for block: at surgeon's request and post-op pain management  Performed by  Anesthesiologist: SABINA BOLIVAR  Preanesthetic Checklist  Completed: patient identified, site marked, surgical consent, pre-op evaluation, timeout performed, IV checked, risks and benefits discussed and monitors and equipment checked  Prep:  Pt Position: supine  Sterile barriers:cap, gloves, mask and sterile barriers  Prep: ChloraPrep  Patient monitoring: blood pressure monitoring, continuous pulse oximetry and EKG  Procedure  Sedation:yes  Performed under: local infiltration  Guidance:ultrasound guided  ULTRASOUND INTERPRETATION.  Using ultrasound guidance a 22 G gauge needle was placed in close proximity to the femoral and sciatic nerve, at which point, under ultrasound guidance anesthetic was injected in the area of the nerve and spread of the anesthesia was seen on ultrasound in close proximity thereto.  There were no abnormalities seen on ultrasound; a digital image was taken; and the patient tolerated the procedure with no complications. Images:still images obtained    Laterality:left  Block Type:adductor canal block, popliteal and sciatic  Injection Technique:single-shot  Needle Type:echogenic  Needle Gauge:22 G  Resistance on Injection: less than 15 psi  Medications  Local Injected:ropivacaine 0.5% without epinephrine Local Amount Injected:25mL  Post Assessment  Injection Assessment: negative aspiration for heme, no paresthesia on injection and incremental injection  Patient Tolerance:comfortable throughout block  Complications:no  Additional Notes  Sciatic - rop 15cc /  dex 4mg  Fem ( add canal ) - rop 10cc

## 2018-05-22 NOTE — TELEPHONE ENCOUNTER
Please give his name and number to [email or web address] to call to get a scooter and let me know when he does thanks

## 2018-05-30 DIAGNOSIS — N52.8 OTHER MALE ERECTILE DYSFUNCTION: ICD-10-CM

## 2018-05-30 DIAGNOSIS — I10 ACCELERATED HYPERTENSION: ICD-10-CM

## 2018-05-30 DIAGNOSIS — R79.89 LOW TESTOSTERONE: ICD-10-CM

## 2018-05-30 DIAGNOSIS — E66.01 MORBID OBESITY DUE TO EXCESS CALORIES (HCC): ICD-10-CM

## 2018-05-30 DIAGNOSIS — R53.82 CHRONIC FATIGUE: ICD-10-CM

## 2018-05-30 DIAGNOSIS — E11.9 CONTROLLED TYPE 2 DIABETES MELLITUS WITHOUT COMPLICATION, WITH LONG-TERM CURRENT USE OF INSULIN (HCC): ICD-10-CM

## 2018-05-30 DIAGNOSIS — E55.9 VITAMIN D DEFICIENCY: ICD-10-CM

## 2018-05-30 DIAGNOSIS — E78.5 DYSLIPIDEMIA: ICD-10-CM

## 2018-05-30 DIAGNOSIS — Z79.4 CONTROLLED TYPE 2 DIABETES MELLITUS WITHOUT COMPLICATION, WITH LONG-TERM CURRENT USE OF INSULIN (HCC): ICD-10-CM

## 2018-05-30 RX ORDER — INSULIN GLARGINE 300 U/ML
INJECTION, SOLUTION SUBCUTANEOUS
Qty: 9 PEN | Refills: 0 | Status: SHIPPED | OUTPATIENT
Start: 2018-05-30 | End: 2018-06-16 | Stop reason: SDUPTHER

## 2018-05-30 RX ORDER — PIOGLITAZONEHYDROCHLORIDE 30 MG/1
TABLET ORAL
Qty: 90 TABLET | Refills: 4 | Status: SHIPPED | OUTPATIENT
Start: 2018-05-30 | End: 2018-11-01 | Stop reason: SDUPTHER

## 2018-06-01 ENCOUNTER — OFFICE VISIT (OUTPATIENT)
Dept: ORTHOPEDIC SURGERY | Facility: CLINIC | Age: 58
End: 2018-06-01

## 2018-06-01 VITALS — TEMPERATURE: 97.8 F | HEIGHT: 70 IN | WEIGHT: 215 LBS | BODY MASS INDEX: 30.78 KG/M2

## 2018-06-01 DIAGNOSIS — M65.28 CALCIFIC ACHILLES TENDONITIS: Primary | ICD-10-CM

## 2018-06-01 DIAGNOSIS — M92.62 HAGLUND'S DEFORMITY OF LEFT HEEL: ICD-10-CM

## 2018-06-01 PROCEDURE — 99024 POSTOP FOLLOW-UP VISIT: CPT | Performed by: ORTHOPAEDIC SURGERY

## 2018-06-01 PROCEDURE — 29405 APPL SHORT LEG CAST: CPT | Performed by: ORTHOPAEDIC SURGERY

## 2018-06-01 RX ORDER — OXYCODONE AND ACETAMINOPHEN 7.5; 325 MG/1; MG/1
TABLET ORAL
Qty: 80 TABLET | Refills: 0 | Status: SHIPPED | OUTPATIENT
Start: 2018-06-01 | End: 2018-06-11 | Stop reason: SDUPTHER

## 2018-06-04 ENCOUNTER — TELEPHONE (OUTPATIENT)
Dept: ORTHOPEDIC SURGERY | Facility: CLINIC | Age: 58
End: 2018-06-04

## 2018-06-11 ENCOUNTER — OFFICE VISIT (OUTPATIENT)
Dept: ORTHOPEDIC SURGERY | Facility: CLINIC | Age: 58
End: 2018-06-11

## 2018-06-11 VITALS — BODY MASS INDEX: 38.65 KG/M2 | TEMPERATURE: 97.5 F | WEIGHT: 270 LBS | HEIGHT: 70 IN

## 2018-06-11 DIAGNOSIS — S86.012A ACHILLES TENDON TEAR, LEFT, INITIAL ENCOUNTER: ICD-10-CM

## 2018-06-11 DIAGNOSIS — M65.28 CALCIFIC ACHILLES TENDONITIS: ICD-10-CM

## 2018-06-11 DIAGNOSIS — M92.62 HAGLUND'S DEFORMITY OF LEFT HEEL: ICD-10-CM

## 2018-06-11 DIAGNOSIS — S86.002D ACHILLES TENDON INJURY, LEFT, SUBSEQUENT ENCOUNTER: Primary | ICD-10-CM

## 2018-06-11 PROCEDURE — 73650 X-RAY EXAM OF HEEL: CPT | Performed by: ORTHOPAEDIC SURGERY

## 2018-06-11 PROCEDURE — 99024 POSTOP FOLLOW-UP VISIT: CPT | Performed by: ORTHOPAEDIC SURGERY

## 2018-06-11 PROCEDURE — 29405 APPL SHORT LEG CAST: CPT | Performed by: ORTHOPAEDIC SURGERY

## 2018-06-11 RX ORDER — OXYCODONE AND ACETAMINOPHEN 7.5; 325 MG/1; MG/1
TABLET ORAL
Qty: 80 TABLET | Refills: 0 | Status: SHIPPED | OUTPATIENT
Start: 2018-06-11 | End: 2018-06-22

## 2018-06-11 NOTE — PROGRESS NOTES
"Ankle Follow Up      Patient: Aaron Latif    YOB: 1960 58 y.o. male    Chief Complaints: Ankle pain    History of Present Illness: Follows up left Achilles debridement and secondary repair with FHL tendon transfer and gastroc recession and resection of Tosin deformity from 5/22/18.  Last seen on 6/1/18.  He has persistent complaints of moderate intermittent stabbing pain in the posterior aspect of left heel.  He's been using Percocet 7.5 mg one by mouth Q4 hours for pain relief.  States that he is remains nonweightbearing and is been elevating without pressure behind the heel  HPI    ROS: ankle pain, no fevers chills chest pain or shortness of breath  Past Medical History:   Diagnosis Date   • Anxiety    • Arthritis    • Diabetic peripheral neuropathy     TYPE 2   • Erectile dysfunction    • Fatigue    • Fracture of rib of right side    • High cholesterol    • Hypertension    • Low testosterone    • Neuropathy    • Sleep apnea     WEARS CPAP   • Type 2 diabetes mellitus    • Vitamin D deficiency        Physical Exam:   Vitals:    06/11/18 1103   Temp: 97.5 °F (36.4 °C)   Weight: 122 kg (270 lb)   Height: 177.8 cm (70\")   PainSc:   8     Well developed with normal mood.He is with his wife.  Distal wound show some slight maceration and debrided some of the macerated skin but there was no sign of full-thickness breakdown.  There was only slight wound dehiscence maybe 1-2 mm to the central portion over the area of about 3 cm.  Unable to express any fluid and there was no warmth or erythema.  Left calf is nontender without sign of DVT      Radiology: Lateral view of the left heel was ordered to evaluate postoperative alignment reviewed and compared with preoperative views.  There is been adequate resection of the Tosin deformity as well as the significant spurring at the insertion of the Achilles      Assessment/Plan:  Status post extensive left Achilles reconstruction with some slight wound " maceration and dehiscence but no sign of infection or full-thickness breakdown at this time.    Left the Steri-Strips off today the wound was painted generously with Betadine and sterile dressing was applied.  He was placed back in a very well-padded short-leg cast in neutral dorsiflexion continue strict elevation and nonweightbearing.    Did refill his Percocet 7.5 mg one by mouth Q4 to 6 hours as needed for pain #80 with no refills  Controlled substance treatment options discussed in detail.  Patient's signed consent to medical options on file.  DANY form in chart.  Risks of narcotic medication usage outlined.  Possibility of physical and psychological dependence and abuse, especially long-term, emphasized to the patient.      Follow-up in one week for wound check no x-rays

## 2018-06-18 RX ORDER — INSULIN GLARGINE 300 U/ML
INJECTION, SOLUTION SUBCUTANEOUS
Qty: 9 PEN | Refills: 0 | Status: SHIPPED | OUTPATIENT
Start: 2018-06-18 | End: 2018-06-30 | Stop reason: SDUPTHER

## 2018-06-22 ENCOUNTER — TELEPHONE (OUTPATIENT)
Dept: ORTHOPEDIC SURGERY | Facility: CLINIC | Age: 58
End: 2018-06-22

## 2018-06-22 ENCOUNTER — OFFICE VISIT (OUTPATIENT)
Dept: INTERNAL MEDICINE | Facility: CLINIC | Age: 58
End: 2018-06-22

## 2018-06-22 ENCOUNTER — OFFICE VISIT (OUTPATIENT)
Dept: ORTHOPEDIC SURGERY | Facility: CLINIC | Age: 58
End: 2018-06-22

## 2018-06-22 VITALS
HEIGHT: 70 IN | DIASTOLIC BLOOD PRESSURE: 82 MMHG | SYSTOLIC BLOOD PRESSURE: 128 MMHG | BODY MASS INDEX: 38.65 KG/M2 | WEIGHT: 270 LBS

## 2018-06-22 VITALS — WEIGHT: 270 LBS | TEMPERATURE: 97.8 F | HEIGHT: 70 IN | BODY MASS INDEX: 38.65 KG/M2

## 2018-06-22 DIAGNOSIS — E11.42 DIABETIC PERIPHERAL NEUROPATHY (HCC): Chronic | ICD-10-CM

## 2018-06-22 DIAGNOSIS — M92.62 HAGLUND'S DEFORMITY OF LEFT HEEL: ICD-10-CM

## 2018-06-22 DIAGNOSIS — M65.28 CALCIFIC ACHILLES TENDONITIS: ICD-10-CM

## 2018-06-22 DIAGNOSIS — S86.012A ACHILLES TENDON TEAR, LEFT, INITIAL ENCOUNTER: Chronic | ICD-10-CM

## 2018-06-22 DIAGNOSIS — S86.012A ACHILLES TENDON TEAR, LEFT, INITIAL ENCOUNTER: Primary | ICD-10-CM

## 2018-06-22 DIAGNOSIS — I10 BENIGN ESSENTIAL HTN: Chronic | ICD-10-CM

## 2018-06-22 DIAGNOSIS — E78.1 ESSENTIAL HYPERTRIGLYCERIDEMIA: Primary | Chronic | ICD-10-CM

## 2018-06-22 DIAGNOSIS — IMO0001 UNCONTROLLED TYPE 2 DIABETES MELLITUS WITHOUT COMPLICATION, WITH LONG-TERM CURRENT USE OF INSULIN: Chronic | ICD-10-CM

## 2018-06-22 DIAGNOSIS — M65.28 CALCIFIC ACHILLES TENDONITIS: Chronic | ICD-10-CM

## 2018-06-22 DIAGNOSIS — G47.33 OSA ON CPAP: Chronic | ICD-10-CM

## 2018-06-22 DIAGNOSIS — Z99.89 OSA ON CPAP: Chronic | ICD-10-CM

## 2018-06-22 DIAGNOSIS — E66.01 MORBID OBESITY DUE TO EXCESS CALORIES (HCC): Chronic | ICD-10-CM

## 2018-06-22 PROCEDURE — 99024 POSTOP FOLLOW-UP VISIT: CPT | Performed by: ORTHOPAEDIC SURGERY

## 2018-06-22 PROCEDURE — 99214 OFFICE O/P EST MOD 30 MIN: CPT | Performed by: INTERNAL MEDICINE

## 2018-06-22 RX ORDER — OXYCODONE AND ACETAMINOPHEN 7.5; 325 MG/1; MG/1
TABLET ORAL
Qty: 60 TABLET | Refills: 0 | Status: SHIPPED | OUTPATIENT
Start: 2018-06-22 | End: 2018-07-02 | Stop reason: SDUPTHER

## 2018-06-22 NOTE — PROGRESS NOTES
Subjective   Aaron Latif is a 58 y.o. male.     History of Present Illness     The following portions of the patient's history were reviewed and updated as appropriate: allergies, current medications, past family history, past medical history, past social history, past surgical history and problem list.  59 yo/m with BC on CPAP, DM2, ED with low testosterone, HTN, s/p achilles tendon repair, dense PN of his feet, obesity, here for follow up. He is working with an endocrinologist and an orthopedic surgeon. He has a short cast on his left foot. He is also having troubles with his right shoulder for the last month and he is going to see his othopedic surgeon today regarding that issue. His blood sugars have improved overall, he is very compliant with his CPAP and his HTN is well controlled. His low testosterone and ED do not seem to be responding to his weekly injections of testosterone and he is going to take up these issues with his endocrinologist.   Review of Systems   Constitutional: Positive for fatigue.   HENT: Negative.    Eyes: Negative.    Respiratory: Positive for shortness of breath.    Cardiovascular: Negative.    Gastrointestinal: Negative.    Endocrine: Negative.    Genitourinary: Negative.    Musculoskeletal: Positive for arthralgias.   Skin: Negative.    Allergic/Immunologic: Negative.    Neurological: Positive for numbness.   Hematological: Negative.    Psychiatric/Behavioral: Negative.        Objective   Physical Exam   Constitutional: He is oriented to person, place, and time. He appears well-developed and well-nourished.   HENT:   Head: Normocephalic and atraumatic.   Eyes: EOM are normal. Pupils are equal, round, and reactive to light.   Neck: Normal range of motion. Neck supple.   Cardiovascular: Normal rate, regular rhythm and normal heart sounds.    Pulmonary/Chest: Effort normal and breath sounds normal.   Abdominal: Soft. Bowel sounds are normal.   Musculoskeletal:   S/p left achilles  tendon repair with a short cast  Right shoulder pain with ROM  S/p multiple bilateral knee surgeries   Neurological: He is alert and oriented to person, place, and time.   Dense PN of both feet    Skin: Skin is warm and dry.   Psychiatric: He has a normal mood and affect. His behavior is normal. Judgment and thought content normal.   Nursing note and vitals reviewed.        Assessment/Plan   Aaron was seen today for annual exam.    Diagnoses and all orders for this visit:    Essential hypertriglyceridemia  Comments:  continue atorvastatin    Benign essential HTN  Comments:  well controlled at present    Morbid obesity due to excess calories  Comments:  working with an endocrinologist    Diabetic peripheral neuropathy  Comments:  on lyrica    Uncontrolled type 2 diabetes mellitus without complication, with long-term current use of insulin  Comments:  under better control    Calcific Achilles tendonitis  Comments:  in a short cast    Achilles tendon tear, left, initial encounter  Comments:  supposed to get the cast off today    BC on CPAP  Comments:  very compliant

## 2018-06-24 NOTE — PROGRESS NOTES
"Ankle Follow Up      Patient: Aaron Latif    YOB: 1960 58 y.o. male    Chief Complaints: Ankle pain    History of Present Illness:Follows up left Achilles debridement and secondary repair with FHL tendon transfer and gastroc recession and resection of Tosin deformity from 5/22/18.  He was last seen on 6/11/18 placed into a well-padded cast and he has remained nonweightbearing.  His pain has improved with still some occasional stabbing aching pain in the left hindfoot  HPI    ROS: ankle pain, no fevers chills chest pain or shortness of breath  Past Medical History:   Diagnosis Date   • Anxiety    • Arthritis    • Diabetic peripheral neuropathy     TYPE 2   • Erectile dysfunction    • Fatigue    • Fracture of rib of right side    • High cholesterol    • Hypertension    • Low testosterone    • Neuropathy    • Sleep apnea     WEARS CPAP   • Type 2 diabetes mellitus    • Vitamin D deficiency        Physical Exam:   Vitals:    06/22/18 1239   Temp: 97.8 °F (36.6 °C)   Weight: 122 kg (270 lb)   Height: 177.8 cm (70\")     Well developed with normal mood.He is with his wife.  There is improvement in swelling in the left hindfoot and the wound continues to improve in the area of previous dehiscence appears to be epithelialized now there was no warmth erythema or drainage.  His proximal incision is well-healed.  Left calf is nontender without sign of DVT      Radiology: None performed      Assessment/Plan:  Status post left Achilles extensive reconstruction.  His wound dehiscence is now improving with no sign of infection or full-thickness breakdown.    We left him out of the cast today that he will remain nonweightbearing and may remove it at rest but understands to have the boot on whenever he is up and about to protect this.  He may let this get wet in the shower and we'll avoid any pressure on the heel.    I did refill his Percocet 7.5 mg one by mouth every 4-6 hours as needed for pain #60 with no " refills .  I will see him back on 7/2/18..  We had a very pleasant visit he will call if he has any problems or questions in the interim

## 2018-07-02 ENCOUNTER — OFFICE VISIT (OUTPATIENT)
Dept: ORTHOPEDIC SURGERY | Facility: CLINIC | Age: 58
End: 2018-07-02

## 2018-07-02 VITALS — TEMPERATURE: 97.5 F | BODY MASS INDEX: 38.65 KG/M2 | WEIGHT: 270 LBS | HEIGHT: 70 IN

## 2018-07-02 DIAGNOSIS — M65.28 CALCIFIC ACHILLES TENDONITIS: ICD-10-CM

## 2018-07-02 DIAGNOSIS — M92.62 HAGLUND'S DEFORMITY OF LEFT HEEL: Primary | ICD-10-CM

## 2018-07-02 DIAGNOSIS — S86.002D ACHILLES TENDON INJURY, LEFT, SUBSEQUENT ENCOUNTER: ICD-10-CM

## 2018-07-02 PROCEDURE — 99024 POSTOP FOLLOW-UP VISIT: CPT | Performed by: ORTHOPAEDIC SURGERY

## 2018-07-02 RX ORDER — OXYCODONE AND ACETAMINOPHEN 7.5; 325 MG/1; MG/1
TABLET ORAL
Qty: 60 TABLET | Refills: 0 | Status: SHIPPED | OUTPATIENT
Start: 2018-07-02 | End: 2018-07-16 | Stop reason: DRUGHIGH

## 2018-07-02 RX ORDER — INSULIN GLARGINE 300 U/ML
INJECTION, SOLUTION SUBCUTANEOUS
Qty: 9 PEN | Refills: 0 | Status: SHIPPED | OUTPATIENT
Start: 2018-07-02 | End: 2018-10-18 | Stop reason: SDUPTHER

## 2018-07-02 NOTE — PROGRESS NOTES
"Ankle Follow Up      Patient: Aaron Latif    YOB: 1960 58 y.o. male    Chief Complaints: Ankle pain    History of Present Illness: Follows up left Achilles debridement and secondary repair with FHL tendon transfer and gastroc recession as well as resection of Tosin deformity from 5/22/18.  He was last seen on 6/22/18.  He continues to take Percocet 7.5 mg one or 2 every 6 hours.  His pain has improved but still gets intermittent stabbing aching pain with some swelling at the left heel.  His wound has continued to improve  HPI    ROS: ankle pain no fevers chills chest pain or shortness of breath  Past Medical History:   Diagnosis Date   • Anxiety    • Arthritis    • Diabetic peripheral neuropathy     TYPE 2   • Erectile dysfunction    • Fatigue    • Fracture of rib of right side    • High cholesterol    • Hypertension    • Low testosterone    • Neuropathy    • Sleep apnea     WEARS CPAP   • Type 2 diabetes mellitus    • Vitamin D deficiency        Physical Exam:   Vitals:    07/02/18 1108   Temp: 97.5 °F (36.4 °C)   Weight: 122 kg (270 lb)   Height: 177.8 cm (70\")     Well developed with normal mood.With his son.  His wound appears healed now with no warmth erythema or drainage and mild swelling.  Calf is nontender without sign of DVT.  He was able to actively plantarflex but we did not overstress this.      Radiology: None performed      Assessment/Plan: Post left Achilles extensive reconstruction    His wound dehiscence has continued to improve and I don't see any sign of infection or full-thickness breakdown.    He may progress to about 50% weightbearing with his boot with a double heel lift and walker and will avoid any pressure on the heel and will make sure this is covered     He is going out of town in 2 days and will return in 2 weeks.  I'll see him back at that time and if he continues to do well we'll start physical therapy.    I did refill his Percocet 7.5 mg one every 4-6 hours as " needed and counseled him to continue weaning down on these as he has been doing.  Controlled substance treatment options discussed in detail.  Patient's signed consent to medical options on file.  DANY form in chart.  Risks of narcotic medication usage outlined.  Possibility of physical and psychological dependence and abuse, especially long-term, emphasized to the patient.

## 2018-07-16 ENCOUNTER — OFFICE VISIT (OUTPATIENT)
Dept: ORTHOPEDIC SURGERY | Facility: CLINIC | Age: 58
End: 2018-07-16

## 2018-07-16 VITALS — BODY MASS INDEX: 38.65 KG/M2 | HEIGHT: 70 IN | WEIGHT: 270 LBS | TEMPERATURE: 97.7 F

## 2018-07-16 DIAGNOSIS — M92.62 HAGLUND'S DEFORMITY OF LEFT HEEL: ICD-10-CM

## 2018-07-16 DIAGNOSIS — M65.28 CALCIFIC ACHILLES TENDONITIS: Primary | ICD-10-CM

## 2018-07-16 PROCEDURE — 99024 POSTOP FOLLOW-UP VISIT: CPT | Performed by: ORTHOPAEDIC SURGERY

## 2018-07-16 RX ORDER — QUETIAPINE FUMARATE 25 MG/1
TABLET, FILM COATED ORAL
Qty: 90 TABLET | Refills: 0 | Status: SHIPPED | OUTPATIENT
Start: 2018-07-16 | End: 2018-09-20 | Stop reason: SDUPTHER

## 2018-07-16 RX ORDER — OXYCODONE HYDROCHLORIDE AND ACETAMINOPHEN 5; 325 MG/1; MG/1
TABLET ORAL
Qty: 60 TABLET | Refills: 0 | Status: SHIPPED | OUTPATIENT
Start: 2018-07-16 | End: 2018-08-09

## 2018-07-16 NOTE — PATIENT INSTRUCTIONS
[facility]  GIOVANA JENSEN MD  [address], [address], [place] [address]    ACHILLES REPAIR REHABILITATION PROTOCOL  POST-OP GOALS    PHASE I (6-8 weeks post-op)   GOALS:  Decrease edema     Increase Scar mobility / tendon gliding     Initiate range of motion, strengthening, and proprioceptive training    1. Begin scar mobilization /edema control  2. Gentle heel cord stretching with towel  3. Range of motion for PT, inversion, and eversion  4. Gentle stretching for inversion, eversion, and PF with Thera band  5. BAPS or ankle board in sitting  6. Calf raises in sitting (soleus) for tendon gliding  7. Towel Scrunches  8. One-leg stance for proprioception / balance  9. Aquatic exercise / Hydro Track walking    PHASE II (8-12 weeks post-op)   GOALS:  Full weight bearing / normal gait     Restore functional range of motion     Improve strength and proprioception    1. Progress above treatment  2. Biking /one-leg cycling with minimal resistance  3. Calf press (light weight/high rep)  4. Retro walking for eccentric control of gastro/soleus (progress incline)  5. Gait training on treadmill  6. DF strengthening with Thera band  7. Progression of proprioceptive training (eyes open / closed)    PHASE III (12-16 weeks)   GOALS:  Restore functional strength and proprioception     Improve Endurance    1. StairMaster, NordicTrack, etc.   2. Fitter /slide board for lateral strength / proprioception  3. Progress strength / proprioception  Program              PHASE IV (16-20 weeks)   GOALS:  Prepare patient for return to activity / sports    1. Continue progression of exercises  2. Initiate jogging /running program  3. Plyometric training progression  4. Sport specific /functional drills for return to activity (cutting drills, jumping, etc.)    CRITERIA FOR RETURN TO SPORTS (5-6 months post-op)  1. Functional range of motion  2. Normal Strength / endurance of involved LE  musculature  3. Minimal Effusion / pain  4. Successful functional progression  5. Functional test within  85% of uninvolved LE

## 2018-07-16 NOTE — PROGRESS NOTES
"Ankle Follow Up      Patient: Aaron Latif    YOB: 1960 58 y.o. male    Chief Complaints: Ankle hurts be getting better    History of Present Illness: Follows up left Achilles surgery from 5/22/18.  Last seen on 7/2/18 and has since gone to visit his daughter with instructions for only 50% weightbearing with double heel lift.  States that his pain has improved but still continues to use Percocet 7.5 mg about every 8 hours or so complains of stabbing pain with weightbearing on the left heel but no pain at rest.  HPI    ROS: ankle pain, no chest pain fevers or chills or shortness of breath  Past Medical History:   Diagnosis Date   • Anxiety    • Arthritis    • Diabetic peripheral neuropathy (CMS/HCC)     TYPE 2   • Erectile dysfunction    • Fatigue    • Fracture of rib of right side    • High cholesterol    • Hypertension    • Low testosterone    • Neuropathy    • Sleep apnea     WEARS CPAP   • Type 2 diabetes mellitus (CMS/HCC)    • Vitamin D deficiency        Physical Exam:   Vitals:    07/16/18 1040   Temp: 97.7 °F (36.5 °C)   Weight: 122 kg (270 lb)   Height: 177.8 cm (70\")     Well developed with normal mood.Left Achilles incision is well healed now I'll unable to elicit much if any focal pain with palpation about the calcaneus and he was able to actively plantarflex this with good motor strength.  Left calf is nontender without sign of DVT      Radiology: None performed      Assessment/Plan:  Status post left Achilles surgery.    Encouraged that his pain has improved obvious slowly and he understands to keep weaning down on his pain medication.  Change him to Percocet 5 mg one by mouth every 12 hours as needed for pain #60 with no refills Controlled substance treatment options discussed in detail.  Patient's signed consent to medical options on file.  DANY form in chart.  Risks of narcotic medication usage outlined.  Possibility of physical and psychological dependence and abuse, especially " long-term, emphasized to the patient.    We'll have him progress to full weightbearing with double stage heel lift for the next week to 10 days and if he does well he may then remove 1 heel lift per week showed him how to do this.    He will also start physical therapy and wants to go to [facility] in spring [place] where he is gone for his knee previously and he was provided with a copy of Achilles protocol for this.  Anything worsens he'll let me know otherwise I will see him back in 4 weeks    Reportedly is a chronic complaints of pain and stiffness to his left knee he's had this operated on twice and says that his Achilles has actually progressed much better and more quickly than his knee did.    I recommended that for any further problems or questions regarding the knee that he see Dr. Fitzgerald who did his last surgery.

## 2018-07-19 ENCOUNTER — TELEPHONE (OUTPATIENT)
Dept: ORTHOPEDIC SURGERY | Facility: CLINIC | Age: 58
End: 2018-07-19

## 2018-07-19 NOTE — TELEPHONE ENCOUNTER
Patient called asking for [facility]BB to call him. He has been in a boot from having foot surgery by MWADAN and his knee is worse than ever with pain and swelling.

## 2018-07-20 ENCOUNTER — TELEPHONE (OUTPATIENT)
Dept: ORTHOPEDIC SURGERY | Facility: CLINIC | Age: 58
End: 2018-07-20

## 2018-07-20 NOTE — TELEPHONE ENCOUNTER
Esha.  Please offer him a copy of the operative report.  We should be able to mail that to him.  He can also  a copy when he comes into the office for his appointment with Dr. Fitzgerald.  That should have everything including the type of implant, sizes and all.  Thanks.

## 2018-07-26 ENCOUNTER — OFFICE VISIT (OUTPATIENT)
Dept: ORTHOPEDIC SURGERY | Facility: CLINIC | Age: 58
End: 2018-07-26

## 2018-07-26 VITALS — TEMPERATURE: 98.1 F | HEIGHT: 70 IN | WEIGHT: 270 LBS | BODY MASS INDEX: 38.65 KG/M2

## 2018-07-26 DIAGNOSIS — Z96.652 HISTORY OF TOTAL KNEE ARTHROPLASTY, LEFT: Primary | ICD-10-CM

## 2018-07-26 PROCEDURE — 73562 X-RAY EXAM OF KNEE 3: CPT | Performed by: ORTHOPAEDIC SURGERY

## 2018-07-26 PROCEDURE — 99214 OFFICE O/P EST MOD 30 MIN: CPT | Performed by: ORTHOPAEDIC SURGERY

## 2018-07-26 NOTE — PROGRESS NOTES
Patient: Aaron Latif  YOB: 1960 58 y.o. male  Medical Record Number: [id number]    Chief Complaints:   Chief Complaint   Patient presents with   • Left Knee - Post-op, Follow-up, Pain       History of Present Illness:Aaron Latif is a 58 y.o. male who presents for follow-up of  Left knee pain which is still significant.  He describes severe stabbing aching pain with intermittent shooting episodes up and down the leg.  He is nearly a year out from a poly-change to a deep-thicker insert.  Unfortunately his postop recovery has been somewhat complicated by the fact that he has had foot surgery and has been in a boot for the past few months.  He has pain with every step primarily within the anterior aspect of the knee    Allergies:   Allergies   Allergen Reactions   • Codeine Other (See Comments)     UNKNOWN       Medications:   Current Outpatient Prescriptions   Medication Sig Dispense Refill   • amLODIPine (NORVASC) 10 MG tablet Take 1 tablet by mouth Daily. 30 tablet 5   • atorvastatin (LIPITOR) 80 MG tablet Take 1 tablet by mouth Every Night. 90 tablet 3   • ergocalciferol (ERGOCALCIFEROL) 43294 units capsule Take 2 capsules by mouth 1 (One) Time Per Week. (Patient taking differently: Take 2 capsules by mouth 1 (One) Time Per Week. thursday) 26 capsule 3   • FARXIGA 10 MG tablet 1 tablet every morning by mouth (Patient taking differently: Take 10 mg by mouth Every Morning. 1 tablet every morning by mouth) 30 tablet 5   • fenofibrate micronized (ANTARA) 130 MG capsule Take 1 capsule by mouth Every Morning Before Breakfast. 30 capsule 5   • glucose blood (ONE TOUCH ULTRA TEST) test strip 1 each by Other route 3 (Three) Times a Day. Use as instructed 400 each 1   • hydrochlorothiazide (HYDRODIURIL) 25 MG tablet Take 1 tablet by mouth Every Morning. 90 tablet 1   • Insulin Glargine (TOUJEO MAX SOLOSTAR) 300 UNIT/ML solution pen-injector Inject 75 Units under the skin Every Morning.     • Insulin Pen  "Needle 31G X 8 MM misc Use once a day 100 each 0   • JANUMET XR  MG tablet Take 2 tablets by mouth Daily With Dinner. 60 tablet 5   • LYRICA 200 MG capsule Take 1 capsule by mouth 2 (Two) Times a Day. 60 capsule 5   • metoprolol succinate XL (TOPROL-XL) 50 MG 24 hr tablet Take 1 tablet by mouth Daily. 90 tablet 1   • omega-3 acid ethyl esters (LOVAZA) 1 g capsule Take 2 capsules by mouth 2 (Two) Times a Day. 120 capsule 5   • ONETOUCH DELICA LANCETS 33G misc Check blood glucose 3 times daily 100 each 5   • oxyCODONE-acetaminophen (PERCOCET) 5-325 MG per tablet 1 po q 12 hrs prn pain 60 tablet 0   • pioglitazone (ACTOS) 30 MG tablet TAKE ONE TABLET BY MOUTH DAILY 90 tablet 4   • QUEtiapine (SEROquel) 25 MG tablet TAKE ONE TABLET BY MOUTH ONCE NIGHTLY 90 tablet 0   • Syringe 23G X 1\" 3 ML misc Use weekly for testosterone injection 10 each 3   • Testosterone Cypionate (DEPOTESTOTERONE CYPIONATE) 200 MG/ML injection Inject 1.5 mL into the shoulder, thigh, or buttocks 1 (One) Time Per Week. 10 mL 5   • TOUJEO SOLOSTAR 300 UNIT/ML solution pen-injector INJECT 75 UNITS UNDER THE SKIN DAILY 9 pen 0     No current facility-administered medications for this visit.          The following portions of the patient's history were reviewed and updated as appropriate: allergies, current medications, past family history, past medical history, past social history, past surgical history and problem list.    Review of Systems:   A 14 point review of systems was performed. All systems negative except pertinent positives/negative listed in HPI above    Physical Exam:   Vitals:    07/26/18 1330   Temp: 98.1 °F (36.7 °C)   Weight: 122 kg (270 lb)   Height: 177.8 cm (70\")   PainSc:   7       General: A and O x 3, ASA, NAD    SCLERA:    Normal    DENTITION:   Normal  Incision of the left knee is healed nicely there is a trace effusion he does have moderate anterior posterior laxity and flexion.  There is no extension laxity.  The " patella tracked midline quad strength is 4+    Radiology:  Xrays 3views F knee (ap,lateral, sunrise) were ordered and reviewed for evaluation of knee pain demonstratinga well positioned knee replacement without evidence of wear, loosening or osteolysis  todays xrays were compared to previous xrays and demonstrate no change    Assessment/Plan:  Left anterior knee pain.  He does have some flexion instability which may be exacerbated by the boot.  Unfortunately if his pain persists we may have to consider conversion to a varus valgus constraint femoral component.  We would likely have to upsize it or move the femoral component slightly posterior to tighten the flexion gap.  I will order a sedimentation rate and C-reactive protein he'll call back for results.  I will see him in a few months no x-rays needed.

## 2018-07-27 ENCOUNTER — LAB (OUTPATIENT)
Dept: LAB | Facility: HOSPITAL | Age: 58
End: 2018-07-27

## 2018-07-27 DIAGNOSIS — Z96.652 HISTORY OF TOTAL KNEE ARTHROPLASTY, LEFT: ICD-10-CM

## 2018-07-27 LAB
CRP SERPL-MCNC: 0.07 MG/DL (ref 0–0.5)
ERYTHROCYTE [SEDIMENTATION RATE] IN BLOOD: 4 MM/HR (ref 0–20)

## 2018-07-27 PROCEDURE — 36415 COLL VENOUS BLD VENIPUNCTURE: CPT | Performed by: ORTHOPAEDIC SURGERY

## 2018-07-27 PROCEDURE — 86140 C-REACTIVE PROTEIN: CPT | Performed by: ORTHOPAEDIC SURGERY

## 2018-07-27 PROCEDURE — 85652 RBC SED RATE AUTOMATED: CPT

## 2018-07-30 ENCOUNTER — TELEPHONE (OUTPATIENT)
Dept: ORTHOPEDIC SURGERY | Facility: CLINIC | Age: 58
End: 2018-07-30

## 2018-07-30 RX ORDER — OMEGA-3-ACID ETHYL ESTERS 1 G/1
CAPSULE, LIQUID FILLED ORAL
Qty: 120 CAPSULE | Refills: 4 | Status: SHIPPED | OUTPATIENT
Start: 2018-07-30 | End: 2018-11-01 | Stop reason: SDUPTHER

## 2018-07-30 RX ORDER — HYDROCHLOROTHIAZIDE 25 MG/1
TABLET ORAL
Qty: 90 TABLET | Refills: 1 | Status: SHIPPED | OUTPATIENT
Start: 2018-07-30 | End: 2019-01-25

## 2018-07-31 NOTE — TELEPHONE ENCOUNTER
Labs are normal.  Would recommend that he follow-up with Dr. Fitzgerald is still having any problems.  Or as discussed.

## 2018-08-09 ENCOUNTER — OFFICE VISIT (OUTPATIENT)
Dept: ORTHOPEDIC SURGERY | Facility: CLINIC | Age: 58
End: 2018-08-09

## 2018-08-09 VITALS — BODY MASS INDEX: 38.65 KG/M2 | HEIGHT: 70 IN | TEMPERATURE: 98.6 F | WEIGHT: 270 LBS

## 2018-08-09 DIAGNOSIS — M92.62 HAGLUND'S DEFORMITY OF LEFT HEEL: ICD-10-CM

## 2018-08-09 DIAGNOSIS — M65.28 CALCIFIC ACHILLES TENDONITIS: Primary | ICD-10-CM

## 2018-08-09 PROCEDURE — 99024 POSTOP FOLLOW-UP VISIT: CPT | Performed by: ORTHOPAEDIC SURGERY

## 2018-08-09 RX ORDER — OXYCODONE HYDROCHLORIDE AND ACETAMINOPHEN 5; 325 MG/1; MG/1
TABLET ORAL
Qty: 15 TABLET | Refills: 0 | Status: SHIPPED | OUTPATIENT
Start: 2018-08-09 | End: 2019-01-25

## 2018-08-09 NOTE — PROGRESS NOTES
"Ankle Follow Up      Patient: Aaron Latif    YOB: 1960 58 y.o. male    Chief Complaints: Ankle still hurts but is getting better    History of Present Illness: Patient had left Achilles surgery on 5/22/18.  He was last seen on 7/16/18.  He has since come off of his cane and has been full weightbearing in his boot.  He has moderate intermittent stabbing pain in the left Achilles that has been doing well with therapy.  He saw Dr. Fitzgerald for his knee is still complains of more pain in his knee than to his Achilles  HPI    ROS: ankle pain  Past Medical History:   Diagnosis Date   • Anxiety    • Arthritis    • Diabetic peripheral neuropathy (CMS/HCC)     TYPE 2   • Erectile dysfunction    • Fatigue    • Fracture of rib of right side    • High cholesterol    • Hypertension    • Low testosterone    • Neuropathy    • Sleep apnea     WEARS CPAP   • Type 2 diabetes mellitus (CMS/HCC)    • Vitamin D deficiency        Physical Exam:   Vitals:    08/09/18 1543   Temp: 98.6 °F (37 °C)   Weight: 122 kg (270 lb)   Height: 177.8 cm (70\")     Well developed with normal mood.  On exam his incision is well-healed there was mild discomfort around the hindfoot but 5 out of 5 plantarflexion strength.  He had difficulty flexing his great toe but was able to plantar flex it some after dorsiflexing it.  Left calf is nontender without sign of DVT      Radiology: None performed      Assessment/Plan:  Status post left Achilles surgery    He still having quite a bit of pain which is not unexpected given the degree of surgery that he has and understands this may take a year or longer to see how well he really does.    I did refill his Percocet 5 mg 1 by mouth daily at bedtime as needed for pain #15 and he understands that I cannot refilled this any further.  I offered him referral to pain management which she declined.    Controlled substance treatment options discussed in detail.  Patient's signed consent to medical options on " file.  DANY form in chart.  Risks of narcotic medication usage outlined.  Possibility of physical and psychological dependence and abuse, especially long-term, emphasized to the patient.      I reviewed his physical therapy notes and he seems to be progressing well for those notes and he may start weaning out of his boot around the house for the next 2 weeks and if pain continues to improve may wean out of it altogether.  If anything worsens in the interim he'll let me know    I recommended that he check with his primary care physician regarding use of any anti-inflammatories he is already on meloxicam.    I will see him back in 4 weeks with x-rays of his left calcaneus if he still having pain

## 2018-09-10 ENCOUNTER — OFFICE VISIT (OUTPATIENT)
Dept: ORTHOPEDIC SURGERY | Facility: CLINIC | Age: 58
End: 2018-09-10

## 2018-09-10 VITALS — BODY MASS INDEX: 39.08 KG/M2 | WEIGHT: 273 LBS | TEMPERATURE: 98.4 F | HEIGHT: 70 IN

## 2018-09-10 DIAGNOSIS — M65.28 CALCIFIC ACHILLES TENDONITIS: Primary | ICD-10-CM

## 2018-09-10 DIAGNOSIS — M92.62 HAGLUND'S DEFORMITY OF LEFT HEEL: ICD-10-CM

## 2018-09-10 PROCEDURE — 99213 OFFICE O/P EST LOW 20 MIN: CPT | Performed by: ORTHOPAEDIC SURGERY

## 2018-09-10 NOTE — PROGRESS NOTES
"Ankle Follow Up      Patient: Aaron Latif    YOB: 1960 58 y.o. male    Chief Complaints: Ankle getting better    History of Present Illness:Patient had left Achilles surgery on 5/22/18.  Last seen on 8/9/18 which time he was still having some pain.  Instructions are given to continue with PT,  to wean out of his boot and he is ready on meloxicam    He has improved significantly and doesn't really have any \"pain to speak of\" gets some occasional twinges.  He feels like his great toe strength is improving but still feels somewhat weak.  He continues to do therapy and is been out of his boot now  HPI    ROS: ankle pain, no fevers chills chest pain or shortness of breath  Past Medical History:   Diagnosis Date   • Anxiety    • Arthritis    • Diabetic peripheral neuropathy (CMS/HCC)     TYPE 2   • Erectile dysfunction    • Fatigue    • Fracture of rib of right side    • High cholesterol    • Hypertension    • Low testosterone    • Neuropathy    • Sleep apnea     WEARS CPAP   • Type 2 diabetes mellitus (CMS/HCC)    • Vitamin D deficiency        Physical Exam:   Vitals:    09/10/18 0814   Temp: 98.4 °F (36.9 °C)   TempSrc: Temporal Artery    Weight: 124 kg (273 lb)   Height: 177.8 cm (70\")     Well developed with normal mood.  With his wife.  There is mild swelling about the ankle but calf is nontender without sign of DVT.  Incision is well healed now.  Really no focal pain to palpation about the Achilles.  5 out of 5 plantarflexion strength.  He was able to flex the great toe at the MTP joint to some degree but not really much at the IP joint.      Radiology: None performed      Assessment/Plan:  Status post left Achilles surgery with some residual great toe weakness from tendon transfer    He seems to have turned the corner and his pain has significantly improved.  Counseled him that he will need to continue with therapy and his great toe strength may improve over time.    He will continue out of his " boot    I will see him back in 8 weeks

## 2018-09-12 NOTE — TELEPHONE ENCOUNTER
----- Message from Parris Luna MD sent at 9/11/2018  2:27 PM EDT -----  That is the maximal that he is getting.  Ask him to try using it 3 times a day for couple of days and see if he gets any improvement and let us know  ----- Message -----  From: Kelly Glaser MA  Sent: 9/10/2018   3:27 PM  To: Parris Luna MD    Patient is calling in regards to Lyrica. He states that he is still having pain in his feet due to neuropathy. He is asking if his Lyrica can be increased to 3 times a day.     Left patient a voicemail informing him of approved dose change.  Called RX into [facility] for 30 days.  Juan Hung.

## 2018-09-14 ENCOUNTER — RESULTS ENCOUNTER (OUTPATIENT)
Dept: ENDOCRINOLOGY | Age: 58
End: 2018-09-14

## 2018-09-14 DIAGNOSIS — R53.82 CHRONIC FATIGUE: ICD-10-CM

## 2018-09-14 DIAGNOSIS — F52.21 ED (ERECTILE DYSFUNCTION) OF NON-ORGANIC ORIGIN: ICD-10-CM

## 2018-09-14 DIAGNOSIS — R79.89 LOW TESTOSTERONE: ICD-10-CM

## 2018-09-14 DIAGNOSIS — IMO0001 UNCONTROLLED TYPE 2 DIABETES MELLITUS WITHOUT COMPLICATION, WITH LONG-TERM CURRENT USE OF INSULIN: ICD-10-CM

## 2018-09-14 DIAGNOSIS — E55.9 VITAMIN D DEFICIENCY: ICD-10-CM

## 2018-09-14 DIAGNOSIS — I10 BENIGN ESSENTIAL HTN: ICD-10-CM

## 2018-09-14 DIAGNOSIS — E78.5 DYSLIPIDEMIA: ICD-10-CM

## 2018-09-14 DIAGNOSIS — E78.1 ESSENTIAL HYPERTRIGLYCERIDEMIA: ICD-10-CM

## 2018-09-21 RX ORDER — QUETIAPINE FUMARATE 25 MG/1
TABLET, FILM COATED ORAL
Qty: 90 TABLET | Refills: 0 | Status: SHIPPED | OUTPATIENT
Start: 2018-09-21 | End: 2018-12-23 | Stop reason: SDUPTHER

## 2018-09-27 ENCOUNTER — TELEPHONE (OUTPATIENT)
Dept: ENDOCRINOLOGY | Age: 58
End: 2018-09-27

## 2018-09-27 DIAGNOSIS — I10 ACCELERATED HYPERTENSION: ICD-10-CM

## 2018-09-27 DIAGNOSIS — N52.8 OTHER MALE ERECTILE DYSFUNCTION: ICD-10-CM

## 2018-09-27 DIAGNOSIS — R79.89 LOW TESTOSTERONE: ICD-10-CM

## 2018-09-27 DIAGNOSIS — E11.9 CONTROLLED TYPE 2 DIABETES MELLITUS WITHOUT COMPLICATION, WITH LONG-TERM CURRENT USE OF INSULIN (HCC): ICD-10-CM

## 2018-09-27 DIAGNOSIS — Z79.4 CONTROLLED TYPE 2 DIABETES MELLITUS WITHOUT COMPLICATION, WITH LONG-TERM CURRENT USE OF INSULIN (HCC): ICD-10-CM

## 2018-09-27 DIAGNOSIS — E78.5 DYSLIPIDEMIA: ICD-10-CM

## 2018-09-27 DIAGNOSIS — E66.01 MORBID OBESITY DUE TO EXCESS CALORIES (HCC): ICD-10-CM

## 2018-09-27 DIAGNOSIS — E55.9 VITAMIN D DEFICIENCY: ICD-10-CM

## 2018-09-27 DIAGNOSIS — R53.82 CHRONIC FATIGUE: ICD-10-CM

## 2018-09-28 RX ORDER — FENOFIBRATE 130 MG/1
CAPSULE ORAL
Qty: 30 CAPSULE | Refills: 4 | Status: SHIPPED | OUTPATIENT
Start: 2018-09-28 | End: 2018-11-01 | Stop reason: SDUPTHER

## 2018-09-28 RX ORDER — DAPAGLIFLOZIN 10 MG/1
TABLET, FILM COATED ORAL
Qty: 30 TABLET | Refills: 4 | Status: SHIPPED | OUTPATIENT
Start: 2018-09-28 | End: 2018-11-01 | Stop reason: SDUPTHER

## 2018-10-01 RX ORDER — SITAGLIPTIN AND METFORMIN HYDROCHLORIDE 1000; 50 MG/1; MG/1
TABLET, FILM COATED, EXTENDED RELEASE ORAL
Qty: 60 TABLET | Refills: 4 | Status: SHIPPED | OUTPATIENT
Start: 2018-10-01 | End: 2018-11-01 | Stop reason: SDUPTHER

## 2018-10-16 ENCOUNTER — TELEPHONE (OUTPATIENT)
Dept: ENDOCRINOLOGY | Age: 58
End: 2018-10-16

## 2018-10-16 NOTE — TELEPHONE ENCOUNTER
Spoke to patient in regards to BG readings. Per Dr. Luna increase Toujeo by 2 units every 3 days until fasting BG in the AM is below 110.    Patient expressed understanding.

## 2018-10-18 ENCOUNTER — LAB (OUTPATIENT)
Dept: ENDOCRINOLOGY | Age: 58
End: 2018-10-18

## 2018-10-18 DIAGNOSIS — R53.82 CHRONIC FATIGUE: ICD-10-CM

## 2018-10-18 DIAGNOSIS — IMO0001 UNCONTROLLED TYPE 2 DIABETES MELLITUS WITHOUT COMPLICATION, WITH LONG-TERM CURRENT USE OF INSULIN: ICD-10-CM

## 2018-10-18 DIAGNOSIS — E78.1 ESSENTIAL HYPERTRIGLYCERIDEMIA: ICD-10-CM

## 2018-10-18 DIAGNOSIS — F52.21 ED (ERECTILE DYSFUNCTION) OF NON-ORGANIC ORIGIN: ICD-10-CM

## 2018-10-18 DIAGNOSIS — E78.5 DYSLIPIDEMIA: ICD-10-CM

## 2018-10-18 DIAGNOSIS — E55.9 VITAMIN D DEFICIENCY: ICD-10-CM

## 2018-10-18 DIAGNOSIS — R79.89 LOW TESTOSTERONE: ICD-10-CM

## 2018-10-18 DIAGNOSIS — I10 BENIGN ESSENTIAL HTN: ICD-10-CM

## 2018-10-18 RX ORDER — INSULIN GLARGINE 300 U/ML
INJECTION, SOLUTION SUBCUTANEOUS
Qty: 9 PEN | Refills: 0 | Status: SHIPPED | OUTPATIENT
Start: 2018-10-18 | End: 2018-11-01

## 2018-10-20 LAB
25(OH)D3+25(OH)D2 SERPL-MCNC: 58.3 NG/ML (ref 30–100)
ALBUMIN SERPL-MCNC: 4.4 G/DL (ref 3.5–5.2)
ALBUMIN/GLOB SERPL: 1.5 G/DL
ALP SERPL-CCNC: 36 U/L (ref 39–117)
ALT SERPL-CCNC: 69 U/L (ref 1–41)
AST SERPL-CCNC: 60 U/L (ref 1–40)
BILIRUB SERPL-MCNC: 0.6 MG/DL (ref 0.1–1.2)
BUN SERPL-MCNC: 18 MG/DL (ref 8–23)
BUN/CREAT SERPL: 17 (ref 7–25)
C PEPTIDE SERPL-MCNC: 2.5 NG/ML (ref 1.1–4.4)
CALCIUM SERPL-MCNC: 10.1 MG/DL (ref 8.6–10.5)
CHLORIDE SERPL-SCNC: 99 MMOL/L (ref 98–107)
CHOLEST SERPL-MCNC: 82 MG/DL (ref 0–200)
CO2 SERPL-SCNC: 28.4 MMOL/L (ref 22–29)
CREAT SERPL-MCNC: 1.06 MG/DL (ref 0.76–1.27)
GLOBULIN SER CALC-MCNC: 2.9 GM/DL
GLUCOSE SERPL-MCNC: 147 MG/DL (ref 65–99)
HBA1C MFR BLD: 8.55 % (ref 4.8–5.6)
HCT VFR BLD AUTO: 49.2 % (ref 40.4–52.2)
HDLC SERPL-MCNC: 34 MG/DL (ref 40–60)
HGB BLD-MCNC: 15.7 G/DL (ref 13.7–17.6)
INTERPRETATION: NORMAL
LDLC SERPL CALC-MCNC: 20 MG/DL (ref 0–100)
Lab: NORMAL
MICROALBUMIN UR-MCNC: 5.3 UG/ML
POTASSIUM SERPL-SCNC: 4.2 MMOL/L (ref 3.5–5.2)
PROT SERPL-MCNC: 7.3 G/DL (ref 6–8.5)
SHBG SERPL-SCNC: 39.7 NMOL/L (ref 19.3–76.4)
SODIUM SERPL-SCNC: 143 MMOL/L (ref 136–145)
T4 SERPL-MCNC: 7.34 MCG/DL (ref 4.5–11.7)
TESTOST FREE SERPL-MCNC: 6.8 PG/ML (ref 6.6–18.1)
TESTOST SERPL-MCNC: 199 NG/DL (ref 264–916)
TRIGL SERPL-MCNC: 140 MG/DL (ref 0–150)
TSH SERPL DL<=0.005 MIU/L-ACNC: 4.38 MIU/ML (ref 0.27–4.2)
URATE SERPL-MCNC: 6.2 MG/DL (ref 3.4–7)
VLDLC SERPL CALC-MCNC: 28 MG/DL (ref 5–40)

## 2018-10-29 DIAGNOSIS — E55.9 VITAMIN D DEFICIENCY: ICD-10-CM

## 2018-10-29 DIAGNOSIS — R53.82 CHRONIC FATIGUE: ICD-10-CM

## 2018-10-29 DIAGNOSIS — N52.8 OTHER MALE ERECTILE DYSFUNCTION: ICD-10-CM

## 2018-10-29 DIAGNOSIS — E11.9 CONTROLLED TYPE 2 DIABETES MELLITUS WITHOUT COMPLICATION, WITH LONG-TERM CURRENT USE OF INSULIN (HCC): ICD-10-CM

## 2018-10-29 DIAGNOSIS — E66.01 MORBID OBESITY DUE TO EXCESS CALORIES (HCC): ICD-10-CM

## 2018-10-29 DIAGNOSIS — E78.5 DYSLIPIDEMIA: ICD-10-CM

## 2018-10-29 DIAGNOSIS — R79.89 LOW TESTOSTERONE: ICD-10-CM

## 2018-10-29 DIAGNOSIS — I10 ACCELERATED HYPERTENSION: ICD-10-CM

## 2018-10-29 DIAGNOSIS — Z79.4 CONTROLLED TYPE 2 DIABETES MELLITUS WITHOUT COMPLICATION, WITH LONG-TERM CURRENT USE OF INSULIN (HCC): ICD-10-CM

## 2018-10-29 RX ORDER — ERGOCALCIFEROL 1.25 MG/1
CAPSULE ORAL
Qty: 24 CAPSULE | Refills: 1 | Status: CANCELLED | OUTPATIENT
Start: 2018-10-29

## 2018-11-01 ENCOUNTER — OFFICE VISIT (OUTPATIENT)
Dept: ENDOCRINOLOGY | Age: 58
End: 2018-11-01

## 2018-11-01 VITALS
SYSTOLIC BLOOD PRESSURE: 122 MMHG | RESPIRATION RATE: 16 BRPM | DIASTOLIC BLOOD PRESSURE: 78 MMHG | WEIGHT: 274 LBS | BODY MASS INDEX: 39.31 KG/M2

## 2018-11-01 DIAGNOSIS — M54.41 CHRONIC BILATERAL LOW BACK PAIN WITH BILATERAL SCIATICA: ICD-10-CM

## 2018-11-01 DIAGNOSIS — G89.29 CHRONIC BILATERAL LOW BACK PAIN WITH BILATERAL SCIATICA: ICD-10-CM

## 2018-11-01 DIAGNOSIS — N52.9 ERECTILE DYSFUNCTION, UNSPECIFIED ERECTILE DYSFUNCTION TYPE: ICD-10-CM

## 2018-11-01 DIAGNOSIS — I10 BENIGN ESSENTIAL HTN: ICD-10-CM

## 2018-11-01 DIAGNOSIS — M54.42 CHRONIC BILATERAL LOW BACK PAIN WITH BILATERAL SCIATICA: ICD-10-CM

## 2018-11-01 DIAGNOSIS — E03.9 PRIMARY HYPOTHYROIDISM: ICD-10-CM

## 2018-11-01 DIAGNOSIS — E66.01 MORBID OBESITY DUE TO EXCESS CALORIES (HCC): ICD-10-CM

## 2018-11-01 DIAGNOSIS — N52.8 OTHER MALE ERECTILE DYSFUNCTION: ICD-10-CM

## 2018-11-01 DIAGNOSIS — E78.5 DYSLIPIDEMIA: ICD-10-CM

## 2018-11-01 DIAGNOSIS — E11.65 UNCONTROLLED TYPE 2 DIABETES MELLITUS WITH HYPERGLYCEMIA (HCC): Primary | ICD-10-CM

## 2018-11-01 DIAGNOSIS — E55.9 VITAMIN D DEFICIENCY: ICD-10-CM

## 2018-11-01 DIAGNOSIS — R79.89 LOW TESTOSTERONE: ICD-10-CM

## 2018-11-01 DIAGNOSIS — E78.1 ESSENTIAL HYPERTRIGLYCERIDEMIA: ICD-10-CM

## 2018-11-01 DIAGNOSIS — R53.82 CHRONIC FATIGUE: ICD-10-CM

## 2018-11-01 PROCEDURE — 99215 OFFICE O/P EST HI 40 MIN: CPT | Performed by: INTERNAL MEDICINE

## 2018-11-01 RX ORDER — MELOXICAM 15 MG/1
TABLET ORAL
Qty: 90 TABLET | Refills: 3 | Status: SHIPPED | OUTPATIENT
Start: 2018-11-01 | End: 2019-01-25

## 2018-11-01 RX ORDER — TESTOSTERONE CYPIONATE 200 MG/ML
300 INJECTION, SOLUTION INTRAMUSCULAR WEEKLY
Qty: 10 ML | Refills: 5 | Status: SHIPPED | OUTPATIENT
Start: 2018-11-01 | End: 2019-07-01 | Stop reason: SDUPTHER

## 2018-11-01 RX ORDER — ERGOCALCIFEROL 1.25 MG/1
2 CAPSULE ORAL WEEKLY
Qty: 26 CAPSULE | Refills: 3 | Status: SHIPPED | OUTPATIENT
Start: 2018-11-01 | End: 2019-02-08 | Stop reason: HOSPADM

## 2018-11-01 RX ORDER — METOPROLOL SUCCINATE 50 MG/1
50 TABLET, EXTENDED RELEASE ORAL DAILY
Qty: 90 TABLET | Refills: 1 | Status: SHIPPED | OUTPATIENT
Start: 2018-11-01 | End: 2019-08-01 | Stop reason: SDUPTHER

## 2018-11-01 RX ORDER — AMLODIPINE BESYLATE 10 MG/1
10 TABLET ORAL DAILY
Qty: 30 TABLET | Refills: 5 | Status: SHIPPED | OUTPATIENT
Start: 2018-11-01 | End: 2019-10-31 | Stop reason: SDUPTHER

## 2018-11-01 RX ORDER — ATORVASTATIN CALCIUM 20 MG/1
20 TABLET, FILM COATED ORAL DAILY
Qty: 30 TABLET | Refills: 11 | Status: SHIPPED | OUTPATIENT
Start: 2018-11-01 | End: 2019-06-10 | Stop reason: SDUPTHER

## 2018-11-01 RX ORDER — PIOGLITAZONEHYDROCHLORIDE 30 MG/1
30 TABLET ORAL DAILY
Qty: 90 TABLET | Refills: 3 | Status: SHIPPED | OUTPATIENT
Start: 2018-11-01 | End: 2019-06-10 | Stop reason: SDUPTHER

## 2018-11-01 RX ORDER — INSULIN GLARGINE 300 U/ML
100 INJECTION, SOLUTION SUBCUTANEOUS EVERY MORNING
Qty: 12 PEN | Refills: 3 | Status: SHIPPED | OUTPATIENT
Start: 2018-11-01 | End: 2019-03-27 | Stop reason: SDUPTHER

## 2018-11-01 RX ORDER — SITAGLIPTIN AND METFORMIN HYDROCHLORIDE 1000; 50 MG/1; MG/1
2 TABLET, FILM COATED, EXTENDED RELEASE ORAL DAILY
Qty: 60 TABLET | Refills: 5 | Status: SHIPPED | OUTPATIENT
Start: 2018-11-01 | End: 2019-06-10 | Stop reason: SDUPTHER

## 2018-11-01 RX ORDER — DULOXETIN HYDROCHLORIDE 60 MG/1
60 CAPSULE, DELAYED RELEASE ORAL DAILY
Qty: 30 CAPSULE | Refills: 2 | Status: SHIPPED | OUTPATIENT
Start: 2018-11-01 | End: 2019-01-23 | Stop reason: SDUPTHER

## 2018-11-01 RX ORDER — LEVOTHYROXINE SODIUM 75 MCG
75 TABLET ORAL DAILY
Qty: 30 TABLET | Refills: 5 | Status: SHIPPED | OUTPATIENT
Start: 2018-11-01 | End: 2018-11-08 | Stop reason: SDUPTHER

## 2018-11-01 RX ORDER — DAPAGLIFLOZIN 10 MG/1
1 TABLET, FILM COATED ORAL EVERY MORNING
Qty: 30 TABLET | Refills: 5 | Status: SHIPPED | OUTPATIENT
Start: 2018-11-01 | End: 2019-06-10 | Stop reason: SDUPTHER

## 2018-11-01 RX ORDER — LANCETS 33 GAUGE
EACH MISCELLANEOUS
Qty: 100 EACH | Refills: 5 | Status: SHIPPED | OUTPATIENT
Start: 2018-11-01 | End: 2019-05-07 | Stop reason: CLARIF

## 2018-11-01 RX ORDER — FENOFIBRATE 130 MG/1
130 CAPSULE ORAL
Qty: 30 CAPSULE | Refills: 5 | Status: SHIPPED | OUTPATIENT
Start: 2018-11-01 | End: 2019-06-10 | Stop reason: SDUPTHER

## 2018-11-01 RX ORDER — OMEGA-3-ACID ETHYL ESTERS 1 G/1
2 CAPSULE, LIQUID FILLED ORAL 2 TIMES DAILY
Qty: 120 CAPSULE | Refills: 5 | Status: SHIPPED | OUTPATIENT
Start: 2018-11-01 | End: 2019-02-08 | Stop reason: HOSPADM

## 2018-11-01 NOTE — PROGRESS NOTES
Subjective   Aaron Latif is a 58 y.o. male seen for follow up for DM2, hyperlipidemia, HTN, vit d deficiency, lab review. He is checking BG once a day in the AM. No BG readings. He states that he would like to discuss his medications because his neuropathy is getting worse.   History of Present Illness this is a 58-year-old gentleman known patient with type II diabetes hypertension and dyslipidemia as well as low testosterone and erectile dysfunction and diabetic neuropathy.  Over the course of last 6 months he has had no significant health problem for which to go to emergency room or hospital.  For the past 3 months he has a stopped using testosterone regularly.  He is also frustrated because of his low back pain and pain in his legs and feels very depressed.    /78   Resp 16   Wt 124 kg (274 lb)   BMI 39.31 kg/m²      Allergies   Allergen Reactions   • Codeine Other (See Comments)     UNKNOWN       Current Outpatient Prescriptions:   •  amLODIPine (NORVASC) 10 MG tablet, Take 1 tablet by mouth Daily., Disp: 30 tablet, Rfl: 5  •  atorvastatin (LIPITOR) 80 MG tablet, Take 1 tablet by mouth Every Night., Disp: 90 tablet, Rfl: 3  •  ergocalciferol (ERGOCALCIFEROL) 69283 units capsule, Take 2 capsules by mouth 1 (One) Time Per Week. (Patient taking differently: Take 2 capsules by mouth 1 (One) Time Per Week. thursday), Disp: 26 capsule, Rfl: 3  •  FARXIGA 10 MG tablet, TAKE ONE TABLET BY MOUTH EVERY MORNING, Disp: 30 tablet, Rfl: 4  •  fenofibrate micronized (ANTARA) 130 MG capsule, TAKE ONE CAPSULE BY MOUTH EVERY MORNING BEFORE BREAKFAST, Disp: 30 capsule, Rfl: 4  •  glucose blood (ONE TOUCH ULTRA TEST) test strip, 1 each by Other route 3 (Three) Times a Day. Use as instructed, Disp: 400 each, Rfl: 1  •  hydrochlorothiazide (HYDRODIURIL) 25 MG tablet, TAKE ONE TABLET BY MOUTH EVERY MORNING, Disp: 90 tablet, Rfl: 1  •  Insulin Glargine (TOUJEO MAX SOLOSTAR) 300 UNIT/ML solution pen-injector, Inject 75 Units  "under the skin Every Morning., Disp: , Rfl:   •  Insulin Pen Needle 31G X 8 MM misc, Use once a day, Disp: 100 each, Rfl: 0  •  JANUMET XR  MG tablet, TAKE TWO TABLETS BY MOUTH DAILY WITH DINNER, Disp: 60 tablet, Rfl: 4  •  LYRICA 200 MG capsule, Take 1 capsule by mouth 3 (Three) Times a Day., Disp: 90 capsule, Rfl: 0  •  meloxicam (MOBIC) 15 MG tablet, TAKE ONE TABLET BY MOUTH DAILY WITH FOOD AS NEEDED FOR MODERATE PAIN, Disp: 90 tablet, Rfl: 3  •  metoprolol succinate XL (TOPROL-XL) 50 MG 24 hr tablet, Take 1 tablet by mouth Daily., Disp: 90 tablet, Rfl: 1  •  omega-3 acid ethyl esters (LOVAZA) 1 g capsule, TAKE TWO CAPSULES BY MOUTH TWICE A DAY, Disp: 120 capsule, Rfl: 4  •  ONETOUCH DELICA LANCETS 33G misc, Check blood glucose 3 times daily, Disp: 100 each, Rfl: 5  •  oxyCODONE-acetaminophen (PERCOCET) 5-325 MG per tablet, 1 po qhs prn pain, Disp: 15 tablet, Rfl: 0  •  pioglitazone (ACTOS) 30 MG tablet, TAKE ONE TABLET BY MOUTH DAILY, Disp: 90 tablet, Rfl: 4  •  QUEtiapine (SEROquel) 25 MG tablet, TAKE ONE TABLET BY MOUTH ONCE NIGHTLY, Disp: 90 tablet, Rfl: 0  •  Syringe 23G X 1\" 3 ML misc, Use weekly for testosterone injection, Disp: 10 each, Rfl: 3  •  Testosterone Cypionate (DEPOTESTOTERONE CYPIONATE) 200 MG/ML injection, Inject 1.5 mL into the shoulder, thigh, or buttocks 1 (One) Time Per Week., Disp: 10 mL, Rfl: 5  •  TOUJEO SOLOSTAR 300 UNIT/ML solution pen-injector, INJECT 75 UNITS UNDER THE SKIN DAILY, Disp: 9 pen, Rfl: 0      The following portions of the patient's history were reviewed and updated as appropriate: allergies, current medications, past family history, past medical history, past social history, past surgical history and problem list.    Review of Systems   Constitutional: Negative.    HENT: Negative.    Eyes: Negative.    Respiratory: Negative.    Cardiovascular: Negative.    Gastrointestinal: Negative.    Endocrine: Negative.    Genitourinary: Negative.    Musculoskeletal: " Negative.    Skin: Negative.    Allergic/Immunologic: Negative.    Neurological: Negative.    Hematological: Negative.    Psychiatric/Behavioral: Negative.        Objective   Physical Exam   Constitutional: He appears well-developed and well-nourished. No distress.   HENT:   Head: Normocephalic and atraumatic.   Right Ear: External ear normal.   Left Ear: External ear normal.   Nose: Nose normal.   Mouth/Throat: Oropharynx is clear and moist. No oropharyngeal exudate.   Eyes: Pupils are equal, round, and reactive to light. Conjunctivae and EOM are normal. Right eye exhibits no discharge. Left eye exhibits no discharge. No scleral icterus.   Neck: Normal range of motion. Neck supple. No JVD present. No tracheal deviation present. No thyromegaly present.   Cardiovascular: Normal rate, regular rhythm, normal heart sounds and intact distal pulses.  Exam reveals no gallop and no friction rub.    No murmur heard.  Pulmonary/Chest: Effort normal and breath sounds normal. No stridor. No respiratory distress. He has no wheezes. He has no rales. He exhibits no tenderness.   Abdominal: Soft. Bowel sounds are normal. He exhibits no distension and no mass. There is no tenderness. There is no rebound and no guarding. No hernia.   Musculoskeletal: Normal range of motion. He exhibits no edema, tenderness or deformity.   Healed the scar of the knee surgery in the anterior aspect of the left knee.   Lymphadenopathy:     He has no cervical adenopathy.   Neurological: He is alert. He has normal reflexes. He displays normal reflexes. No cranial nerve deficit or sensory deficit. He exhibits normal muscle tone. Coordination normal.   Skin: Skin is warm and dry. No rash noted. He is not diaphoretic. No erythema. No pallor.   Psychiatric: He has a normal mood and affect. His behavior is normal. Judgment and thought content normal.   Nursing note and vitals reviewed.       Results for orders placed or performed in visit on 09/14/18   T4 &  TSH ([facility])   Result Value Ref Range    TSH 4.380 (H) 0.270 - 4.200 mIU/mL    T4, Total 7.34 4.50 - 11.70 mcg/dL   TestT+TestF+SHBG   Result Value Ref Range    Testosterone, Total 199 (L) 264 - 916 ng/dL    Testosterone, Free 6.8 6.6 - 18.1 pg/mL    Sex Hormone Binding Globulin 39.7 19.3 - 76.4 nmol/L   Uric Acid   Result Value Ref Range    Uric Acid 6.2 3.4 - 7.0 mg/dL   Vitamin D 25 Hydroxy   Result Value Ref Range    25 Hydroxy, Vitamin D 58.3 30.0 - 100.0 ng/ml   Comprehensive Metabolic Panel   Result Value Ref Range    Glucose 147 (H) 65 - 99 mg/dL    BUN 18 8 - 23 mg/dL    Creatinine 1.06 0.76 - 1.27 mg/dL    eGFR Non African Am 71 >60 mL/min/1.73    eGFR African Am 86 >60 mL/min/1.73    BUN/Creatinine Ratio 17.0 7.0 - 25.0    Sodium 143 136 - 145 mmol/L    Potassium 4.2 3.5 - 5.2 mmol/L    Chloride 99 98 - 107 mmol/L    Total CO2 28.4 22.0 - 29.0 mmol/L    Calcium 10.1 8.6 - 10.5 mg/dL    Total Protein 7.3 6.0 - 8.5 g/dL    Albumin 4.40 3.50 - 5.20 g/dL    Globulin 2.9 gm/dL    A/G Ratio 1.5 g/dL    Total Bilirubin 0.6 0.1 - 1.2 mg/dL    Alkaline Phosphatase 36 (L) 39 - 117 U/L    AST (SGOT) 60 (H) 1 - 40 U/L    ALT (SGPT) 69 (H) 1 - 41 U/L   C-Peptide   Result Value Ref Range    C-Peptide 2.5 1.1 - 4.4 ng/mL   Hemoglobin A1c   Result Value Ref Range    Hemoglobin A1C 8.55 (H) 4.80 - 5.60 %   Lipid Panel   Result Value Ref Range    Total Cholesterol 82 0 - 200 mg/dL    Triglycerides 140 0 - 150 mg/dL    HDL Cholesterol 34 (L) 40 - 60 mg/dL    VLDL Cholesterol 28 5 - 40 mg/dL    LDL Cholesterol  20 0 - 100 mg/dL   Hemoglobin & Hematocrit, Blood   Result Value Ref Range    Hemoglobin 15.7 13.7 - 17.6 g/dL    Hematocrit 49.2 40.4 - 52.2 %   MicroAlbumin, Urine, Random   Result Value Ref Range    Microalbumin, Urine 5.3 Not Estab. ug/mL   Cardiovascular Risk Assessment   Result Value Ref Range    Interpretation Note    Diabetes Patient Education   Result Value Ref Range    PDF Image Not applicable           Assessment/Plan   Diagnoses and all orders for this visit:    Uncontrolled type 2 diabetes mellitus with hyperglycemia (CMS/[facility])  -     T3, Free; Future  -     T4 & TSH ([facility]); Future  -     T4, Free; Future  -     Thyroglobulin With Anti-TG; Future  -     TestT+TestF+SHBG; Future  -     Uric Acid; Future  -     Vitamin D 25 Hydroxy; Future  -     Comprehensive Metabolic Panel; Future  -     C-Peptide; Future  -     Hemoglobin A1c; Future  -     Lipid Panel; Future  -     MicroAlbumin, Urine, Random - Urine, Clean Catch; Future  -     PSA DIAGNOSTIC; Future  -     Hemoglobin & Hematocrit, Blood; Future    Benign essential HTN  -     T3, Free; Future  -     T4 & TSH ([facility]); Future  -     T4, Free; Future  -     Thyroglobulin With Anti-TG; Future  -     TestT+TestF+SHBG; Future  -     Uric Acid; Future  -     Vitamin D 25 Hydroxy; Future  -     Comprehensive Metabolic Panel; Future  -     C-Peptide; Future  -     Hemoglobin A1c; Future  -     Lipid Panel; Future  -     MicroAlbumin, Urine, Random - Urine, Clean Catch; Future  -     PSA DIAGNOSTIC; Future  -     Hemoglobin & Hematocrit, Blood; Future    Essential hypertriglyceridemia  -     T3, Free; Future  -     T4 & TSH ([facility]); Future  -     T4, Free; Future  -     Thyroglobulin With Anti-TG; Future  -     TestT+TestF+SHBG; Future  -     Uric Acid; Future  -     Vitamin D 25 Hydroxy; Future  -     Comprehensive Metabolic Panel; Future  -     C-Peptide; Future  -     Hemoglobin A1c; Future  -     Lipid Panel; Future  -     MicroAlbumin, Urine, Random - Urine, Clean Catch; Future  -     PSA DIAGNOSTIC; Future  -     Hemoglobin & Hematocrit, Blood; Future    Morbid obesity due to excess calories (CMS/[facility])  -     pioglitazone (ACTOS) 30 MG tablet; Take 1 tablet by mouth Daily.  -     ergocalciferol (ERGOCALCIFEROL) 83923 units capsule; Take 2 capsules by mouth 1 (One) Time Per Week.  -     fenofibrate micronized (ANTARA) 130 MG capsule; Take 1 capsule  by mouth Every Morning Before Breakfast.  -     T3, Free; Future  -     T4 & TSH ([facility]); Future  -     T4, Free; Future  -     Thyroglobulin With Anti-TG; Future  -     TestT+TestF+SHBG; Future  -     Uric Acid; Future  -     Vitamin D 25 Hydroxy; Future  -     Comprehensive Metabolic Panel; Future  -     C-Peptide; Future  -     Hemoglobin A1c; Future  -     Lipid Panel; Future  -     MicroAlbumin, Urine, Random - Urine, Clean Catch; Future  -     PSA DIAGNOSTIC; Future  -     Hemoglobin & Hematocrit, Blood; Future    Vitamin D deficiency  -     pioglitazone (ACTOS) 30 MG tablet; Take 1 tablet by mouth Daily.  -     ergocalciferol (ERGOCALCIFEROL) 65723 units capsule; Take 2 capsules by mouth 1 (One) Time Per Week.  -     fenofibrate micronized (ANTARA) 130 MG capsule; Take 1 capsule by mouth Every Morning Before Breakfast.  -     T3, Free; Future  -     T4 & TSH ([facility]); Future  -     T4, Free; Future  -     Thyroglobulin With Anti-TG; Future  -     TestT+TestF+SHBG; Future  -     Uric Acid; Future  -     Vitamin D 25 Hydroxy; Future  -     Comprehensive Metabolic Panel; Future  -     C-Peptide; Future  -     Hemoglobin A1c; Future  -     Lipid Panel; Future  -     MicroAlbumin, Urine, Random - Urine, Clean Catch; Future  -     PSA DIAGNOSTIC; Future  -     Hemoglobin & Hematocrit, Blood; Future    Erectile dysfunction, unspecified erectile dysfunction type  -     T3, Free; Future  -     T4 & TSH ([facility]); Future  -     T4, Free; Future  -     Thyroglobulin With Anti-TG; Future  -     TestT+TestF+SHBG; Future  -     Uric Acid; Future  -     Vitamin D 25 Hydroxy; Future  -     Comprehensive Metabolic Panel; Future  -     C-Peptide; Future  -     Hemoglobin A1c; Future  -     Lipid Panel; Future  -     MicroAlbumin, Urine, Random - Urine, Clean Catch; Future  -     PSA DIAGNOSTIC; Future  -     Hemoglobin & Hematocrit, Blood; Future    Chronic fatigue  -     pioglitazone (ACTOS) 30 MG tablet; Take 1 tablet by  mouth Daily.  -     ergocalciferol (ERGOCALCIFEROL) 99620 units capsule; Take 2 capsules by mouth 1 (One) Time Per Week.  -     fenofibrate micronized (ANTARA) 130 MG capsule; Take 1 capsule by mouth Every Morning Before Breakfast.  -     T3, Free; Future  -     T4 & TSH ([facility]); Future  -     T4, Free; Future  -     Thyroglobulin With Anti-TG; Future  -     TestT+TestF+SHBG; Future  -     Uric Acid; Future  -     Vitamin D 25 Hydroxy; Future  -     Comprehensive Metabolic Panel; Future  -     C-Peptide; Future  -     Hemoglobin A1c; Future  -     Lipid Panel; Future  -     MicroAlbumin, Urine, Random - Urine, Clean Catch; Future  -     PSA DIAGNOSTIC; Future  -     Hemoglobin & Hematocrit, Blood; Future    Low testosterone  -     pioglitazone (ACTOS) 30 MG tablet; Take 1 tablet by mouth Daily.  -     ergocalciferol (ERGOCALCIFEROL) 06755 units capsule; Take 2 capsules by mouth 1 (One) Time Per Week.  -     fenofibrate micronized (ANTARA) 130 MG capsule; Take 1 capsule by mouth Every Morning Before Breakfast.  -     T3, Free; Future  -     T4 & TSH ([facility]); Future  -     T4, Free; Future  -     Thyroglobulin With Anti-TG; Future  -     TestT+TestF+SHBG; Future  -     Uric Acid; Future  -     Vitamin D 25 Hydroxy; Future  -     Comprehensive Metabolic Panel; Future  -     C-Peptide; Future  -     Hemoglobin A1c; Future  -     Lipid Panel; Future  -     MicroAlbumin, Urine, Random - Urine, Clean Catch; Future  -     PSA DIAGNOSTIC; Future  -     Hemoglobin & Hematocrit, Blood; Future    Dyslipidemia  -     pioglitazone (ACTOS) 30 MG tablet; Take 1 tablet by mouth Daily.  -     ergocalciferol (ERGOCALCIFEROL) 35692 units capsule; Take 2 capsules by mouth 1 (One) Time Per Week.  -     fenofibrate micronized (ANTARA) 130 MG capsule; Take 1 capsule by mouth Every Morning Before Breakfast.  -     T3, Free; Future  -     T4 & TSH ([facility]); Future  -     T4, Free; Future  -     Thyroglobulin With Anti-TG; Future  -      TestT+TestF+SHBG; Future  -     Uric Acid; Future  -     Vitamin D 25 Hydroxy; Future  -     Comprehensive Metabolic Panel; Future  -     C-Peptide; Future  -     Hemoglobin A1c; Future  -     Lipid Panel; Future  -     MicroAlbumin, Urine, Random - Urine, Clean Catch; Future  -     PSA DIAGNOSTIC; Future  -     Hemoglobin & Hematocrit, Blood; Future    Other male erectile dysfunction  -     pioglitazone (ACTOS) 30 MG tablet; Take 1 tablet by mouth Daily.  -     ergocalciferol (ERGOCALCIFEROL) 42864 units capsule; Take 2 capsules by mouth 1 (One) Time Per Week.  -     fenofibrate micronized (ANTARA) 130 MG capsule; Take 1 capsule by mouth Every Morning Before Breakfast.  -     T3, Free; Future  -     T4 & TSH ([facility]); Future  -     T4, Free; Future  -     Thyroglobulin With Anti-TG; Future  -     TestT+TestF+SHBG; Future  -     Uric Acid; Future  -     Vitamin D 25 Hydroxy; Future  -     Comprehensive Metabolic Panel; Future  -     C-Peptide; Future  -     Hemoglobin A1c; Future  -     Lipid Panel; Future  -     MicroAlbumin, Urine, Random - Urine, Clean Catch; Future  -     PSA DIAGNOSTIC; Future  -     Hemoglobin & Hematocrit, Blood; Future    Chronic bilateral low back pain with bilateral sciatica  -     MRI lumbar spine w wo contrast; Future  -     T3, Free; Future  -     T4 & TSH ([facility]); Future  -     T4, Free; Future  -     Thyroglobulin With Anti-TG; Future  -     TestT+TestF+SHBG; Future  -     Uric Acid; Future  -     Vitamin D 25 Hydroxy; Future  -     Comprehensive Metabolic Panel; Future  -     C-Peptide; Future  -     Hemoglobin A1c; Future  -     Lipid Panel; Future  -     MicroAlbumin, Urine, Random - Urine, Clean Catch; Future  -     PSA DIAGNOSTIC; Future  -     Hemoglobin & Hematocrit, Blood; Future    Primary hypothyroidism  -     T3, Free; Future  -     T4 & TSH ([facility]); Future  -     T4, Free; Future  -     Thyroglobulin With Anti-TG; Future  -     TestT+TestF+SHBG; Future  -     Uric  Acid; Future  -     Vitamin D 25 Hydroxy; Future  -     Comprehensive Metabolic Panel; Future  -     C-Peptide; Future  -     Hemoglobin A1c; Future  -     Lipid Panel; Future  -     MicroAlbumin, Urine, Random - Urine, Clean Catch; Future  -     PSA DIAGNOSTIC; Future  -     Hemoglobin & Hematocrit, Blood; Future    Other orders  -     Testosterone Cypionate (DEPOTESTOTERONE CYPIONATE) 200 MG/ML injection; Inject 1.5 mL into the appropriate muscle as directed by prescriber 1 (One) Time Per Week.  -     TOUJEO MAX SOLOSTAR 300 UNIT/ML solution pen-injector; Inject 100 Units under the skin into the appropriate area as directed Every Morning.  -     ONETOUCH DELICA LANCETS 33G misc; Check blood glucose 3 times daily  -     omega-3 acid ethyl esters (LOVAZA) 1 g capsule; Take 2 capsules by mouth 2 (Two) Times a Day.  -     LYRICA 200 MG capsule; Take 1 capsule by mouth 3 (Three) Times a Day.  -     JANUMET XR  MG tablet; Take 2 tablets by mouth Daily.  -     FARXIGA 10 MG tablet; Take 10 mg by mouth Every Morning.  -     amLODIPine (NORVASC) 10 MG tablet; Take 1 tablet by mouth Daily.  -     metoprolol succinate XL (TOPROL-XL) 50 MG 24 hr tablet; Take 1 tablet by mouth Daily.  -     Insulin Pen Needle 31G X 8 MM misc; Use once a day  -     DULoxetine (CYMBALTA) 60 MG capsule; Take 1 capsule by mouth Daily.  -     SYNTHROID 75 MCG tablet; Take 1 tablet by mouth Daily.  -     atorvastatin (LIPITOR) 20 MG tablet; Take 1 tablet by mouth Daily.               In summary I saw and examined this 58-year-old gentleman for above-mentioned problems.  I reviewed his laboratory evaluation of 10/18/2018 and provided him with a hard copy of it.  With the exception of hemoglobin A1c of 8.55 she is otherwise clinically and metabolically stable.  I increased his Toujeo to 100 units every morning and also started him on Cymbalta to help both his neuropathy and depression and order an MRI of his lumbar spine to make sure any  compression neuropathy.  I also started him on Synthroid 75 µg daily because of new case of hypothyroidism.  He will continue rest of his prescriptions and I will see him in 6 months or sooner if needed with laboratory evaluation prior to each office visit.  This office visit lasted 45 minutes and 25 minutes of it but she spent on face-to-face counseling and planning to future care moving forward.

## 2018-11-08 RX ORDER — LEVOTHYROXINE SODIUM 0.07 MG/1
75 TABLET ORAL DAILY
Qty: 30 TABLET | Refills: 5 | Status: SHIPPED | OUTPATIENT
Start: 2018-11-08 | End: 2019-05-30 | Stop reason: SDUPTHER

## 2018-12-03 RX ORDER — SYRINGE W-NEEDLE,DISPOSAB,3 ML 25GX5/8"
SYRINGE, EMPTY DISPOSABLE MISCELLANEOUS
Qty: 10 EACH | Refills: 3 | Status: SHIPPED | OUTPATIENT
Start: 2018-12-03 | End: 2020-01-27 | Stop reason: SDUPTHER

## 2018-12-06 ENCOUNTER — TELEPHONE (OUTPATIENT)
Dept: ORTHOPEDIC SURGERY | Facility: CLINIC | Age: 58
End: 2018-12-06

## 2018-12-06 ENCOUNTER — OFFICE VISIT (OUTPATIENT)
Dept: ORTHOPEDIC SURGERY | Facility: CLINIC | Age: 58
End: 2018-12-06

## 2018-12-06 VITALS — HEIGHT: 70 IN | BODY MASS INDEX: 39.22 KG/M2 | WEIGHT: 274 LBS | TEMPERATURE: 97.8 F

## 2018-12-06 DIAGNOSIS — G89.29 CHRONIC PAIN OF LEFT KNEE: Primary | ICD-10-CM

## 2018-12-06 DIAGNOSIS — M25.562 CHRONIC PAIN OF LEFT KNEE: Primary | ICD-10-CM

## 2018-12-06 PROCEDURE — 99214 OFFICE O/P EST MOD 30 MIN: CPT | Performed by: ORTHOPAEDIC SURGERY

## 2018-12-06 RX ORDER — CEFAZOLIN SODIUM 2 G/100ML
2 INJECTION, SOLUTION INTRAVENOUS ONCE
Status: CANCELLED | OUTPATIENT
Start: 2019-02-06 | End: 2018-12-06

## 2018-12-06 RX ORDER — MELOXICAM 15 MG/1
15 TABLET ORAL ONCE
Status: CANCELLED | OUTPATIENT
Start: 2019-02-06 | End: 2018-12-06

## 2018-12-06 RX ORDER — PREGABALIN 75 MG/1
150 CAPSULE ORAL ONCE
Status: CANCELLED | OUTPATIENT
Start: 2019-02-06 | End: 2018-12-06

## 2018-12-06 NOTE — PROGRESS NOTES
Patient: Aaron Latif  YOB: 1960 58 y.o. male  Medical Record Number: [id number]    Chief Complaints:   Chief Complaint   Patient presents with   • Left Knee - Follow-up       History of Present Illness:Aaron Latif is a 58 y.o. male who presents for follow-up of  left total knee replacement.  He is about 2 years out from left total knee replacement done by Dr. Anderson in about 1 year out from a left knee poly-change still having severe persistent pain in the left knee and feelings of instability especially with stairs.  He is now out of his ankle boot and has been out of that for at least 2-3 months.  He is done extensive therapy working on stability with the leg and despite that still has persistent discomfort he describes a stabbing aching pain and feels that he is miserable and can only walk short distances with constant feelings of pain and instability.    Allergies:   Allergies   Allergen Reactions   • Codeine Other (See Comments)     UNKNOWN       Medications:   Current Outpatient Medications   Medication Sig Dispense Refill   • amLODIPine (NORVASC) 10 MG tablet Take 1 tablet by mouth Daily. 30 tablet 5   • atorvastatin (LIPITOR) 20 MG tablet Take 1 tablet by mouth Daily. 30 tablet 11   • DULoxetine (CYMBALTA) 60 MG capsule Take 1 capsule by mouth Daily. 30 capsule 2   • ergocalciferol (ERGOCALCIFEROL) 51040 units capsule Take 2 capsules by mouth 1 (One) Time Per Week. 26 capsule 3   • FARXIGA 10 MG tablet Take 10 mg by mouth Every Morning. 30 tablet 5   • fenofibrate micronized (ANTARA) 130 MG capsule Take 1 capsule by mouth Every Morning Before Breakfast. 30 capsule 5   • hydrochlorothiazide (HYDRODIURIL) 25 MG tablet TAKE ONE TABLET BY MOUTH EVERY MORNING 90 tablet 1   • JANUMET XR  MG tablet Take 2 tablets by mouth Daily. 60 tablet 5   • levothyroxine (SYNTHROID) 75 MCG tablet Take 1 tablet by mouth Daily. 30 tablet 5   • LYRICA 200 MG capsule Take 1 capsule by mouth 3 (Three)  "Times a Day. 90 capsule 5   • meloxicam (MOBIC) 15 MG tablet TAKE ONE TABLET BY MOUTH DAILY WITH FOOD AS NEEDED FOR MODERATE PAIN 90 tablet 3   • metoprolol succinate XL (TOPROL-XL) 50 MG 24 hr tablet Take 1 tablet by mouth Daily. 90 tablet 1   • omega-3 acid ethyl esters (LOVAZA) 1 g capsule Take 2 capsules by mouth 2 (Two) Times a Day. 120 capsule 5   • pioglitazone (ACTOS) 30 MG tablet Take 1 tablet by mouth Daily. 90 tablet 3   • QUEtiapine (SEROquel) 25 MG tablet TAKE ONE TABLET BY MOUTH ONCE NIGHTLY 90 tablet 0   • Testosterone Cypionate (DEPOTESTOTERONE CYPIONATE) 200 MG/ML injection Inject 1.5 mL into the appropriate muscle as directed by prescriber 1 (One) Time Per Week. 10 mL 5   • TOUJEO MAX SOLOSTAR 300 UNIT/ML solution pen-injector Inject 100 Units under the skin into the appropriate area as directed Every Morning. 12 pen 3   • Insulin Pen Needle 31G X 8 MM misc Use once a day 100 each 0   • Needle, Disp, 18G X 1\" misc Use weekly with testosterone injection 25 each 0   • ONE TOUCH ULTRA TEST test strip USE ONE STRIP TO TEST THREE TIMES A  each 0   • ONETOUCH DELICA LANCETS 33G misc Check blood glucose 3 times daily 100 each 5   • oxyCODONE-acetaminophen (PERCOCET) 5-325 MG per tablet 1 po qhs prn pain 15 tablet 0   • Syringe 23G X 1\" 3 ML misc Use weekly for testosterone injection 10 each 3     No current facility-administered medications for this visit.          The following portions of the patient's history were reviewed and updated as appropriate: allergies, current medications, past family history, past medical history, past social history, past surgical history and problem list.    Review of Systems:   A 14 point review of systems was performed. All systems negative except pertinent positives/negative listed in HPI above    Physical Exam:   Vitals:    12/06/18 0931   Temp: 97.8 °F (36.6 °C)   TempSrc: Temporal   Weight: 124 kg (274 lb)   Height: 177.8 cm (70\")       General: A and O x 3, " ASA, NAD    SCLERA:    Normal    DENTITION:   Normal  On exam the incision is well-healed he has moderate anterior soft tissue swelling he does have about 2 cm anterior posterior translation and 90° of flexion.  There is no laxity in extension.  The patella tracks midline he walks with a markedly antalgic gait and appears to have a shift at about 30° of flexion.  The calf is soft is intact to light touch with palpable distal pulses    Radiology:  Xrays 3views left knee (ap,lateral, sunrise) taken previously demonstratinga well positioned cruciate retaining knee replacement without evidence of wear, loosening or osteolysis.  It appears appropriately sized and well positioned    Assessment/Plan:  Persistent severe left knee pain following knee replacement and subsequent polyethylene exchange to a deep dish implant.  He does appear to have flexion instability and perhaps mid flexion instability which is causing his pain.  We had a long discussion I don't think this will resolve with continued therapy given the severity.  Next step would be conversion to a posterior stabilized or varus valgus constraint implant for a femoral component revision.  I explained him that I could not give him any guarantees on the outcome and that despite best interventions he may still have persistent discomfort in fact it may not improve.  Continuation of conservative management vs. TKA discussed.  The patient wishes to proceed with total knee replacement.  At this point the patient has failed the full compliment of conservative treatment and stating complete understanding of the risks/benefits/ anternatives wishes to proceed with surgical treatment.    Risk and benefits of surgery were reviewed.  Including, but not limited to, blood clots or pulmonary embolism, anesthesia risk, infection, fracture, skin/leg numbness, persistent pain/crepitance/popping/catching, failure of the implant, need for future surgeries, hematoma, possible nerve or  blood vessel injury, need for transfusion, and potential risk of stroke,heart attack or death, among others.  The patient understands and wishes to proceed.     It was explained that if tissue has been repaired or reconstructed, there is also an increased chance of failure which may require further management.  Following the completion of the discussion, the patient expressed understanding of this planned course of care, all their questions were answered and consent will be obtained preoperatively.    Operative Plan: left Smith and Nephjayna Oxinium Total Knee Replacement femoral component revision a 1-2 day staywith home health rehab

## 2018-12-07 PROBLEM — M25.562 CHRONIC PAIN OF LEFT KNEE: Status: ACTIVE | Noted: 2018-12-07

## 2018-12-07 PROBLEM — G89.29 CHRONIC PAIN OF LEFT KNEE: Status: ACTIVE | Noted: 2018-12-07

## 2018-12-13 ENCOUNTER — HOSPITAL ENCOUNTER (OUTPATIENT)
Dept: MRI IMAGING | Facility: HOSPITAL | Age: 58
Discharge: HOME | End: 2018-12-13
Attending: INTERNAL MEDICINE | Admitting: INTERNAL MEDICINE

## 2018-12-13 DIAGNOSIS — M54.41 CHRONIC BILATERAL LOW BACK PAIN WITH BILATERAL SCIATICA: ICD-10-CM

## 2018-12-13 DIAGNOSIS — G89.29 CHRONIC BILATERAL LOW BACK PAIN WITH BILATERAL SCIATICA: ICD-10-CM

## 2018-12-13 DIAGNOSIS — M54.42 CHRONIC BILATERAL LOW BACK PAIN WITH BILATERAL SCIATICA: ICD-10-CM

## 2018-12-13 LAB — CREAT BLDA-MCNC: 0.9 MG/DL (ref 0.6–1.3)

## 2018-12-13 PROCEDURE — 72158 MRI LUMBAR SPINE W/O & W/DYE: CPT

## 2018-12-13 PROCEDURE — 0 GADOBENATE DIMEGLUMINE 529 MG/ML SOLUTION: Performed by: INTERNAL MEDICINE

## 2018-12-13 PROCEDURE — A9577 INJ MULTIHANCE: HCPCS | Performed by: INTERNAL MEDICINE

## 2018-12-13 PROCEDURE — 82565 ASSAY OF CREATININE: CPT

## 2018-12-13 RX ADMIN — GADOBENATE DIMEGLUMINE 20 ML: 529 INJECTION, SOLUTION INTRAVENOUS at 08:21

## 2018-12-18 DIAGNOSIS — M47.26 OSTEOARTHRITIS OF SPINE WITH RADICULOPATHY, LUMBAR REGION: Primary | ICD-10-CM

## 2018-12-26 RX ORDER — QUETIAPINE FUMARATE 25 MG/1
TABLET, FILM COATED ORAL
Qty: 90 TABLET | Refills: 0 | Status: SHIPPED | OUTPATIENT
Start: 2018-12-26 | End: 2019-01-25

## 2019-01-15 ENCOUNTER — CONSULT (OUTPATIENT)
Dept: ORTHOPEDIC SURGERY | Facility: CLINIC | Age: 59
End: 2019-01-15

## 2019-01-15 VITALS — TEMPERATURE: 97.8 F | BODY MASS INDEX: 38.65 KG/M2 | WEIGHT: 270 LBS | HEIGHT: 70 IN

## 2019-01-15 DIAGNOSIS — M54.50 LUMBAR SPINE PAIN: Primary | ICD-10-CM

## 2019-01-15 DIAGNOSIS — M54.50 LUMBAR BACK PAIN: ICD-10-CM

## 2019-01-15 PROCEDURE — 99214 OFFICE O/P EST MOD 30 MIN: CPT | Performed by: ORTHOPAEDIC SURGERY

## 2019-01-15 PROCEDURE — 72100 X-RAY EXAM L-S SPINE 2/3 VWS: CPT | Performed by: ORTHOPAEDIC SURGERY

## 2019-01-15 NOTE — PROGRESS NOTES
New patient or new problem visit    Chief Complaint   Patient presents with   • Lumbar Spine - Pain       HPI: He is seen today at request of Dr. Luna for complaints of low back pain.  He has a chronic history of diabetic peripheral neuropathy for which she's been on Lyrica and recently increased his dosage.  This helps her leg pain but not the back.  He has some chronic left knee pain for which upcoming total knee revision is planned by Dr. Fitzgerald.  No balance difficulties bowel or bladder complaints.  He recommends that the knee pain may be causing him to walk differently and aggravating his back.    PFSH: See chart- reviewed    Review of Systems   Constitutional: Negative for chills, fever and unexpected weight change.   HENT: Negative for trouble swallowing and voice change.    Eyes: Negative for visual disturbance.   Respiratory: Negative for cough and shortness of breath.    Cardiovascular: Negative for chest pain and leg swelling.   Gastrointestinal: Negative for abdominal pain, nausea and vomiting.   Endocrine: Negative for cold intolerance and heat intolerance.   Genitourinary: Negative for difficulty urinating, frequency and urgency.   Skin: Negative for rash and wound.   Allergic/Immunologic: Negative for immunocompromised state.   Neurological: Negative for weakness and numbness.   Hematological: Does not bruise/bleed easily.   Psychiatric/Behavioral: Negative for dysphoric mood. The patient is not nervous/anxious.        PE: Constitutional: Vital signs above-noted.  Awake, alert and oriented    Psychiatric: Affect and insight do not appear grossly disturbed.    Pulmonary: Breathing is unlabored, color is good.    Skin: Warm, dry and normal turgor    Cardiac:  pedal pulses intact.  No edema.    Eyesight and hearing appear adequate for examination purposes      Musculoskeletal:  There is mild tenderness to percussion and palpation of the lumbosacral spine. Motion appears undisturbed.  Posture is  unremarkable to coronal and sagittal inspection.    The skin about the area is intact.  There is no palpable or visible deformity.  There is no local spasm.       Neurologic:   Reflexes are absent in the patellae and achilles.   Motor function is undisturbed in quadriceps, EHL, and gastrocnemius      Sensation appears symmetrically intact to light touch   .  In the bilateral lower extremities there is no evidence of atrophy.   Clonus is absent..  Gait appears undisturbed.  SLR test negative      MEDICAL DECISION MAKING    XRAY: Plain film x-rays of lumbar spine show degenerative changes age-appropriate and some loss of lordosis.  No comparison views are available.  MRI scan is reviewed and doesn't show much in way of stenosis of the little disc protrusion at T11 12 is noted.    Other: I spoke with Dr. Fitzgerald the knee revision is for what appears to be fairly clearcut mechanical reasons so there really is little confusion as to any event that the pain being radicular.  I concluded that there would be little value in obtaining a lumbar epidurals on the off chance that that could help  the knee    Impression: He has multilevel lumbar disc degeneration and an easy big muscular gap is overweight as well.  He has a history of degenerative arthritis which doesn't help.  I don't see any obvious evidence of spinal stenosis or at least not significant stenosis and I doubt epidural injections would help.  If Lyrica is helping for peripheral neuropathy I don't think were covering anything upper missing anything by that.    Plan: Commending physical therapy for the back and I will see him as needed

## 2019-01-23 RX ORDER — DULOXETIN HYDROCHLORIDE 60 MG/1
CAPSULE, DELAYED RELEASE ORAL
Qty: 30 CAPSULE | Refills: 1 | Status: SHIPPED | OUTPATIENT
Start: 2019-01-23 | End: 2019-01-25

## 2019-01-25 ENCOUNTER — APPOINTMENT (OUTPATIENT)
Dept: PREADMISSION TESTING | Facility: HOSPITAL | Age: 59
End: 2019-01-25

## 2019-01-25 VITALS
BODY MASS INDEX: 39.22 KG/M2 | WEIGHT: 274 LBS | TEMPERATURE: 98 F | SYSTOLIC BLOOD PRESSURE: 135 MMHG | DIASTOLIC BLOOD PRESSURE: 83 MMHG | HEIGHT: 70 IN | RESPIRATION RATE: 20 BRPM | OXYGEN SATURATION: 95 % | HEART RATE: 91 BPM

## 2019-01-25 DIAGNOSIS — M25.562 CHRONIC PAIN OF LEFT KNEE: ICD-10-CM

## 2019-01-25 DIAGNOSIS — G89.29 CHRONIC PAIN OF LEFT KNEE: ICD-10-CM

## 2019-01-25 LAB
ANION GAP SERPL CALCULATED.3IONS-SCNC: 11.3 MMOL/L
BILIRUB UR QL STRIP: NEGATIVE
BUN BLD-MCNC: 19 MG/DL (ref 8–23)
BUN/CREAT SERPL: 19.4 (ref 7–25)
CALCIUM SPEC-SCNC: 10.3 MG/DL (ref 8.6–10.5)
CHLORIDE SERPL-SCNC: 103 MMOL/L (ref 98–107)
CLARITY UR: CLEAR
CO2 SERPL-SCNC: 29.7 MMOL/L (ref 22–29)
COLOR UR: YELLOW
CREAT BLD-MCNC: 0.98 MG/DL (ref 0.76–1.27)
DEPRECATED RDW RBC AUTO: 53.2 FL (ref 37–54)
ERYTHROCYTE [DISTWIDTH] IN BLOOD BY AUTOMATED COUNT: 15.4 % (ref 11.5–14.5)
GFR SERPL CREATININE-BSD FRML MDRD: 78 ML/MIN/1.73
GLUCOSE BLD-MCNC: 167 MG/DL (ref 65–99)
GLUCOSE UR STRIP-MCNC: ABNORMAL MG/DL
HCT VFR BLD AUTO: 46.1 % (ref 40.4–52.2)
HGB BLD-MCNC: 15.1 G/DL (ref 13.7–17.6)
HGB UR QL STRIP.AUTO: NEGATIVE
KETONES UR QL STRIP: NEGATIVE
LEUKOCYTE ESTERASE UR QL STRIP.AUTO: NEGATIVE
MCH RBC QN AUTO: 31.3 PG (ref 27–32.7)
MCHC RBC AUTO-ENTMCNC: 32.8 G/DL (ref 32.6–36.4)
MCV RBC AUTO: 95.6 FL (ref 79.8–96.2)
NITRITE UR QL STRIP: NEGATIVE
PH UR STRIP.AUTO: 6.5 [PH] (ref 5–8)
PLATELET # BLD AUTO: 213 10*3/MM3 (ref 140–500)
PMV BLD AUTO: 10.3 FL (ref 6–12)
POTASSIUM BLD-SCNC: 4 MMOL/L (ref 3.5–5.2)
PROT UR QL STRIP: NEGATIVE
RBC # BLD AUTO: 4.82 10*6/MM3 (ref 4.6–6)
SODIUM BLD-SCNC: 144 MMOL/L (ref 136–145)
SP GR UR STRIP: >=1.03 (ref 1–1.03)
UROBILINOGEN UR QL STRIP: ABNORMAL
WBC NRBC COR # BLD: 8.6 10*3/MM3 (ref 4.5–10.7)

## 2019-01-25 PROCEDURE — 80048 BASIC METABOLIC PNL TOTAL CA: CPT | Performed by: ORTHOPAEDIC SURGERY

## 2019-01-25 PROCEDURE — 93010 ELECTROCARDIOGRAM REPORT: CPT | Performed by: INTERNAL MEDICINE

## 2019-01-25 PROCEDURE — 81003 URINALYSIS AUTO W/O SCOPE: CPT | Performed by: ORTHOPAEDIC SURGERY

## 2019-01-25 PROCEDURE — 93005 ELECTROCARDIOGRAM TRACING: CPT

## 2019-01-25 PROCEDURE — 36415 COLL VENOUS BLD VENIPUNCTURE: CPT

## 2019-01-25 PROCEDURE — 85027 COMPLETE CBC AUTOMATED: CPT | Performed by: ORTHOPAEDIC SURGERY

## 2019-01-25 RX ORDER — MELOXICAM 15 MG/1
15 TABLET ORAL DAILY
COMMUNITY
End: 2019-11-13

## 2019-01-25 RX ORDER — PREGABALIN 200 MG/1
400 CAPSULE ORAL NIGHTLY
COMMUNITY
End: 2019-05-07 | Stop reason: SDUPTHER

## 2019-01-25 RX ORDER — DULOXETIN HYDROCHLORIDE 60 MG/1
60 CAPSULE, DELAYED RELEASE ORAL DAILY
COMMUNITY
End: 2019-04-01 | Stop reason: SDUPTHER

## 2019-01-25 RX ORDER — PREGABALIN 200 MG/1
200 CAPSULE ORAL DAILY
COMMUNITY
End: 2019-05-10 | Stop reason: SDUPTHER

## 2019-01-25 RX ORDER — QUETIAPINE FUMARATE 25 MG/1
25 TABLET, FILM COATED ORAL NIGHTLY
COMMUNITY
End: 2019-02-15 | Stop reason: SDUPTHER

## 2019-01-25 RX ORDER — CHLORHEXIDINE GLUCONATE 500 MG/1
CLOTH TOPICAL
COMMUNITY
End: 2019-02-08 | Stop reason: HOSPADM

## 2019-01-25 RX ORDER — HYDROCHLOROTHIAZIDE 25 MG/1
25 TABLET ORAL DAILY
COMMUNITY
End: 2019-02-08 | Stop reason: HOSPADM

## 2019-01-25 ASSESSMENT — KOOS JR
KOOS JR SCORE: 50.012
KOOS JR SCORE: 15

## 2019-01-25 NOTE — DISCHARGE INSTRUCTIONS
2% CHLORAHEXIDINE GLUCONATE* CLOTH  Preparing or “prepping” skin before surgery can reduce the risk of infection at the surgical site. To make the process easier, [facility] has chosen disposable cloths moistened with a rinse-free, 2% Chlorhexidine Gluconate (CHG) antiseptic solution. The steps below outline the prepping process and should be carefully followed.        Use the prep cloth on the area that is circled in the diagram             Directions Night before Surgery  1) Shower using a fresh bar of anti-bacterial soap (such as Dial) and clean washcloth.  Use a clean towel to completely dry your skin.  2) Do not use any lotions, oils or creams on your skin.  3) Open the package and remove 1 cloth, wipe your skin for 30 seconds in a circular motion.  Allow to dry for 3 minutes.  4) Repeat #3 with second cloth.  5) Do not touch your eyes, ears, or mouth with the prep cloth.  6) Allow the wet prep solution to air dry.  7) Discard the prep cloth and wash your hands with soap and water.   8) Dress in clean bed clothes and sleep on fresh clean bed sheets.   9) You may experience some temporary itching after the prep.    Directions Day of Surgery  1) Repeat steps 1,2,3,4,5,6,7, and 9.   2) Dress in clean clothes before coming to the hospital.    BACTROBAN NASAL OINTMENT  There are many germs normally in your nose. Bactroban is an ointment that will help reduce these germs. Please follow these instructions for Bactroban use:      ____The day before surgery in the morning  Date________    ____The day before surgery in the evening              Date________    ____The day of surgery in the morning    Date________    **Squirt ½ package of Bactroban Ointment onto a cotton applicator and apply to inside of 1st nostril.  Squirt the remaining Bactroban and apply to the inside of the other nostril.      Take the following medications the morning of surgery with a small sip of water:  AMLODIPINE, METOPROLOL, AND  MARIANA      ARRIVAL TIME 0900 TO MAIN OR         General Instructions:  • Do not eat solid food after midnight the night before surgery.  • You may drink clear liquids day of surgery but must stop at least one hour before your hospital arrival time. (CUTOFF TIME 0800 AM)  • It is beneficial for you to have a clear drink that contains carbohydrates the day of surgery.  We suggest a 12 to 20 ounce bottle of Gatorade or Powerade for non-diabetic patients or a 12 to 20 ounce bottle of G2 or Powerade Zero for diabetic patients. (Pediatric patients, are not advised to drink a 12 to 20 ounce carbohydrate drink)    Clear liquids are liquids you can see through.  Nothing red in color.     Plain water                               Sports drinks  Sodas                                   Gelatin (Jell-O)  Fruit juices without pulp such as white grape juice and apple juice  Popsicles that contain no fruit or yogurt  Tea or coffee (no cream or milk added)  Gatorade / Powerade  G2 / Powerade Zero    • Infants may have breast milk up to four hours before surgery.  • Infants drinking formula may drink formula up to six hours before surgery.   • Patients who avoid smoking, chewing tobacco and alcohol for 4 weeks prior to surgery have a reduced risk of post-operative complications.  Quit smoking as many days before surgery as you can.  • Do not smoke, use chewing tobacco or drink alcohol the day of surgery.   • If applicable bring your C-PAP/ BI-PAP machine.  • Bring any papers given to you in the doctor’s office.  • Wear clean comfortable clothes and socks.  • Do not wear contact lenses or make-up.  Bring a case for your glasses.   • Bring crutches or walker if applicable.  • Remove all piercings.  Leave jewelry and any other valuables at home.  • Hair extensions with metal clips must be removed prior to surgery.  • The Pre-Admission Testing nurse will instruct you to bring medications if unable to obtain an accurate list in  Pre-Admission Testing.            Preventing a Surgical Site Infection:  • For 2 to 3 days before surgery, avoid shaving with a razor because the razor can irritate skin and make it easier to develop an infection.    • Any areas of open skin can increase the risk of a post-operative wound infection by allowing bacteria to enter and travel throughout the body.  Notify your surgeon if you have any skin wounds / rashes even if it is not near the expected surgical site.  The area will need assessed to determine if surgery should be delayed until it is healed.  • The night prior to surgery sleep in a clean bed with clean clothing.  Do not allow pets to sleep with you.  • Shower on the morning of surgery using a fresh bar of anti-bacterial soap (such as Dial) and clean washcloth.  Dry with a clean towel and dress in clean clothing.  • Ask your surgeon if you will be receiving antibiotics prior to surgery.  • Make sure you, your family, and all healthcare providers clean their hands with soap and water or an alcohol based hand  before caring for you or your wound.    Day of surgery:  Upon arrival, a Pre-op nurse and Anesthesiologist will review your health history, obtain vital signs, and answer questions you may have.  The only belongings needed at this time will be your home medications and if applicable your C-PAP/BI-PAP machine.  If you are staying overnight your family can leave the rest of your belongings in the car and bring them to your room later.  A Pre-op nurse will start an IV and you may receive medication in preparation for surgery, including something to help you relax.  Your family will be able to see you in the Pre-op area.  While you are in surgery your family should notify the waiting room  if they leave the waiting room area and provide a contact phone number.    Please be aware that surgery does come with discomfort.  We want to make every effort to control your discomfort so  please discuss any uncontrolled symptoms with your nurse.   Your doctor will most likely have prescribed pain medications.      If you are going home after surgery you will receive individualized written care instructions before being discharged.  A responsible adult must drive you to and from the hospital on the day of your surgery and stay with you for 24 hours.    If you are staying overnight following surgery, you will be transported to your hospital room following the recovery period.  [facility] has all private rooms.    You have received a list of surgical assistants for your reference.  If you have any questions please call Pre-Admission Testing at [phone].  Deductibles and co-payments are collected on the day of service. Please be prepared to pay the required co-pay, deductible or deposit on the day of service as defined by your plan.

## 2019-01-29 RX ORDER — HYDROCHLOROTHIAZIDE 25 MG/1
TABLET ORAL
Qty: 90 TABLET | Refills: 1 | Status: SHIPPED | OUTPATIENT
Start: 2019-01-29 | End: 2019-08-01 | Stop reason: SDUPTHER

## 2019-01-31 ENCOUNTER — OFFICE VISIT (OUTPATIENT)
Dept: ORTHOPEDIC SURGERY | Facility: CLINIC | Age: 59
End: 2019-01-31

## 2019-01-31 VITALS
WEIGHT: 272.6 LBS | HEIGHT: 70 IN | DIASTOLIC BLOOD PRESSURE: 90 MMHG | TEMPERATURE: 97.6 F | SYSTOLIC BLOOD PRESSURE: 138 MMHG | BODY MASS INDEX: 39.03 KG/M2

## 2019-01-31 DIAGNOSIS — M25.562 CHRONIC PAIN OF LEFT KNEE: Primary | ICD-10-CM

## 2019-01-31 DIAGNOSIS — G89.29 CHRONIC PAIN OF LEFT KNEE: Primary | ICD-10-CM

## 2019-01-31 PROCEDURE — 77077 JOINT SURVEY SINGLE VIEW: CPT | Performed by: ORTHOPAEDIC SURGERY

## 2019-01-31 PROCEDURE — S0260 H&P FOR SURGERY: HCPCS | Performed by: NURSE PRACTITIONER

## 2019-01-31 NOTE — H&P
Patient: Aaron Latif    Date of Admission: 2/6/19    YOB: 1960    Medical Record Number: [id number]    Admitting Physician: Dr. Eloy Fitzgerald    Reason for Admission: Left knee revision    History of Present Illness: 58 y.o. male presents with painful left total knee replacement which has not been responsive to the full compliment of conservative measures. Despite conservative attempts, there is still severe, constant activity limiting pain. Given the severity of the pain, the patient has elected to proceed with knee revision    Allergies:   Allergies   Allergen Reactions   • Codeine Other (See Comments)     UNKNOWN         Current Medications:  Scheduled Meds:  PRN Meds:.    PMH:     Past Medical History:   Diagnosis Date   • Anxiety    • Arthritis    • Chest pain     PT STATES CHEST DISCOMFORT, WEAKNESS AND DIZZINESS - DISCUSSED WITH NP, INSTRUCTED TO CALL PCP ASAP TO INFORM HIM OF SYMPTOMS AND UPCOMING SURGERY, PT VERABLIZD UNDERSTANDING    • Diabetic peripheral neuropathy (CMS/HCC)     TYPE 2   • Dizziness    • Erectile dysfunction    • Fatigue    • High cholesterol    • Hypertension    • Hypothyroidism    • Low testosterone    • Neuropathy    • Sleep apnea     WEARS CPAP   • Type 2 diabetes mellitus (CMS/HCC)    • Vitamin D deficiency        PF/Surg/Soc Hx:     Past Surgical History:   Procedure Laterality Date   • ACHILLES TENDON REPAIR Left 2009   • ACHILLES TENDON SURGERY Left 5/22/2018    Procedure: Left Achilles debridement and repair, Achilles tendon lengthening at the calf, partial excision of calcaneus and tendon transfer from great toe to calcaneus;  Surgeon: Maycol Chawla MD;  Location: [facility] OR [facility] [place];  Service: Orthopedics   • EYE SURGERY Left     BASKETBALL INJURY YEARS AGO   • HERNIA REPAIR     • KNEE ARTHROSCOPY Left 7/26/2016    Procedure: KNEE ARTHROSCOPY, PARTIAL medial MENISCECTOMY;  Surgeon: Layton Anderson MD;  Location: [facility] OR [facility] [place];  Service:    • KNEE MINI  "REVISION Left 12/20/2017    Procedure: LEFT TOTAL KNEE ARTHROPLASTY poly change;  Surgeon: Eloy Fitzgerald MD;  Location: [facility] OR;  Service:    • KNEE SURGERY     • NE TOTAL KNEE ARTHROPLASTY Left 12/15/2016    Procedure: TOTAL KNEE ARTHROPLASTY;  Surgeon: Layton Anderson MD;  Location: [facility] OR;  Service: Orthopedics        Social History     Occupational History   • Not on file   Tobacco Use   • Smoking status: Never Smoker   • Smokeless tobacco: Never Used   Substance and Sexual Activity   • Alcohol use: Yes     Comment:  1-2 BEERS/ NIGHT    • Drug use: No   • Sexual activity: Defer      Social History     Social History Narrative   • Not on file        Family History   Problem Relation Age of Onset   • Diabetes Mother    • Hypertension Mother    • Heart disease Father    • Diabetes Brother    • Hypertension Brother    • Hypertension Paternal Uncle    • Malig Hyperthermia Neg Hx          Review of Systems:   A 14 point review of systems was performed, pertinent positives discussed above, all other systems are negative    Physical Exam: 58 y.o. male  Vital Signs :   Vitals:    01/31/19 1310   BP: 138/90   BP Location: Left arm   Patient Position: Sitting   Temp: 97.6 °F (36.4 °C)   TempSrc: Temporal   Weight: 124 kg (272 lb 9.6 oz)   Height: 177.8 cm (70\")     General: Alert and Oriented x 3, No acute distress.  Psych: mood and affect appropriate; recent and remote memory intact  Eyes: conjunctiva clear; pupils equally round and reactive, sclera nonicteric  CV: no peripheral edema  Resp: normal respiratory effort  Skin: no rashes or wounds; normal turgor  Musculosketetal; pain and crepitance with knee range of motion  Vascular: palpable distal pulses    Xrays:  -3 views (AP, lateral, and sunrise) were ordered and reviewed demonstrating previously replaced left knee-A full length AP xray was ordered today for purposes of operative alignment demonstrating previously replaced left knee. There are no " previous full length films for review    Assessment:  Painful left total knee replacement Conservative measures have failed.      Plan:  The plan is to proceed with left femoral knee revision. The patient voiced understanding of the risks, benefits, and alternative forms of treatment that were discussed with Dr Fitzgerald at the time of scheduling.  Patient is planning going home with home health after 1-2 day stay    Della Carrillo, APRN  1/31/2019

## 2019-02-01 ENCOUNTER — TELEPHONE (OUTPATIENT)
Dept: ORTHOPEDIC SURGERY | Facility: CLINIC | Age: 59
End: 2019-02-01

## 2019-02-01 ENCOUNTER — LAB (OUTPATIENT)
Dept: LAB | Facility: HOSPITAL | Age: 59
End: 2019-02-01

## 2019-02-01 DIAGNOSIS — M25.562 CHRONIC PAIN OF LEFT KNEE: ICD-10-CM

## 2019-02-01 DIAGNOSIS — G89.29 CHRONIC PAIN OF LEFT KNEE: ICD-10-CM

## 2019-02-01 LAB
CRP SERPL-MCNC: 0.22 MG/DL (ref 0–0.5)
ERYTHROCYTE [SEDIMENTATION RATE] IN BLOOD: 7 MM/HR (ref 0–20)

## 2019-02-01 PROCEDURE — 86140 C-REACTIVE PROTEIN: CPT

## 2019-02-01 PROCEDURE — 36415 COLL VENOUS BLD VENIPUNCTURE: CPT

## 2019-02-01 PROCEDURE — 85652 RBC SED RATE AUTOMATED: CPT

## 2019-02-01 NOTE — TELEPHONE ENCOUNTER
If patient is having any chest pain or shortness of breath he needs to go to the emergency room immediately!  I never advised the patient to see his primary care physician or contact him about this.  That is an emergency

## 2019-02-01 NOTE — TELEPHONE ENCOUNTER
Received a call from the nurse practitioner at [facility] preadmission testing.  She did check on the patient after he was complaining of some chest discomfort and weakness and slight shortness of breath when he was at the hospital for preop labs.  He was advised by the NP at [facility] to see PCP.  Patient informed her that he feels better and did not go see his PCP.  Message has been sent to [facility]

## 2019-02-05 ENCOUNTER — OFFICE VISIT (OUTPATIENT)
Dept: INTERNAL MEDICINE | Facility: CLINIC | Age: 59
End: 2019-02-05

## 2019-02-05 VITALS
BODY MASS INDEX: 38.94 KG/M2 | WEIGHT: 272 LBS | DIASTOLIC BLOOD PRESSURE: 70 MMHG | HEIGHT: 70 IN | SYSTOLIC BLOOD PRESSURE: 110 MMHG

## 2019-02-05 DIAGNOSIS — E55.9 VITAMIN D DEFICIENCY: Chronic | ICD-10-CM

## 2019-02-05 DIAGNOSIS — Z99.89 OSA ON CPAP: Chronic | ICD-10-CM

## 2019-02-05 DIAGNOSIS — G47.33 OSA ON CPAP: Chronic | ICD-10-CM

## 2019-02-05 DIAGNOSIS — I10 BENIGN ESSENTIAL HTN: Primary | Chronic | ICD-10-CM

## 2019-02-05 DIAGNOSIS — E03.9 PRIMARY HYPOTHYROIDISM: Chronic | ICD-10-CM

## 2019-02-05 DIAGNOSIS — R42 LIGHTHEADEDNESS: Chronic | ICD-10-CM

## 2019-02-05 PROCEDURE — 99214 OFFICE O/P EST MOD 30 MIN: CPT | Performed by: INTERNAL MEDICINE

## 2019-02-05 NOTE — PROGRESS NOTES
Subjective   Aaron Latif is a 59 y.o. male. Presents with a chief complaint of DM2, HTN, BC on CPAP, Hypothyroidism, here for follow up.    History of Present Illness patient had a brief episode of lightheadedness associated with low blood sugar    The following portions of the patient's history were reviewed and updated as appropriate: allergies, current medications, past family history, past medical history, past social history, past surgical history and problem list.    Review of Systems   Constitutional: Negative.    HENT: Negative.    Eyes: Negative.    Respiratory: Negative.    Cardiovascular: Negative.    Gastrointestinal: Negative.    Endocrine: Negative.    Genitourinary: Negative.    Musculoskeletal: Positive for arthralgias.   Skin: Negative.    Allergic/Immunologic: Negative.    Neurological: Negative.    Hematological: Negative.    Psychiatric/Behavioral: Negative.        Objective   Physical Exam   Constitutional: He appears well-developed and well-nourished.   HENT:   Head: Normocephalic and atraumatic.   Eyes: EOM are normal. Pupils are equal, round, and reactive to light.   Neck: Normal range of motion.   Cardiovascular: Normal rate, regular rhythm and normal heart sounds.   Pulmonary/Chest: Effort normal and breath sounds normal.   Abdominal: Soft. Bowel sounds are normal.   Musculoskeletal: Normal range of motion.   Neurological: He is alert.   Skin: Skin is warm.   Psychiatric: He has a normal mood and affect.   Nursing note and vitals reviewed.        Assessment/Plan   Diagnoses and all orders for this visit:    Benign essential HTN  Comments:  well controlled    BC on CPAP  Comments:  well controlled    Vitamin D deficiency  Comments:  well controlled    Primary hypothyroidism  Comments:  well controlled    Lightheadedness  Comments:  secondary to hypoglycemia

## 2019-02-06 ENCOUNTER — ANESTHESIA EVENT (OUTPATIENT)
Dept: PERIOP | Facility: HOSPITAL | Age: 59
End: 2019-02-06

## 2019-02-06 ENCOUNTER — ANESTHESIA (OUTPATIENT)
Dept: PERIOP | Facility: HOSPITAL | Age: 59
End: 2019-02-06

## 2019-02-06 ENCOUNTER — APPOINTMENT (OUTPATIENT)
Dept: GENERAL RADIOLOGY | Facility: HOSPITAL | Age: 59
End: 2019-02-06

## 2019-02-06 ENCOUNTER — HOSPITAL ENCOUNTER (INPATIENT)
Facility: HOSPITAL | Age: 59
LOS: 2 days | Discharge: HOME HEALTH | End: 2019-02-08
Attending: ORTHOPAEDIC SURGERY | Admitting: ORTHOPAEDIC SURGERY

## 2019-02-06 DIAGNOSIS — M25.562 CHRONIC PAIN OF LEFT KNEE: ICD-10-CM

## 2019-02-06 DIAGNOSIS — G89.29 CHRONIC PAIN OF LEFT KNEE: ICD-10-CM

## 2019-02-06 LAB
GLUCOSE BLDC GLUCOMTR-MCNC: 135 MG/DL (ref 70–130)
GLUCOSE BLDC GLUCOMTR-MCNC: 164 MG/DL (ref 70–130)
GLUCOSE BLDC GLUCOMTR-MCNC: 173 MG/DL (ref 70–130)

## 2019-02-06 PROCEDURE — 25010000002 FENTANYL CITRATE (PF) 100 MCG/2ML SOLUTION: Performed by: ANESTHESIOLOGY

## 2019-02-06 PROCEDURE — 25010000002 FENTANYL CITRATE (PF) 100 MCG/2ML SOLUTION: Performed by: NURSE ANESTHETIST, CERTIFIED REGISTERED

## 2019-02-06 PROCEDURE — 25010000002 VANCOMYCIN 10 G RECONSTITUTED SOLUTION: Performed by: ORTHOPAEDIC SURGERY

## 2019-02-06 PROCEDURE — 25010000002 PROPOFOL 10 MG/ML EMULSION: Performed by: NURSE ANESTHETIST, CERTIFIED REGISTERED

## 2019-02-06 PROCEDURE — 27486 REVISE/REPLACE KNEE JOINT: CPT | Performed by: ORTHOPAEDIC SURGERY

## 2019-02-06 PROCEDURE — 25010000003 CEFAZOLIN IN DEXTROSE 2-4 GM/100ML-% SOLUTION: Performed by: ORTHOPAEDIC SURGERY

## 2019-02-06 PROCEDURE — C1713 ANCHOR/SCREW BN/BN,TIS/BN: HCPCS | Performed by: ORTHOPAEDIC SURGERY

## 2019-02-06 PROCEDURE — 25010000002 HYDROMORPHONE PER 4 MG: Performed by: NURSE ANESTHETIST, CERTIFIED REGISTERED

## 2019-02-06 PROCEDURE — 25010000002 ROPIVACAINE PER 1 MG: Performed by: ORTHOPAEDIC SURGERY

## 2019-02-06 PROCEDURE — 73560 X-RAY EXAM OF KNEE 1 OR 2: CPT

## 2019-02-06 PROCEDURE — 25010000002 KETOROLAC TROMETHAMINE PER 15 MG: Performed by: ORTHOPAEDIC SURGERY

## 2019-02-06 PROCEDURE — C1776 JOINT DEVICE (IMPLANTABLE): HCPCS | Performed by: ORTHOPAEDIC SURGERY

## 2019-02-06 PROCEDURE — 0SPD0JZ REMOVAL OF SYNTHETIC SUBSTITUTE FROM LEFT KNEE JOINT, OPEN APPROACH: ICD-10-PCS | Performed by: ORTHOPAEDIC SURGERY

## 2019-02-06 PROCEDURE — 0SRD0J9 REPLACEMENT OF LEFT KNEE JOINT WITH SYNTHETIC SUBSTITUTE, CEMENTED, OPEN APPROACH: ICD-10-PCS | Performed by: ORTHOPAEDIC SURGERY

## 2019-02-06 PROCEDURE — 94799 UNLISTED PULMONARY SVC/PX: CPT

## 2019-02-06 PROCEDURE — 25010000002 DEXAMETHASONE PER 1 MG: Performed by: NURSE ANESTHETIST, CERTIFIED REGISTERED

## 2019-02-06 PROCEDURE — 87070 CULTURE OTHR SPECIMN AEROBIC: CPT | Performed by: ORTHOPAEDIC SURGERY

## 2019-02-06 PROCEDURE — 87075 CULTR BACTERIA EXCEPT BLOOD: CPT | Performed by: ORTHOPAEDIC SURGERY

## 2019-02-06 PROCEDURE — 87205 SMEAR GRAM STAIN: CPT | Performed by: ORTHOPAEDIC SURGERY

## 2019-02-06 PROCEDURE — 25010000002 MIDAZOLAM PER 1 MG: Performed by: ANESTHESIOLOGY

## 2019-02-06 PROCEDURE — 25010000002 KETOROLAC TROMETHAMINE PER 15 MG: Performed by: NURSE ANESTHETIST, CERTIFIED REGISTERED

## 2019-02-06 PROCEDURE — 25010000002 ONDANSETRON PER 1 MG: Performed by: NURSE ANESTHETIST, CERTIFIED REGISTERED

## 2019-02-06 PROCEDURE — 82962 GLUCOSE BLOOD TEST: CPT

## 2019-02-06 DEVICE — CMT BONE PALACOS RPLSG W/GENT HI/VISC 1X40: Type: IMPLANTABLE DEVICE | Site: KNEE | Status: FUNCTIONAL

## 2019-02-06 DEVICE — LEGION SCREW-ON LWEDGE 5MM DISTAL                                    X 5MM POSTERIOR SIZE 6
Type: IMPLANTABLE DEVICE | Site: KNEE | Status: FUNCTIONAL
Brand: LEGION

## 2019-02-06 DEVICE — LEGION OXINIUM CONSTRAINED FEMORAL                                    SIZE 6 LEFT
Type: IMPLANTABLE DEVICE | Site: KNEE | Status: FUNCTIONAL
Brand: LEGION

## 2019-02-06 DEVICE — LEGION PRESSFIT STEM 20MM X 160MM
Type: IMPLANTABLE DEVICE | Site: KNEE | Status: FUNCTIONAL
Brand: LEGION

## 2019-02-06 DEVICE — LEGION CONSTRAINED ARTICULAR                                    INSERT 9MM SIZE 7-8
Type: IMPLANTABLE DEVICE | Site: KNEE | Status: FUNCTIONAL
Brand: LEGION

## 2019-02-06 RX ORDER — KETOROLAC TROMETHAMINE 30 MG/ML
30 INJECTION, SOLUTION INTRAMUSCULAR; INTRAVENOUS EVERY 8 HOURS
Status: DISPENSED | OUTPATIENT
Start: 2019-02-07 | End: 2019-02-08

## 2019-02-06 RX ORDER — SODIUM CHLORIDE 0.9 % (FLUSH) 0.9 %
3-10 SYRINGE (ML) INJECTION AS NEEDED
Status: DISCONTINUED | OUTPATIENT
Start: 2019-02-06 | End: 2019-02-06 | Stop reason: HOSPADM

## 2019-02-06 RX ORDER — MIDAZOLAM HYDROCHLORIDE 1 MG/ML
2 INJECTION INTRAMUSCULAR; INTRAVENOUS
Status: DISCONTINUED | OUTPATIENT
Start: 2019-02-06 | End: 2019-02-06 | Stop reason: HOSPADM

## 2019-02-06 RX ORDER — ROCURONIUM BROMIDE 10 MG/ML
INJECTION, SOLUTION INTRAVENOUS AS NEEDED
Status: DISCONTINUED | OUTPATIENT
Start: 2019-02-06 | End: 2019-02-06 | Stop reason: SURG

## 2019-02-06 RX ORDER — INSULIN GLARGINE 100 [IU]/ML
100 INJECTION, SOLUTION SUBCUTANEOUS EVERY MORNING
Status: DISCONTINUED | OUTPATIENT
Start: 2019-02-07 | End: 2019-02-08 | Stop reason: HOSPADM

## 2019-02-06 RX ORDER — NALOXONE HCL 0.4 MG/ML
0.2 VIAL (ML) INJECTION AS NEEDED
Status: DISCONTINUED | OUTPATIENT
Start: 2019-02-06 | End: 2019-02-06 | Stop reason: HOSPADM

## 2019-02-06 RX ORDER — ACETAMINOPHEN 10 MG/ML
1000 INJECTION, SOLUTION INTRAVENOUS ONCE
Status: COMPLETED | OUTPATIENT
Start: 2019-02-06 | End: 2019-02-06

## 2019-02-06 RX ORDER — UREA 10 %
3 LOTION (ML) TOPICAL NIGHTLY PRN
Status: DISCONTINUED | OUTPATIENT
Start: 2019-02-06 | End: 2019-02-08 | Stop reason: HOSPADM

## 2019-02-06 RX ORDER — ACETAMINOPHEN 325 MG/1
650 TABLET ORAL ONCE AS NEEDED
Status: DISCONTINUED | OUTPATIENT
Start: 2019-02-06 | End: 2019-02-06 | Stop reason: HOSPADM

## 2019-02-06 RX ORDER — ONDANSETRON 2 MG/ML
4 INJECTION INTRAMUSCULAR; INTRAVENOUS ONCE AS NEEDED
Status: DISCONTINUED | OUTPATIENT
Start: 2019-02-06 | End: 2019-02-06 | Stop reason: HOSPADM

## 2019-02-06 RX ORDER — MELOXICAM 15 MG/1
15 TABLET ORAL ONCE
Status: COMPLETED | OUTPATIENT
Start: 2019-02-06 | End: 2019-02-06

## 2019-02-06 RX ORDER — SODIUM CHLORIDE 0.9 % (FLUSH) 0.9 %
3 SYRINGE (ML) INJECTION EVERY 12 HOURS SCHEDULED
Status: DISCONTINUED | OUTPATIENT
Start: 2019-02-06 | End: 2019-02-06 | Stop reason: HOSPADM

## 2019-02-06 RX ORDER — CEFAZOLIN SODIUM 2 G/100ML
2 INJECTION, SOLUTION INTRAVENOUS EVERY 8 HOURS
Status: COMPLETED | OUTPATIENT
Start: 2019-02-06 | End: 2019-02-07

## 2019-02-06 RX ORDER — FAMOTIDINE 10 MG/ML
20 INJECTION, SOLUTION INTRAVENOUS
Status: DISCONTINUED | OUTPATIENT
Start: 2019-02-06 | End: 2019-02-06 | Stop reason: HOSPADM

## 2019-02-06 RX ORDER — DIPHENHYDRAMINE HYDROCHLORIDE 50 MG/ML
12.5 INJECTION INTRAMUSCULAR; INTRAVENOUS
Status: DISCONTINUED | OUTPATIENT
Start: 2019-02-06 | End: 2019-02-06 | Stop reason: HOSPADM

## 2019-02-06 RX ORDER — HYDROMORPHONE HCL 110MG/55ML
PATIENT CONTROLLED ANALGESIA SYRINGE INTRAVENOUS AS NEEDED
Status: DISCONTINUED | OUTPATIENT
Start: 2019-02-06 | End: 2019-02-06 | Stop reason: SURG

## 2019-02-06 RX ORDER — DIPHENHYDRAMINE HCL 25 MG
25 CAPSULE ORAL
Status: DISCONTINUED | OUTPATIENT
Start: 2019-02-06 | End: 2019-02-06 | Stop reason: HOSPADM

## 2019-02-06 RX ORDER — METOPROLOL SUCCINATE 50 MG/1
50 TABLET, EXTENDED RELEASE ORAL DAILY
Status: DISCONTINUED | OUTPATIENT
Start: 2019-02-07 | End: 2019-02-08 | Stop reason: HOSPADM

## 2019-02-06 RX ORDER — MELOXICAM 15 MG/1
15 TABLET ORAL DAILY
Status: DISCONTINUED | OUTPATIENT
Start: 2019-02-07 | End: 2019-02-08 | Stop reason: HOSPADM

## 2019-02-06 RX ORDER — ASPIRIN 325 MG
325 TABLET, DELAYED RELEASE (ENTERIC COATED) ORAL EVERY 12 HOURS SCHEDULED
Status: DISCONTINUED | OUTPATIENT
Start: 2019-02-07 | End: 2019-02-08 | Stop reason: HOSPADM

## 2019-02-06 RX ORDER — FENTANYL CITRATE 50 UG/ML
50 INJECTION, SOLUTION INTRAMUSCULAR; INTRAVENOUS
Status: DISCONTINUED | OUTPATIENT
Start: 2019-02-06 | End: 2019-02-06 | Stop reason: HOSPADM

## 2019-02-06 RX ORDER — PROMETHAZINE HYDROCHLORIDE 25 MG/1
25 SUPPOSITORY RECTAL ONCE AS NEEDED
Status: DISCONTINUED | OUTPATIENT
Start: 2019-02-06 | End: 2019-02-06 | Stop reason: HOSPADM

## 2019-02-06 RX ORDER — ONDANSETRON 4 MG/1
4 TABLET, ORALLY DISINTEGRATING ORAL EVERY 6 HOURS PRN
Status: DISCONTINUED | OUTPATIENT
Start: 2019-02-06 | End: 2019-02-08 | Stop reason: HOSPADM

## 2019-02-06 RX ORDER — AMLODIPINE BESYLATE 10 MG/1
10 TABLET ORAL DAILY
Status: DISCONTINUED | OUTPATIENT
Start: 2019-02-07 | End: 2019-02-08 | Stop reason: HOSPADM

## 2019-02-06 RX ORDER — LABETALOL HYDROCHLORIDE 5 MG/ML
5 INJECTION, SOLUTION INTRAVENOUS
Status: DISCONTINUED | OUTPATIENT
Start: 2019-02-06 | End: 2019-02-06 | Stop reason: HOSPADM

## 2019-02-06 RX ORDER — DEXAMETHASONE SODIUM PHOSPHATE 10 MG/ML
INJECTION INTRAMUSCULAR; INTRAVENOUS AS NEEDED
Status: DISCONTINUED | OUTPATIENT
Start: 2019-02-06 | End: 2019-02-06 | Stop reason: SURG

## 2019-02-06 RX ORDER — ONDANSETRON 2 MG/ML
INJECTION INTRAMUSCULAR; INTRAVENOUS AS NEEDED
Status: DISCONTINUED | OUTPATIENT
Start: 2019-02-06 | End: 2019-02-06 | Stop reason: SURG

## 2019-02-06 RX ORDER — HYDROCODONE BITARTRATE AND ACETAMINOPHEN 7.5; 325 MG/1; MG/1
1 TABLET ORAL EVERY 4 HOURS PRN
Status: DISCONTINUED | OUTPATIENT
Start: 2019-02-06 | End: 2019-02-08

## 2019-02-06 RX ORDER — ACETAMINOPHEN 325 MG/1
325 TABLET ORAL EVERY 4 HOURS PRN
Status: DISCONTINUED | OUTPATIENT
Start: 2019-02-06 | End: 2019-02-08 | Stop reason: HOSPADM

## 2019-02-06 RX ORDER — HYDROCODONE BITARTRATE AND ACETAMINOPHEN 7.5; 325 MG/1; MG/1
2 TABLET ORAL EVERY 4 HOURS PRN
Status: DISCONTINUED | OUTPATIENT
Start: 2019-02-06 | End: 2019-02-08

## 2019-02-06 RX ORDER — PROMETHAZINE HYDROCHLORIDE 25 MG/ML
12.5 INJECTION, SOLUTION INTRAMUSCULAR; INTRAVENOUS ONCE AS NEEDED
Status: DISCONTINUED | OUTPATIENT
Start: 2019-02-06 | End: 2019-02-06 | Stop reason: HOSPADM

## 2019-02-06 RX ORDER — HYDRALAZINE HYDROCHLORIDE 20 MG/ML
5 INJECTION INTRAMUSCULAR; INTRAVENOUS
Status: DISCONTINUED | OUTPATIENT
Start: 2019-02-06 | End: 2019-02-06 | Stop reason: HOSPADM

## 2019-02-06 RX ORDER — HYDROCHLOROTHIAZIDE 25 MG/1
25 TABLET ORAL EVERY MORNING
Status: DISCONTINUED | OUTPATIENT
Start: 2019-02-07 | End: 2019-02-06 | Stop reason: SDUPTHER

## 2019-02-06 RX ORDER — CEFAZOLIN SODIUM 2 G/100ML
2 INJECTION, SOLUTION INTRAVENOUS ONCE
Status: COMPLETED | OUTPATIENT
Start: 2019-02-06 | End: 2019-02-06

## 2019-02-06 RX ORDER — ONDANSETRON 4 MG/1
4 TABLET, FILM COATED ORAL EVERY 6 HOURS PRN
Status: DISCONTINUED | OUTPATIENT
Start: 2019-02-06 | End: 2019-02-08 | Stop reason: HOSPADM

## 2019-02-06 RX ORDER — KETOROLAC TROMETHAMINE 30 MG/ML
INJECTION, SOLUTION INTRAMUSCULAR; INTRAVENOUS AS NEEDED
Status: DISCONTINUED | OUTPATIENT
Start: 2019-02-06 | End: 2019-02-06 | Stop reason: SURG

## 2019-02-06 RX ORDER — TRANEXAMIC ACID 100 MG/ML
INJECTION, SOLUTION INTRAVENOUS AS NEEDED
Status: DISCONTINUED | OUTPATIENT
Start: 2019-02-06 | End: 2019-02-06 | Stop reason: SURG

## 2019-02-06 RX ORDER — PREGABALIN 75 MG/1
150 CAPSULE ORAL ONCE
Status: DISCONTINUED | OUTPATIENT
Start: 2019-02-06 | End: 2019-02-06 | Stop reason: HOSPADM

## 2019-02-06 RX ORDER — FLUMAZENIL 0.1 MG/ML
0.2 INJECTION INTRAVENOUS AS NEEDED
Status: DISCONTINUED | OUTPATIENT
Start: 2019-02-06 | End: 2019-02-06 | Stop reason: HOSPADM

## 2019-02-06 RX ORDER — HYDROMORPHONE HYDROCHLORIDE 1 MG/ML
0.5 INJECTION, SOLUTION INTRAMUSCULAR; INTRAVENOUS; SUBCUTANEOUS
Status: DISCONTINUED | OUTPATIENT
Start: 2019-02-06 | End: 2019-02-06 | Stop reason: HOSPADM

## 2019-02-06 RX ORDER — SODIUM CHLORIDE, SODIUM LACTATE, POTASSIUM CHLORIDE, CALCIUM CHLORIDE 600; 310; 30; 20 MG/100ML; MG/100ML; MG/100ML; MG/100ML
9 INJECTION, SOLUTION INTRAVENOUS CONTINUOUS PRN
Status: DISCONTINUED | OUTPATIENT
Start: 2019-02-06 | End: 2019-02-06 | Stop reason: HOSPADM

## 2019-02-06 RX ORDER — MIDAZOLAM HYDROCHLORIDE 1 MG/ML
1 INJECTION INTRAMUSCULAR; INTRAVENOUS
Status: DISCONTINUED | OUTPATIENT
Start: 2019-02-06 | End: 2019-02-06 | Stop reason: HOSPADM

## 2019-02-06 RX ORDER — HYDROCODONE BITARTRATE AND ACETAMINOPHEN 7.5; 325 MG/1; MG/1
1 TABLET ORAL ONCE AS NEEDED
Status: DISCONTINUED | OUTPATIENT
Start: 2019-02-06 | End: 2019-02-06 | Stop reason: HOSPADM

## 2019-02-06 RX ORDER — OXYCODONE AND ACETAMINOPHEN 7.5; 325 MG/1; MG/1
1 TABLET ORAL ONCE AS NEEDED
Status: DISCONTINUED | OUTPATIENT
Start: 2019-02-06 | End: 2019-02-06 | Stop reason: HOSPADM

## 2019-02-06 RX ORDER — DOCUSATE SODIUM 100 MG/1
100 CAPSULE, LIQUID FILLED ORAL 2 TIMES DAILY
Status: DISCONTINUED | OUTPATIENT
Start: 2019-02-06 | End: 2019-02-08 | Stop reason: HOSPADM

## 2019-02-06 RX ORDER — HYDROCHLOROTHIAZIDE 25 MG/1
25 TABLET ORAL DAILY
Status: DISCONTINUED | OUTPATIENT
Start: 2019-02-07 | End: 2019-02-08 | Stop reason: HOSPADM

## 2019-02-06 RX ORDER — ONDANSETRON 2 MG/ML
4 INJECTION INTRAMUSCULAR; INTRAVENOUS EVERY 6 HOURS PRN
Status: DISCONTINUED | OUTPATIENT
Start: 2019-02-06 | End: 2019-02-08 | Stop reason: HOSPADM

## 2019-02-06 RX ORDER — DULOXETIN HYDROCHLORIDE 60 MG/1
60 CAPSULE, DELAYED RELEASE ORAL DAILY
Status: DISCONTINUED | OUTPATIENT
Start: 2019-02-07 | End: 2019-02-08 | Stop reason: HOSPADM

## 2019-02-06 RX ORDER — PIOGLITAZONEHYDROCHLORIDE 30 MG/1
30 TABLET ORAL DAILY
Status: DISCONTINUED | OUTPATIENT
Start: 2019-02-07 | End: 2019-02-08 | Stop reason: HOSPADM

## 2019-02-06 RX ORDER — EPHEDRINE SULFATE 50 MG/ML
5 INJECTION, SOLUTION INTRAVENOUS ONCE AS NEEDED
Status: DISCONTINUED | OUTPATIENT
Start: 2019-02-06 | End: 2019-02-06 | Stop reason: HOSPADM

## 2019-02-06 RX ORDER — WOUND DRESSING ADHESIVE - LIQUID
LIQUID MISCELLANEOUS AS NEEDED
Status: DISCONTINUED | OUTPATIENT
Start: 2019-02-06 | End: 2019-02-06 | Stop reason: HOSPADM

## 2019-02-06 RX ORDER — FENTANYL CITRATE 50 UG/ML
INJECTION, SOLUTION INTRAMUSCULAR; INTRAVENOUS AS NEEDED
Status: DISCONTINUED | OUTPATIENT
Start: 2019-02-06 | End: 2019-02-06 | Stop reason: SURG

## 2019-02-06 RX ORDER — PROPOFOL 10 MG/ML
VIAL (ML) INTRAVENOUS AS NEEDED
Status: DISCONTINUED | OUTPATIENT
Start: 2019-02-06 | End: 2019-02-06 | Stop reason: SURG

## 2019-02-06 RX ORDER — PREGABALIN 100 MG/1
400 CAPSULE ORAL NIGHTLY
Status: DISCONTINUED | OUTPATIENT
Start: 2019-02-06 | End: 2019-02-08 | Stop reason: HOSPADM

## 2019-02-06 RX ORDER — PROMETHAZINE HYDROCHLORIDE 25 MG/1
25 TABLET ORAL ONCE AS NEEDED
Status: DISCONTINUED | OUTPATIENT
Start: 2019-02-06 | End: 2019-02-06 | Stop reason: HOSPADM

## 2019-02-06 RX ORDER — LIDOCAINE HYDROCHLORIDE 20 MG/ML
INJECTION, SOLUTION INFILTRATION; PERINEURAL AS NEEDED
Status: DISCONTINUED | OUTPATIENT
Start: 2019-02-06 | End: 2019-02-06 | Stop reason: SURG

## 2019-02-06 RX ORDER — QUETIAPINE FUMARATE 25 MG/1
25 TABLET, FILM COATED ORAL NIGHTLY
Status: DISCONTINUED | OUTPATIENT
Start: 2019-02-06 | End: 2019-02-08 | Stop reason: HOSPADM

## 2019-02-06 RX ORDER — PREGABALIN 100 MG/1
200 CAPSULE ORAL DAILY
Status: DISCONTINUED | OUTPATIENT
Start: 2019-02-07 | End: 2019-02-08 | Stop reason: HOSPADM

## 2019-02-06 RX ORDER — FENTANYL CITRATE 50 UG/ML
INJECTION, SOLUTION INTRAMUSCULAR; INTRAVENOUS
Status: COMPLETED | OUTPATIENT
Start: 2019-02-06 | End: 2019-02-06

## 2019-02-06 RX ORDER — LEVOTHYROXINE SODIUM 0.07 MG/1
75 TABLET ORAL
Status: DISCONTINUED | OUTPATIENT
Start: 2019-02-07 | End: 2019-02-08 | Stop reason: HOSPADM

## 2019-02-06 RX ORDER — MAGNESIUM HYDROXIDE 1200 MG/15ML
LIQUID ORAL AS NEEDED
Status: DISCONTINUED | OUTPATIENT
Start: 2019-02-06 | End: 2019-02-06 | Stop reason: HOSPADM

## 2019-02-06 RX ADMIN — HYDROCODONE BITARTRATE AND ACETAMINOPHEN 2 TABLET: 7.5; 325 TABLET ORAL at 22:31

## 2019-02-06 RX ADMIN — HYDROMORPHONE HYDROCHLORIDE 0.5 MG: 1 INJECTION, SOLUTION INTRAMUSCULAR; INTRAVENOUS; SUBCUTANEOUS at 19:30

## 2019-02-06 RX ADMIN — MIDAZOLAM 1 MG: 1 INJECTION INTRAMUSCULAR; INTRAVENOUS at 12:49

## 2019-02-06 RX ADMIN — MELOXICAM 15 MG: 15 TABLET ORAL at 12:28

## 2019-02-06 RX ADMIN — TRANEXAMIC ACID 1000 MG: 100 INJECTION, SOLUTION INTRAVENOUS at 17:13

## 2019-02-06 RX ADMIN — HYDROMORPHONE HYDROCHLORIDE 0.5 MG: 2 INJECTION INTRAMUSCULAR; INTRAVENOUS; SUBCUTANEOUS at 17:37

## 2019-02-06 RX ADMIN — SODIUM CHLORIDE, POTASSIUM CHLORIDE, SODIUM LACTATE AND CALCIUM CHLORIDE: 600; 310; 30; 20 INJECTION, SOLUTION INTRAVENOUS at 16:49

## 2019-02-06 RX ADMIN — PROPOFOL 200 MG: 10 INJECTION, EMULSION INTRAVENOUS at 15:21

## 2019-02-06 RX ADMIN — LIDOCAINE HYDROCHLORIDE 100 MG: 20 INJECTION, SOLUTION INFILTRATION; PERINEURAL at 15:21

## 2019-02-06 RX ADMIN — FENTANYL CITRATE 100 MCG: 50 INJECTION INTRAMUSCULAR; INTRAVENOUS at 15:13

## 2019-02-06 RX ADMIN — SODIUM CHLORIDE, POTASSIUM CHLORIDE, SODIUM LACTATE AND CALCIUM CHLORIDE 500 ML: 600; 310; 30; 20 INJECTION, SOLUTION INTRAVENOUS at 12:49

## 2019-02-06 RX ADMIN — PREGABALIN 400 MG: 100 CAPSULE ORAL at 22:31

## 2019-02-06 RX ADMIN — CEFAZOLIN SODIUM 2 G: 2 INJECTION, SOLUTION INTRAVENOUS at 22:31

## 2019-02-06 RX ADMIN — TRANEXAMIC ACID 1000 MG: 100 INJECTION, SOLUTION INTRAVENOUS at 15:34

## 2019-02-06 RX ADMIN — SODIUM CHLORIDE, POTASSIUM CHLORIDE, SODIUM LACTATE AND CALCIUM CHLORIDE 9 ML/HR: 600; 310; 30; 20 INJECTION, SOLUTION INTRAVENOUS at 13:13

## 2019-02-06 RX ADMIN — MIDAZOLAM 2 MG: 1 INJECTION INTRAMUSCULAR; INTRAVENOUS at 13:12

## 2019-02-06 RX ADMIN — FENTANYL CITRATE 50 MCG: 50 INJECTION INTRAMUSCULAR; INTRAVENOUS at 13:14

## 2019-02-06 RX ADMIN — SUGAMMADEX 400 MG: 100 INJECTION, SOLUTION INTRAVENOUS at 17:33

## 2019-02-06 RX ADMIN — ROCURONIUM BROMIDE 20 MG: 10 INJECTION INTRAVENOUS at 16:49

## 2019-02-06 RX ADMIN — CEFAZOLIN SODIUM 2 G: 2 INJECTION, SOLUTION INTRAVENOUS at 15:13

## 2019-02-06 RX ADMIN — ROCURONIUM BROMIDE 40 MG: 10 INJECTION INTRAVENOUS at 15:21

## 2019-02-06 RX ADMIN — MUPIROCIN 10 APPLICATION: 20 OINTMENT TOPICAL at 22:30

## 2019-02-06 RX ADMIN — QUETIAPINE FUMARATE 25 MG: 25 TABLET ORAL at 22:30

## 2019-02-06 RX ADMIN — FAMOTIDINE 20 MG: 10 INJECTION, SOLUTION INTRAVENOUS at 12:49

## 2019-02-06 RX ADMIN — SODIUM CHLORIDE, POTASSIUM CHLORIDE, SODIUM LACTATE AND CALCIUM CHLORIDE: 600; 310; 30; 20 INJECTION, SOLUTION INTRAVENOUS at 15:12

## 2019-02-06 RX ADMIN — ACETAMINOPHEN 1000 MG: 10 INJECTION, SOLUTION INTRAVENOUS at 15:18

## 2019-02-06 RX ADMIN — KETOROLAC TROMETHAMINE 30 MG: 30 INJECTION, SOLUTION INTRAMUSCULAR; INTRAVENOUS at 17:35

## 2019-02-06 RX ADMIN — DEXAMETHASONE SODIUM PHOSPHATE 8 MG: 10 INJECTION INTRAMUSCULAR; INTRAVENOUS at 15:36

## 2019-02-06 RX ADMIN — ONDANSETRON 4 MG: 2 INJECTION INTRAMUSCULAR; INTRAVENOUS at 17:18

## 2019-02-06 RX ADMIN — HYDROMORPHONE HYDROCHLORIDE 0.5 MG: 2 INJECTION INTRAMUSCULAR; INTRAVENOUS; SUBCUTANEOUS at 17:18

## 2019-02-06 RX ADMIN — VANCOMYCIN HYDROCHLORIDE 1750 MG: 10 INJECTION, POWDER, LYOPHILIZED, FOR SOLUTION INTRAVENOUS at 12:28

## 2019-02-06 NOTE — ANESTHESIA PROCEDURE NOTES
Airway  Urgency: elective    Date/Time: 2/6/2019 3:24 PM  Airway not difficult    General Information and Staff    Patient location during procedure: OR  Anesthesiologist: Spenser Castro MD  CRNA: Ashley Marquez CRNA    Indications and Patient Condition  Indications for airway management: airway protection    Preoxygenated: yes  MILS not maintained throughout  Mask difficulty assessment: 1 - vent by mask    Final Airway Details  Final airway type: endotracheal airway      Successful airway: ETT  Cuffed: yes   Successful intubation technique: direct laryngoscopy  Endotracheal tube insertion site: oral  Blade: Davis  Blade size: 3  ETT size (mm): 8.0  Cormack-Lehane Classification: grade I - full view of glottis  Placement verified by: chest auscultation and capnometry   Cuff volume (mL): 8  Measured from: lips  ETT to lips (cm): 22  Number of attempts at approach: 1    Additional Comments  Pt preoxygenated prior to induction, easy mask airway, atraumatic intubation,+ ETCO2, + bs bilat,  ETT secured and connected to ventilator.

## 2019-02-06 NOTE — ANESTHESIA PREPROCEDURE EVALUATION
Anesthesia Evaluation     Patient summary reviewed   NPO Solid Status: > 8 hours             Airway   Mallampati: II  No difficulty expected  Dental      Pulmonary    (+) sleep apnea,   Cardiovascular     ECG reviewed  Rhythm: regular    (+) hypertension,       Neuro/Psych  GI/Hepatic/Renal/Endo    (+) obesity,   diabetes mellitus, hypothyroidism,     Musculoskeletal     Abdominal    Substance History      OB/GYN          Other                        Anesthesia Plan    ASA 3     general     Anesthetic plan, all risks, benefits, and alternatives have been provided, discussed and informed consent has been obtained with: patient.  Use of blood products discussed with patient .

## 2019-02-07 LAB
GLUCOSE BLDC GLUCOMTR-MCNC: 134 MG/DL (ref 70–130)
GLUCOSE BLDC GLUCOMTR-MCNC: 162 MG/DL (ref 70–130)
GLUCOSE BLDC GLUCOMTR-MCNC: 207 MG/DL (ref 70–130)
GLUCOSE BLDC GLUCOMTR-MCNC: 217 MG/DL (ref 70–130)
HCT VFR BLD AUTO: 41.9 % (ref 40.4–52.2)
HGB BLD-MCNC: 13.5 G/DL (ref 13.7–17.6)

## 2019-02-07 PROCEDURE — 82962 GLUCOSE BLOOD TEST: CPT

## 2019-02-07 PROCEDURE — 85018 HEMOGLOBIN: CPT | Performed by: ORTHOPAEDIC SURGERY

## 2019-02-07 PROCEDURE — 63710000001 INSULIN GLARGINE PER 5 UNITS: Performed by: ORTHOPAEDIC SURGERY

## 2019-02-07 PROCEDURE — 25010000002 KETOROLAC TROMETHAMINE PER 15 MG: Performed by: ORTHOPAEDIC SURGERY

## 2019-02-07 PROCEDURE — 25010000003 CEFAZOLIN IN DEXTROSE 2-4 GM/100ML-% SOLUTION: Performed by: ORTHOPAEDIC SURGERY

## 2019-02-07 PROCEDURE — 99024 POSTOP FOLLOW-UP VISIT: CPT | Performed by: NURSE PRACTITIONER

## 2019-02-07 PROCEDURE — 85014 HEMATOCRIT: CPT | Performed by: ORTHOPAEDIC SURGERY

## 2019-02-07 PROCEDURE — 97161 PT EVAL LOW COMPLEX 20 MIN: CPT

## 2019-02-07 RX ORDER — HYDROCODONE BITARTRATE AND ACETAMINOPHEN 7.5; 325 MG/1; MG/1
2 TABLET ORAL EVERY 4 HOURS PRN
Qty: 60 TABLET | Refills: 0 | Status: SHIPPED | OUTPATIENT
Start: 2019-02-07 | End: 2019-02-16

## 2019-02-07 RX ORDER — PSEUDOEPHEDRINE HCL 30 MG
100 TABLET ORAL 2 TIMES DAILY
Qty: 27 EACH | Refills: 0 | Status: SHIPPED | OUTPATIENT
Start: 2019-02-07 | End: 2019-02-21

## 2019-02-07 RX ORDER — ONDANSETRON 4 MG/1
4 TABLET, FILM COATED ORAL EVERY 6 HOURS PRN
Qty: 10 TABLET | Refills: 0 | Status: SHIPPED | OUTPATIENT
Start: 2019-02-07 | End: 2020-02-18

## 2019-02-07 RX ADMIN — PREGABALIN 200 MG: 100 CAPSULE ORAL at 08:33

## 2019-02-07 RX ADMIN — ASPIRIN 325 MG: 325 TABLET, DELAYED RELEASE ORAL at 08:33

## 2019-02-07 RX ADMIN — MELOXICAM 15 MG: 15 TABLET ORAL at 09:19

## 2019-02-07 RX ADMIN — DOCUSATE SODIUM 100 MG: 100 CAPSULE, LIQUID FILLED ORAL at 08:34

## 2019-02-07 RX ADMIN — PIOGLITAZONE 30 MG: 30 TABLET ORAL at 08:33

## 2019-02-07 RX ADMIN — QUETIAPINE FUMARATE 25 MG: 25 TABLET ORAL at 20:31

## 2019-02-07 RX ADMIN — KETOROLAC TROMETHAMINE 30 MG: 30 INJECTION, SOLUTION INTRAMUSCULAR at 01:44

## 2019-02-07 RX ADMIN — POLYETHYLENE GLYCOL 3350 17 G: 17 POWDER, FOR SOLUTION ORAL at 08:34

## 2019-02-07 RX ADMIN — ASPIRIN 325 MG: 325 TABLET, DELAYED RELEASE ORAL at 20:32

## 2019-02-07 RX ADMIN — HYDROCODONE BITARTRATE AND ACETAMINOPHEN 2 TABLET: 7.5; 325 TABLET ORAL at 20:31

## 2019-02-07 RX ADMIN — LEVOTHYROXINE SODIUM 75 MCG: 75 TABLET ORAL at 06:51

## 2019-02-07 RX ADMIN — PREGABALIN 400 MG: 100 CAPSULE ORAL at 20:31

## 2019-02-07 RX ADMIN — DULOXETINE HYDROCHLORIDE 60 MG: 60 CAPSULE, DELAYED RELEASE ORAL at 08:33

## 2019-02-07 RX ADMIN — CEFAZOLIN SODIUM 2 G: 2 INJECTION, SOLUTION INTRAVENOUS at 06:27

## 2019-02-07 RX ADMIN — HYDROCODONE BITARTRATE AND ACETAMINOPHEN 2 TABLET: 7.5; 325 TABLET ORAL at 03:00

## 2019-02-07 RX ADMIN — DOCUSATE SODIUM 100 MG: 100 CAPSULE, LIQUID FILLED ORAL at 20:32

## 2019-02-07 RX ADMIN — HYDROCODONE BITARTRATE AND ACETAMINOPHEN 2 TABLET: 7.5; 325 TABLET ORAL at 16:21

## 2019-02-07 RX ADMIN — METFORMIN HYDROCHLORIDE: 500 TABLET, EXTENDED RELEASE ORAL at 08:33

## 2019-02-07 RX ADMIN — INSULIN GLARGINE 100 UNITS: 100 INJECTION, SOLUTION SUBCUTANEOUS at 08:34

## 2019-02-07 RX ADMIN — HYDROCODONE BITARTRATE AND ACETAMINOPHEN 2 TABLET: 7.5; 325 TABLET ORAL at 06:51

## 2019-02-07 RX ADMIN — HYDROCODONE BITARTRATE AND ACETAMINOPHEN 2 TABLET: 7.5; 325 TABLET ORAL at 11:14

## 2019-02-07 RX ADMIN — METOPROLOL SUCCINATE 50 MG: 50 TABLET, FILM COATED, EXTENDED RELEASE ORAL at 08:33

## 2019-02-07 NOTE — PLAN OF CARE
Problem: Patient Care Overview  Goal: Plan of Care Review  Outcome: Ongoing (interventions implemented as appropriate)   02/07/19 1721   Coping/Psychosocial   Plan of Care Reviewed With patient   OTHER   Outcome Summary A&OX4, UP TO CHAIR, AMBULATING, WORKED C PT X1- 2ND PT HELD PER MD D/T BLEEDING, ANTONIO DSG CHANGED TODAY D/T COPIOUS DRAINAGE- DSG REINFORCED X2, REINFORCE PRN, VSS, NVI, VOIDING, PAIN CONTROLLED, ADEQUATE PO INTAKE   Plan of Care Review   Progress improving     Goal: Individualization and Mutuality  Outcome: Ongoing (interventions implemented as appropriate)    Goal: Discharge Needs Assessment  Outcome: Ongoing (interventions implemented as appropriate)    Goal: Interprofessional Rounds/Family Conf  Outcome: Ongoing (interventions implemented as appropriate)      Problem: Knee Arthroplasty (Total, Partial) (Adult)  Goal: Signs and Symptoms of Listed Potential Problems Will be Absent, Minimized or Managed (Knee Arthroplasty)  Outcome: Ongoing (interventions implemented as appropriate)    Goal: Anesthesia/Sedation Recovery  Outcome: Ongoing (interventions implemented as appropriate)      Problem: Fall Risk (Adult)  Goal: Identify Related Risk Factors and Signs and Symptoms  Outcome: Outcome(s) achieved Date Met: 02/07/19 02/07/19 1721   Fall Risk (Adult)   Related Risk Factors (Fall Risk) environment unfamiliar     Goal: Absence of Fall  Outcome: Ongoing (interventions implemented as appropriate)

## 2019-02-07 NOTE — PLAN OF CARE
Problem: Patient Care Overview  Goal: Plan of Care Review  Outcome: Ongoing (interventions implemented as appropriate)   02/07/19 0400   Coping/Psychosocial   Plan of Care Reviewed With patient   OTHER   Outcome Summary VSS, po pain medication and toradol effective for pain control, voiding, ambulated in arguelles, Marta dressing, discussed monitoring blood sugar and blood pressure, possible home today.     Goal: Individualization and Mutuality  Outcome: Ongoing (interventions implemented as appropriate)    Goal: Discharge Needs Assessment  Outcome: Ongoing (interventions implemented as appropriate)      Problem: Knee Arthroplasty (Total, Partial) (Adult)  Goal: Signs and Symptoms of Listed Potential Problems Will be Absent, Minimized or Managed (Knee Arthroplasty)  Outcome: Ongoing (interventions implemented as appropriate)      Problem: Fall Risk (Adult)  Goal: Identify Related Risk Factors and Signs and Symptoms  Outcome: Ongoing (interventions implemented as appropriate)    Goal: Absence of Fall  Outcome: Ongoing (interventions implemented as appropriate)

## 2019-02-07 NOTE — PROGRESS NOTES
Continued Stay Note  [facility]     Patient Name: Aaron Latif  MRN: [id number]  Today's Date: 2/7/2019    Admit Date: 2/6/2019    Discharge Plan     Row Name 02/07/19 1105       Plan    Plan  [facility]    Patient/Family in Agreement with Plan  yes    Plan Comments  Spoke with pt, verified correct information on facesheet and explained the role of CCP. Pt would like to d/c home with [facility], referral given to Kandice with [facility] who states they are able to accept. Plan will be to d/c home with [facility] and family support. No other needs identified.     Final Discharge Disposition Code  06 - home with home health care    Final Note  Pt to d/c home with [facility], notified Fito with [facility] of d/c orders.        Discharge Codes    No documentation.       Expected Discharge Date and Time     Expected Discharge Date Expected Discharge Time    Feb 7, 2019             Cheryl Alvarado RN

## 2019-02-07 NOTE — ANESTHESIA POSTPROCEDURE EVALUATION
"Patient: Aaron Latif    Procedure Summary     Date:  02/06/19 Room / Location:  [facility] OR [address] [place] [address] MAIN OR    Anesthesia Start:  1512 Anesthesia Stop:  1808    Procedure:  TOTAL KNEE ARTHROPLASTY REVISION (Left Knee) Diagnosis:       Chronic pain of left knee      (Chronic pain of left knee [M25.562, G89.29])    Surgeon:  Eloy Fitzgerald MD Provider:  Spenser Castro MD    Anesthesia Type:  general ASA Status:  3          Anesthesia Type: general  Last vitals  BP   126/83 (02/06/19 2016)   Temp   36.1 °C (97 °F) (02/06/19 2016)   Pulse   78 (02/06/19 2016)   Resp   16 (02/06/19 2016)     SpO2   97 % (02/06/19 2016)     Post Anesthesia Care and Evaluation    Patient location during evaluation: bedside  Patient participation: complete - patient participated  Level of consciousness: awake and alert  Pain management: adequate  Airway patency: patent  Anesthetic complications: No anesthetic complications  PONV Status: none  Cardiovascular status: acceptable  Respiratory status: acceptable  Hydration status: acceptable    Comments: /83 (BP Location: Left arm, Patient Position: Lying)   Pulse 78   Temp 36.1 °C (97 °F) (Oral)   Resp 16   Ht 177.8 cm (70\")   Wt 124 kg (272 lb 4.3 oz)   SpO2 97%   BMI 39.07 kg/m²         "

## 2019-02-07 NOTE — THERAPY DISCHARGE NOTE
Acute Care - Physical Therapy Initial Eval/Discharge  [facility]     Patient Name: Aaron Latif  : 1960  MRN: [id number]  Today's Date: 2019   Onset of Illness/Injury or Date of Surgery: 19     Referring Physician: Amilcar       Admit Date: 2019    Visit Dx:    ICD-10-CM ICD-9-CM   1. Chronic pain of left knee M25.562 719.46    G89.29 338.29     Patient Active Problem List   Diagnosis   • Chronic fatigue   • Erectile dysfunction   • Low testosterone   • Morbid obesity due to excess calories (CMS/HCC)   • Vitamin D deficiency   • Dyslipidemia   • Shortness of breath   • Benign essential HTN   • Pityriasis versicolor   • CB on CPAP   • Umbilical hernia without obstruction or gangrene   • Diabetes mellitus type 2, uncontrolled (CMS/HCC)   • Diabetic peripheral neuropathy (CMS/HCC)   • ED (erectile dysfunction) of non-organic origin   • Essential hypertriglyceridemia   • Osteoarthritis of left knee   • Status post total knee replacement, left   • Primary osteoarthritis of left knee   • OA (osteoarthritis) of knee   • Calcific Achilles tendonitis   • Chronic heel pain, left   • Tosin's deformity of left heel   • Achilles tendon tear, left, initial encounter   • Primary hypothyroidism   • Chronic bilateral low back pain with bilateral sciatica   • Chronic pain of left knee     Past Medical History:   Diagnosis Date   • Anxiety    • Arthritis    • Diabetic peripheral neuropathy (CMS/HCC)     TYPE 2   • Dizziness    • Erectile dysfunction    • Fatigue    • High cholesterol    • Hypertension    • Hypothyroidism    • Low testosterone    • Neuropathy    • Sleep apnea     WEARS CPAP   • Type 2 diabetes mellitus (CMS/HCC)    • Vitamin D deficiency      Past Surgical History:   Procedure Laterality Date   • ACHILLES TENDON REPAIR Left    • ACHILLES TENDON SURGERY Left 2018    Procedure: Left Achilles debridement and repair, Achilles tendon lengthening at the calf, partial excision of calcaneus  and tendon transfer from great toe to calcaneus;  Surgeon: Maycol Chawla MD;  Location: [facility] OR [facility];  Service: Orthopedics   • EYE SURGERY Left     BASKETBALL INJURY YEARS AGO   • HERNIA REPAIR     • KNEE ARTHROSCOPY Left 7/26/2016    Procedure: KNEE ARTHROSCOPY, PARTIAL medial MENISCECTOMY;  Surgeon: Layton Anderson MD;  Location: [facility] OR [facility];  Service:    • KNEE MINI REVISION Left 12/20/2017    Procedure: LEFT TOTAL KNEE ARTHROPLASTY poly change;  Surgeon: Eloy Fitzgerald MD;  Location: [facility] MAIN OR;  Service:    • KNEE SURGERY     • OK TOTAL KNEE ARTHROPLASTY Left 12/15/2016    Procedure: TOTAL KNEE ARTHROPLASTY;  Surgeon: Layton Anderson MD;  Location: [facility] MAIN OR;  Service: Orthopedics          PT ASSESSMENT (last 12 hours)      Physical Therapy Evaluation     Row Name 02/07/19 1142          PT Evaluation Time/Intention    Subjective Information  no complaints  -MA     Document Type  discharge evaluation/summary  -MA     Mode of Treatment  physical therapy;individual therapy  -MA     Patient Effort  adequate  -MA     Symptoms Noted During/After Treatment  none  -MA     Comment  pt has had increased bleeding from incision this am. Per RN, ok to work with. Avoided knee flexion exercises to decrease chance of bleeding (per pt request)   -MA     Row Name 02/07/19 1142          General Information    Patient Profile Reviewed?  yes  -MA     Onset of Illness/Injury or Date of Surgery  02/06/19  -MA     Referring Physician  Amilcar   -MA     Patient Observations  alert;cooperative;agree to therapy  -MA     Patient/Family Observations  up in chair, no acute distress, anixous about incision bleeding   -MA     Prior Level of Function  independent:  -MA     Equipment Currently Used at Home  none has necessary equipment at home   -MA     Pertinent History of Current Functional Problem  POD1 L TKA revision   -MA     Existing Precautions/Restrictions  fall  -MA     Barriers to Rehab  none identified  -MA      [facility] Name 02/07/19 1142          Relationship/Environment    Lives With  spouse  -[facility] Name 02/07/19 1142          Home Main Entrance    Number of Stairs, Main Entrance  -- 1+1 with landing in between   -[facility] Name 02/07/19 1142          Cognitive Assessment/Interventions    Additional Documentation  Cognitive Assessment/Intervention (Group)  -MA     Row Name 02/07/19 1142          Cognitive Assessment/Intervention- PT/OT    Affect/Mental Status (Cognitive)  WNL  -MA     Orientation Status (Cognition)  oriented x 4  -MA     Follows Commands (Cognition)  WNL  -MA     Safety Deficit (Cognitive)  insight into deficits/self awareness  -MA     Personal Safety Interventions  fall prevention program maintained;gait belt;muscle strengthening facilitated;nonskid shoes/slippers when out of bed;supervised activity  -[facility] Name 02/07/19 1142          Bed Mobility Assessment/Treatment    Comment (Bed Mobility)  NT- UIC   -MA     Row Name 02/07/19 1142          Transfer Assessment/Treatment    Transfer Assessment/Treatment  sit-stand transfer;stand-sit transfer  -MA     Sit-Stand [place] (Transfers)  supervision  -MA     Stand-Sit [place] (Transfers)  supervision  -MA     Row Name 02/07/19 1142          Sit-Stand Transfer    Assistive Device (Sit-Stand Transfers)  walker, front-wheeled  -[facility] Name 02/07/19 1142          Stand-Sit Transfer    Assistive Device (Stand-Sit Transfers)  walker, front-wheeled  -MA     Row Name 02/07/19 1142          Gait/Stairs Assessment/Training    [place] Level (Gait)  supervision  -MA     Assistive Device (Gait)  walker, front-wheeled  -MA     Distance in Feet (Gait)  50  -MA     Pattern (Gait)  swing-through  -MA     Comment (Gait/Stairs)  pt declines to attempt stairs, cues for sequencing and safety with walker   -[facility] Name 02/07/19 1142          General ROM    GENERAL ROM COMMENTS  L knee: -5 extension. Flexion not tested due to possibility of bleeding  (per pt request)    -MA     Row Name 02/07/19 1142          MMT (Manual Muscle Testing)    General MMT Comments  L knee post op weakness  -MA     Row Name 02/07/19 1142          Motor Assessment/Intervention    Additional Documentation  Therapeutic Exercise Interventions (Group)  -MA     Row Name 02/07/19 1142          Therapeutic Exercise    Comment (Therapeutic Exercise)  L knee post op protocol x10   -MA     Row Name 02/07/19 1142          Pain Assessment    Additional Documentation  Pain Scale: Word Pre/Post-Treatment (Group)  -MA     Row Name 02/07/19 1142          Pain Scale: Numbers Pre/Post-Treatment    Pain Location - Side  Left  -MA     Pain Location  knee  -MA     Pain Intervention(s)  Repositioned;Ambulation/increased activity  -MA     Row Name 02/07/19 1142          Pain Scale: Word Pre/Post-Treatment    Pain: Word Scale, Pretreatment  2 - mild pain  -MA     Pain: Word Scale, Post-Treatment  2 - mild pain  -MA     Row Name             Wound 02/06/19 1618 Left knee incision    Wound - Properties Group Date first assessed: 02/06/19  -MB Time first assessed: 1618  -MB Side: Left  -MB Location: knee  -MB Type: incision  -MB    Row Name 02/07/19 1142          Physical Therapy Clinical Impression    Patient/Family Goals Statement (PT Clinical Impression)  return home   -MA     Criteria for Skilled Interventions Met (PT Clinical Impression)  no continue with [facility] PT   -MA     Pathology/Pathophysiology Noted (Describe Specifically for Each System)  musculoskeletal  -MA     Impairments Found (describe specific impairments)  gait, locomotion, and balance;muscle performance;ROM  -MA     Row Name 02/07/19 1142          Vital Signs    O2 Delivery Pre Treatment  room air  -MA     Row Name 02/07/19 1142          Positioning and Restraints    Pre-Treatment Position  sitting in chair/recliner  -MA     Post Treatment Position  chair  -MA     In Chair  reclined;call light within reach;encouraged to call for assist no alarm  "on when entering room   -MA     Row Name 02/07/19 1142          Living Environment    Home Accessibility  stairs to enter home  -MA       User Key  (r) = Recorded By, (t) = Taken By, (c) = Cosigned By    Initials Name Provider Type    Mari Hair, RN Registered Nurse    Denise Block, PT Physical Therapist          Physical Therapy Education     Title: PT OT SLP Therapies (Resolved)     Topic: Physical Therapy (Resolved)     Point: Mobility training (Resolved)     Learning Progress Summary           Patient Acceptance, E, VU by MA at 2/7/2019 11:47 AM                   Point: Home exercise program (Resolved)     Learning Progress Summary           Patient Acceptance, E, VU by MA at 2/7/2019 11:47 AM                   Point: Body mechanics (Resolved)     Learning Progress Summary           Patient Acceptance, E, VU by MA at 2/7/2019 11:47 AM                   Point: Precautions (Resolved)     Learning Progress Summary           Patient Acceptance, E, VU by MA at 2/7/2019 11:47 AM                               User Key     Initials Effective Dates Name Provider Type Daina TRACEY 10/19/18 -  Denise Collado, JAYA Physical Therapist PT                PT Recommendation and Plan  Anticipated Discharge Disposition (PT): home, home with assist, home with home health  Therapy Frequency (PT Clinical Impression): evaluation only  Outcome Summary/Treatment Plan (PT)  Anticipated Discharge Disposition (PT): home, home with assist, home with home health  Outcome Summary: PT POD1 L TKA revision. Previously independent. Upon eval, decreased L knee ROM/strength as expected. SBA for all mobility. Pt declines doing stairs due to \"knowing how to do them already.\" Educated on safe strategy for stairs, ambulation, and post op exercises. No further acute care PT needs. Plan for d/c home with [facility] PT when mediclaly able.     Outcome Measures     Row Name 02/07/19 1100             How much help from another person do you currently " need...    Turning from your back to your side while in flat bed without using bedrails?  4  -MA      Moving from lying on back to sitting on the side of a flat bed without bedrails?  4  -MA      Moving to and from a bed to a chair (including a wheelchair)?  4  -MA      Standing up from a chair using your arms (e.g., wheelchair, bedside chair)?  4  -MA      Climbing 3-5 steps with a railing?  3  -MA      To walk in hospital room?  3  -MA      AM-PAC 6 Clicks Score  22  -MA         Functional Assessment    Outcome Measure Options  AM-PAC 6 Clicks Basic Mobility (PT)  -MA        User Key  (r) = Recorded By, (t) = Taken By, (c) = Cosigned By    Initials Name Provider Type    Denise Block, PT Physical Therapist           Time Calculation:   PT Charges     Row Name 02/07/19 1149             Time Calculation    Start Time  1128  -MA      Stop Time  1138  -MA      Time Calculation (min)  10 min  -MA      PT Received On  02/07/19  -MA        User Key  (r) = Recorded By, (t) = Taken By, (c) = Cosigned By    Initials Name Provider Type    Denise Block, PT Physical Therapist        Therapy Suggested Charges     Code   Minutes Charges    None           Therapy Charges for Today     Code Description Service Date Service Provider Modifiers Qty    [id number] HC PT EVAL LOW COMPLEXITY 1 2/7/2019 Denise Collado, PT GP 1          PT G-Codes  Outcome Measure Options: AM-PAC 6 Clicks Basic Mobility (PT)  AM-PAC 6 Clicks Score: 22    PT Discharge Summary  Anticipated Discharge Disposition (PT): home, home with assist, home with home health  Reason for Discharge: All goals achieved    Denise Collado PT  2/7/2019

## 2019-02-07 NOTE — DISCHARGE SUMMARY
Patient Name: Aaron Latif  Patient YOB: 1960    Date of Admission:  2/6/2019  Date of Discharge:  2/7/2019  Discharge Diagnosis: TOTAL KNEE ARTHROPLASTY REVISION  Presenting Problem/History of Present Illness: Chronic pain of left knee [M25.562, G89.29]  Chronic pain of left knee [M25.562, G89.29]  Chronic pain of left knee [M25.562, G89.29]  Admitting Physician: Dr Eloy Fitzgerald  Consults:   Consults     No orders found for last 30 day(s).          DETAILS OF HOSPITAL STAY:  Patient is a 59 y.o. male was admitted to the floor following the above procedure and underwent an uncomplicated hospital stay.  Patient did well with physical therapy and was ambulating well without problems.  On the day of discharge the wound was clean, dry and intact and calf was soft and non tender and Homans sign was negative.  Patient was tolerating  without problems.  Patient will be discharged home.    Condition on Discharge:  Stable    Vital Signs  Temp:  [97 °F (36.1 °C)-98.4 °F (36.9 °C)] 97.1 °F (36.2 °C)  Heart Rate:  [75-96] 77  Resp:  [12-18] 16  BP: (105-149)/(68-98) 119/74    LABS:      Admission on 02/06/2019   Component Date Value Ref Range Status   • Glucose 02/06/2019 135* 70 - 130 mg/dL Final   • Gram Stain 02/06/2019 No WBCs or organisms seen   Preliminary   • Gram Stain 02/06/2019 No WBCs or organisms seen   Preliminary   • Glucose 02/06/2019 164* 70 - 130 mg/dL Final   • Glucose 02/06/2019 173* 70 - 130 mg/dL Final   • Hemoglobin 02/07/2019 13.5* 13.7 - 17.6 g/dL Final   • Hematocrit 02/07/2019 41.9  40.4 - 52.2 % Final   • Glucose 02/07/2019 134* 70 - 130 mg/dL Final       No results found.    Discharge Medications     Discharge Medications      New Medications      Instructions Start Date   aspirin 325 MG EC tablet   325 mg, Oral, Every 12 Hours Scheduled      docusate sodium 100 MG capsule   100 mg, Oral, 2 Times Daily      HYDROcodone-acetaminophen 7.5-325 MG per tablet  Commonly known as:  NORCO   2  "tablets, Oral, Every 4 Hours PRN      ondansetron 4 MG tablet  Commonly known as:  ZOFRAN   4 mg, Oral, Every 6 Hours PRN      polyethylene glycol pack packet  Commonly known as:  MIRALAX   17 g, Oral, 2 Times Daily         Changes to Medications      Instructions Start Date   JANUMET XR  MG tablet  Generic drug:  SITagliptin-MetFORMIN HCl ER  What changed:  when to take this   2 tablets, Oral, Daily         Continue These Medications      Instructions Start Date   amLODIPine 10 MG tablet  Commonly known as:  NORVASC   10 mg, Oral, Daily      atorvastatin 20 MG tablet  Commonly known as:  LIPITOR   20 mg, Oral, Daily      DULoxetine 60 MG capsule  Commonly known as:  CYMBALTA   60 mg, Oral, Daily      FARXIGA 10 MG tablet  Generic drug:  Dapagliflozin Propanediol   1 tablet, Oral, Every Morning      fenofibrate micronized 130 MG capsule  Commonly known as:  ANTARA   130 mg, Oral, Every Morning Before Breakfast      hydrochlorothiazide 25 MG tablet  Commonly known as:  HYDRODIURIL   25 mg, Oral, Daily      hydrochlorothiazide 25 MG tablet  Commonly known as:  HYDRODIURIL   TAKE ONE TABLET BY MOUTH EVERY MORNING      Insulin Pen Needle 31G X 8 MM misc   Use once a day      levothyroxine 75 MCG tablet  Commonly known as:  SYNTHROID   75 mcg, Oral, Daily      meloxicam 15 MG tablet  Commonly known as:  MOBIC   15 mg, Oral, Daily, TO HOLD PRIOR TO OR X 1 WEEK       metoprolol succinate XL 50 MG 24 hr tablet  Commonly known as:  TOPROL-XL   50 mg, Oral, Daily      Needle (Disp) 18G X 1\" misc   Use weekly with testosterone injection      ONETOUCH DELICA LANCETS 33G misc   Check blood glucose 3 times daily      pioglitazone 30 MG tablet  Commonly known as:  ACTOS   30 mg, Oral, Daily      pregabalin 200 MG capsule  Commonly known as:  LYRICA   200 mg, Oral, Daily      pregabalin 200 MG capsule  Commonly known as:  LYRICA   400 mg, Oral, Nightly      QUEtiapine 25 MG tablet  Commonly known as:  SEROquel   25 mg, Oral, " "Nightly      Syringe 23G X 1\" 3 ML misc   Use weekly for testosterone injection      Testosterone Cypionate 200 MG/ML injection  Commonly known as:  DEPOTESTOTERONE CYPIONATE   300 mg, Intramuscular, Weekly      TOUJEO MAX SOLOSTAR 300 UNIT/ML solution pen-injector  Generic drug:  Insulin Glargine   100 Units, Subcutaneous, Every Morning         Stop These Medications    Chlorhexidine Gluconate Cloth 2 % pads     ergocalciferol 90317 units capsule  Commonly known as:  ERGOCALCIFEROL     mupirocin 2 % nasal ointment  Commonly known as:  BACTROBAN     omega-3 acid ethyl esters 1 g capsule  Commonly known as:  LOVAZA     ONE TOUCH ULTRA TEST test strip  Generic drug:  glucose blood            Activity at Discharge:     Discharge Instructions: Patient is to continue with physical therapy exercises daily and continue working with the physical therapist as ordered. Patient may weight bear as tolerated. Apply ice regularly. Patient may ice for long periods of time as long as ice is not directly on the skin. Patient instructed on frequent calf pumping exercises.  Patient also instructed on incentive spirometer during hospitalization and encouraged to continue to use at home regularly.    Dressing: The dressing is designed to be left in place until you return to the office in 2 weeks.  The suction unit should stop functioning at 7 days and the green light will switch to yellow.  At that point the suction unit and tubing can be disconnected at the port closest to the dressing.  The suction unit and tubing may be discarded.  You may shower immediately upon return home, you will need to turn the pump off by depressing the orange button once and then you may disconnect the pump and tubing at the connection port.  After showering, shake off the excess water and reattach the tubing.  Restart the pump by depressing the orange button one time and you will notice the green light flashing again.  If the dressing becomes disloged or " saturated it should be changed. Please refer to the ANTONIO information sheet if you have any questions about the dressing.  If you have a home health nurse or therapist they can be contacted to assist with dressing change or repair. You may also call the ANTONIO dressing hotline for questions related to the dressing ([phone]). If there still other problems or questions related to the dressing despite these measures then you can contact Gracie at our office [phone].  If for some reason the ATNONIO dressing is removed, after 7 days the wound can be gently cleaned with antibacterial soap then allowed to dry and covered with a dry sterile dressing. The wound should be covered at all times except while showering.  Patient may change dressings daily and prn using sterile 4x4 and paper tape, and should call if any unusual drainage, redness or swelling.*  Follow up appointment in 2 weeks - patient to call the office at [phone] to schedule.  Patient will be discharged on aspirin 325mg BID x 2weeks, then daily x 4weeks    Discharge Diagnosis:    Patient Active Problem List   Diagnosis   • Chronic fatigue   • Erectile dysfunction   • Low testosterone   • Morbid obesity due to excess calories (CMS/HCC)   • Vitamin D deficiency   • Dyslipidemia   • Shortness of breath   • Benign essential HTN   • Pityriasis versicolor   • BC on CPAP   • Umbilical hernia without obstruction or gangrene   • Diabetes mellitus type 2, uncontrolled (CMS/HCC)   • Diabetic peripheral neuropathy (CMS/HCC)   • ED (erectile dysfunction) of non-organic origin   • Essential hypertriglyceridemia   • Osteoarthritis of left knee   • Status post total knee replacement, left   • Primary osteoarthritis of left knee   • OA (osteoarthritis) of knee   • Calcific Achilles tendonitis   • Chronic heel pain, left   • Tosin's deformity of left heel   • Achilles tendon tear, left, initial encounter   • Primary hypothyroidism   • Chronic bilateral low back pain with  bilateral sciatica   • Chronic pain of left knee       Follow-up Appointments  Future Appointments   Date Time Provider Department Center   2/26/2019  3:30 PM Dionicio Fitzgerald MD [facility] None   5/9/2019  9:15 AM Grant Benitez MD [facility] None   5/28/2019  8:20 AM [facility] None   6/10/2019 10:20 AM Parris Luna MD [facility] None     Additional Instructions for the Follow-ups that You Need to Schedule     Ambulatory Referral to Home Health   As directed      Face to Face Visit Date:  2/7/2019    Follow-up Provider for Plan of Care?:  I will be treating the patient on an ongoing basis.  Please send me the Plan of Care for signature.    Follow-up Provider:  DIONICIO FITZGERALD [[id number]]    Reason/Clinical Findings:  tka revision    Describe mobility limitations that make leaving home difficult:  postop    Nursing/Therapeutic Services Requested:  Physical Therapy    PT orders:  Total joint pathway    Frequency:  1 Week 1                  Della Carrillo, DORA  02/07/19  9:15 AM

## 2019-02-07 NOTE — OP NOTE
Name: Aaron Latif  YOB: 1960    DATE OF SURGERY:2/6/2019    PREOPERATIVE DIAGNOSIS: Left failed total knee replacement    POSTOPERATIVE DIAGNOSIS: Left failed total knee replacement -  Flexion instability    PROCEDURE PERFORMED: Left total knee revision - femoral component    SURGEON: Eloy Fitzgerald M.D.    ASSISTANT: DONAL BROOKS    IMPLANTS:   Implant Name Type Inv. Item Serial No.  Lot No. LRB No. Used   CMT BONE PALACOS RPLSG W/GENT HI/VISC 1X40 - [id number] Implant CMT BONE PALACOS RPLSG W/GENT HI/VISC 1X40  [facility] [id number] Left 1   STEM FEM/TIB KN LEGION/HK PF STR [id number] - [id number] Implant STEM FEM/TIB KN LEGION/HK PF STR [id number]  [employer] [id number] Left 1   COMP FEM LEGION OXINIUM CNSTR SZ6 LT - [id number] Implant COMP FEM LEGION OXINIUM CNSTR SZ6 LT  [employer] [id number] Left 1   WEDGE LEGION SCRWON SZ6 5DX5P - [id number] Implant WEDGE LEGION SCRWON SZ6 5DX5P  [employer] [id number] Left 1   WEDGE LEGION SCRWON SZ6 5DX5P - [id number] Implant WEDGE LEGION SCRWON SZ6 5DX5P  [employer] [id number] Left 1   size 7-8 9mm articular insert Implant   [employer] [id number] Left 1       Estimated Blood Loss: 200cc  Specimens : none  Complications: none    DESCRIPTION OF PROCEDURE: The patient was taken to the operating room and placed in the supine position. A sequential compression device was carefully placed on the non-operative leg. Preoperative antibiotics were administered. Surgical time out was performed. After adequate induction of anesthesia, the leg was prepped and draped in the usual sterile fashion, exsanguinated with an Esmarch bandage and the tourniquet inflated to 250 mmHg. A midline incision was performed through the previous incision followed by a medial parapatellar arthrotomy. There was a negligible amount of clear noninfectious appearing fluid within the joint.  Culture swabs ×2 were taken per routine.  The medial  retinaculum was then carefully released from the proximal medial tibia with electrocautery staying directly on bone.The patellar tendon scar tissue was gently released from a portion of the proximal tibia down to the level of the patellar tendon insertion to allow for patellar mobilization. The patella was subluxed laterally.  There was excessive lateral scar tissue in thickness of the extensor mechanism and was quite difficult to sublux the patella laterally at all.  I formed a quadriceps snip to aid with exposure.  The femoral and tibial components were carefully examined.  The tibial component was well fixed and appeared to demonstrate appropriate alignment.  The femoral component was also well fixed.  I examined the knee in extension and flexion and did have moderate flexion instability.  The PCL was somewhat incompetent and a posterior drawer resulted in posterior subluxation of at least a centimeter to centimeter and a half.  At this point I turned attention to removal of the femoral component.  Before meals quarter-inch curved osteotome and Umang osteotomes and we were able to divide the bone cement interface.  The femoral component was then removed with a tamp and mallet and there was minimal bone loss.  There was evidence of the femoral component being well fixed.   After the femoral component was removed we then placed a drill hole at the center portion and line with the femoral canal.  We then progressively reamed until we had chatter at the appropriate depth of the femoral stem extension.  The last reamer was left in place and the anterior posterior cutting block was placed over the reamer.  The anterior posterior and chamfer cuts were then performed off of the cutting block.  We made sure that the rotation matched the epicondylar axis which had been marked with electrocautery and a marking pen.  We then reamed for the box.  The cuts had been placed such that there was excellent healthy bleeding bone that  the implant would sit on.  The augments were then placed onto the femoral trial and the trial impacted in place.  We then placed the trial polyethylene liner which gave full extension and excellent stability in both flexion and extension with good balance.  There was a clear difference in the flexion stability with the varus valgus constraint insert.  There was no posterior translation with posterior drawer.  There was minimal anterior translation with anterior drawer.  It should be noted that it was quite difficult to get the varus valgus constraint insert in given the limited mobility of the extensor mechanism.  The patella tracked midline without tilt or subluxation.   The knee was then copiously irrigated and the final implant was constructed at the back table.  We irrigated and then dried the femoral surface which was ideal for cement interdigitation.  The final implant was then impacted the excess cement was removed and the knee was held in full extension with the final trial spacer in place.  After cement had completely hardened the knee was again trialed was excellent balance and stability and the patella tracked midline.  The final polyethylene insert was then impacted and locked in nicely.   The knee was then copiously irrigated.  The tissues were injected in standard fashion with the anesthetic cocktail solution.  The tourniquet was then released. There was excellent hemostasis. We placed a one-eighth inch Hemovac drain. We closed the knee in multiple layers in standard fashion.  A quadriceps snip was repaired with multiple #1 Vicryl figure-of-eight sutures and there was excellent apposition.  Sterile dressing were applied. At the end of the case, the sponge and needle counts were reported as being correct. There were no known complications. The patient was then transported to the recovery room.      Eloy Fitzgerald M.D.  2/6/2019

## 2019-02-07 NOTE — PLAN OF CARE
"Problem: Patient Care Overview  Goal: Plan of Care Review   02/07/19 1147   OTHER   Outcome Summary PT POD1 L TKA revision. Previously independent. RN/pt report increased bleeding from incision today. No bleeding noted throughout session. Upon eval, decreased L knee ROM/strength as expected. SBA for all mobility. Pt declines doing stairs due to \"knowing how to do them already.\" Educated on safe strategy for stairs, ambulation, and post op exercises. No further acute care PT needs. Plan for d/c home with [facility] PT when mediclaly able.          "

## 2019-02-08 VITALS
HEIGHT: 70 IN | SYSTOLIC BLOOD PRESSURE: 160 MMHG | TEMPERATURE: 96.5 F | BODY MASS INDEX: 38.98 KG/M2 | HEART RATE: 93 BPM | DIASTOLIC BLOOD PRESSURE: 68 MMHG | RESPIRATION RATE: 16 BRPM | WEIGHT: 272.27 LBS | OXYGEN SATURATION: 96 %

## 2019-02-08 LAB
GLUCOSE BLDC GLUCOMTR-MCNC: 105 MG/DL (ref 70–130)
GLUCOSE BLDC GLUCOMTR-MCNC: 61 MG/DL (ref 70–130)
GLUCOSE BLDC GLUCOMTR-MCNC: 96 MG/DL (ref 70–130)
HCT VFR BLD AUTO: 39.1 % (ref 40.4–52.2)
HGB BLD-MCNC: 12.7 G/DL (ref 13.7–17.6)

## 2019-02-08 PROCEDURE — 63710000001 INSULIN GLARGINE PER 5 UNITS: Performed by: ORTHOPAEDIC SURGERY

## 2019-02-08 PROCEDURE — 99024 POSTOP FOLLOW-UP VISIT: CPT | Performed by: NURSE PRACTITIONER

## 2019-02-08 PROCEDURE — 82962 GLUCOSE BLOOD TEST: CPT

## 2019-02-08 PROCEDURE — 85018 HEMOGLOBIN: CPT | Performed by: ORTHOPAEDIC SURGERY

## 2019-02-08 PROCEDURE — 85014 HEMATOCRIT: CPT | Performed by: ORTHOPAEDIC SURGERY

## 2019-02-08 RX ORDER — OXYCODONE AND ACETAMINOPHEN 7.5; 325 MG/1; MG/1
1 TABLET ORAL EVERY 4 HOURS PRN
Status: DISCONTINUED | OUTPATIENT
Start: 2019-02-08 | End: 2019-02-08 | Stop reason: HOSPADM

## 2019-02-08 RX ORDER — OXYCODONE AND ACETAMINOPHEN 7.5; 325 MG/1; MG/1
TABLET ORAL
Qty: 84 TABLET | Refills: 0 | Status: SHIPPED | OUTPATIENT
Start: 2019-02-08 | End: 2019-03-19 | Stop reason: SDUPTHER

## 2019-02-08 RX ADMIN — AMLODIPINE BESYLATE 10 MG: 10 TABLET ORAL at 09:15

## 2019-02-08 RX ADMIN — ASPIRIN 325 MG: 325 TABLET, DELAYED RELEASE ORAL at 09:15

## 2019-02-08 RX ADMIN — HYDROCODONE BITARTRATE AND ACETAMINOPHEN 2 TABLET: 7.5; 325 TABLET ORAL at 03:11

## 2019-02-08 RX ADMIN — MELOXICAM 15 MG: 15 TABLET ORAL at 09:15

## 2019-02-08 RX ADMIN — POLYETHYLENE GLYCOL 3350 17 G: 17 POWDER, FOR SOLUTION ORAL at 09:27

## 2019-02-08 RX ADMIN — METFORMIN HYDROCHLORIDE: 500 TABLET, EXTENDED RELEASE ORAL at 09:15

## 2019-02-08 RX ADMIN — DOCUSATE SODIUM 100 MG: 100 CAPSULE, LIQUID FILLED ORAL at 09:27

## 2019-02-08 RX ADMIN — OXYCODONE HYDROCHLORIDE AND ACETAMINOPHEN 1 TABLET: 7.5; 325 TABLET ORAL at 13:10

## 2019-02-08 RX ADMIN — DULOXETINE HYDROCHLORIDE 60 MG: 60 CAPSULE, DELAYED RELEASE ORAL at 09:15

## 2019-02-08 RX ADMIN — METOPROLOL SUCCINATE 50 MG: 50 TABLET, FILM COATED, EXTENDED RELEASE ORAL at 09:15

## 2019-02-08 RX ADMIN — INSULIN GLARGINE 100 UNITS: 100 INJECTION, SOLUTION SUBCUTANEOUS at 09:26

## 2019-02-08 RX ADMIN — LEVOTHYROXINE SODIUM 75 MCG: 75 TABLET ORAL at 06:47

## 2019-02-08 RX ADMIN — HYDROCODONE BITARTRATE AND ACETAMINOPHEN 2 TABLET: 7.5; 325 TABLET ORAL at 06:47

## 2019-02-08 RX ADMIN — PIOGLITAZONE 30 MG: 30 TABLET ORAL at 09:15

## 2019-02-08 RX ADMIN — HYDROCHLOROTHIAZIDE 25 MG: 25 TABLET ORAL at 09:15

## 2019-02-08 RX ADMIN — PREGABALIN 200 MG: 100 CAPSULE ORAL at 09:31

## 2019-02-08 NOTE — PROGRESS NOTES
Orthopedic Progress Note      Patient: Aaron Latif  Date of Admission: 2/6/2019  YOB: 1960  Medical Record Number: [id number]    POD # :  2 Days Post-Op Procedure(s) (LRB):  TOTAL KNEE ARTHROPLASTY REVISION (Left)    Systemic or Specific Complaints: No Complaints    Pain Relief: complete resolution    Physical Exam:  59 y.o.  male  Vitals:  Temp:  [97 °F (36.1 °C)-97.8 °F (36.6 °C)] 97 °F (36.1 °C)  Heart Rate:  [77-88] 80  Resp:  [16] 16  BP: ()/(61-74) 106/62  alert and oriented  Ext: NV intact. ROM appropriate. Calf is soft and nontender. Negative Homans Sn  Skin: Incision clean dry and intact w/out signs or  symptoms of infection.    Activity: Mobilizing Per P.T.   Weight Bearing: As Tolerated    Data Review     Admission on 02/06/2019   Component Date Value Ref Range Status   • Glucose 02/06/2019 135* 70 - 130 mg/dL Final   • Wound Culture 02/06/2019 No growth   Preliminary   • Gram Stain 02/06/2019 No WBCs or organisms seen   Preliminary   • Wound Culture 02/06/2019 No growth   Preliminary   • Gram Stain 02/06/2019 No WBCs or organisms seen   Preliminary   • Glucose 02/06/2019 164* 70 - 130 mg/dL Final   • Glucose 02/06/2019 173* 70 - 130 mg/dL Final   • Hemoglobin 02/07/2019 13.5* 13.7 - 17.6 g/dL Final   • Hematocrit 02/07/2019 41.9  40.4 - 52.2 % Final   • Glucose 02/07/2019 134* 70 - 130 mg/dL Final   • Glucose 02/07/2019 217* 70 - 130 mg/dL Final   • Glucose 02/07/2019 207* 70 - 130 mg/dL Final   • Glucose 02/07/2019 162* 70 - 130 mg/dL Final   • Hemoglobin 02/08/2019 12.7* 13.7 - 17.6 g/dL Final   • Hematocrit 02/08/2019 39.1* 40.4 - 52.2 % Final   • Glucose 02/08/2019 105  70 - 130 mg/dL Final       No results found.    Medications:    amLODIPine 10 mg Oral Daily   aspirin 325 mg Oral Q12H   docusate sodium 100 mg Oral BID   DULoxetine 60 mg Oral Daily   hydrochlorothiazide 25 mg Oral Daily   insulin glargine 100 Units Subcutaneous QAM   ketorolac 30 mg Intravenous Q8H    levothyroxine 75 mcg Oral Q AM   linagliptin-metFORMIN ER (JENTADUETO XR) 2.5-500 mg combo dose  Oral Daily   meloxicam 15 mg Oral Daily   metoprolol succinate XL 50 mg Oral Daily   mupirocin  Each Nare BID   pioglitazone 30 mg Oral Daily   polyethylene glycol 17 g Oral BID   pregabalin 200 mg Oral Daily   pregabalin 400 mg Oral Nightly   QUEtiapine 25 mg Oral Nightly     •  acetaminophen  •  melatonin  •  ondansetron **OR** ondansetron ODT **OR** ondansetron  •  oxyCODONE-acetaminophen  •  oxyCODONE-acetaminophen    Assessment:  Doing well POD  # 2 Days Post-Op Procedure(s) (LRB):  TOTAL KNEE ARTHROPLASTY REVISION (Left)  Problem List Items Addressed This Visit        Musculoskeletal and Integument    * (Principal) Chronic pain of left knee    Relevant Medications    lactated ringers bolus 500 mL (Completed)    ceFAZolin in dextrose (ANCEF) IVPB solution 2 g (Completed)    meloxicam (MOBIC) tablet 15 mg (Completed)    vancomycin 1750 mg/500 mL 0.9% NS IVPB ([facility]) (Completed)    Other Relevant Orders    Anaerobic Culture - Wound, Knee, Left    Anaerobic Culture - Wound, Knee, Left    Wound Culture - Wound, Knee, Left (Completed)    Wound Culture - Wound, Knee, Left (Completed)    Ambulatory Referral to Home Health          Plan:  Continue efforts to mobilize  Continue Pain Control Measures  Continue incisional Care  DVT prophylaxis    Discharge Plan:Home today    Della Carrillo, DORA    Date: 2/8/2019  Time: 7:01 AM

## 2019-02-08 NOTE — PLAN OF CARE
Problem: Patient Care Overview  Goal: Plan of Care Review  Outcome: Ongoing (interventions implemented as appropriate)   02/08/19 0250   Coping/Psychosocial   Plan of Care Reviewed With patient   OTHER   Outcome Summary HTN controlled with PO meds. pain well controlled.   Plan of Care Review   Progress improving       Problem: Fall Risk (Adult)  Goal: Absence of Fall  Outcome: Ongoing (interventions implemented as appropriate)   02/08/19 0250   Fall Risk (Adult)   Absence of Fall making progress toward outcome

## 2019-02-08 NOTE — PLAN OF CARE
Problem: Patient Care Overview  Goal: Plan of Care Review  Outcome: Ongoing (interventions implemented as appropriate)   02/08/19 1196   Coping/Psychosocial   Plan of Care Reviewed With patient;son   OTHER   Outcome Summary POD2 Left TKA, VSS, afebrile, pain controlled with po pain medication, Dressing changed with large amount of dry blood on dressing, applied ABD pads, kerlex and ACE wrap, extra dressing suppies given to pt to take home, DC instructions reviewed with pt and son and all questions answered, IV dc'c.   Plan of Care Review   Progress improving       Problem: Knee Arthroplasty (Total, Partial) (Adult)  Goal: Signs and Symptoms of Listed Potential Problems Will be Absent, Minimized or Managed (Knee Arthroplasty)  Outcome: Ongoing (interventions implemented as appropriate)      Problem: Fall Risk (Adult)  Goal: Absence of Fall  Outcome: Ongoing (interventions implemented as appropriate)

## 2019-02-09 ENCOUNTER — READMISSION MANAGEMENT (OUTPATIENT)
Dept: CALL CENTER | Facility: HOSPITAL | Age: 59
End: 2019-02-09

## 2019-02-09 LAB
BACTERIA SPEC AEROBE CULT: NORMAL
BACTERIA SPEC AEROBE CULT: NORMAL
GRAM STN SPEC: NORMAL
GRAM STN SPEC: NORMAL

## 2019-02-09 NOTE — OUTREACH NOTE
Prep Survey      Responses   Facility patient discharged from?  [facility]   Is patient eligible?  Yes   Discharge diagnosis  TOTAL KNEE ARTHROPLASTY REVISION   Does the patient have one of the following disease processes/diagnoses(primary or secondary)?  Total Joint Replacement   Does the patient have Home health ordered?  Yes   What is the Home health agency?   [facility]   Is there a DME ordered?  No   Prep survey completed?  Yes          Althea Pandya RN

## 2019-02-11 LAB
BACTERIA SPEC ANAEROBE CULT: NORMAL
BACTERIA SPEC ANAEROBE CULT: NORMAL

## 2019-02-12 ENCOUNTER — TELEPHONE (OUTPATIENT)
Dept: ORTHOPEDIC SURGERY | Facility: CLINIC | Age: 59
End: 2019-02-12

## 2019-02-12 ENCOUNTER — READMISSION MANAGEMENT (OUTPATIENT)
Dept: CALL CENTER | Facility: HOSPITAL | Age: 59
End: 2019-02-12

## 2019-02-12 NOTE — OUTREACH NOTE
Total Joint Week 1 Survey      Responses   Facility patient discharged from?  [facility]   Does the patient have one of the following disease processes/diagnoses(primary or secondary)?  Total Joint Replacement   Is there a successful TCM telephone encounter documented?  No   Joint surgery performed?  Knee   Week 1 attempt successful?  Yes   Call start time  1238   Revoke  Decline to participate   Call end time  1237          Yakelin Amaral RN

## 2019-02-15 RX ORDER — QUETIAPINE FUMARATE 25 MG/1
TABLET, FILM COATED ORAL
Qty: 90 TABLET | Refills: 0 | Status: SHIPPED | OUTPATIENT
Start: 2019-02-15 | End: 2019-05-19 | Stop reason: SDUPTHER

## 2019-02-22 ENCOUNTER — TELEPHONE (OUTPATIENT)
Dept: ORTHOPEDIC SURGERY | Facility: CLINIC | Age: 59
End: 2019-02-22

## 2019-02-22 NOTE — TELEPHONE ENCOUNTER
Patient has 2 week PO scheduled 02/26 for / Left Knee / surgery date 2/6/19.     Alexi physical therapist with [facility] patient has cancelled all three in home PT visits this week due to personal issues so [facility] went ahead & discharged patient since no visits left & patient stated he was doing ok. Thanks /srh

## 2019-02-26 ENCOUNTER — OFFICE VISIT (OUTPATIENT)
Dept: ORTHOPEDIC SURGERY | Facility: CLINIC | Age: 59
End: 2019-02-26

## 2019-02-26 VITALS — BODY MASS INDEX: 38.51 KG/M2 | TEMPERATURE: 98.3 F | WEIGHT: 269 LBS | HEIGHT: 70 IN

## 2019-02-26 DIAGNOSIS — Z96.652 STATUS POST LEFT KNEE REPLACEMENT: Primary | ICD-10-CM

## 2019-02-26 PROCEDURE — 99024 POSTOP FOLLOW-UP VISIT: CPT | Performed by: ORTHOPAEDIC SURGERY

## 2019-02-26 NOTE — PROGRESS NOTES
Aaron Latif : 1960 MRN: [id number] DATE: 2019    DIAGNOSIS: 2 week follow up left total knee      SUBJECTIVE:Patient returns today for 2 week follow up of left total knee replacement. Patient reports doing well with no unusual complaints. Appears to be progressing appropriately.    OBJECTIVE:   Exam:. The incision is healing appropriately. No sign of infection. Range of motion is progressing as expected. The calf is soft and nontender with a negative Homans sign.    ASSESSMENT: 2 week status post left knee replacement.    PLAN: 1) Staples removed and steri strips applied   2) Order given for PT   3) Discontinue CURT hose   4) Continue ice PRN   5) aspirin 325 mg orally every day for 6 weeks   6) Follow up in 6 weeks with repeat Xrays of left knee (3views)    Eloy Fitzgerald MD  2019

## 2019-03-08 ENCOUNTER — TELEPHONE (OUTPATIENT)
Dept: ORTHOPEDIC SURGERY | Facility: CLINIC | Age: 59
End: 2019-03-08

## 2019-03-08 NOTE — TELEPHONE ENCOUNTER
Returned call to Nargis and explained to her that we do not have a formal TKA protocol, just standard exercises and ROM is all.

## 2019-03-11 RX ORDER — TADALAFIL 5 MG/1
5 TABLET ORAL DAILY PRN
Qty: 30 TABLET | Refills: 5 | Status: SHIPPED | OUTPATIENT
Start: 2019-03-11 | End: 2019-06-10 | Stop reason: SDUPTHER

## 2019-03-19 RX ORDER — OXYCODONE AND ACETAMINOPHEN 7.5; 325 MG/1; MG/1
TABLET ORAL
Qty: 42 TABLET | Refills: 0 | Status: SHIPPED | OUTPATIENT
Start: 2019-03-19 | End: 2019-11-13

## 2019-03-27 ENCOUNTER — TELEPHONE (OUTPATIENT)
Dept: INTERNAL MEDICINE | Facility: CLINIC | Age: 59
End: 2019-03-27

## 2019-03-27 DIAGNOSIS — R35.0 BENIGN PROSTATIC HYPERPLASIA WITH URINARY FREQUENCY: Primary | ICD-10-CM

## 2019-03-27 DIAGNOSIS — N40.1 BENIGN PROSTATIC HYPERPLASIA WITH URINARY FREQUENCY: Primary | ICD-10-CM

## 2019-03-27 NOTE — TELEPHONE ENCOUNTER
----- Message from Winter Levy sent at 3/27/2019  8:18 AM EDT -----  Contact: Pt   Pt would like a referral for urology,  Had one in the past with Dr Hall, but did not go see the one referred.     Frequent urination.    Pt#[phone]

## 2019-03-28 RX ORDER — INSULIN GLARGINE 300 U/ML
100 INJECTION, SOLUTION SUBCUTANEOUS EVERY MORNING
Qty: 12 PEN | Refills: 2 | Status: SHIPPED | OUTPATIENT
Start: 2019-03-28 | End: 2019-05-02 | Stop reason: SDUPTHER

## 2019-04-01 RX ORDER — DULOXETIN HYDROCHLORIDE 60 MG/1
CAPSULE, DELAYED RELEASE ORAL
Qty: 30 CAPSULE | Refills: 0 | Status: SHIPPED | OUTPATIENT
Start: 2019-04-01 | End: 2019-04-29 | Stop reason: SDUPTHER

## 2019-04-22 ENCOUNTER — RESULTS ENCOUNTER (OUTPATIENT)
Dept: ENDOCRINOLOGY | Age: 59
End: 2019-04-22

## 2019-04-22 DIAGNOSIS — I10 BENIGN ESSENTIAL HTN: ICD-10-CM

## 2019-04-22 DIAGNOSIS — E78.1 ESSENTIAL HYPERTRIGLYCERIDEMIA: ICD-10-CM

## 2019-04-22 DIAGNOSIS — R53.82 CHRONIC FATIGUE: ICD-10-CM

## 2019-04-22 DIAGNOSIS — R79.89 LOW TESTOSTERONE: ICD-10-CM

## 2019-04-22 DIAGNOSIS — E11.65 UNCONTROLLED TYPE 2 DIABETES MELLITUS WITH HYPERGLYCEMIA (HCC): ICD-10-CM

## 2019-04-22 DIAGNOSIS — M54.42 CHRONIC BILATERAL LOW BACK PAIN WITH BILATERAL SCIATICA: ICD-10-CM

## 2019-04-22 DIAGNOSIS — N52.9 ERECTILE DYSFUNCTION, UNSPECIFIED ERECTILE DYSFUNCTION TYPE: ICD-10-CM

## 2019-04-22 DIAGNOSIS — E66.01 MORBID OBESITY DUE TO EXCESS CALORIES (HCC): ICD-10-CM

## 2019-04-22 DIAGNOSIS — G89.29 CHRONIC BILATERAL LOW BACK PAIN WITH BILATERAL SCIATICA: ICD-10-CM

## 2019-04-22 DIAGNOSIS — M54.41 CHRONIC BILATERAL LOW BACK PAIN WITH BILATERAL SCIATICA: ICD-10-CM

## 2019-04-22 DIAGNOSIS — E78.5 DYSLIPIDEMIA: ICD-10-CM

## 2019-04-22 DIAGNOSIS — E03.9 PRIMARY HYPOTHYROIDISM: ICD-10-CM

## 2019-04-22 DIAGNOSIS — E55.9 VITAMIN D DEFICIENCY: ICD-10-CM

## 2019-04-22 DIAGNOSIS — N52.8 OTHER MALE ERECTILE DYSFUNCTION: ICD-10-CM

## 2019-04-25 ENCOUNTER — OFFICE VISIT (OUTPATIENT)
Dept: ORTHOPEDIC SURGERY | Facility: CLINIC | Age: 59
End: 2019-04-25

## 2019-04-25 VITALS — BODY MASS INDEX: 38.65 KG/M2 | HEIGHT: 70 IN | WEIGHT: 270 LBS | TEMPERATURE: 97.4 F

## 2019-04-25 DIAGNOSIS — Z96.652 STATUS POST LEFT KNEE REPLACEMENT: Primary | ICD-10-CM

## 2019-04-25 PROCEDURE — 99024 POSTOP FOLLOW-UP VISIT: CPT | Performed by: NURSE PRACTITIONER

## 2019-04-25 PROCEDURE — 73562 X-RAY EXAM OF KNEE 3: CPT | Performed by: NURSE PRACTITIONER

## 2019-04-25 NOTE — PROGRESS NOTES
"Patient: Aaron Latif  YOB: 1960 59 y.o. male  Medical Record Number: [id number]    Chief Complaints:   Chief Complaint   Patient presents with   • Left Knee - Post-op, Pain       History of Present Illness:Aaron Latif is a 59 y.o. male who presents for follow-up of left knee revision.  He has about 9 weeks out.  Patient initially reports he has almost no pain, is concerned about the mild swelling he still has but has not been icing regularly.  Denies any fever or chills.    Allergies:   Allergies   Allergen Reactions   • Codeine Other (See Comments)     UNKNOWN       Medications:   Current Outpatient Medications   Medication Sig Dispense Refill   • amLODIPine (NORVASC) 10 MG tablet Take 1 tablet by mouth Daily. 30 tablet 5   • atorvastatin (LIPITOR) 20 MG tablet Take 1 tablet by mouth Daily. 30 tablet 11   • DULoxetine (CYMBALTA) 60 MG capsule TAKE ONE CAPSULE BY MOUTH DAILY 30 capsule 0   • FARXIGA 10 MG tablet Take 10 mg by mouth Every Morning. 30 tablet 5   • fenofibrate micronized (ANTARA) 130 MG capsule Take 1 capsule by mouth Every Morning Before Breakfast. 30 capsule 5   • hydrochlorothiazide (HYDRODIURIL) 25 MG tablet TAKE ONE TABLET BY MOUTH EVERY MORNING 90 tablet 1   • Insulin Pen Needle 31G X 8 MM misc Use once a day 100 each 0   • JANUMET XR  MG tablet Take 2 tablets by mouth Daily. (Patient taking differently: Take 2 tablets by mouth Every Evening.) 60 tablet 5   • levothyroxine (SYNTHROID) 75 MCG tablet Take 1 tablet by mouth Daily. 30 tablet 5   • meloxicam (MOBIC) 15 MG tablet Take 15 mg by mouth Daily. TO HOLD PRIOR TO OR X 1 WEEK     • metoprolol succinate XL (TOPROL-XL) 50 MG 24 hr tablet Take 1 tablet by mouth Daily. 90 tablet 1   • Needle, Disp, 18G X 1\" misc Use weekly with testosterone injection 25 each 0   • ondansetron (ZOFRAN) 4 MG tablet Take 1 tablet by mouth Every 6 (Six) Hours As Needed for Nausea or Vomiting. 10 tablet 0   • ONETOUCH DELICA LANCETS 33G misc " "Check blood glucose 3 times daily 100 each 5   • oxyCODONE-acetaminophen (PERCOCET) 7.5-325 MG per tablet 1-2 po q 4-6 hr prn pain 42 tablet 0   • pioglitazone (ACTOS) 30 MG tablet Take 1 tablet by mouth Daily. 90 tablet 3   • pregabalin (LYRICA) 200 MG capsule Take 200 mg by mouth Daily.     • pregabalin (LYRICA) 200 MG capsule Take 400 mg by mouth Every Night.     • QUEtiapine (SEROquel) 25 MG tablet TAKE ONE TABLET BY MOUTH ONCE NIGHTLY 90 tablet 0   • Syringe 23G X 1\" 3 ML misc Use weekly for testosterone injection 10 each 3   • tadalafil (CIALIS) 5 MG tablet Take 1 tablet by mouth Daily As Needed for erectile dysfunction. 30 tablet 5   • Testosterone Cypionate (DEPOTESTOTERONE CYPIONATE) 200 MG/ML injection Inject 1.5 mL into the appropriate muscle as directed by prescriber 1 (One) Time Per Week. 10 mL 5   • TOUJEO MAX SOLOSTAR 300 UNIT/ML solution pen-injector INJECT 100 UNITS UNDER THE SKIN INTO THE APPROPRIATE AREA AS DIRECTED EVERY MORNING 12 pen 2     No current facility-administered medications for this visit.          The following portions of the patient's history were reviewed and updated as appropriate: allergies, current medications, past family history, past medical history, past social history, past surgical history and problem list.    Review of Systems:   A 14 point review of systems was performed. All systems negative except pertinent positives/negative listed in HPI above    Physical Exam:   Vitals:    04/25/19 1030   Temp: 97.4 °F (36.3 °C)   TempSrc: Temporal   Weight: 122 kg (270 lb)   Height: 177.8 cm (70\")       General: A and O x 3, ASA, NAD    SCLERA:    Normal    DENTITION:   Normal  Skin clear no unusual lesions noted  Left knee the patient is a well-healed surgical incision noted 120 degrees flexion neutral and extension with no instability calf is soft nontender    Radiology:  Xrays 3views (ap,lateral, sunrise) left knee were ordered and reviewed today secondary to previous surgery " show well-placed well-positioned left knee revision.  Compared to views are unchanged    Assessment/Plan:  Status post left knee revision stable    At the end of the visit the patient requested additional narcotics despite at the beginning of the patient's office visit he stated he really was not having any pain.  At this point I think he is far enough out to change to Tylenol or ibuprofen.  Certainly he can discuss this with his primary care physician if they feel additional narcotics are appropriate.  Otherwise he will return office in 3 months for follow-up with Dr. Fitzgerald

## 2019-04-30 ENCOUNTER — TELEPHONE (OUTPATIENT)
Dept: ORTHOPEDIC SURGERY | Facility: CLINIC | Age: 59
End: 2019-04-30

## 2019-04-30 RX ORDER — DULOXETIN HYDROCHLORIDE 60 MG/1
CAPSULE, DELAYED RELEASE ORAL
Qty: 30 CAPSULE | Refills: 0 | Status: SHIPPED | OUTPATIENT
Start: 2019-04-30 | End: 2019-05-31 | Stop reason: SDUPTHER

## 2019-04-30 NOTE — TELEPHONE ENCOUNTER
Yakelin Hill MA was unavailable at the time that the patient returned her call, patient was informed that is can discontinue the aspirin and Mobic medication per [facility]. See previous message. DF

## 2019-05-02 RX ORDER — INSULIN GLARGINE 300 U/ML
150 INJECTION, SOLUTION SUBCUTANEOUS EVERY MORNING
Qty: 15 PEN | Refills: 3 | Status: SHIPPED | OUTPATIENT
Start: 2019-05-02 | End: 2019-06-10 | Stop reason: SDUPTHER

## 2019-05-07 RX ORDER — LANCETS
EACH MISCELLANEOUS
Qty: 300 EACH | Refills: 1 | Status: SHIPPED | OUTPATIENT
Start: 2019-05-07 | End: 2019-12-11

## 2019-05-07 RX ORDER — BLOOD-GLUCOSE METER
1 EACH MISCELLANEOUS DAILY
Qty: 1 KIT | Refills: 1 | Status: SHIPPED | OUTPATIENT
Start: 2019-05-07

## 2019-05-07 RX ORDER — BLOOD SUGAR DIAGNOSTIC
STRIP MISCELLANEOUS
Qty: 300 EACH | Refills: 1 | Status: SHIPPED | OUTPATIENT
Start: 2019-05-07 | End: 2019-12-11

## 2019-05-09 DIAGNOSIS — E11.40 NEUROPATHY DUE TO TYPE 2 DIABETES MELLITUS (HCC): Primary | ICD-10-CM

## 2019-05-09 DIAGNOSIS — Z86.39 H/O DIABETIC NEUROPATHY: ICD-10-CM

## 2019-05-09 NOTE — TELEPHONE ENCOUNTER
Pt is calling for a refill     pregabalin (LYRICA) 200 MG capsule     (Dr Guzman's office asked him to call us. Also might need a PA.)    Patient requests RX SENT TO   [facility] [address] [place] [address] [address] [place], [place] [address]OR AT [facility] [address] - [phone]  - [phone] FX    Caller# [phone]

## 2019-05-13 RX ORDER — PREGABALIN 200 MG/1
200 CAPSULE ORAL 3 TIMES DAILY
Qty: 90 CAPSULE | Refills: 5 | Status: SHIPPED | OUTPATIENT
Start: 2019-05-13 | End: 2020-01-27 | Stop reason: SDUPTHER

## 2019-05-15 DIAGNOSIS — E11.40 NEUROPATHY DUE TO TYPE 2 DIABETES MELLITUS (HCC): ICD-10-CM

## 2019-05-20 RX ORDER — QUETIAPINE FUMARATE 25 MG/1
TABLET, FILM COATED ORAL
Qty: 90 TABLET | Refills: 0 | Status: SHIPPED | OUTPATIENT
Start: 2019-05-20 | End: 2019-07-25 | Stop reason: SDUPTHER

## 2019-05-29 ENCOUNTER — TELEPHONE (OUTPATIENT)
Dept: ORTHOPEDIC SURGERY | Facility: CLINIC | Age: 59
End: 2019-05-29

## 2019-05-29 LAB
25(OH)D3+25(OH)D2 SERPL-MCNC: 44.2 NG/ML (ref 30–100)
ALBUMIN SERPL-MCNC: 4.8 G/DL (ref 3.5–5.2)
ALBUMIN/GLOB SERPL: 1.9 G/DL
ALP SERPL-CCNC: 46 U/L (ref 39–117)
ALT SERPL-CCNC: 48 U/L (ref 1–41)
AST SERPL-CCNC: 37 U/L (ref 1–40)
BILIRUB SERPL-MCNC: 0.3 MG/DL (ref 0.2–1.2)
BUN SERPL-MCNC: 19 MG/DL (ref 8–23)
BUN/CREAT SERPL: 19 (ref 7–25)
C PEPTIDE SERPL-MCNC: 3 NG/ML (ref 1.1–4.4)
CALCIUM SERPL-MCNC: 10.4 MG/DL (ref 8.6–10.5)
CHLORIDE SERPL-SCNC: 101 MMOL/L (ref 98–107)
CHOLEST SERPL-MCNC: 102 MG/DL (ref 0–200)
CO2 SERPL-SCNC: 28.5 MMOL/L (ref 22–29)
CREAT SERPL-MCNC: 1 MG/DL (ref 0.76–1.27)
GLOBULIN SER CALC-MCNC: 2.5 GM/DL
GLUCOSE SERPL-MCNC: 227 MG/DL (ref 65–99)
HBA1C MFR BLD: 8.2 % (ref 4.8–5.6)
HCT VFR BLD AUTO: 48.8 % (ref 37.5–51)
HDLC SERPL-MCNC: 29 MG/DL (ref 40–60)
HGB BLD-MCNC: 15.4 G/DL (ref 13–17.7)
INTERPRETATION: NORMAL
LDLC SERPL CALC-MCNC: 21 MG/DL (ref 0–100)
Lab: NORMAL
MICROALBUMIN UR-MCNC: 6.9 UG/ML
POTASSIUM SERPL-SCNC: 4.4 MMOL/L (ref 3.5–5.2)
PROT SERPL-MCNC: 7.3 G/DL (ref 6–8.5)
PSA SERPL-MCNC: 0.58 NG/ML (ref 0–4)
SHBG SERPL-SCNC: 37.6 NMOL/L (ref 19.3–76.4)
SODIUM SERPL-SCNC: 144 MMOL/L (ref 136–145)
T3FREE SERPL-MCNC: 2.4 PG/ML (ref 2–4.4)
T4 FREE SERPL-MCNC: 1.17 NG/DL (ref 0.93–1.7)
T4 SERPL-MCNC: 7.42 MCG/DL (ref 4.5–11.7)
TESTOST FREE SERPL-MCNC: 5.5 PG/ML (ref 6.6–18.1)
TESTOST SERPL-MCNC: 187 NG/DL (ref 264–916)
THYROGLOB AB SERPL-ACNC: <1 IU/ML (ref 0–0.9)
THYROGLOB SERPL-MCNC: 14.2 NG/ML (ref 1.4–29.2)
TRIGL SERPL-MCNC: 259 MG/DL (ref 0–150)
TSH SERPL DL<=0.005 MIU/L-ACNC: 1.78 MIU/ML (ref 0.27–4.2)
URATE SERPL-MCNC: 6.2 MG/DL (ref 3.4–7)
VLDLC SERPL CALC-MCNC: 51.8 MG/DL

## 2019-05-29 NOTE — TELEPHONE ENCOUNTER
Call returned to the patient.  He is concerned because he is unable to wiggle his great toe on that left foot.  He is having no pain and no numbness.  There is no erythema.  Patient states that it is been this way since his surgery last year.  Will discuss with Dr. Odell and then get back with the patient

## 2019-05-29 NOTE — TELEPHONE ENCOUNTER
Patient is about a year out now from Achilles repair and reconstruction including an FHL tendon transfer.  He called because he was concerned that he has not been able to flex his great toe and states his been that way pretty much since surgery.  He is not having any pain with it and says it does not affect his walking and no numbness and that his ankle is doing quite well.  Reviewed with him again that we had to use his FHL tendon to augment his Achilles and usually there are other smaller muscles that will still allow some flexion to the great toe but these may not be working well in him.    Reviewed with him that there really was not anything to do at this point other than to maybe do some physical therapy which he declined at this time as his symptoms are not really bothersome to him and reviewed with him that it was unlikely that he would regain any significant flexion or motion there being a year out now from surgery.  He said that was fine and he understands now and that he appreciated my care and will call if he has any further problems or questions

## 2019-05-29 NOTE — TELEPHONE ENCOUNTER
Please call patient to discuss what is going on with Left ankle. Patient had left Achilles surgery on 5/22/18, [facility] offered to schedule follow up appt but patient requested call back from [facility] or [facility]. Thanks /[facility]rh

## 2019-05-30 RX ORDER — LEVOTHYROXINE SODIUM 0.07 MG/1
75 TABLET ORAL DAILY
Qty: 30 TABLET | Refills: 5 | Status: SHIPPED | OUTPATIENT
Start: 2019-05-30 | End: 2019-06-10 | Stop reason: SDUPTHER

## 2019-06-03 RX ORDER — DULOXETIN HYDROCHLORIDE 60 MG/1
CAPSULE, DELAYED RELEASE ORAL
Qty: 30 CAPSULE | Refills: 0 | Status: SHIPPED | OUTPATIENT
Start: 2019-06-03 | End: 2019-06-27 | Stop reason: SDUPTHER

## 2019-06-06 ENCOUNTER — TELEPHONE (OUTPATIENT)
Dept: ORTHOPEDIC SURGERY | Facility: CLINIC | Age: 59
End: 2019-06-06

## 2019-06-06 NOTE — TELEPHONE ENCOUNTER
"Error / Just FYI: Patient was originally scheduled to see [facility] on 1st available 6/24 for OPSE / LEFT SHLDR / NXR / no specific DOI / No prior LEFT SHLDR SX / Wants MRI - patient previously only wanted [facility], however patient wished to reschedule sooner due to pain, \"losing sleep, can't move Left arm\" - patient was rescheduled with NP Val Al for 1st available Wed 6/12/19 at [facility]. Just FYI: patient was previously seen for Left Shldr by [facility] 05/03/16. Thanks /[facility]rh  "

## 2019-06-10 ENCOUNTER — OFFICE VISIT (OUTPATIENT)
Dept: ENDOCRINOLOGY | Age: 59
End: 2019-06-10

## 2019-06-10 VITALS
BODY MASS INDEX: 39.43 KG/M2 | WEIGHT: 275.4 LBS | HEIGHT: 70 IN | SYSTOLIC BLOOD PRESSURE: 118 MMHG | RESPIRATION RATE: 16 BRPM | DIASTOLIC BLOOD PRESSURE: 72 MMHG

## 2019-06-10 DIAGNOSIS — E78.5 DYSLIPIDEMIA: ICD-10-CM

## 2019-06-10 DIAGNOSIS — E55.9 VITAMIN D DEFICIENCY: ICD-10-CM

## 2019-06-10 DIAGNOSIS — N52.8 OTHER MALE ERECTILE DYSFUNCTION: ICD-10-CM

## 2019-06-10 DIAGNOSIS — E11.65 UNCONTROLLED TYPE 2 DIABETES MELLITUS WITH HYPERGLYCEMIA (HCC): Primary | ICD-10-CM

## 2019-06-10 DIAGNOSIS — E03.9 PRIMARY HYPOTHYROIDISM: ICD-10-CM

## 2019-06-10 DIAGNOSIS — E66.01 MORBID OBESITY DUE TO EXCESS CALORIES (HCC): ICD-10-CM

## 2019-06-10 DIAGNOSIS — R53.82 CHRONIC FATIGUE: ICD-10-CM

## 2019-06-10 DIAGNOSIS — R79.89 LOW TESTOSTERONE: ICD-10-CM

## 2019-06-10 DIAGNOSIS — E78.1 ESSENTIAL HYPERTRIGLYCERIDEMIA: ICD-10-CM

## 2019-06-10 DIAGNOSIS — I10 BENIGN ESSENTIAL HTN: ICD-10-CM

## 2019-06-10 PROCEDURE — 99214 OFFICE O/P EST MOD 30 MIN: CPT | Performed by: INTERNAL MEDICINE

## 2019-06-10 RX ORDER — LEVOTHYROXINE SODIUM 0.07 MG/1
75 TABLET ORAL DAILY
Qty: 90 TABLET | Refills: 3 | Status: SHIPPED | OUTPATIENT
Start: 2019-06-10 | End: 2020-07-13

## 2019-06-10 RX ORDER — TADALAFIL 5 MG/1
5 TABLET ORAL DAILY PRN
Qty: 30 TABLET | Refills: 5 | Status: SHIPPED | OUTPATIENT
Start: 2019-06-10 | End: 2020-01-27 | Stop reason: SDUPTHER

## 2019-06-10 RX ORDER — INSULIN GLARGINE 300 U/ML
175 INJECTION, SOLUTION SUBCUTANEOUS EVERY MORNING
Qty: 18 PEN | Refills: 3 | Status: SHIPPED | OUTPATIENT
Start: 2019-06-10 | End: 2019-06-10 | Stop reason: SDUPTHER

## 2019-06-10 RX ORDER — FENOFIBRATE 130 MG/1
130 CAPSULE ORAL
Qty: 90 CAPSULE | Refills: 3 | Status: SHIPPED | OUTPATIENT
Start: 2019-06-10 | End: 2020-01-27 | Stop reason: SDUPTHER

## 2019-06-10 RX ORDER — INSULIN GLARGINE 300 U/ML
175 INJECTION, SOLUTION SUBCUTANEOUS EVERY MORNING
Qty: 18 PEN | Refills: 3 | Status: SHIPPED | OUTPATIENT
Start: 2019-06-10 | End: 2019-11-11

## 2019-06-10 RX ORDER — PIOGLITAZONEHYDROCHLORIDE 30 MG/1
30 TABLET ORAL DAILY
Qty: 90 TABLET | Refills: 3 | Status: SHIPPED | OUTPATIENT
Start: 2019-06-10 | End: 2020-01-27 | Stop reason: SDUPTHER

## 2019-06-10 RX ORDER — DAPAGLIFLOZIN 10 MG/1
1 TABLET, FILM COATED ORAL EVERY MORNING
Qty: 90 TABLET | Refills: 3 | Status: SHIPPED | OUTPATIENT
Start: 2019-06-10 | End: 2020-01-27 | Stop reason: SDUPTHER

## 2019-06-10 RX ORDER — SITAGLIPTIN AND METFORMIN HYDROCHLORIDE 1000; 50 MG/1; MG/1
2 TABLET, FILM COATED, EXTENDED RELEASE ORAL EVERY EVENING
Qty: 180 TABLET | Refills: 3 | Status: SHIPPED | OUTPATIENT
Start: 2019-06-10 | End: 2020-01-27 | Stop reason: SDUPTHER

## 2019-06-10 RX ORDER — ATORVASTATIN CALCIUM 20 MG/1
20 TABLET, FILM COATED ORAL DAILY
Qty: 90 TABLET | Refills: 3 | Status: SHIPPED | OUTPATIENT
Start: 2019-06-10 | End: 2020-01-27 | Stop reason: SDUPTHER

## 2019-06-10 NOTE — PROGRESS NOTES
"Subjective   Aaron Latif is a 59 y.o. male seen for follow up for DM2, hyperlipidemia, HTN, hypothyroidism, vit d deficiency, lab review. He is checking BG once daily. He states that he is getting a 27 day supply and only 4 pens of Toujeo which is not lasting the full 30 day supply.     History of Present Illness this is a 59-year-old gentleman known patient with type 2 diabetes hypertension and dyslipidemia as well as hypothyroidism and morbid obesity with erectile dysfunction and hypogonadism.  Over the course of last 6 months he has had no significant health problems for which to go to the ER or hospital.  He is not taking his testosterone and at the same time complaining of being very tired with no energy and a low sex drive.     /72   Resp 16   Ht 177.8 cm (70\")   Wt 125 kg (275 lb 6.4 oz)   BMI 39.52 kg/m²      Allergies   Allergen Reactions   • Codeine Other (See Comments)     UNKNOWN       Current Outpatient Medications:   •  ACCU-CHEK GUIDE test strip, Use 3 times a day, Disp: 300 each, Rfl: 1  •  amLODIPine (NORVASC) 10 MG tablet, Take 1 tablet by mouth Daily., Disp: 30 tablet, Rfl: 5  •  atorvastatin (LIPITOR) 20 MG tablet, Take 1 tablet by mouth Daily., Disp: 30 tablet, Rfl: 11  •  Blood Glucose Monitoring Suppl (ACCU-CHEK GUIDE ME) w/Device kit, 1 kit Daily., Disp: 1 kit, Rfl: 1  •  DULoxetine (CYMBALTA) 60 MG capsule, TAKE ONE CAPSULE BY MOUTH DAILY, Disp: 30 capsule, Rfl: 0  •  FARXIGA 10 MG tablet, Take 10 mg by mouth Every Morning., Disp: 30 tablet, Rfl: 5  •  fenofibrate micronized (ANTARA) 130 MG capsule, Take 1 capsule by mouth Every Morning Before Breakfast., Disp: 30 capsule, Rfl: 5  •  hydrochlorothiazide (HYDRODIURIL) 25 MG tablet, TAKE ONE TABLET BY MOUTH EVERY MORNING, Disp: 90 tablet, Rfl: 1  •  Insulin Pen Needle 31G X 8 MM misc, Use once a day, Disp: 100 each, Rfl: 0  •  JANUMET XR  MG tablet, Take 2 tablets by mouth Daily. (Patient taking differently: Take 2 tablets " "by mouth Every Evening.), Disp: 60 tablet, Rfl: 5  •  Lancets (ACCU-CHEK MULTICLIX) lancets, Use 3 times a day, Disp: 300 each, Rfl: 1  •  levothyroxine (SYNTHROID) 75 MCG tablet, Take 1 tablet by mouth Daily., Disp: 30 tablet, Rfl: 5  •  LYRICA 200 MG capsule, Take 1 capsule by mouth 3 (Three) Times a Day., Disp: 90 capsule, Rfl: 5  •  meloxicam (MOBIC) 15 MG tablet, Take 15 mg by mouth Daily. TO HOLD PRIOR TO OR X 1 WEEK, Disp: , Rfl:   •  metoprolol succinate XL (TOPROL-XL) 50 MG 24 hr tablet, Take 1 tablet by mouth Daily., Disp: 90 tablet, Rfl: 1  •  Needle, Disp, 18G X 1\" misc, Use weekly with testosterone injection, Disp: 25 each, Rfl: 0  •  ondansetron (ZOFRAN) 4 MG tablet, Take 1 tablet by mouth Every 6 (Six) Hours As Needed for Nausea or Vomiting., Disp: 10 tablet, Rfl: 0  •  oxyCODONE-acetaminophen (PERCOCET) 7.5-325 MG per tablet, 1-2 po q 4-6 hr prn pain, Disp: 42 tablet, Rfl: 0  •  pioglitazone (ACTOS) 30 MG tablet, Take 1 tablet by mouth Daily., Disp: 90 tablet, Rfl: 3  •  pregabalin (LYRICA) 200 MG capsule, Take 1 capsule by mouth 3 (Three) Times a Day., Disp: 90 capsule, Rfl: 5  •  QUEtiapine (SEROquel) 25 MG tablet, TAKE ONE TABLET BY MOUTH ONCE NIGHTLY, Disp: 90 tablet, Rfl: 0  •  Syringe 23G X 1\" 3 ML misc, Use weekly for testosterone injection, Disp: 10 each, Rfl: 3  •  tadalafil (CIALIS) 5 MG tablet, Take 1 tablet by mouth Daily As Needed for erectile dysfunction., Disp: 30 tablet, Rfl: 5  •  Testosterone Cypionate (DEPOTESTOTERONE CYPIONATE) 200 MG/ML injection, Inject 1.5 mL into the appropriate muscle as directed by prescriber 1 (One) Time Per Week., Disp: 10 mL, Rfl: 5  •  TOUJEO MAX SOLOSTAR 300 UNIT/ML solution pen-injector, Inject 150 Units under the skin into the appropriate area as directed Every Morning., Disp: 15 pen, Rfl: 3      The following portions of the patient's history were reviewed and updated as appropriate: allergies, current medications, past family history, past medical " history, past social history, past surgical history and problem list.    Review of Systems   Constitutional: Negative.    HENT: Negative.    Eyes: Negative.    Respiratory: Negative.    Cardiovascular: Negative.    Gastrointestinal: Negative.    Endocrine: Negative.    Genitourinary: Negative.    Musculoskeletal: Negative.    Skin: Negative.    Allergic/Immunologic: Negative.    Neurological: Negative.    Hematological: Negative.    Psychiatric/Behavioral: Negative.        Objective   Physical Exam   Constitutional: He appears well-developed and well-nourished. No distress.   HENT:   Head: Normocephalic and atraumatic.   Right Ear: External ear normal.   Left Ear: External ear normal.   Nose: Nose normal.   Mouth/Throat: Oropharynx is clear and moist. No oropharyngeal exudate.   Eyes: Conjunctivae and EOM are normal. Pupils are equal, round, and reactive to light. Right eye exhibits no discharge. Left eye exhibits no discharge. No scleral icterus.   Neck: Normal range of motion. Neck supple. No JVD present. No tracheal deviation present. No thyromegaly present.   Cardiovascular: Normal rate, regular rhythm, normal heart sounds and intact distal pulses. Exam reveals no gallop and no friction rub.   No murmur heard.  Pulmonary/Chest: Effort normal and breath sounds normal. No stridor. No respiratory distress. He has no wheezes. He has no rales. He exhibits no tenderness.   Abdominal: Soft. Bowel sounds are normal. He exhibits no distension and no mass. There is no tenderness. There is no rebound and no guarding. No hernia.   Musculoskeletal: Normal range of motion. He exhibits no edema, tenderness or deformity.   Healed the scar of the knee surgery in the anterior aspect of the left knee.   Lymphadenopathy:     He has no cervical adenopathy.   Neurological: He is alert. He has normal reflexes. He displays normal reflexes. No cranial nerve deficit or sensory deficit. He exhibits normal muscle tone. Coordination normal.    Skin: Skin is warm and dry. No rash noted. He is not diaphoretic. No erythema. No pallor.   Psychiatric: He has a normal mood and affect. His behavior is normal. Judgment and thought content normal.   Nursing note and vitals reviewed.       Results for orders placed or performed in visit on 04/22/19   T3, Free   Result Value Ref Range    T3, Free 2.4 2.0 - 4.4 pg/mL   T4 & TSH ([facility])   Result Value Ref Range    TSH 1.780 0.270 - 4.200 mIU/mL    T4, Total 7.42 4.50 - 11.70 mcg/dL   T4, Free   Result Value Ref Range    Free T4 1.17 0.93 - 1.70 ng/dL   Thyroglobulin With Anti-TG   Result Value Ref Range    Thyroglobulin Ab <1.0 0.0 - 0.9 IU/mL   TestT+TestF+SHBG   Result Value Ref Range    Testosterone, Total 187 (L) 264 - 916 ng/dL    Testosterone, Free 5.5 (L) 6.6 - 18.1 pg/mL    Sex Hormone Binding Globulin 37.6 19.3 - 76.4 nmol/L   Uric Acid   Result Value Ref Range    Uric Acid 6.2 3.4 - 7.0 mg/dL   Vitamin D 25 Hydroxy   Result Value Ref Range    25 Hydroxy, Vitamin D 44.2 30.0 - 100.0 ng/ml   Comprehensive Metabolic Panel   Result Value Ref Range    Glucose 227 (H) 65 - 99 mg/dL    BUN 19 8 - 23 mg/dL    Creatinine 1.00 0.76 - 1.27 mg/dL    eGFR Non African Am 76 >60 mL/min/1.73    eGFR African Am 92 >60 mL/min/1.73    BUN/Creatinine Ratio 19.0 7.0 - 25.0    Sodium 144 136 - 145 mmol/L    Potassium 4.4 3.5 - 5.2 mmol/L    Chloride 101 98 - 107 mmol/L    Total CO2 28.5 22.0 - 29.0 mmol/L    Calcium 10.4 8.6 - 10.5 mg/dL    Total Protein 7.3 6.0 - 8.5 g/dL    Albumin 4.80 3.50 - 5.20 g/dL    Globulin 2.5 gm/dL    A/G Ratio 1.9 g/dL    Total Bilirubin 0.3 0.2 - 1.2 mg/dL    Alkaline Phosphatase 46 39 - 117 U/L    AST (SGOT) 37 1 - 40 U/L    ALT (SGPT) 48 (H) 1 - 41 U/L   C-Peptide   Result Value Ref Range    C-Peptide 3.0 1.1 - 4.4 ng/mL   Hemoglobin A1c   Result Value Ref Range    Hemoglobin A1C 8.20 (H) 4.80 - 5.60 %   Lipid Panel   Result Value Ref Range    Total Cholesterol 102 0 - 200 mg/dL     Triglycerides 259 (H) 0 - 150 mg/dL    HDL Cholesterol 29 (L) 40 - 60 mg/dL    VLDL Cholesterol 51.8 mg/dL    LDL Cholesterol  21 0 - 100 mg/dL   MicroAlbumin, Urine, Random - Urine, Clean Catch   Result Value Ref Range    Microalbumin, Urine 6.9 Not Estab. ug/mL   PSA DIAGNOSTIC   Result Value Ref Range    PSA 0.580 0.000 - 4.000 ng/mL   Hemoglobin & Hematocrit, Blood   Result Value Ref Range    Hemoglobin 15.4 13.0 - 17.7 g/dL    Hematocrit 48.8 37.5 - 51.0 %   Thyroglobulin By CYRIL   Result Value Ref Range    THYROGLOBULIN BY CYRIL 14.2 1.4 - 29.2 ng/mL   Cardiovascular Risk Assessment   Result Value Ref Range    Interpretation Note    Diabetes Patient Education   Result Value Ref Range    PDF Image Not applicable          Assessment/Plan   Diagnoses and all orders for this visit:    Uncontrolled type 2 diabetes mellitus with hyperglycemia (CMS/[facility])  -     T4 & TSH ([facility]); Future  -     TestT+TestF+SHBG; Future  -     Uric Acid; Future  -     Vitamin D 25 Hydroxy; Future  -     Comprehensive Metabolic Panel; Future  -     C-Peptide; Future  -     Hemoglobin A1c; Future  -     Lipid Panel; Future  -     MicroAlbumin, Urine, Random - Urine, Clean Catch; Future  -     Hemoglobin & Hematocrit, Blood; Future    Benign essential HTN  -     T4 & TSH ([facility]); Future  -     TestT+TestF+SHBG; Future  -     Uric Acid; Future  -     Vitamin D 25 Hydroxy; Future  -     Comprehensive Metabolic Panel; Future  -     C-Peptide; Future  -     Hemoglobin A1c; Future  -     Lipid Panel; Future  -     MicroAlbumin, Urine, Random - Urine, Clean Catch; Future  -     Hemoglobin & Hematocrit, Blood; Future    Essential hypertriglyceridemia  -     T4 & TSH ([facility]); Future  -     TestT+TestF+SHBG; Future  -     Uric Acid; Future  -     Vitamin D 25 Hydroxy; Future  -     Comprehensive Metabolic Panel; Future  -     C-Peptide; Future  -     Hemoglobin A1c; Future  -     Lipid Panel; Future  -     MicroAlbumin, Urine, Random - Urine,  Clean Catch; Future  -     Hemoglobin & Hematocrit, Blood; Future    Morbid obesity due to excess calories (CMS/HCC)  -     fenofibrate micronized (ANTARA) 130 MG capsule; Take 1 capsule by mouth Every Morning Before Breakfast.  -     pioglitazone (ACTOS) 30 MG tablet; Take 1 tablet by mouth Daily.  -     T4 & TSH ([facility]); Future  -     TestT+TestF+SHBG; Future  -     Uric Acid; Future  -     Vitamin D 25 Hydroxy; Future  -     Comprehensive Metabolic Panel; Future  -     C-Peptide; Future  -     Hemoglobin A1c; Future  -     Lipid Panel; Future  -     MicroAlbumin, Urine, Random - Urine, Clean Catch; Future  -     Hemoglobin & Hematocrit, Blood; Future    Vitamin D deficiency  -     fenofibrate micronized (ANTARA) 130 MG capsule; Take 1 capsule by mouth Every Morning Before Breakfast.  -     pioglitazone (ACTOS) 30 MG tablet; Take 1 tablet by mouth Daily.  -     T4 & TSH ([facility]); Future  -     TestT+TestF+SHBG; Future  -     Uric Acid; Future  -     Vitamin D 25 Hydroxy; Future  -     Comprehensive Metabolic Panel; Future  -     C-Peptide; Future  -     Hemoglobin A1c; Future  -     Lipid Panel; Future  -     MicroAlbumin, Urine, Random - Urine, Clean Catch; Future  -     Hemoglobin & Hematocrit, Blood; Future    Primary hypothyroidism  -     T4 & TSH ([facility]); Future  -     TestT+TestF+SHBG; Future  -     Uric Acid; Future  -     Vitamin D 25 Hydroxy; Future  -     Comprehensive Metabolic Panel; Future  -     C-Peptide; Future  -     Hemoglobin A1c; Future  -     Lipid Panel; Future  -     MicroAlbumin, Urine, Random - Urine, Clean Catch; Future  -     Hemoglobin & Hematocrit, Blood; Future    Chronic fatigue  -     fenofibrate micronized (ANTARA) 130 MG capsule; Take 1 capsule by mouth Every Morning Before Breakfast.  -     pioglitazone (ACTOS) 30 MG tablet; Take 1 tablet by mouth Daily.  -     T4 & TSH ([facility]); Future  -     TestT+TestF+SHBG; Future  -     Uric Acid; Future  -     Vitamin D 25 Hydroxy;  Future  -     Comprehensive Metabolic Panel; Future  -     C-Peptide; Future  -     Hemoglobin A1c; Future  -     Lipid Panel; Future  -     MicroAlbumin, Urine, Random - Urine, Clean Catch; Future  -     Hemoglobin & Hematocrit, Blood; Future    Low testosterone  -     fenofibrate micronized (ANTARA) 130 MG capsule; Take 1 capsule by mouth Every Morning Before Breakfast.  -     pioglitazone (ACTOS) 30 MG tablet; Take 1 tablet by mouth Daily.  -     T4 & TSH ([facility]); Future  -     TestT+TestF+SHBG; Future  -     Uric Acid; Future  -     Vitamin D 25 Hydroxy; Future  -     Comprehensive Metabolic Panel; Future  -     C-Peptide; Future  -     Hemoglobin A1c; Future  -     Lipid Panel; Future  -     MicroAlbumin, Urine, Random - Urine, Clean Catch; Future  -     Hemoglobin & Hematocrit, Blood; Future    Dyslipidemia  -     fenofibrate micronized (ANTARA) 130 MG capsule; Take 1 capsule by mouth Every Morning Before Breakfast.  -     pioglitazone (ACTOS) 30 MG tablet; Take 1 tablet by mouth Daily.  -     T4 & TSH ([facility]); Future  -     TestT+TestF+SHBG; Future  -     Uric Acid; Future  -     Vitamin D 25 Hydroxy; Future  -     Comprehensive Metabolic Panel; Future  -     C-Peptide; Future  -     Hemoglobin A1c; Future  -     Lipid Panel; Future  -     MicroAlbumin, Urine, Random - Urine, Clean Catch; Future  -     Hemoglobin & Hematocrit, Blood; Future    Other male erectile dysfunction  -     fenofibrate micronized (ANTARA) 130 MG capsule; Take 1 capsule by mouth Every Morning Before Breakfast.  -     pioglitazone (ACTOS) 30 MG tablet; Take 1 tablet by mouth Daily.  -     T4 & TSH ([facility]); Future  -     TestT+TestF+SHBG; Future  -     Uric Acid; Future  -     Vitamin D 25 Hydroxy; Future  -     Comprehensive Metabolic Panel; Future  -     C-Peptide; Future  -     Hemoglobin A1c; Future  -     Lipid Panel; Future  -     MicroAlbumin, Urine, Random - Urine, Clean Catch; Future  -     Hemoglobin & Hematocrit, Blood;  Future    Other orders  -     TOUJEO MAX SOLOSTAR 300 UNIT/ML solution pen-injector; Inject 175 Units under the skin into the appropriate area as directed Every Morning.  -     levothyroxine (SYNTHROID) 75 MCG tablet; Take 1 tablet by mouth Daily.  -     FARXIGA 10 MG tablet; Take 10 mg by mouth Every Morning.  -     JANUMET XR  MG tablet; Take 2 tablets by mouth Every Evening.  -     Insulin Pen Needle 31G X 8 MM misc; Use once a day  -     atorvastatin (LIPITOR) 20 MG tablet; Take 1 tablet by mouth Daily.  -     tadalafil (CIALIS) 5 MG tablet; Take 1 tablet by mouth Daily As Needed for erectile dysfunction.      This summary I saw and examined this 59-year-old gentleman for above-mentioned problems.  I reviewed his laboratory evaluation of May 28, 2019 and provided him and his wife who was present during this office visit a hard copy of it.  Aside from is mildly elevated hemoglobin A1c he is clinically and metabolically stable.  I explained the dosing of Toujeo to him in detail and after a long discussion with him and me and him and his wife he finally agreed to go ahead and give himself testosterone injections.  I will see him in 6 months or sooner if needed with laboratory evaluation prior to each office visit.

## 2019-06-11 ENCOUNTER — PRIOR AUTHORIZATION (OUTPATIENT)
Dept: ENDOCRINOLOGY | Age: 59
End: 2019-06-11

## 2019-06-12 ENCOUNTER — OFFICE VISIT (OUTPATIENT)
Dept: ORTHOPEDIC SURGERY | Facility: CLINIC | Age: 59
End: 2019-06-12

## 2019-06-12 VITALS — WEIGHT: 270 LBS | BODY MASS INDEX: 38.65 KG/M2 | HEIGHT: 70 IN | TEMPERATURE: 98.2 F

## 2019-06-12 DIAGNOSIS — M25.512 CHRONIC LEFT SHOULDER PAIN: ICD-10-CM

## 2019-06-12 DIAGNOSIS — M75.02 ADHESIVE CAPSULITIS OF LEFT SHOULDER: Primary | ICD-10-CM

## 2019-06-12 DIAGNOSIS — G89.29 CHRONIC LEFT SHOULDER PAIN: ICD-10-CM

## 2019-06-12 PROCEDURE — 99213 OFFICE O/P EST LOW 20 MIN: CPT | Performed by: NURSE PRACTITIONER

## 2019-06-12 PROCEDURE — 73030 X-RAY EXAM OF SHOULDER: CPT | Performed by: NURSE PRACTITIONER

## 2019-06-12 NOTE — PROGRESS NOTES
Patient: Aaron Latif    YOB: 1960    Medical Record Number: [id number]    Chief Complaints:   Left shoulder pain, loss of motion    History of Present Illness:     59 y.o. male patient who presents for evaluation of left shoulder pain.  His wife is present during the visit. Reports pain began 6 months ago.  Denies injury or precipitating event.  Describes the pain as severe, intermittent, and aching.  He often has pain at rest, but it is worse with any type of movement.  He cannot sleep on his left side.  Icy Hot and heat provide mild, temporary relief.  Denies associated symptoms.  He is followed by Dr. Luna for his diabetes.  Reports his A1c as of yesterday was 8.4.  Patient is right hand dominant.      Allergies:   Allergies   Allergen Reactions   • Codeine Other (See Comments)     UNKNOWN     Home Medications:    Current Outpatient Medications:   •  ACCU-CHEK GUIDE test strip, Use 3 times a day, Disp: 300 each, Rfl: 1  •  amLODIPine (NORVASC) 10 MG tablet, Take 1 tablet by mouth Daily., Disp: 30 tablet, Rfl: 5  •  atorvastatin (LIPITOR) 20 MG tablet, Take 1 tablet by mouth Daily., Disp: 90 tablet, Rfl: 3  •  Blood Glucose Monitoring Suppl (ACCU-CHEK GUIDE ME) w/Device kit, 1 kit Daily., Disp: 1 kit, Rfl: 1  •  DULoxetine (CYMBALTA) 60 MG capsule, TAKE ONE CAPSULE BY MOUTH DAILY, Disp: 30 capsule, Rfl: 0  •  FARXIGA 10 MG tablet, Take 10 mg by mouth Every Morning., Disp: 90 tablet, Rfl: 3  •  fenofibrate micronized (ANTARA) 130 MG capsule, Take 1 capsule by mouth Every Morning Before Breakfast., Disp: 90 capsule, Rfl: 3  •  hydrochlorothiazide (HYDRODIURIL) 25 MG tablet, TAKE ONE TABLET BY MOUTH EVERY MORNING, Disp: 90 tablet, Rfl: 1  •  Insulin Pen Needle 31G X 8 MM misc, Use once a day, Disp: 100 each, Rfl: 3  •  JANUMET XR  MG tablet, Take 2 tablets by mouth Every Evening., Disp: 180 tablet, Rfl: 3  •  Lancets (ACCU-CHEK MULTICLIX) lancets, Use 3 times a day, Disp: 300 each, Rfl:  "1  •  levothyroxine (SYNTHROID) 75 MCG tablet, Take 1 tablet by mouth Daily., Disp: 90 tablet, Rfl: 3  •  LYRICA 200 MG capsule, Take 1 capsule by mouth 3 (Three) Times a Day., Disp: 90 capsule, Rfl: 5  •  meloxicam (MOBIC) 15 MG tablet, Take 15 mg by mouth Daily. TO HOLD PRIOR TO OR X 1 WEEK, Disp: , Rfl:   •  metoprolol succinate XL (TOPROL-XL) 50 MG 24 hr tablet, Take 1 tablet by mouth Daily., Disp: 90 tablet, Rfl: 1  •  Needle, Disp, 18G X 1\" misc, Use weekly with testosterone injection, Disp: 25 each, Rfl: 0  •  ondansetron (ZOFRAN) 4 MG tablet, Take 1 tablet by mouth Every 6 (Six) Hours As Needed for Nausea or Vomiting., Disp: 10 tablet, Rfl: 0  •  pioglitazone (ACTOS) 30 MG tablet, Take 1 tablet by mouth Daily., Disp: 90 tablet, Rfl: 3  •  pregabalin (LYRICA) 200 MG capsule, Take 1 capsule by mouth 3 (Three) Times a Day., Disp: 90 capsule, Rfl: 5  •  QUEtiapine (SEROquel) 25 MG tablet, TAKE ONE TABLET BY MOUTH ONCE NIGHTLY, Disp: 90 tablet, Rfl: 0  •  Syringe 23G X 1\" 3 ML misc, Use weekly for testosterone injection, Disp: 10 each, Rfl: 3  •  tadalafil (CIALIS) 5 MG tablet, Take 1 tablet by mouth Daily As Needed for erectile dysfunction., Disp: 30 tablet, Rfl: 5  •  Testosterone Cypionate (DEPOTESTOTERONE CYPIONATE) 200 MG/ML injection, Inject 1.5 mL into the appropriate muscle as directed by prescriber 1 (One) Time Per Week., Disp: 10 mL, Rfl: 5  •  TOUJEO MAX SOLOSTAR 300 UNIT/ML solution pen-injector, Inject 175 Units under the skin into the appropriate area as directed Every Morning., Disp: 18 pen, Rfl: 3  •  oxyCODONE-acetaminophen (PERCOCET) 7.5-325 MG per tablet, 1-2 po q 4-6 hr prn pain, Disp: 42 tablet, Rfl: 0    Past Medical History:   Diagnosis Date   • Anxiety    • Arthritis    • Diabetic peripheral neuropathy (CMS/HCC)     TYPE 2   • Dizziness    • Erectile dysfunction    • Fatigue    • High cholesterol    • Hypertension    • Hypothyroidism    • Low testosterone    • Neuropathy    • Sleep apnea  "    WEARS CPAP   • Type 2 diabetes mellitus (CMS/[facility])    • Vitamin D deficiency        Past Surgical History:   Procedure Laterality Date   • ACHILLES TENDON REPAIR Left 2009   • ACHILLES TENDON SURGERY Left 5/22/2018    Procedure: Left Achilles debridement and repair, Achilles tendon lengthening at the calf, partial excision of calcaneus and tendon transfer from great toe to calcaneus;  Surgeon: Maycol Chawla MD;  [place] [facility] [place] [facility] [place]: [place]opedics   • EYE SURGERY Left     BASKETBALL INJURY YEARS AGO   • HERNIA REPAIR     • KNEE ARTHROSCOPY Left 7/26/2016    Procedure: KNEE ARTHROSCOPY, PARTIAL medial MENISCECTOMY;  Surgeon: Layton Anderson MD;  [place] [facility] [place] [facility] [place]  Service:    • KNEE MINI REVISION Left 12/20/2017    Procedure: LEFT TOTAL KNEE ARTHROPLASTY poly change;  Surgeon: Eloy Fitzgerald MD;  Location: [facility] MAIN OR;  Service:    • KNEE SURGERY     • [facility] TOTAL KNEE ARTHROPLASTY Left 12/15/2016    Procedure: TOTAL KNEE ARTHROPLASTY;  Surgeon: Layton Anderson MD;  Location: [facility] MAIN OR;  Service: Orthopedics   • TOTAL KNEE ARTHROPLASTY REVISION Left 2/6/2019    Procedure: TOTAL KNEE ARTHROPLASTY REVISION;  Surgeon: Eloy Fitzgerald MD;  Location: [facility] MAIN OR;  Service: Orthopedics       Social History     Occupational History   • Not on file   Tobacco Use   • Smoking status: Never Smoker   • Smokeless tobacco: Never Used   Substance and Sexual Activity   • Alcohol use: Yes     Comment:  1-2 BEERS/ NIGHT    • Drug use: No   • Sexual activity: Defer      Social History     Social History Narrative   • Not on file       Family History   Problem Relation Age of Onset   • Diabetes Mother    • Hypertension Mother    • Heart disease Father    • Diabetes Brother    • Hypertension Brother    • Hypertension Paternal Uncle    • Malig Hyperthermia Neg Hx      Review of Systems:      Constitutional: Denies fever, shaking or chills   Eyes: Denies change in visual acuity   HEENT: Denies nasal  "congestion or sore throat   Respiratory: Denies cough or shortness of breath   Cardiovascular: Denies chest pain or edema  Endocrine: Denies tremors, palpitations, intolerance of heat or cold, polyuria, polydipsia.  GI: Denies abdominal pain, nausea, vomiting, bloody stools or diarrhea  : Denies frequency, urgency, incontinence, retention, or nocturia.  Musculoskeletal: Denies numbness, tingling or loss of motor function except as above  Integument: Denies rash, lesion or ulceration   Neurologic: Denies headache or focal weakness, deficits  Heme: Denies spontaneous or excessive bleeding, epistaxis, hematuria, melena, fatigue, enlarged or tender lymph nodes.      All other pertinent positives and negatives as noted above in HPI.    Physical Exam: 59 y.o. male  Vitals:    06/12/19 0853   Temp: 98.2 °F (36.8 °C)   Weight: 122 kg (270 lb)   Height: 177.8 cm (70\")     General:  Patient is awake and alert.  Appears in no acute distress or discomfort.    Psych:  Affect and demeanor are appropriate.    Eyes:  Conjunctiva and sclera appear grossly normal.  Eyes track well and EOM seem to be intact.    Ears:  No gross abnormalities.  Hearing adequate for the exam.    Cardiovascular:  Regular rate and rhythm.    Lungs:  Good chest expansion.  Breathing unlabored.    Lymph:  No palpable adenopathy about neck or axilla.    Neck:  Supple.  Normal ROM.  Negative Spurling's for shoulder or arm pain.    Left upper extremity:  Skin benign and intact without evidence for swelling, masses or atrophy.  No palpable masses.  Tender over rotator interval.  Limited ROM:  Forward elevation 145° compared to 175° on contralateral side, external rotation 55° compared to 60° on contralateral side,  internal rotation to L2 compared to T10 on contralateral side no evident instability or apprehension.  Positive Neer and Lugo impingement maneuvers.  Negative Speeds, Yergason's and active compression maneuvers.  Discomfort but well preserved " strength with resistive testing of rotator cuff.  Good strength in wrist and hand.  Intact sensation in arm, hand.  Palpable radial pulse with brisk cap refill.         Radiology:   AP, scapular Y, and axillary views of the left shoulder are ordered by myself and compared to previous films. The x-rays show mild degeneration of the acromioclavicular joint and minimal osteophyte formation at the inferior portion of the glenoid. No obvious acute abnormalities, lesions, masses, significant degenerative changes, or other concerning findings. The acromiohumeral interval is normal.  Glenoid version appears normal as well.    Assessment/Plan:   Left shoulder adhesive capsulitis    Even though I believe he has adhesive capsulitis, he may also have rotator cuff tendinitis or a tear.  The examination was difficult due to his level of discomfort.  Discussed options in detail including conservative versus surgical options. I have recommended that we start with a conservative approach. With regards to conservative options, we discussed appropriate activity modifications, icing as needed, anti-inflammatories, physical therapy, and injections.  Patient expressed a desire to proceed with a shoulder manipulation.  We discussed all that is involved with that procedure and the subsequent physical therapy required for optimal outcome.  I explained a MRI is necessary for further evaluation prior to this procedure.  I demonstrated some home exercises for the patient.  He is to perform the exercises I demonstrated several times a day.  I strongly encouraged him to keep his blood glucose levels under tight control with diet and medications.  I offered a referral for formal physical therapy, but he declined for now.  I have entered a referral for a MRI.  I will call him with the results and recommendations of how to proceed.    DORA Cotter    06/12/2019    CC to Grant Benitez MD

## 2019-06-27 RX ORDER — DULOXETIN HYDROCHLORIDE 60 MG/1
CAPSULE, DELAYED RELEASE ORAL
Qty: 30 CAPSULE | Refills: 0 | Status: SHIPPED | OUTPATIENT
Start: 2019-06-27 | End: 2019-07-25 | Stop reason: SDUPTHER

## 2019-06-29 RX ORDER — TESTOSTERONE CYPIONATE 200 MG/ML
INJECTION, SOLUTION INTRAMUSCULAR
Refills: 4 | Status: CANCELLED | OUTPATIENT
Start: 2019-06-29

## 2019-07-01 RX ORDER — TESTOSTERONE CYPIONATE 200 MG/ML
300 INJECTION, SOLUTION INTRAMUSCULAR WEEKLY
Qty: 4 ML | Refills: 5 | Status: SHIPPED | OUTPATIENT
Start: 2019-07-01 | End: 2020-01-27 | Stop reason: SDUPTHER

## 2019-07-05 ENCOUNTER — HOSPITAL ENCOUNTER (OUTPATIENT)
Dept: MRI IMAGING | Facility: HOSPITAL | Age: 59
Discharge: HOME | End: 2019-07-05
Admitting: NURSE PRACTITIONER

## 2019-07-05 DIAGNOSIS — G89.29 CHRONIC LEFT SHOULDER PAIN: ICD-10-CM

## 2019-07-05 DIAGNOSIS — M25.512 CHRONIC LEFT SHOULDER PAIN: ICD-10-CM

## 2019-07-05 PROCEDURE — 73221 MRI JOINT UPR EXTREM W/O DYE: CPT

## 2019-07-10 ENCOUNTER — TELEPHONE (OUTPATIENT)
Dept: ORTHOPEDIC SURGERY | Facility: CLINIC | Age: 59
End: 2019-07-10

## 2019-07-10 RX ORDER — OMEGA-3-ACID ETHYL ESTERS 1 G/1
CAPSULE, LIQUID FILLED ORAL
Qty: 120 CAPSULE | Refills: 4 | Status: SHIPPED | OUTPATIENT
Start: 2019-07-10 | End: 2019-12-23

## 2019-07-11 ENCOUNTER — TELEPHONE (OUTPATIENT)
Dept: ORTHOPEDIC SURGERY | Facility: CLINIC | Age: 59
End: 2019-07-11

## 2019-07-12 RX ORDER — NEEDLES, DISPOSABLE 25GX5/8"
NEEDLE, DISPOSABLE MISCELLANEOUS
Qty: 25 EACH | Refills: 0 | Status: SHIPPED | OUTPATIENT
Start: 2019-07-12

## 2019-07-12 NOTE — TELEPHONE ENCOUNTER
I spoke to Mr. Latif.  I provided him with the MRI results of his left shoulder as reviewed by Dr. Anderson.  He does have some acromioclavicular joint arthritis and biceps tendinopathy.  Dr. Anderson would like to see him back in clinic to discuss conservative and surgical treatment options.  I will have our office call him to schedule this appointment.  Of note, he reports his motion has improved slightly with stretches as demonstrated in clinic.

## 2019-07-25 ENCOUNTER — OFFICE VISIT (OUTPATIENT)
Dept: ORTHOPEDIC SURGERY | Facility: CLINIC | Age: 59
End: 2019-07-25

## 2019-07-25 VITALS — TEMPERATURE: 98.4 F | BODY MASS INDEX: 38.65 KG/M2 | HEIGHT: 70 IN | WEIGHT: 270 LBS

## 2019-07-25 DIAGNOSIS — Z96.652 HISTORY OF TOTAL KNEE ARTHROPLASTY, LEFT: Primary | ICD-10-CM

## 2019-07-25 PROCEDURE — 73562 X-RAY EXAM OF KNEE 3: CPT | Performed by: ORTHOPAEDIC SURGERY

## 2019-07-25 PROCEDURE — 99212 OFFICE O/P EST SF 10 MIN: CPT | Performed by: ORTHOPAEDIC SURGERY

## 2019-07-25 RX ORDER — QUETIAPINE FUMARATE 25 MG/1
TABLET, FILM COATED ORAL
Qty: 90 TABLET | Refills: 0 | Status: SHIPPED | OUTPATIENT
Start: 2019-07-25 | End: 2019-11-08 | Stop reason: SDUPTHER

## 2019-07-25 RX ORDER — DULOXETIN HYDROCHLORIDE 60 MG/1
CAPSULE, DELAYED RELEASE ORAL
Qty: 30 CAPSULE | Refills: 0 | Status: SHIPPED | OUTPATIENT
Start: 2019-07-25 | End: 2019-09-02 | Stop reason: SDUPTHER

## 2019-07-25 NOTE — PROGRESS NOTES
"Patient: Aaron Latif  YOB: 1960 59 y.o. male  Medical Record Number: [id number]    Chief Complaints:   Chief Complaint   Patient presents with   • Left Knee - Follow-up       History of Present Illness:Aaron Latif is a 59 y.o. male who presents for follow-up of left knee revision now 5 months out overall he is doing very well continues to improve pain is improved markedly he is walking a mile or so without discomfort does have some swelling.    Allergies:   Allergies   Allergen Reactions   • Codeine Other (See Comments)     UNKNOWN       Medications:   Current Outpatient Medications   Medication Sig Dispense Refill   • ACCU-CHEK GUIDE test strip Use 3 times a day 300 each 1   • amLODIPine (NORVASC) 10 MG tablet Take 1 tablet by mouth Daily. 30 tablet 5   • atorvastatin (LIPITOR) 20 MG tablet Take 1 tablet by mouth Daily. 90 tablet 3   • BD HYPODERMIC NEEDLE 18G X 1\" misc USE WEEKLY WITH TESTOSTERONE INJECTION 25 each 0   • Blood Glucose Monitoring Suppl (ACCU-CHEK GUIDE ME) w/Device kit 1 kit Daily. 1 kit 1   • DULoxetine (CYMBALTA) 60 MG capsule TAKE ONE CAPSULE BY MOUTH DAILY 30 capsule 0   • FARXIGA 10 MG tablet Take 10 mg by mouth Every Morning. 90 tablet 3   • fenofibrate micronized (ANTARA) 130 MG capsule Take 1 capsule by mouth Every Morning Before Breakfast. 90 capsule 3   • hydrochlorothiazide (HYDRODIURIL) 25 MG tablet TAKE ONE TABLET BY MOUTH EVERY MORNING 90 tablet 1   • Insulin Pen Needle 31G X 8 MM misc Use once a day 100 each 3   • JANUMET XR  MG tablet Take 2 tablets by mouth Every Evening. 180 tablet 3   • Lancets (ACCU-CHEK MULTICLIX) lancets Use 3 times a day 300 each 1   • levothyroxine (SYNTHROID) 75 MCG tablet Take 1 tablet by mouth Daily. 90 tablet 3   • LYRICA 200 MG capsule Take 1 capsule by mouth 3 (Three) Times a Day. 90 capsule 5   • meloxicam (MOBIC) 15 MG tablet Take 15 mg by mouth Daily. TO HOLD PRIOR TO OR X 1 WEEK     • metoprolol succinate XL (TOPROL-XL) " "50 MG 24 hr tablet Take 1 tablet by mouth Daily. 90 tablet 1   • Needle, Disp, (BD DISP NEEDLE) 23G X 1\" misc USE WEEKLY FOR INSULIN INJECTION 10 each 2   • omega-3 acid ethyl esters (LOVAZA) 1 g capsule TAKE TWO CAPSULES BY MOUTH TWICE A  capsule 4   • ondansetron (ZOFRAN) 4 MG tablet Take 1 tablet by mouth Every 6 (Six) Hours As Needed for Nausea or Vomiting. 10 tablet 0   • oxyCODONE-acetaminophen (PERCOCET) 7.5-325 MG per tablet 1-2 po q 4-6 hr prn pain 42 tablet 0   • pioglitazone (ACTOS) 30 MG tablet Take 1 tablet by mouth Daily. 90 tablet 3   • pregabalin (LYRICA) 200 MG capsule Take 1 capsule by mouth 3 (Three) Times a Day. 90 capsule 5   • QUEtiapine (SEROquel) 25 MG tablet TAKE ONE TABLET BY MOUTH ONCE NIGHTLY 90 tablet 0   • Syringe 23G X 1\" 3 ML misc Use weekly for testosterone injection 10 each 3   • tadalafil (CIALIS) 5 MG tablet Take 1 tablet by mouth Daily As Needed for erectile dysfunction. 30 tablet 5   • Testosterone Cypionate (DEPOTESTOTERONE CYPIONATE) 200 MG/ML injection Inject 1.5 mL into the appropriate muscle as directed by prescriber 1 (One) Time Per Week. 4 mL 5   • TOUJEO MAX SOLOSTAR 300 UNIT/ML solution pen-injector Inject 175 Units under the skin into the appropriate area as directed Every Morning. 18 pen 3     No current facility-administered medications for this visit.          The following portions of the patient's history were reviewed and updated as appropriate: allergies, current medications, past family history, past medical history, past social history, past surgical history and problem list.    Review of Systems:   A 14 point review of systems was performed. All systems negative except pertinent positives/negative listed in HPI above    Physical Exam:   Vitals:    07/25/19 0934   Temp: 98.4 °F (36.9 °C)   Weight: 122 kg (270 lb)   Height: 177.8 cm (70\")       General: A and O x 3, ASA, NAD    SCLERA:    Normal    DENTITION:   Normal  Knee range of motion is 0-1 20 " incision is well-healed he has moderate diffuse anterior soft tissue swelling especially on the anterolateral joint line there is no instability in extension or flexion.  Quad strength 4+    Radiology:  Xrays 3views left knee (ap,lateral, sunrise) were ordered and reviewed for evaluation of knee pain demonstrating a well-positioned revision knee replacement  todays xrays were compared to previous xrays and demonstrate no change    Assessment/Plan:  Left knee revision doing well continue quad exercises weight management return in February for annual visit with repeat x-rays

## 2019-07-31 ENCOUNTER — OFFICE VISIT (OUTPATIENT)
Dept: ORTHOPEDIC SURGERY | Facility: CLINIC | Age: 59
End: 2019-07-31

## 2019-07-31 VITALS — HEIGHT: 70 IN | BODY MASS INDEX: 38.65 KG/M2 | TEMPERATURE: 98.7 F | WEIGHT: 270 LBS

## 2019-07-31 DIAGNOSIS — M25.512 LEFT SHOULDER PAIN, UNSPECIFIED CHRONICITY: Primary | ICD-10-CM

## 2019-07-31 PROCEDURE — 20605 DRAIN/INJ JOINT/BURSA W/O US: CPT | Performed by: ORTHOPAEDIC SURGERY

## 2019-07-31 PROCEDURE — 99214 OFFICE O/P EST MOD 30 MIN: CPT | Performed by: ORTHOPAEDIC SURGERY

## 2019-07-31 PROCEDURE — 20610 DRAIN/INJ JOINT/BURSA W/O US: CPT | Performed by: ORTHOPAEDIC SURGERY

## 2019-07-31 RX ADMIN — LIDOCAINE HYDROCHLORIDE 1 ML: 10 INJECTION, SOLUTION EPIDURAL; INFILTRATION; INTRACAUDAL; PERINEURAL at 10:06

## 2019-07-31 RX ADMIN — METHYLPREDNISOLONE ACETATE 80 MG: 80 INJECTION, SUSPENSION INTRA-ARTICULAR; INTRALESIONAL; INTRAMUSCULAR; SOFT TISSUE at 08:22

## 2019-07-31 RX ADMIN — LIDOCAINE HYDROCHLORIDE 2 ML: 10 INJECTION, SOLUTION EPIDURAL; INFILTRATION; INTRACAUDAL; PERINEURAL at 08:22

## 2019-07-31 RX ADMIN — METHYLPREDNISOLONE ACETATE 80 MG: 80 INJECTION, SUSPENSION INTRA-ARTICULAR; INTRALESIONAL; INTRAMUSCULAR; SOFT TISSUE at 10:06

## 2019-08-01 RX ORDER — METOPROLOL SUCCINATE 50 MG/1
TABLET, EXTENDED RELEASE ORAL
Qty: 90 TABLET | Refills: 0 | Status: SHIPPED | OUTPATIENT
Start: 2019-08-01 | End: 2019-10-31 | Stop reason: SDUPTHER

## 2019-08-01 RX ORDER — HYDROCHLOROTHIAZIDE 25 MG/1
TABLET ORAL
Qty: 90 TABLET | Refills: 0 | Status: SHIPPED | OUTPATIENT
Start: 2019-08-01 | End: 2019-10-31 | Stop reason: SDUPTHER

## 2019-08-04 RX ORDER — METHYLPREDNISOLONE ACETATE 80 MG/ML
80 INJECTION, SUSPENSION INTRA-ARTICULAR; INTRALESIONAL; INTRAMUSCULAR; SOFT TISSUE
Status: COMPLETED | OUTPATIENT
Start: 2019-07-31 | End: 2019-07-31

## 2019-08-04 RX ORDER — TRAMADOL HYDROCHLORIDE 50 MG/1
50 TABLET ORAL EVERY 4 HOURS PRN
Qty: 30 TABLET | Refills: 0 | Status: SHIPPED | OUTPATIENT
Start: 2019-08-04 | End: 2019-11-13

## 2019-08-04 RX ORDER — LIDOCAINE HYDROCHLORIDE 10 MG/ML
1 INJECTION, SOLUTION EPIDURAL; INFILTRATION; INTRACAUDAL; PERINEURAL
Status: COMPLETED | OUTPATIENT
Start: 2019-07-31 | End: 2019-07-31

## 2019-08-04 RX ORDER — LIDOCAINE HYDROCHLORIDE 10 MG/ML
2 INJECTION, SOLUTION EPIDURAL; INFILTRATION; INTRACAUDAL; PERINEURAL
Status: COMPLETED | OUTPATIENT
Start: 2019-07-31 | End: 2019-07-31

## 2019-09-03 RX ORDER — DULOXETIN HYDROCHLORIDE 60 MG/1
CAPSULE, DELAYED RELEASE ORAL
Qty: 30 CAPSULE | Refills: 0 | Status: SHIPPED | OUTPATIENT
Start: 2019-09-03 | End: 2019-10-03 | Stop reason: SDUPTHER

## 2019-10-03 RX ORDER — DULOXETIN HYDROCHLORIDE 60 MG/1
CAPSULE, DELAYED RELEASE ORAL
Qty: 30 CAPSULE | Refills: 0 | Status: SHIPPED | OUTPATIENT
Start: 2019-10-03 | End: 2019-10-31 | Stop reason: SDUPTHER

## 2019-10-31 RX ORDER — HYDROCHLOROTHIAZIDE 25 MG/1
TABLET ORAL
Qty: 90 TABLET | Refills: 0 | Status: SHIPPED | OUTPATIENT
Start: 2019-10-31 | End: 2020-02-04

## 2019-10-31 RX ORDER — METOPROLOL SUCCINATE 50 MG/1
TABLET, EXTENDED RELEASE ORAL
Qty: 90 TABLET | Refills: 0 | Status: SHIPPED | OUTPATIENT
Start: 2019-10-31 | End: 2020-02-04

## 2019-10-31 RX ORDER — AMLODIPINE BESYLATE 10 MG/1
TABLET ORAL
Qty: 30 TABLET | Refills: 4 | Status: SHIPPED | OUTPATIENT
Start: 2019-10-31 | End: 2020-04-09

## 2019-10-31 RX ORDER — DULOXETIN HYDROCHLORIDE 60 MG/1
CAPSULE, DELAYED RELEASE ORAL
Qty: 30 CAPSULE | Refills: 0 | Status: SHIPPED | OUTPATIENT
Start: 2019-10-31 | End: 2019-12-13 | Stop reason: SDUPTHER

## 2019-11-09 RX ORDER — QUETIAPINE FUMARATE 25 MG/1
TABLET, FILM COATED ORAL
Qty: 90 TABLET | Refills: 0 | Status: SHIPPED | OUTPATIENT
Start: 2019-11-09 | End: 2020-02-04

## 2019-11-11 DIAGNOSIS — E11.40 NEUROPATHY DUE TO TYPE 2 DIABETES MELLITUS (HCC): ICD-10-CM

## 2019-11-12 ENCOUNTER — TELEPHONE (OUTPATIENT)
Dept: INTERNAL MEDICINE | Facility: CLINIC | Age: 59
End: 2019-11-12

## 2019-11-12 RX ORDER — PREGABALIN 200 MG/1
CAPSULE ORAL
Qty: 90 CAPSULE | Refills: 4 | OUTPATIENT
Start: 2019-11-12

## 2019-11-12 NOTE — TELEPHONE ENCOUNTER
Pt is over due for shae, last OV was 02/2019, can his shae 05/09. He stated that I didn't feel the need to come in, I informed him dr.Lag ask to come back in 3 mos for check up and I know you been seen Endo but we still need to see for med refills flora you take lyrica and it's CS. Pt coming tomorrow to see NP but asked to send request to you.   DANY chapa

## 2019-11-12 NOTE — TELEPHONE ENCOUNTER
"Patient was calling to inquire why dr Benitez did not \"approve\" his refill of his lyrica 200 mg capsule. Patient is going to be out of pills in two days. Patient stated pain is unbearable when he goes without his medicine. Patient confirmed pharmacy to be [facility] [address]. Patient requested a call back when this medicine has been called in to his pharmacy [phone]  "

## 2019-11-13 ENCOUNTER — OFFICE VISIT (OUTPATIENT)
Dept: INTERNAL MEDICINE | Facility: CLINIC | Age: 59
End: 2019-11-13

## 2019-11-13 ENCOUNTER — TELEPHONE (OUTPATIENT)
Dept: ENDOCRINOLOGY | Age: 59
End: 2019-11-13

## 2019-11-13 VITALS
HEART RATE: 70 BPM | HEIGHT: 64 IN | DIASTOLIC BLOOD PRESSURE: 70 MMHG | SYSTOLIC BLOOD PRESSURE: 118 MMHG | WEIGHT: 277 LBS | BODY MASS INDEX: 47.29 KG/M2

## 2019-11-13 DIAGNOSIS — IMO0001 UNCONTROLLED TYPE 2 DIABETES MELLITUS WITHOUT COMPLICATION, WITH LONG-TERM CURRENT USE OF INSULIN: ICD-10-CM

## 2019-11-13 DIAGNOSIS — E78.5 DYSLIPIDEMIA: ICD-10-CM

## 2019-11-13 DIAGNOSIS — Z23 NEED FOR VACCINATION AGAINST STREPTOCOCCUS PNEUMONIAE USING PNEUMOCOCCAL CONJUGATE VACCINE 13: ICD-10-CM

## 2019-11-13 DIAGNOSIS — Z99.89 OSA ON CPAP: ICD-10-CM

## 2019-11-13 DIAGNOSIS — Z23 NEED FOR INFLUENZA VACCINATION: ICD-10-CM

## 2019-11-13 DIAGNOSIS — G47.33 OSA ON CPAP: ICD-10-CM

## 2019-11-13 DIAGNOSIS — I10 BENIGN ESSENTIAL HTN: Primary | ICD-10-CM

## 2019-11-13 DIAGNOSIS — E03.9 PRIMARY HYPOTHYROIDISM: ICD-10-CM

## 2019-11-13 DIAGNOSIS — G89.29 CHRONIC BILATERAL LOW BACK PAIN WITHOUT SCIATICA: ICD-10-CM

## 2019-11-13 DIAGNOSIS — E11.40 NEUROPATHY DUE TO TYPE 2 DIABETES MELLITUS (HCC): ICD-10-CM

## 2019-11-13 DIAGNOSIS — M54.50 CHRONIC BILATERAL LOW BACK PAIN WITHOUT SCIATICA: ICD-10-CM

## 2019-11-13 PROCEDURE — 90674 CCIIV4 VAC NO PRSV 0.5 ML IM: CPT | Performed by: NURSE PRACTITIONER

## 2019-11-13 PROCEDURE — 90471 IMMUNIZATION ADMIN: CPT | Performed by: NURSE PRACTITIONER

## 2019-11-13 PROCEDURE — 90670 PCV13 VACCINE IM: CPT | Performed by: NURSE PRACTITIONER

## 2019-11-13 PROCEDURE — 90472 IMMUNIZATION ADMIN EACH ADD: CPT | Performed by: NURSE PRACTITIONER

## 2019-11-13 PROCEDURE — 99214 OFFICE O/P EST MOD 30 MIN: CPT | Performed by: NURSE PRACTITIONER

## 2019-11-13 RX ORDER — INSULIN DEGLUDEC 200 U/ML
INJECTION, SOLUTION SUBCUTANEOUS
Qty: 9 PEN | Refills: 5 | Status: SHIPPED | OUTPATIENT
Start: 2019-11-13 | End: 2020-01-27 | Stop reason: SDUPTHER

## 2019-11-13 NOTE — PROGRESS NOTES
Subjective   Aaron Latif is a 59 y.o. male.     Hypertension   This is a chronic problem. The current episode started more than 1 month ago. The problem is unchanged. The problem is controlled. Pertinent negatives include no anxiety, blurred vision, chest pain, headaches, orthopnea, palpitations, peripheral edema, PND or shortness of breath. Current antihypertension treatment includes calcium channel blockers. The current treatment provides significant improvement.   Hyperlipidemia   This is a chronic problem. The current episode started more than 1 year ago. The problem is controlled. Recent lipid tests were reviewed and are normal. Pertinent negatives include no chest pain or shortness of breath. Current antihyperlipidemic treatment includes statins and ezetimibe. The current treatment provides significant improvement of lipids. Risk factors for coronary artery disease include diabetes mellitus, dyslipidemia, male sex, hypertension and obesity.   Diabetes   He presents for his follow-up diabetic visit. He has type 2 diabetes mellitus. His disease course has been stable. Hypoglycemia symptoms include dizziness (intermittent, chronic ). Pertinent negatives for hypoglycemia include no headaches. Pertinent negatives for diabetes include no blurred vision, no chest pain and no fatigue. Current diabetic treatment includes oral agent (triple therapy). He is compliant with treatment all of the time. ()        The following portions of the patient's history were reviewed and updated as appropriate: allergies, current medications, past family history, past medical history, past social history, past surgical history and problem list.    Review of Systems   Constitutional: Negative for activity change, appetite change, fatigue and fever.   Eyes: Negative for blurred vision and visual disturbance.   Respiratory: Negative for cough, shortness of breath and wheezing.    Cardiovascular: Negative for chest pain,  palpitations, orthopnea, leg swelling and PND.   Musculoskeletal: Positive for arthralgias (shoulders, and knees f), back pain (chronic, lower back ) and joint swelling (left knee, ).   Neurological: Positive for dizziness (intermittent, chronic ). Negative for headaches.       Objective   Physical Exam   Constitutional: He is oriented to person, place, and time. He appears well-developed and well-nourished.   HENT:   Head: Normocephalic.   Nose: Nose normal.   Neck: Carotid bruit is not present. No thyroid mass and no thyromegaly present.   Cardiovascular: Regular rhythm and normal heart sounds. Exam reveals no S3 and no S4.   No murmur heard.  Repeat bp left arm 122/80  No pedal edema    Pulmonary/Chest: Effort normal and breath sounds normal. He has no decreased breath sounds. He has no wheezes. He has no rhonchi. He has no rales.   Musculoskeletal: He exhibits no edema.        Right shoulder: He exhibits normal range of motion.        Right knee: He exhibits decreased range of motion. Tenderness found.        Left knee: He exhibits decreased range of motion and swelling (trace). Tenderness found.        Lumbar back: He exhibits decreased range of motion and tenderness.   Neurological: He is alert and oriented to person, place, and time. Gait normal.   Skin: Skin is warm and dry.   Psychiatric: He has a normal mood and affect. His speech is normal and behavior is normal. Judgment and thought content normal. Cognition and memory are normal.       Assessment/Plan   Aaron was seen today for hypertension, hyperlipidemia and diabetes.    Diagnoses and all orders for this visit:    Benign essential HTN  Comments:  stable     Dyslipidemia  Comments:  tolerating statin therapy ok     Uncontrolled type 2 diabetes mellitus without complication, with long-term current use of insulin (CMS/[facility])  Comments:  managed by endocrinologist     Primary hypothyroidism  Comments:  stable     BC on CPAP  Comments:  using  CPAP    Need  for influenza vaccination    Need for vaccination against Streptococcus pneumoniae using pneumococcal conjugate vaccine 13    Neuropathy due to type 2 diabetes mellitus (CMS/[facility])  Comments:  tolerating lyrica; script provided today     Chronic bilateral low back pain without sciatica  Comments:  he is requesting oxycodone/acetaminophen- will discuss with dr marks.         Needs diabetic eye exam   He was advised shingrix vaccination   He declines blood work today

## 2019-11-13 NOTE — TELEPHONE ENCOUNTER
Spoke with Amy at [facility] pharmacy [phone] and gave verbal RX for patient's Lantus solostar insulin pens.

## 2019-11-14 ENCOUNTER — TELEPHONE (OUTPATIENT)
Dept: INTERNAL MEDICINE | Facility: CLINIC | Age: 59
End: 2019-11-14

## 2019-11-14 NOTE — TELEPHONE ENCOUNTER
,  Pt asking if you can refill his percocet, was getting it from  and from his ortho due to knee surgery but no longer, he said he takes it for for chronic pain in neck and back.  DANY done.  Would you like to send him something ?  Please advise

## 2019-11-15 RX ORDER — OXYCODONE AND ACETAMINOPHEN 10; 325 MG/1; MG/1
1 TABLET ORAL EVERY 8 HOURS PRN
Qty: 30 TABLET | Refills: 0 | Status: SHIPPED | OUTPATIENT
Start: 2019-11-15 | End: 2019-12-10 | Stop reason: SDUPTHER

## 2019-11-15 NOTE — TELEPHONE ENCOUNTER
Pt informed of the limited supply sent. Advised to take as needed and he can take 1/2 tab as well for mild pain. Pt will see him in Feb to discuss that further with him.

## 2019-11-24 ENCOUNTER — HOSPITAL ENCOUNTER (EMERGENCY)
Facility: HOSPITAL | Age: 59
Discharge: HOME | End: 2019-11-24
Attending: EMERGENCY MEDICINE | Admitting: EMERGENCY MEDICINE

## 2019-11-24 ENCOUNTER — APPOINTMENT (OUTPATIENT)
Dept: GENERAL RADIOLOGY | Facility: HOSPITAL | Age: 59
End: 2019-11-24

## 2019-11-24 ENCOUNTER — APPOINTMENT (OUTPATIENT)
Dept: CT IMAGING | Facility: HOSPITAL | Age: 59
End: 2019-11-24

## 2019-11-24 VITALS
SYSTOLIC BLOOD PRESSURE: 119 MMHG | DIASTOLIC BLOOD PRESSURE: 77 MMHG | RESPIRATION RATE: 16 BRPM | WEIGHT: 281.5 LBS | BODY MASS INDEX: 40.3 KG/M2 | HEIGHT: 70 IN | TEMPERATURE: 96.6 F | OXYGEN SATURATION: 98 % | HEART RATE: 69 BPM

## 2019-11-24 DIAGNOSIS — R11.2 NON-INTRACTABLE VOMITING WITH NAUSEA, UNSPECIFIED VOMITING TYPE: Primary | ICD-10-CM

## 2019-11-24 DIAGNOSIS — R74.8 ELEVATED LIVER ENZYMES: ICD-10-CM

## 2019-11-24 LAB
ALBUMIN SERPL-MCNC: 4.7 G/DL (ref 3.5–5.2)
ALBUMIN/GLOB SERPL: 1.3 G/DL
ALP SERPL-CCNC: 38 U/L (ref 39–117)
ALT SERPL W P-5'-P-CCNC: 45 U/L (ref 1–41)
ANION GAP SERPL CALCULATED.3IONS-SCNC: 16 MMOL/L (ref 5–15)
AST SERPL-CCNC: 49 U/L (ref 1–40)
BASOPHILS # BLD AUTO: 0.03 10*3/MM3 (ref 0–0.2)
BASOPHILS NFR BLD AUTO: 0.2 % (ref 0–1.5)
BILIRUB SERPL-MCNC: 0.8 MG/DL (ref 0.2–1.2)
BILIRUB UR QL STRIP: NEGATIVE
BUN BLD-MCNC: 17 MG/DL (ref 8–23)
BUN/CREAT SERPL: 16.7 (ref 7–25)
CALCIUM SPEC-SCNC: 10 MG/DL (ref 8.6–10.5)
CHLORIDE SERPL-SCNC: 94 MMOL/L (ref 98–107)
CLARITY UR: CLEAR
CO2 SERPL-SCNC: 25 MMOL/L (ref 22–29)
COLOR UR: YELLOW
CREAT BLD-MCNC: 1.02 MG/DL (ref 0.76–1.27)
DEPRECATED RDW RBC AUTO: 47.7 FL (ref 37–54)
EOSINOPHIL # BLD AUTO: 0.12 10*3/MM3 (ref 0–0.4)
EOSINOPHIL NFR BLD AUTO: 1 % (ref 0.3–6.2)
ERYTHROCYTE [DISTWIDTH] IN BLOOD BY AUTOMATED COUNT: 14.5 % (ref 12.3–15.4)
GFR SERPL CREATININE-BSD FRML MDRD: 74 ML/MIN/1.73
GLOBULIN UR ELPH-MCNC: 3.5 GM/DL
GLUCOSE BLD-MCNC: 155 MG/DL (ref 65–99)
GLUCOSE UR STRIP-MCNC: ABNORMAL MG/DL
HCT VFR BLD AUTO: 44.9 % (ref 37.5–51)
HGB BLD-MCNC: 15 G/DL (ref 13–17.7)
HGB UR QL STRIP.AUTO: NEGATIVE
IMM GRANULOCYTES # BLD AUTO: 0.06 10*3/MM3 (ref 0–0.05)
IMM GRANULOCYTES NFR BLD AUTO: 0.5 % (ref 0–0.5)
KETONES UR QL STRIP: NEGATIVE
LEUKOCYTE ESTERASE UR QL STRIP.AUTO: NEGATIVE
LIPASE SERPL-CCNC: 22 U/L (ref 13–60)
LYMPHOCYTES # BLD AUTO: 3.44 10*3/MM3 (ref 0.7–3.1)
LYMPHOCYTES NFR BLD AUTO: 28 % (ref 19.6–45.3)
MCH RBC QN AUTO: 30 PG (ref 26.6–33)
MCHC RBC AUTO-ENTMCNC: 33.4 G/DL (ref 31.5–35.7)
MCV RBC AUTO: 89.8 FL (ref 79–97)
MONOCYTES # BLD AUTO: 0.91 10*3/MM3 (ref 0.1–0.9)
MONOCYTES NFR BLD AUTO: 7.4 % (ref 5–12)
NEUTROPHILS # BLD AUTO: 7.71 10*3/MM3 (ref 1.7–7)
NEUTROPHILS NFR BLD AUTO: 62.9 % (ref 42.7–76)
NITRITE UR QL STRIP: NEGATIVE
NRBC BLD AUTO-RTO: 0 /100 WBC (ref 0–0.2)
NT-PROBNP SERPL-MCNC: 61.3 PG/ML (ref 5–900)
PH UR STRIP.AUTO: 8 [PH] (ref 5–8)
PLATELET # BLD AUTO: 302 10*3/MM3 (ref 140–450)
PMV BLD AUTO: 10.5 FL (ref 6–12)
POTASSIUM BLD-SCNC: 3.3 MMOL/L (ref 3.5–5.2)
PROT SERPL-MCNC: 8.2 G/DL (ref 6–8.5)
PROT UR QL STRIP: NEGATIVE
RBC # BLD AUTO: 5 10*6/MM3 (ref 4.14–5.8)
SODIUM BLD-SCNC: 135 MMOL/L (ref 136–145)
SP GR UR STRIP: 1.02 (ref 1–1.03)
TROPONIN T SERPL-MCNC: 0.02 NG/ML (ref 0–0.03)
UROBILINOGEN UR QL STRIP: ABNORMAL
WBC NRBC COR # BLD: 12.27 10*3/MM3 (ref 3.4–10.8)

## 2019-11-24 PROCEDURE — 71046 X-RAY EXAM CHEST 2 VIEWS: CPT

## 2019-11-24 PROCEDURE — 85025 COMPLETE CBC W/AUTO DIFF WBC: CPT | Performed by: NURSE PRACTITIONER

## 2019-11-24 PROCEDURE — 80053 COMPREHEN METABOLIC PANEL: CPT | Performed by: NURSE PRACTITIONER

## 2019-11-24 PROCEDURE — 25010000002 MORPHINE PER 10 MG: Performed by: NURSE PRACTITIONER

## 2019-11-24 PROCEDURE — 25010000002 IOPAMIDOL 61 % SOLUTION: Performed by: NURSE PRACTITIONER

## 2019-11-24 PROCEDURE — 83690 ASSAY OF LIPASE: CPT | Performed by: NURSE PRACTITIONER

## 2019-11-24 PROCEDURE — 83880 ASSAY OF NATRIURETIC PEPTIDE: CPT | Performed by: NURSE PRACTITIONER

## 2019-11-24 PROCEDURE — 93005 ELECTROCARDIOGRAM TRACING: CPT | Performed by: NURSE PRACTITIONER

## 2019-11-24 PROCEDURE — 84484 ASSAY OF TROPONIN QUANT: CPT | Performed by: NURSE PRACTITIONER

## 2019-11-24 PROCEDURE — 99284 EMERGENCY DEPT VISIT MOD MDM: CPT

## 2019-11-24 PROCEDURE — 74177 CT ABD & PELVIS W/CONTRAST: CPT

## 2019-11-24 PROCEDURE — 81003 URINALYSIS AUTO W/O SCOPE: CPT | Performed by: NURSE PRACTITIONER

## 2019-11-24 PROCEDURE — 96375 TX/PRO/DX INJ NEW DRUG ADDON: CPT

## 2019-11-24 PROCEDURE — 93010 ELECTROCARDIOGRAM REPORT: CPT | Performed by: INTERNAL MEDICINE

## 2019-11-24 PROCEDURE — 25010000002 ONDANSETRON PER 1 MG: Performed by: NURSE PRACTITIONER

## 2019-11-24 PROCEDURE — 96374 THER/PROPH/DIAG INJ IV PUSH: CPT

## 2019-11-24 RX ORDER — PROMETHAZINE HYDROCHLORIDE 25 MG/1
25 TABLET ORAL EVERY 6 HOURS PRN
Qty: 8 TABLET | Refills: 0 | Status: SHIPPED | OUTPATIENT
Start: 2019-11-24 | End: 2019-11-24 | Stop reason: SDUPTHER

## 2019-11-24 RX ORDER — MORPHINE SULFATE 2 MG/ML
4 INJECTION, SOLUTION INTRAMUSCULAR; INTRAVENOUS ONCE
Status: COMPLETED | OUTPATIENT
Start: 2019-11-24 | End: 2019-11-24

## 2019-11-24 RX ORDER — PREGABALIN 200 MG/1
200 CAPSULE ORAL 3 TIMES DAILY
Qty: 6 CAPSULE | Refills: 0 | Status: SHIPPED | OUTPATIENT
Start: 2019-11-24 | End: 2020-05-20

## 2019-11-24 RX ORDER — PROMETHAZINE HYDROCHLORIDE 25 MG/1
25 TABLET ORAL EVERY 6 HOURS PRN
Qty: 8 TABLET | Refills: 0 | Status: SHIPPED | OUTPATIENT
Start: 2019-11-24 | End: 2020-02-18

## 2019-11-24 RX ORDER — SODIUM CHLORIDE 0.9 % (FLUSH) 0.9 %
10 SYRINGE (ML) INJECTION AS NEEDED
Status: DISCONTINUED | OUTPATIENT
Start: 2019-11-24 | End: 2019-11-24 | Stop reason: HOSPADM

## 2019-11-24 RX ORDER — ONDANSETRON 2 MG/ML
4 INJECTION INTRAMUSCULAR; INTRAVENOUS ONCE
Status: COMPLETED | OUTPATIENT
Start: 2019-11-24 | End: 2019-11-24

## 2019-11-24 RX ADMIN — IOPAMIDOL 100 ML: 612 INJECTION, SOLUTION INTRAVENOUS at 16:26

## 2019-11-24 RX ADMIN — MORPHINE SULFATE 4 MG: 2 INJECTION, SOLUTION INTRAMUSCULAR; INTRAVENOUS at 15:03

## 2019-11-24 RX ADMIN — ONDANSETRON 4 MG: 2 INJECTION INTRAMUSCULAR; INTRAVENOUS at 15:03

## 2019-11-24 RX ADMIN — SODIUM CHLORIDE 1000 ML: 9 INJECTION, SOLUTION INTRAVENOUS at 15:03

## 2019-11-24 NOTE — DISCHARGE INSTRUCTIONS
"Return Precautions    Although you are being discharged from the ED today, I encourage you to return for worsening symptoms.  Things can, and do, change such that treatment at home with medication may not be adequate.      Specifically, return for any of the following:    Chest pain, shortness of breath, pain or nausea and vomiting not controlled by medications provided.    Please make a follow up with your Primary Care Provider for a blood pressure recheck.   _____________________________________________________________________  STROKE IS AN EMERGENCY:  Act FAST and check for these signals    Face:   Does the face look uneven?  Arm:   Does one arm drift down?  Speech:  Does their speech sound strange?  Time:   Call 911 for any signs of a stroke    Stroke risk factors:  Atrial fibrillation  Diabetes   Family history of stroke  Heart disease  High cholesterol   Physical inactivity   Obesity   High blood pressure  Smoking      HEART ATTACK INFORMATION:  Symptoms of a heart attack:    Nausea       Sweating, or cold sweat   Feeling of impending doom    Jaw pain  Pain that travels down one or both arms   Shortness of breath  Chest pressure, squeezing or discomfort   Anxiety  Feeling of \"fullness\" in chest      Risk factors for heart attack:    Smoking    High cholesterol  High blood pressure  Family History    Obesity   Lack of exercise  Gender (men higher risk)  Diet   Stress  Age     Diabetes  Excessive alcohol     Cigarette smoking:  Cigarette smoking WILL shorten your life and causes many illnesses.  We recommend you stop smoking.  A free resource is :  2-785 QUIT NOW    National Suicide Prevention Lifeline:  1-910.960.8244  This is a national network of local crisis centers who provide free and confidential emotional support for people with emotional crisis or emotional distress.    This service is provided 24 hours a day, 7 days a week                      "

## 2019-11-24 NOTE — ED PROVIDER NOTES
Pt with hx of DM presents to the ED c/o N/V x3 and an episode of near-sycnope while at work today. EMS was called to the scene and pt had a blood sugar reading of 52 upon arrival. Pt also c/o upper abdominal pain. Pt states that he ate fish for dinner last night, but the pt's wife also ate the same meal and did not get sick.  He feels better after medications have been given to him in the emergency department.  He is currently tolerating sips of water.    On exam,   Regular rate and rhythm, no murmurs, CTA bilaterally, abdomen is soft, non-tender, non-distended, with positive bowel sounds, oropharynx within normal limits     Plan: see if pt can tolerate PO fluids after zofran, then discharge home     Attestation:  The LIZZ and I have discussed this patient's history, physical exam, and treatment plan.  I have reviewed the documentation and personally had a face to face interaction with the patient. I affirm the documentation and agree with the treatment and plan.  The attached note describes my personal findings.       Documentation assistance provided by lizy Obando for Dr. Whittaker.  Information recorded by the scribe was done at my direction and has been verified and validated by me.       Satnam Obando  11/24/19 1752       Federico Whittaker MD  11/24/19 7110

## 2019-11-24 NOTE — ED TRIAGE NOTES
Near syncope felt dizzy, EMS reports glucose 52 on scene 10g of D10 glucose 210 currently, reports vomiting multiple times since with RUQ pain.

## 2019-11-24 NOTE — ED PROVIDER NOTES
"EMERGENCY DEPARTMENT ENCOUNTER    Room Number:  22/22  Date seen:  11/24/2019  Time seen: 2:48 PM  PCP: Grant Benitez MD  Historian: patient    HPI:  Chief complaint:near syncope  Context:Aaron Latif is a 59 y.o. male who presents to the ED with c/o an episode of near syncope that occurred while at work PTA. Pt states that when EMS arrived his glucose was 52. EMS gave 10g D10 en route. Glucose at sbrbbz=364. Pt states this this AM he woke up and felt bad described as \"head spinning dizziness.\" Pt states that he went to work where he became nauseated and had 2 episodes of loud vomiting x2. Pt also complains of SOA and diffuse abd pain. Pt denies cp. Pt has hx of hernia repair surgery. Pt states that his BM this AM was normal but he had 2 episodes of diarrhea since. Pt states he has hx of DM and states that it is followed by  (endocrinology). Pt states that he has hx of hypoglycemia and that this episode felt similar to that. Pt states he drove to [place], [place] a few weeks ago by car. Family at bedside states pt will have an occasional beer.       Timing:constant  Duration: PTA  Quality:near  Intensity/Severity:moderate  Associated Symptoms:N/V/D, abd pain, dizziness, SOA  Previous Episodes:hx of hypoglycemia   Treatment before arrival:EMS care and treatment      ALLERGIES  Codeine    PAST MEDICAL HISTORY  Active Ambulatory Problems     Diagnosis Date Noted   • Chronic fatigue 02/12/2016   • Erectile dysfunction 02/12/2016   • Low testosterone 02/12/2016   • Morbid obesity due to excess calories (CMS/[facility]) 02/12/2016   • Vitamin D deficiency 02/12/2016   • Dyslipidemia 02/12/2016   • Shortness of breath 04/26/2016   • Benign essential HTN 04/26/2016   • Pityriasis versicolor 04/26/2016   • BC on CPAP 04/26/2016   • Umbilical hernia without obstruction or gangrene 04/26/2016   • Diabetes mellitus type 2, uncontrolled (CMS/[facility]) 06/04/2016   • Diabetic peripheral neuropathy (CMS/[facility]) 06/04/2016   • ED " (erectile dysfunction) of non-organic origin 06/04/2016   • Essential hypertriglyceridemia 06/04/2016   • Osteoarthritis of left knee 12/15/2016   • Status post total knee replacement, left 07/06/2017   • Primary osteoarthritis of left knee 11/07/2017   • OA (osteoarthritis) of knee 12/20/2017   • Calcific Achilles tendonitis 04/30/2018   • Chronic heel pain, left 04/30/2018   • Tosin's deformity of left heel 04/30/2018   • Achilles tendon tear, left, initial encounter 05/16/2018   • Primary hypothyroidism 11/01/2018   • Chronic bilateral low back pain with bilateral sciatica 11/01/2018   • Chronic pain of left knee 12/07/2018     Resolved Ambulatory Problems     Diagnosis Date Noted   • Accelerated hypertension 02/12/2016   • Diabetes mellitus type 2, controlled, without complications (CMS/[facility]) 02/12/2016   • Acute bronchitis 02/12/2016   • 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     Past Medical History:   Diagnosis Date   • Anxiety    • Arthritis    • Diabetic peripheral neuropathy (CMS/[facility])    • Dizziness    • Erectile dysfunction    • Fatigue    • High cholesterol    • Hypertension    • Hypothyroidism    • Low testosterone    • Neuropathy    • Sleep apnea    • Type 2 diabetes mellitus (CMS/[facility])    • Vitamin D deficiency        PAST SURGICAL HISTORY  Past Surgical History:   Procedure Laterality Date   • ACHILLES TENDON REPAIR Left 2009   • ACHILLES TENDON SURGERY Left 5/22/2018    Procedure: Left Achilles debridement and repair, Achilles tendon lengthening at the calf, partial excision of calcaneus and tendon transfer from great toe to calcaneus;  Surgeon: Maycol Chawla MD;  Location: [facility] OR [facility];  Service: Orthopedics   • EYE SURGERY Left     BASKETBALL INJURY YEARS AGO   • HERNIA REPAIR     • KNEE ARTHROSCOPY Left 7/26/2016    Procedure: KNEE ARTHROSCOPY, PARTIAL medial MENISCECTOMY;  Surgeon: Layton Anderson MD;  Location: [facility] OR [facility];  Service:    • KNEE MINI REVISION Left 12/20/2017    Procedure: LEFT  TOTAL KNEE ARTHROPLASTY poly change;  Surgeon: Eloy Fitzgerald MD;  Location: [facility] MAIN OR;  Service:    • KNEE SURGERY     • DE TOTAL KNEE ARTHROPLASTY Left 12/15/2016    Procedure: TOTAL KNEE ARTHROPLASTY;  Surgeon: Layton Anderson MD;  Location: [facility] MAIN OR;  Service: Orthopedics   • TOTAL KNEE ARTHROPLASTY REVISION Left 2/6/2019    Procedure: TOTAL KNEE ARTHROPLASTY REVISION;  Surgeon: Eloy Fitzgerald MD;  Location: [facility] MAIN OR;  Service: Orthopedics       FAMILY HISTORY  Family History   Problem Relation Age of Onset   • Diabetes Mother    • Hypertension Mother    • Heart disease Father    • Diabetes Brother    • Hypertension Brother    • Hypertension Paternal Uncle    • Malig Hyperthermia Neg Hx        SOCIAL HISTORY  Social History     Socioeconomic History   • Marital status:      Spouse name: Not on file   • Number of children: Not on file   • Years of education: Not on file   • Highest education level: Not on file   Tobacco Use   • Smoking status: Never Smoker   • Smokeless tobacco: Never Used   Substance and Sexual Activity   • Alcohol use: Yes     Comment:  1-2 BEERS/ NIGHT    • Drug use: No   • Sexual activity: Defer       REVIEW OF SYSTEMS  Review of Systems   Constitutional: Negative for activity change, appetite change, diaphoresis and fever.   HENT: Negative for trouble swallowing.    Eyes: Negative for visual disturbance.   Respiratory: Positive for shortness of breath. Negative for cough, chest tightness and wheezing.    Cardiovascular: Negative for chest pain, palpitations and leg swelling.   Gastrointestinal: Positive for abdominal pain, diarrhea, nausea and vomiting.   Genitourinary: Negative for dysuria.   Musculoskeletal: Negative for back pain.   Skin: Negative for rash.   Neurological: Positive for dizziness (room spinning) and syncope (near). Negative for speech difficulty and light-headedness.       PHYSICAL EXAM  ED Triage Vitals [11/24/19 1437]   Temp Heart Rate Resp BP  SpO2   95.9 °F (35.5 °C) 76 -- 132/98 99 %      Temp src Heart Rate Source Patient Position BP Location FiO2 (%)   Tympanic -- -- -- --     Physical Exam   Constitutional: He is oriented to person, place, and time. He appears distressed (mild).   HENT:   Head: Normocephalic and atraumatic.   Eyes: Pupils are equal, round, and reactive to light.   Neck: Normal range of motion. Neck supple.   Cardiovascular: Normal rate, regular rhythm, S1 normal, S2 normal and normal heart sounds. Exam reveals no gallop and no friction rub.   No murmur heard.  Pulmonary/Chest: Effort normal and breath sounds normal. No respiratory distress. He has no decreased breath sounds. He has no wheezes. He has no rales. He exhibits no tenderness.   Abdominal: Soft. There is tenderness (epigastric and RUQ). There is no rebound and no guarding.   Hypoactive bowel sounds  Round abd  Actively vomiting   Musculoskeletal: Normal range of motion. He exhibits no deformity.   Lymphadenopathy:     He has no cervical adenopathy.   Neurological: He is alert and oriented to person, place, and time.   Skin: Skin is warm and dry.   Psychiatric: Affect normal.   Nursing note and vitals reviewed.      LAB RESULTS  Recent Results (from the past 24 hour(s))   Comprehensive Metabolic Panel    Collection Time: 11/24/19  3:03 PM   Result Value Ref Range    Glucose 155 (H) 65 - 99 mg/dL    BUN 17 8 - 23 mg/dL    Creatinine 1.02 0.76 - 1.27 mg/dL    Sodium 135 (L) 136 - 145 mmol/L    Potassium 3.3 (L) 3.5 - 5.2 mmol/L    Chloride 94 (L) 98 - 107 mmol/L    CO2 25.0 22.0 - 29.0 mmol/L    Calcium 10.0 8.6 - 10.5 mg/dL    Total Protein 8.2 6.0 - 8.5 g/dL    Albumin 4.70 3.50 - 5.20 g/dL    ALT (SGPT) 45 (H) 1 - 41 U/L    AST (SGOT) 49 (H) 1 - 40 U/L    Alkaline Phosphatase 38 (L) 39 - 117 U/L    Total Bilirubin 0.8 0.2 - 1.2 mg/dL    eGFR Non African Amer 74 >60 mL/min/1.73    Globulin 3.5 gm/dL    A/G Ratio 1.3 g/dL    BUN/Creatinine Ratio 16.7 7.0 - 25.0    Anion Gap  16.0 (H) 5.0 - 15.0 mmol/L   Lipase    Collection Time: 11/24/19  3:03 PM   Result Value Ref Range    Lipase 22 13 - 60 U/L   BNP    Collection Time: 11/24/19  3:03 PM   Result Value Ref Range    proBNP 61.3 5.0 - 900.0 pg/mL   Troponin    Collection Time: 11/24/19  3:03 PM   Result Value Ref Range    Troponin T 0.018 0.000 - 0.030 ng/mL   CBC Auto Differential    Collection Time: 11/24/19  3:03 PM   Result Value Ref Range    WBC 12.27 (H) 3.40 - 10.80 10*3/mm3    RBC 5.00 4.14 - 5.80 10*6/mm3    Hemoglobin 15.0 13.0 - 17.7 g/dL    Hematocrit 44.9 37.5 - 51.0 %    MCV 89.8 79.0 - 97.0 fL    MCH 30.0 26.6 - 33.0 pg    MCHC 33.4 31.5 - 35.7 g/dL    RDW 14.5 12.3 - 15.4 %    RDW-SD 47.7 37.0 - 54.0 fl    MPV 10.5 6.0 - 12.0 fL    Platelets 302 140 - 450 10*3/mm3    Neutrophil % 62.9 42.7 - 76.0 %    Lymphocyte % 28.0 19.6 - 45.3 %    Monocyte % 7.4 5.0 - 12.0 %    Eosinophil % 1.0 0.3 - 6.2 %    Basophil % 0.2 0.0 - 1.5 %    Immature Grans % 0.5 0.0 - 0.5 %    Neutrophils, Absolute 7.71 (H) 1.70 - 7.00 10*3/mm3    Lymphocytes, Absolute 3.44 (H) 0.70 - 3.10 10*3/mm3    Monocytes, Absolute 0.91 (H) 0.10 - 0.90 10*3/mm3    Eosinophils, Absolute 0.12 0.00 - 0.40 10*3/mm3    Basophils, Absolute 0.03 0.00 - 0.20 10*3/mm3    Immature Grans, Absolute 0.06 (H) 0.00 - 0.05 10*3/mm3    nRBC 0.0 0.0 - 0.2 /100 WBC   Urinalysis With Microscopic If Indicated (No Culture) - Urine, Clean Catch    Collection Time: 11/24/19  3:21 PM   Result Value Ref Range    Color, UA Yellow Yellow, Straw    Appearance, UA Clear Clear    pH, UA 8.0 5.0 - 8.0    Specific Gravity, UA 1.021 1.005 - 1.030    Glucose, UA >=1000 mg/dL (3+) (A) Negative    Ketones, UA Negative Negative    Bilirubin, UA Negative Negative    Blood, UA Negative Negative    Protein, UA Negative Negative    Leuk Esterase, UA Negative Negative    Nitrite, UA Negative Negative    Urobilinogen, UA 0.2 E.U./dL 0.2 - 1.0 E.U./dL       Ordered the above labs and independently reviewed  the results.     RADIOLOGY  Xr Chest 2 View    Result Date: 11/24/2019  2 VIEWS CHEST  HISTORY:  Shortness of air.  FINDINGS:  Two views of the chest demonstrate the heart to be within normal limits in size. There is no evidence of infiltrate, effusion or of congestive failure. Diffuse idiopathic skeletal hyperostosis is noted.      Ct Abdomen Pelvis With Contrast    Result Date: 11/24/2019  CT EXAMINATION OF THE ABDOMEN AND PELVIS WITH CONTRAST  HISTORY:  Epigastric pain, elevated liver enzymes.  COMPARISON: CT abdomen and pelvis 11/18/2013.  FINDINGS: The heart is within normal limits in size. Coronary vascular calcifications appreciated. There is mild dependent atelectasis.  The spleen appears unremarkable. There is moderate diffuse fatty infiltration of liver, present previously. There is no evidence of intrahepatic biliary duct dilatation. The gallbladder appears grossly unremarkable. The kidneys enhance with no evidence of hydronephrosis. The adrenal glands and stomach appear unremarkable.  Fecal material is appreciated dispersed throughout the colon. There is moderate diverticulosis diffusely with no evidence of diverticulitis. The bladder is distended. There is no evidence of a small bowel obstruction, free air or free fluid. There is fusion of the sacroiliac joints bilaterally.      Fatty infiltration of the liver. Diverticulosis involving the colon with no evidence of diverticulitis.  The above information was called to and discussed with Terra Schwartz.  CHECK CHECK    Radiation dose reduction techniques were utilized, including automated exposure control and exposure modulation based on body size.         I ordered the above noted radiological studies and reviewed the images on the PACS system.  Reviewed by me and discussed with radiologist.  See dictation for official radiology interpretation.       MEDICATIONS GIVEN IN ER  Medications   sodium chloride 0.9 % flush 10 mL (not administered)   sodium  chloride 0.9 % bolus 1,000 mL (1,000 mL Intravenous New Bag 11/24/19 1503)   ondansetron (ZOFRAN) injection 4 mg (4 mg Intravenous Given 11/24/19 1503)   morphine injection 4 mg (4 mg Intravenous Given 11/24/19 1503)   iopamidol (ISOVUE-300) 61 % injection 100 mL (100 mL Intravenous Given by Other 11/24/19 1626)       EKG  Interpreted by ED Physician    PROCEDURES  Procedures        PROGRESS, DATA ANALYSIS, CONSULTS AND MEDICAL DECISION MAKING  All labs have been independently reviewed by me.  All radiology studies have been reviewed by me and discussed with radiologist dictating the report.  EKG's independently viewed and interpreted by me unless stated otherwise. Discussion below represents my analysis of pertinent findings related to patient's condition, differential diagnosis, treatment plan and final disposition.     ED Course as of Nov 24 1843   Sun Nov 24, 2019   1828 Pt has requested multiple times for some Lyrica as he is having problems with a PA.  I have written him for 2 days worth.  I have explained to patient that I doubt his insurance will pay for this. He and wife may have to pay cash.   [EP]      ED Course User Index  [EP] Terra Schwartz, APRN       1453-Pt actively vomiting. IVF, zofran, and morphine ordered. Discussed with pt and family plan to review lab work, EKG, and CXR. The patient indicates understanding of these issues and agrees with the plan.    1719-Per  (radiology), CT abd/pelvis shows a fatty liver, gallbladder is WNL, and there is diffuse diverticulosis. PO challenge ordered.     1720- Reviewed pt's history and workup with Dr. Whittaker.  After a bedside evaluation, Dr. Whittaker agrees with the plan of care.  Pt looks and states he feels much better at this time.    1815-The patient's history, physical exam, CXR, CT abd/pelvis and lab findings were discussed with the physician, who also performed a face to face history and physical exam.  I discussed all results and noted any  "abnormalities with patient.  Discussed absoute need to recheck abnormalities with their family physician.  I answered any of the patient's questions.  Discussed plan for discharge, as there is no emergent indication for admission.  Pt is agreeable and understands need for follow up and repeat testing.  Pt is aware that discharge does not mean that nothing is wrong but it indicates no emergency is present and they must continue care with their family physician.  Pt is discharged with instructions to follow up with primary care doctor to have their blood pressure rechecked.         Disposition vitals:  /89 (BP Location: Left arm, Patient Position: Lying)   Pulse 70   Temp 96.6 °F (35.9 °C) (Tympanic)   Resp 16   Ht 177.8 cm (70\")   Wt 128 kg (281 lb 8 oz)   SpO2 100%   BMI 40.39 kg/m²       DIAGNOSIS  Final diagnoses:   Non-intractable vomiting with nausea, unspecified vomiting type   Elevated liver enzymes       FOLLOW UP   Grant Benitez MD  [address] [address]  [phone]    Schedule an appointment as soon as possible for a visit         RX     Medication List      New Prescriptions    promethazine 25 MG tablet  Commonly known as:  PHENERGAN  Take 1 tablet by mouth Every 6 (Six) Hours As Needed for Nausea or   Vomiting.             Documentation assistance provided by lizy Rolle for DORA Lundberg.  Information recorded by the lizy was done at my direction and has been verified and validated by me.         Lila Rolle  11/24/19 1816       Terra Schwartz APRN  11/24/19 [id number]    "

## 2019-11-25 ENCOUNTER — TELEPHONE (OUTPATIENT)
Dept: INTERNAL MEDICINE | Facility: CLINIC | Age: 59
End: 2019-11-25

## 2019-11-25 NOTE — TELEPHONE ENCOUNTER
Completely out of Lyrica 200mg. Doesn't understand why they are requesting a PA, has no more medcation, wondering if he could have some samples to hold him over.

## 2019-11-25 NOTE — TELEPHONE ENCOUNTER
Lyrica does not need PA, was sent as [facility]RI[facility]AL for brand name and insurance will pay $62 and pt needs to pay $300, after checking with pt, he was OK taking the generic will only cost him $10. Called pharmacy again, gave the verbal to dispense generic, they will work on it and have it ready.

## 2019-11-26 ENCOUNTER — TELEPHONE (OUTPATIENT)
Dept: ENDOCRINOLOGY | Age: 59
End: 2019-11-26

## 2019-11-27 ENCOUNTER — RESULTS ENCOUNTER (OUTPATIENT)
Dept: ENDOCRINOLOGY | Age: 59
End: 2019-11-27

## 2019-11-27 DIAGNOSIS — N52.8 OTHER MALE ERECTILE DYSFUNCTION: ICD-10-CM

## 2019-11-27 DIAGNOSIS — E55.9 VITAMIN D DEFICIENCY: ICD-10-CM

## 2019-11-27 DIAGNOSIS — E03.9 PRIMARY HYPOTHYROIDISM: ICD-10-CM

## 2019-11-27 DIAGNOSIS — E78.5 DYSLIPIDEMIA: ICD-10-CM

## 2019-11-27 DIAGNOSIS — I10 BENIGN ESSENTIAL HTN: ICD-10-CM

## 2019-11-27 DIAGNOSIS — R53.82 CHRONIC FATIGUE: ICD-10-CM

## 2019-11-27 DIAGNOSIS — E78.1 ESSENTIAL HYPERTRIGLYCERIDEMIA: ICD-10-CM

## 2019-11-27 DIAGNOSIS — E66.01 MORBID OBESITY DUE TO EXCESS CALORIES (HCC): ICD-10-CM

## 2019-11-27 DIAGNOSIS — R79.89 LOW TESTOSTERONE: ICD-10-CM

## 2019-11-27 DIAGNOSIS — E11.65 UNCONTROLLED TYPE 2 DIABETES MELLITUS WITH HYPERGLYCEMIA (HCC): ICD-10-CM

## 2019-12-04 NOTE — TELEPHONE ENCOUNTER
I noticed that he had hypoglycemia and ask him to cut back on his Tresiba to 150 units every morning and every 3 days go up by 2 units if fasting blood glucose is greater than 110.  Be a good idea to have a bedtime snack to keep from dropping while he is asleep.  I asked him to keep us informed of his progress.

## 2019-12-10 ENCOUNTER — TELEPHONE (OUTPATIENT)
Dept: INTERNAL MEDICINE | Facility: CLINIC | Age: 59
End: 2019-12-10

## 2019-12-10 DIAGNOSIS — G89.4 CHRONIC PAIN SYNDROME: Primary | ICD-10-CM

## 2019-12-10 RX ORDER — OXYCODONE AND ACETAMINOPHEN 10; 325 MG/1; MG/1
1 TABLET ORAL EVERY 8 HOURS PRN
Qty: 30 TABLET | Refills: 0 | Status: SHIPPED | OUTPATIENT
Start: 2019-12-10 | End: 2020-01-07 | Stop reason: SDUPTHER

## 2019-12-10 NOTE — TELEPHONE ENCOUNTER
Pt needing refill on oxycodone  mg, tries to take half tablet every 4 hours    [facility] [place]

## 2019-12-11 RX ORDER — BLOOD SUGAR DIAGNOSTIC
STRIP MISCELLANEOUS
Qty: 60 EACH | Refills: 12 | Status: SHIPPED | OUTPATIENT
Start: 2019-12-11 | End: 2021-01-29

## 2019-12-11 RX ORDER — LANCETS
EACH MISCELLANEOUS
Qty: 60 EACH | Refills: 11 | Status: SHIPPED | OUTPATIENT
Start: 2019-12-11 | End: 2021-01-29

## 2019-12-12 ENCOUNTER — TELEPHONE (OUTPATIENT)
Dept: INTERNAL MEDICINE | Facility: CLINIC | Age: 59
End: 2019-12-12

## 2019-12-13 RX ORDER — DULOXETIN HYDROCHLORIDE 60 MG/1
60 CAPSULE, DELAYED RELEASE ORAL DAILY
Qty: 30 CAPSULE | Refills: 0 | Status: SHIPPED | OUTPATIENT
Start: 2019-12-13 | End: 2020-01-14

## 2019-12-16 ENCOUNTER — PRIOR AUTHORIZATION (OUTPATIENT)
Dept: ENDOCRINOLOGY | Age: 59
End: 2019-12-16

## 2019-12-16 NOTE — TELEPHONE ENCOUNTER
As long as you remain covered by your prescription drug plan and there are no  changes to your plan benefits, this request is approved for the following time period:  12/16/2019 – 12/16/2020

## 2019-12-23 RX ORDER — OMEGA-3-ACID ETHYL ESTERS 1 G/1
CAPSULE, LIQUID FILLED ORAL
Qty: 120 CAPSULE | Refills: 3 | Status: SHIPPED | OUTPATIENT
Start: 2019-12-23 | End: 2020-01-27 | Stop reason: SDUPTHER

## 2020-01-07 ENCOUNTER — TELEPHONE (OUTPATIENT)
Dept: INTERNAL MEDICINE | Facility: CLINIC | Age: 60
End: 2020-01-07

## 2020-01-07 DIAGNOSIS — G89.4 CHRONIC PAIN SYNDROME: ICD-10-CM

## 2020-01-07 RX ORDER — OXYCODONE AND ACETAMINOPHEN 10; 325 MG/1; MG/1
1 TABLET ORAL EVERY 8 HOURS PRN
Qty: 30 TABLET | Refills: 0 | Status: SHIPPED | OUTPATIENT
Start: 2020-01-07 | End: 2020-01-23 | Stop reason: SDUPTHER

## 2020-01-07 NOTE — TELEPHONE ENCOUNTER
Pt called and stated that he was out of oxyCODONE-acetaminophen (PERCOCET)  MG per tablet and is requesting a refill. Pt call back [phone]

## 2020-01-14 RX ORDER — DULOXETIN HYDROCHLORIDE 60 MG/1
CAPSULE, DELAYED RELEASE ORAL
Qty: 30 CAPSULE | Refills: 0 | Status: SHIPPED | OUTPATIENT
Start: 2020-01-14 | End: 2020-02-17

## 2020-01-16 LAB
25(OH)D3+25(OH)D2 SERPL-MCNC: 57.8 NG/ML (ref 30–100)
ALBUMIN SERPL-MCNC: 4.6 G/DL (ref 3.5–5.2)
ALBUMIN/GLOB SERPL: 1.7 G/DL
ALP SERPL-CCNC: 41 U/L (ref 39–117)
ALT SERPL-CCNC: 46 U/L (ref 1–41)
AST SERPL-CCNC: 37 U/L (ref 1–40)
BILIRUB SERPL-MCNC: 0.4 MG/DL (ref 0.2–1.2)
BUN SERPL-MCNC: 16 MG/DL (ref 8–23)
BUN/CREAT SERPL: 16.5 (ref 7–25)
C PEPTIDE SERPL-MCNC: 3.3 NG/ML (ref 1.1–4.4)
CALCIUM SERPL-MCNC: 9.9 MG/DL (ref 8.6–10.5)
CHLORIDE SERPL-SCNC: 98 MMOL/L (ref 98–107)
CHOLEST SERPL-MCNC: 105 MG/DL (ref 0–200)
CO2 SERPL-SCNC: 29.1 MMOL/L (ref 22–29)
CREAT SERPL-MCNC: 0.97 MG/DL (ref 0.76–1.27)
GLOBULIN SER CALC-MCNC: 2.7 GM/DL
GLUCOSE SERPL-MCNC: 213 MG/DL (ref 65–99)
HBA1C MFR BLD: 7.6 % (ref 4.8–5.6)
HCT VFR BLD AUTO: 45.9 % (ref 37.5–51)
HDLC SERPL-MCNC: 34 MG/DL (ref 40–60)
HGB BLD-MCNC: 15.6 G/DL (ref 13–17.7)
INTERPRETATION: NORMAL
LDLC SERPL CALC-MCNC: 38 MG/DL (ref 0–100)
Lab: NORMAL
MICROALBUMIN UR-MCNC: 7.4 UG/ML
POTASSIUM SERPL-SCNC: 4.1 MMOL/L (ref 3.5–5.2)
PROT SERPL-MCNC: 7.3 G/DL (ref 6–8.5)
SHBG SERPL-SCNC: 36.8 NMOL/L (ref 19.3–76.4)
SODIUM SERPL-SCNC: 141 MMOL/L (ref 136–145)
T4 SERPL-MCNC: 7.26 MCG/DL (ref 4.5–11.7)
TESTOST FREE SERPL-MCNC: 4.7 PG/ML (ref 6.6–18.1)
TESTOST SERPL-MCNC: 250 NG/DL (ref 264–916)
TRIGL SERPL-MCNC: 164 MG/DL (ref 0–150)
TSH SERPL DL<=0.005 MIU/L-ACNC: 0.88 UIU/ML (ref 0.27–4.2)
URATE SERPL-MCNC: 5 MG/DL (ref 3.4–7)
VLDLC SERPL CALC-MCNC: 32.8 MG/DL

## 2020-01-23 ENCOUNTER — TELEPHONE (OUTPATIENT)
Dept: INTERNAL MEDICINE | Facility: CLINIC | Age: 60
End: 2020-01-23

## 2020-01-23 DIAGNOSIS — G89.4 CHRONIC PAIN SYNDROME: ICD-10-CM

## 2020-01-23 RX ORDER — OXYCODONE AND ACETAMINOPHEN 10; 325 MG/1; MG/1
1 TABLET ORAL EVERY 8 HOURS PRN
Qty: 90 TABLET | Refills: 0 | Status: SHIPPED | OUTPATIENT
Start: 2020-01-23 | End: 2020-02-18 | Stop reason: SDUPTHER

## 2020-01-23 NOTE — TELEPHONE ENCOUNTER
Pt wanted to know if he can have more prescribed. Pt stated he is working now and he has to walk a lot and there is some physical work involved as well. Pt stated he is having to walk 7,000 to 8,000 steps a day. Pt stated this is the reason he is in so much pain.

## 2020-01-23 NOTE — TELEPHONE ENCOUNTER
OV 11/13/19, next:2/18/20.  Abhilash done 11/13/19.   Last refill was 01/07 # 30 tabs.  Please add Q.

## 2020-01-27 ENCOUNTER — OFFICE VISIT (OUTPATIENT)
Dept: ENDOCRINOLOGY | Age: 60
End: 2020-01-27

## 2020-01-27 VITALS
DIASTOLIC BLOOD PRESSURE: 70 MMHG | SYSTOLIC BLOOD PRESSURE: 124 MMHG | WEIGHT: 273 LBS | HEIGHT: 70 IN | BODY MASS INDEX: 39.08 KG/M2

## 2020-01-27 DIAGNOSIS — E03.9 PRIMARY HYPOTHYROIDISM: ICD-10-CM

## 2020-01-27 DIAGNOSIS — E55.9 VITAMIN D DEFICIENCY: ICD-10-CM

## 2020-01-27 DIAGNOSIS — E78.1 ESSENTIAL HYPERTRIGLYCERIDEMIA: ICD-10-CM

## 2020-01-27 DIAGNOSIS — E78.5 DYSLIPIDEMIA: ICD-10-CM

## 2020-01-27 DIAGNOSIS — E11.65 UNCONTROLLED TYPE 2 DIABETES MELLITUS WITH HYPERGLYCEMIA (HCC): Primary | ICD-10-CM

## 2020-01-27 DIAGNOSIS — E66.01 MORBID OBESITY DUE TO EXCESS CALORIES (HCC): ICD-10-CM

## 2020-01-27 DIAGNOSIS — I10 BENIGN ESSENTIAL HTN: ICD-10-CM

## 2020-01-27 DIAGNOSIS — N52.8 OTHER MALE ERECTILE DYSFUNCTION: ICD-10-CM

## 2020-01-27 DIAGNOSIS — N52.9 ERECTILE DYSFUNCTION, UNSPECIFIED ERECTILE DYSFUNCTION TYPE: ICD-10-CM

## 2020-01-27 DIAGNOSIS — R79.89 LOW TESTOSTERONE: ICD-10-CM

## 2020-01-27 DIAGNOSIS — R53.82 CHRONIC FATIGUE: ICD-10-CM

## 2020-01-27 PROCEDURE — 99214 OFFICE O/P EST MOD 30 MIN: CPT | Performed by: INTERNAL MEDICINE

## 2020-01-27 RX ORDER — FENOFIBRATE 130 MG/1
130 CAPSULE ORAL
Qty: 90 CAPSULE | Refills: 3 | Status: SHIPPED | OUTPATIENT
Start: 2020-01-27 | End: 2021-03-25 | Stop reason: SDUPTHER

## 2020-01-27 RX ORDER — SYRINGE W-NEEDLE,DISPOSAB,3 ML 25GX5/8"
SYRINGE, EMPTY DISPOSABLE MISCELLANEOUS
Qty: 10 EACH | Refills: 3 | Status: SHIPPED | OUTPATIENT
Start: 2020-01-27

## 2020-01-27 RX ORDER — SITAGLIPTIN AND METFORMIN HYDROCHLORIDE 1000; 50 MG/1; MG/1
2 TABLET, FILM COATED, EXTENDED RELEASE ORAL EVERY EVENING
Qty: 180 TABLET | Refills: 3 | Status: SHIPPED | OUTPATIENT
Start: 2020-01-27 | End: 2021-02-01

## 2020-01-27 RX ORDER — TESTOSTERONE CYPIONATE 200 MG/ML
300 INJECTION, SOLUTION INTRAMUSCULAR WEEKLY
Qty: 6 ML | Refills: 5 | Status: SHIPPED | OUTPATIENT
Start: 2020-01-27 | End: 2020-12-18

## 2020-01-27 RX ORDER — DAPAGLIFLOZIN 10 MG/1
1 TABLET, FILM COATED ORAL EVERY MORNING
Qty: 90 TABLET | Refills: 3 | Status: SHIPPED | OUTPATIENT
Start: 2020-01-27 | End: 2021-04-05

## 2020-01-27 RX ORDER — INSULIN DEGLUDEC 200 U/ML
INJECTION, SOLUTION SUBCUTANEOUS
Qty: 9 PEN | Refills: 5 | Status: SHIPPED | OUTPATIENT
Start: 2020-01-27 | End: 2020-03-17

## 2020-01-27 RX ORDER — PIOGLITAZONEHYDROCHLORIDE 30 MG/1
30 TABLET ORAL DAILY
Qty: 90 TABLET | Refills: 3 | Status: SHIPPED | OUTPATIENT
Start: 2020-01-27 | End: 2020-12-18

## 2020-01-27 RX ORDER — OMEGA-3-ACID ETHYL ESTERS 1 G/1
2 CAPSULE, LIQUID FILLED ORAL 2 TIMES DAILY
Qty: 360 CAPSULE | Refills: 3 | Status: SHIPPED | OUTPATIENT
Start: 2020-01-27 | End: 2021-01-07

## 2020-01-27 RX ORDER — TADALAFIL 5 MG/1
5 TABLET ORAL DAILY PRN
Qty: 30 TABLET | Refills: 5 | Status: SHIPPED | OUTPATIENT
Start: 2020-01-27 | End: 2021-06-22

## 2020-01-27 RX ORDER — ATORVASTATIN CALCIUM 20 MG/1
20 TABLET, FILM COATED ORAL DAILY
Qty: 90 TABLET | Refills: 3 | Status: SHIPPED | OUTPATIENT
Start: 2020-01-27 | End: 2021-04-05

## 2020-01-27 NOTE — PROGRESS NOTES
"Subjective   Aaron Latif is a 59 y.o. male here for follow up for DM2. Pt is testing BG once daily. Pt did not bring meter. Pt states that he only takes 60 units of insulin if his BG is lower.     History of Present Illness this is a 59-year-old gentleman known patient with type 2 diabetes hypertension and dyslipidemia as well as hypothyroidism and hypogonadism with erectile dysfunction.  On 11/24/2019 he was taken him emergency room for nausea and vomiting and he was found to have hypoglycemia and he told me that that they he had just a couple coffee for his breakfast and this occasion happened around midday while at work.  L.  He is complaining of visual problems and blurriness as well as urinary frequency.  /70   Ht 177.8 cm (70\")   Wt 124 kg (273 lb)   BMI 39.17 kg/m²   Allergies   Allergen Reactions   • Codeine Other (See Comments)     UNKNOWN     Current Outpatient Medications on File Prior to Visit   Medication Sig Dispense Refill   • amLODIPine (NORVASC) 10 MG tablet TAKE ONE TABLET BY MOUTH DAILY 30 tablet 4   • atorvastatin (LIPITOR) 20 MG tablet Take 1 tablet by mouth Daily. 90 tablet 3   • BD HYPODERMIC NEEDLE 18G X 1\" misc USE WEEKLY WITH TESTOSTERONE INJECTION 25 each 0   • Blood Glucose Monitoring Suppl (ACCU-CHEK GUIDE ME) w/Device kit 1 kit Daily. 1 kit 1   • DULoxetine (CYMBALTA) 60 MG capsule TAKE ONE CAPSULE BY MOUTH DAILY 30 capsule 0   • FARXIGA 10 MG tablet Take 10 mg by mouth Every Morning. 90 tablet 3   • fenofibrate micronized (ANTARA) 130 MG capsule Take 1 capsule by mouth Every Morning Before Breakfast. 90 capsule 3   • hydroCHLOROthiazide (HYDRODIURIL) 25 MG tablet TAKE ONE TABLET BY MOUTH EVERY MORNING 90 tablet 0   • Insulin Pen Needle 31G X 8 MM misc Use once a day 100 each 3   • JANUMET XR  MG tablet Take 2 tablets by mouth Every Evening. 180 tablet 3   • Lancets (ONETOUCH ULTRASOFT) lancets Check blood glucose twice daily 60 each 11   • levothyroxine (SYNTHROID) " "75 MCG tablet Take 1 tablet by mouth Daily. 90 tablet 3   • metoprolol succinate XL (TOPROL-XL) 50 MG 24 hr tablet TAKE ONE TABLET BY MOUTH DAILY 90 tablet 0   • Needle, Disp, (BD DISP NEEDLE) 23G X 1\" misc USE WEEKLY FOR INSULIN INJECTION 10 each 2   • omega-3 acid ethyl esters (LOVAZA) 1 g capsule TAKE TWO CAPSULES BY MOUTH TWICE A  capsule 3   • ondansetron (ZOFRAN) 4 MG tablet Take 1 tablet by mouth Every 6 (Six) Hours As Needed for Nausea or Vomiting. 10 tablet 0   • ONETOUCH VERIO test strip Blood glucose twice daily use as instructed 60 each 12   • oxyCODONE-acetaminophen (PERCOCET)  MG per tablet Take 1 tablet by mouth Every 8 (Eight) Hours As Needed for Moderate Pain . 90 tablet 0   • pioglitazone (ACTOS) 30 MG tablet Take 1 tablet by mouth Daily. 90 tablet 3   • pregabalin (LYRICA) 200 MG capsule Take 1 capsule by mouth 3 (Three) Times a Day. 6 capsule 0   • promethazine (PHENERGAN) 25 MG tablet Take 1 tablet by mouth Every 6 (Six) Hours As Needed for Nausea or Vomiting. 8 tablet 0   • QUEtiapine (SEROquel) 25 MG tablet TAKE ONE TABLET BY MOUTH ONCE NIGHTLY 90 tablet 0   • Syringe 23G X 1\" 3 ML misc Use weekly for testosterone injection 10 each 3   • tadalafil (CIALIS) 5 MG tablet Take 1 tablet by mouth Daily As Needed for erectile dysfunction. 30 tablet 5   • Testosterone Cypionate (DEPOTESTOTERONE CYPIONATE) 200 MG/ML injection Inject 1.5 mL into the appropriate muscle as directed by prescriber 1 (One) Time Per Week. 4 mL 5   • TRESIBA FLEXTOUCH 200 UNIT/ML solution pen-injector pen injection 175 units every morning 9 pen 5   • [DISCONTINUED] LYRICA 200 MG capsule Take 1 capsule by mouth 3 (Three) Times a Day. 90 capsule 5   • [DISCONTINUED] pregabalin (LYRICA) 200 MG capsule Take 1 capsule by mouth 3 (Three) Times a Day. 90 capsule 5     No current facility-administered medications on file prior to visit.          The following portions of the patient's history were reviewed and updated as " appropriate: current medications, past family history, past medical history, past social history, past surgical history and problem list.    Review of Systems   Constitutional: Negative for fatigue.   Eyes: Positive for visual disturbance.   Gastrointestinal: Negative for nausea.   Endocrine: Negative for polydipsia.   Genitourinary: Positive for frequency.   The above review of system was reviewed, corroborated and accepted.    Objective   Physical Exam   Constitutional: He appears well-developed and well-nourished. No distress.   HENT:   Head: Normocephalic and atraumatic.   Right Ear: External ear normal.   Left Ear: External ear normal.   Nose: Nose normal.   Mouth/Throat: Oropharynx is clear and moist. No oropharyngeal exudate.   Eyes: Pupils are equal, round, and reactive to light. Conjunctivae and EOM are normal. Right eye exhibits no discharge. Left eye exhibits no discharge. No scleral icterus.   Neck: Normal range of motion. Neck supple. No JVD present. No tracheal deviation present. No thyromegaly present.   Cardiovascular: Normal rate, regular rhythm, normal heart sounds and intact distal pulses. Exam reveals no gallop and no friction rub.   No murmur heard.  Pulmonary/Chest: Effort normal and breath sounds normal. No stridor. No respiratory distress. He has no wheezes. He has no rales. He exhibits no tenderness.   Abdominal: Soft. Bowel sounds are normal. He exhibits no distension and no mass. There is no tenderness. There is no rebound and no guarding. No hernia.   Musculoskeletal: Normal range of motion. He exhibits no edema, tenderness or deformity.   Healed the scar of the knee surgery in the anterior aspect of the left knee.   Lymphadenopathy:     He has no cervical adenopathy.   Neurological: He is alert. He has normal reflexes. He displays normal reflexes. No cranial nerve deficit or sensory deficit. He exhibits normal muscle tone. Coordination normal.   Skin: Skin is warm and dry. No rash noted.  He is not diaphoretic. No erythema. No pallor.   Psychiatric: He has a normal mood and affect. His behavior is normal. Judgment and thought content normal.   Nursing note and vitals reviewed.     No significant change since 6/10/2019 office visit.        Assessment/Plan   Aaron was seen today for diabetes.    Diagnoses and all orders for this visit:    Uncontrolled type 2 diabetes mellitus with hyperglycemia (CMS/[facility])  -     T4 & TSH ([facility]); Future  -     TestT+TestF+SHBG; Future  -     Uric Acid; Future  -     Vitamin D 25 Hydroxy; Future  -     Comprehensive Metabolic Panel; Future  -     C-Peptide; Future  -     Hemoglobin A1c; Future  -     Lipid Panel; Future  -     MicroAlbumin, Urine, Random - Urine, Clean Catch; Future  -     PSA DIAGNOSTIC; Future  -     Hemoglobin & Hematocrit, Blood; Future    Benign essential HTN  -     T4 & TSH ([facility]); Future  -     TestT+TestF+SHBG; Future  -     Uric Acid; Future  -     Vitamin D 25 Hydroxy; Future  -     Comprehensive Metabolic Panel; Future  -     C-Peptide; Future  -     Hemoglobin A1c; Future  -     Lipid Panel; Future  -     MicroAlbumin, Urine, Random - Urine, Clean Catch; Future  -     PSA DIAGNOSTIC; Future  -     Hemoglobin & Hematocrit, Blood; Future    Essential hypertriglyceridemia  -     T4 & TSH ([facility]); Future  -     TestT+TestF+SHBG; Future  -     Uric Acid; Future  -     Vitamin D 25 Hydroxy; Future  -     Comprehensive Metabolic Panel; Future  -     C-Peptide; Future  -     Hemoglobin A1c; Future  -     Lipid Panel; Future  -     MicroAlbumin, Urine, Random - Urine, Clean Catch; Future  -     PSA DIAGNOSTIC; Future  -     Hemoglobin & Hematocrit, Blood; Future    Morbid obesity due to excess calories (CMS/[facility])  -     pioglitazone (ACTOS) 30 MG tablet; Take 1 tablet by mouth Daily.  -     fenofibrate micronized (ANTARA) 130 MG capsule; Take 1 capsule by mouth Every Morning Before Breakfast.  -     T4 & TSH ([facility]); Future  -      TestT+TestF+SHBG; Future  -     Uric Acid; Future  -     Vitamin D 25 Hydroxy; Future  -     Comprehensive Metabolic Panel; Future  -     C-Peptide; Future  -     Hemoglobin A1c; Future  -     Lipid Panel; Future  -     MicroAlbumin, Urine, Random - Urine, Clean Catch; Future  -     PSA DIAGNOSTIC; Future  -     Hemoglobin & Hematocrit, Blood; Future    Vitamin D deficiency  -     pioglitazone (ACTOS) 30 MG tablet; Take 1 tablet by mouth Daily.  -     fenofibrate micronized (ANTARA) 130 MG capsule; Take 1 capsule by mouth Every Morning Before Breakfast.  -     T4 & TSH ([facility]); Future  -     TestT+TestF+SHBG; Future  -     Uric Acid; Future  -     Vitamin D 25 Hydroxy; Future  -     Comprehensive Metabolic Panel; Future  -     C-Peptide; Future  -     Hemoglobin A1c; Future  -     Lipid Panel; Future  -     MicroAlbumin, Urine, Random - Urine, Clean Catch; Future  -     PSA DIAGNOSTIC; Future  -     Hemoglobin & Hematocrit, Blood; Future    Primary hypothyroidism  -     T4 & TSH ([facility]); Future  -     TestT+TestF+SHBG; Future  -     Uric Acid; Future  -     Vitamin D 25 Hydroxy; Future  -     Comprehensive Metabolic Panel; Future  -     C-Peptide; Future  -     Hemoglobin A1c; Future  -     Lipid Panel; Future  -     MicroAlbumin, Urine, Random - Urine, Clean Catch; Future  -     PSA DIAGNOSTIC; Future  -     Hemoglobin & Hematocrit, Blood; Future    Chronic fatigue  -     pioglitazone (ACTOS) 30 MG tablet; Take 1 tablet by mouth Daily.  -     fenofibrate micronized (ANTARA) 130 MG capsule; Take 1 capsule by mouth Every Morning Before Breakfast.  -     T4 & TSH ([facility]); Future  -     TestT+TestF+SHBG; Future  -     Uric Acid; Future  -     Vitamin D 25 Hydroxy; Future  -     Comprehensive Metabolic Panel; Future  -     C-Peptide; Future  -     Hemoglobin A1c; Future  -     Lipid Panel; Future  -     MicroAlbumin, Urine, Random - Urine, Clean Catch; Future  -     PSA DIAGNOSTIC; Future  -     Hemoglobin &  Hematocrit, Blood; Future    Erectile dysfunction, unspecified erectile dysfunction type  -     T4 & TSH ([facility]); Future  -     TestT+TestF+SHBG; Future  -     Uric Acid; Future  -     Vitamin D 25 Hydroxy; Future  -     Comprehensive Metabolic Panel; Future  -     C-Peptide; Future  -     Hemoglobin A1c; Future  -     Lipid Panel; Future  -     MicroAlbumin, Urine, Random - Urine, Clean Catch; Future  -     PSA DIAGNOSTIC; Future  -     Hemoglobin & Hematocrit, Blood; Future    Low testosterone  -     Testosterone Cypionate (DEPOTESTOTERONE CYPIONATE) 200 MG/ML injection; Inject 1.5 mL into the appropriate muscle as directed by prescriber 1 (One) Time Per Week.  -     pioglitazone (ACTOS) 30 MG tablet; Take 1 tablet by mouth Daily.  -     fenofibrate micronized (ANTARA) 130 MG capsule; Take 1 capsule by mouth Every Morning Before Breakfast.  -     T4 & TSH ([facility]); Future  -     TestT+TestF+SHBG; Future  -     Uric Acid; Future  -     Vitamin D 25 Hydroxy; Future  -     Comprehensive Metabolic Panel; Future  -     C-Peptide; Future  -     Hemoglobin A1c; Future  -     Lipid Panel; Future  -     MicroAlbumin, Urine, Random - Urine, Clean Catch; Future  -     PSA DIAGNOSTIC; Future  -     Hemoglobin & Hematocrit, Blood; Future    Dyslipidemia  -     pioglitazone (ACTOS) 30 MG tablet; Take 1 tablet by mouth Daily.  -     fenofibrate micronized (ANTARA) 130 MG capsule; Take 1 capsule by mouth Every Morning Before Breakfast.  -     T4 & TSH ([facility]); Future  -     TestT+TestF+SHBG; Future  -     Uric Acid; Future  -     Vitamin D 25 Hydroxy; Future  -     Comprehensive Metabolic Panel; Future  -     C-Peptide; Future  -     Hemoglobin A1c; Future  -     Lipid Panel; Future  -     MicroAlbumin, Urine, Random - Urine, Clean Catch; Future  -     PSA DIAGNOSTIC; Future  -     Hemoglobin & Hematocrit, Blood; Future    Other male erectile dysfunction  -     pioglitazone (ACTOS) 30 MG tablet; Take 1 tablet by mouth  "Daily.  -     fenofibrate micronized (ANTARA) 130 MG capsule; Take 1 capsule by mouth Every Morning Before Breakfast.  -     T4 & TSH ([facility]); Future  -     TestT+TestF+SHBG; Future  -     Uric Acid; Future  -     Vitamin D 25 Hydroxy; Future  -     Comprehensive Metabolic Panel; Future  -     C-Peptide; Future  -     Hemoglobin A1c; Future  -     Lipid Panel; Future  -     MicroAlbumin, Urine, Random - Urine, Clean Catch; Future  -     PSA DIAGNOSTIC; Future  -     Hemoglobin & Hematocrit, Blood; Future    Other orders  -     TRESIBA FLEXTOUCH 200 UNIT/ML solution pen-injector pen injection; 175 units every morning  -     tadalafil (CIALIS) 5 MG tablet; Take 1 tablet by mouth Daily As Needed for Erectile Dysfunction.  -     Syringe 23G X 1\" 3 ML misc; Use weekly for testosterone injection  -     omega-3 acid ethyl esters (LOVAZA) 1 g capsule; Take 2 capsules by mouth 2 (Two) Times a Day.  -     JANUMET XR  MG tablet; Take 2 tablets by mouth Every Evening.  -     FARXIGA 10 MG tablet; Take 10 mg by mouth Every Morning.  -     atorvastatin (LIPITOR) 20 MG tablet; Take 1 tablet by mouth Daily.      In summary I saw and examined this 59-year-old gentleman for above-mentioned problems.  I reviewed his laboratory evaluation of 1/14/2020 and provided him with a hard copy of it.  Overall he is clinically and metabolically stable and therefore we will continue his medications as well and see him in 6 months or sooner if needed with laboratory evaluation prior to each office visit.  I also made it clear for him to remember that in order to lose weight he needs to reduce his calories however he needs to eat 3 meals in order to avoid or minimize any risk of hypoglycemia.           "

## 2020-01-29 DIAGNOSIS — E55.9 VITAMIN D DEFICIENCY: ICD-10-CM

## 2020-01-29 DIAGNOSIS — E78.5 DYSLIPIDEMIA: ICD-10-CM

## 2020-01-29 DIAGNOSIS — E66.01 MORBID OBESITY DUE TO EXCESS CALORIES (HCC): ICD-10-CM

## 2020-01-29 DIAGNOSIS — N52.8 OTHER MALE ERECTILE DYSFUNCTION: ICD-10-CM

## 2020-01-29 DIAGNOSIS — R53.82 CHRONIC FATIGUE: ICD-10-CM

## 2020-01-29 DIAGNOSIS — R79.89 LOW TESTOSTERONE: ICD-10-CM

## 2020-01-31 RX ORDER — ERGOCALCIFEROL 1.25 MG/1
CAPSULE ORAL
Qty: 26 CAPSULE | Refills: 2 | Status: SHIPPED | OUTPATIENT
Start: 2020-01-31 | End: 2020-10-05

## 2020-02-04 RX ORDER — HYDROCHLOROTHIAZIDE 25 MG/1
TABLET ORAL
Qty: 90 TABLET | Refills: 1 | Status: SHIPPED | OUTPATIENT
Start: 2020-02-04 | End: 2020-07-21

## 2020-02-04 RX ORDER — QUETIAPINE FUMARATE 25 MG/1
TABLET, FILM COATED ORAL
Qty: 90 TABLET | Refills: 0 | Status: SHIPPED | OUTPATIENT
Start: 2020-02-04 | End: 2020-05-07

## 2020-02-04 RX ORDER — METOPROLOL SUCCINATE 50 MG/1
TABLET, EXTENDED RELEASE ORAL
Qty: 90 TABLET | Refills: 0 | Status: SHIPPED | OUTPATIENT
Start: 2020-02-04 | End: 2020-05-05

## 2020-02-13 DIAGNOSIS — G89.4 CHRONIC PAIN SYNDROME: ICD-10-CM

## 2020-02-13 RX ORDER — OXYCODONE AND ACETAMINOPHEN 10; 325 MG/1; MG/1
1 TABLET ORAL EVERY 8 HOURS PRN
Qty: 90 TABLET | Refills: 0 | OUTPATIENT
Start: 2020-02-13

## 2020-02-13 NOTE — TELEPHONE ENCOUNTER
Patient requesting a refill on: oxyCODONE-acetaminophen (PERCOCET)  MG per tablet  Patient stated he is out of medication.  Send to: [facility] [address] [place] [address] [id number]   Patient’s call back number is: [phone]     Patient stated he is having major pain in both shoulders.

## 2020-02-17 RX ORDER — DULOXETIN HYDROCHLORIDE 60 MG/1
CAPSULE, DELAYED RELEASE ORAL
Qty: 30 CAPSULE | Refills: 0 | Status: SHIPPED | OUTPATIENT
Start: 2020-02-17 | End: 2020-03-16

## 2020-02-18 ENCOUNTER — OFFICE VISIT (OUTPATIENT)
Dept: INTERNAL MEDICINE | Facility: CLINIC | Age: 60
End: 2020-02-18

## 2020-02-18 ENCOUNTER — TELEPHONE (OUTPATIENT)
Dept: INTERNAL MEDICINE | Facility: CLINIC | Age: 60
End: 2020-02-18

## 2020-02-18 VITALS
DIASTOLIC BLOOD PRESSURE: 82 MMHG | OXYGEN SATURATION: 94 % | WEIGHT: 270 LBS | HEIGHT: 70 IN | BODY MASS INDEX: 38.65 KG/M2 | SYSTOLIC BLOOD PRESSURE: 120 MMHG | HEART RATE: 75 BPM

## 2020-02-18 DIAGNOSIS — I10 BENIGN ESSENTIAL HTN: Primary | ICD-10-CM

## 2020-02-18 DIAGNOSIS — G89.29 CHRONIC BILATERAL LOW BACK PAIN WITHOUT SCIATICA: ICD-10-CM

## 2020-02-18 DIAGNOSIS — G89.29 CHRONIC PAIN OF BOTH SHOULDERS: ICD-10-CM

## 2020-02-18 DIAGNOSIS — E78.1 ESSENTIAL HYPERTRIGLYCERIDEMIA: ICD-10-CM

## 2020-02-18 DIAGNOSIS — G89.4 CHRONIC PAIN SYNDROME: ICD-10-CM

## 2020-02-18 DIAGNOSIS — E03.9 PRIMARY HYPOTHYROIDISM: ICD-10-CM

## 2020-02-18 DIAGNOSIS — G47.33 OSA ON CPAP: ICD-10-CM

## 2020-02-18 DIAGNOSIS — M25.512 CHRONIC PAIN OF BOTH SHOULDERS: ICD-10-CM

## 2020-02-18 DIAGNOSIS — M54.50 CHRONIC BILATERAL LOW BACK PAIN WITHOUT SCIATICA: ICD-10-CM

## 2020-02-18 DIAGNOSIS — Z99.89 OSA ON CPAP: ICD-10-CM

## 2020-02-18 DIAGNOSIS — M25.511 CHRONIC PAIN OF BOTH SHOULDERS: ICD-10-CM

## 2020-02-18 DIAGNOSIS — E11.65 UNCONTROLLED TYPE 2 DIABETES MELLITUS WITH HYPERGLYCEMIA (HCC): ICD-10-CM

## 2020-02-18 PROCEDURE — 99214 OFFICE O/P EST MOD 30 MIN: CPT | Performed by: INTERNAL MEDICINE

## 2020-02-18 RX ORDER — OXYCODONE AND ACETAMINOPHEN 10; 325 MG/1; MG/1
1 TABLET ORAL EVERY 8 HOURS PRN
Qty: 90 TABLET | Refills: 0 | Status: SHIPPED | OUTPATIENT
Start: 2020-02-18 | End: 2020-03-16 | Stop reason: SDUPTHER

## 2020-02-18 NOTE — TELEPHONE ENCOUNTER
PATIENT IS CURRENTLY OUT OF oxyCODONE-acetaminophen (PERCOCET)  MG per tablet MEDICATION. NOT DUE FOR A REFILL TILL Thursday AND IS WONDERING IF THIS COULD BE CALLED IN TODAY.I CONFIRMED HIS [facility] [address].

## 2020-02-18 NOTE — PROGRESS NOTES
"Subjective   Aaron Latif is a 60 y.o. male.  Patient presents with chief complaint of worsening bilateral shoulder pain that is now become limited in his range of motion, back pain and chronic knee pain, hypertension, hyperlipidemia, obstructive sleep apnea on CPAP, primary hypothyroidism, and previously uncontrolled diabetes mellitus which is getting under much better control with a recent A1c of 7.8 here for follow-up evaluation and treatment.  In examination the patient has extremely limited range of motion for both of his shoulders and I recommend that he see an orthopedist right away because I believe that he is developing severe frozen shoulder syndrome bilaterally.      /82   Pulse 75   Ht 177.8 cm (70\")   Wt 122 kg (270 lb)   SpO2 94%   BMI 38.74 kg/m²     Body mass index is 38.74 kg/m².    History of Present Illness worsening bilateral shoulder pain over the last 3 to 4 months    The following portions of the patient's history were reviewed and updated as appropriate: allergies, current medications, past family history, past medical history, past social history, past surgical history and problem list.    Review of Systems   Constitutional: Positive for fatigue.   Eyes: Negative.    Respiratory: Negative.    Cardiovascular: Negative.    Gastrointestinal: Negative.    Musculoskeletal: Positive for arthralgias and back pain.   Neurological: Negative for numbness.   Psychiatric/Behavioral: Negative.        Objective   Physical Exam   Constitutional: He is oriented to person, place, and time. He appears well-developed and well-nourished.   HENT:   Head: Normocephalic and atraumatic.   Neck:   Chronic neck pain secondary to cervical disc disease   Cardiovascular: Normal rate, regular rhythm and normal heart sounds.   Pulmonary/Chest: Effort normal and breath sounds normal.   Abdominal: Soft. Bowel sounds are normal.   Musculoskeletal:   Worsening bilateral shoulder pain with very limited range of " motion chronic lower back and knee pain as well   Neurological: He is alert and oriented to person, place, and time.   Psychiatric: He has a normal mood and affect. His behavior is normal.   Nursing note and vitals reviewed.        Assessment/Plan   Aaron was seen today for hypertension, back pain and diabetes.    Diagnoses and all orders for this visit:    Benign essential HTN  Comments:  Well-controlled no changes at this time    Essential hypertriglyceridemia  Comments:  Continue current treatment regimen will check lipids on a regular basis    BC on CPAP  Comments:  Very compliant no problems at this time    Primary hypothyroidism  Comments:  Continue current thyroid replacement hormone level    Uncontrolled type 2 diabetes mellitus with hyperglycemia (CMS/[facility])  Comments:  Patient's A1c is down to 7.8 but I encourage diet and exercise as an adjunct to his therapy    Chronic bilateral low back pain without sciatica  Comments:  Continue current pain management strategy    Chronic pain of both shoulders  Comments:  I strongly recommend that the patient see an orthopedic surgeon to soonest possible  Orders:  -     Ambulatory Referral to Orthopedic Surgery    Chronic pain syndrome  Comments:  No change in medication at this time  Orders:  -     oxyCODONE-acetaminophen (PERCOCET)  MG per tablet; Take 1 tablet by mouth Every 8 (Eight) Hours As Needed for Moderate Pain .

## 2020-03-02 ENCOUNTER — APPOINTMENT (OUTPATIENT)
Dept: GENERAL RADIOLOGY | Facility: HOSPITAL | Age: 60
End: 2020-03-02

## 2020-03-02 ENCOUNTER — HOSPITAL ENCOUNTER (EMERGENCY)
Facility: HOSPITAL | Age: 60
Discharge: HOME | End: 2020-03-02
Attending: EMERGENCY MEDICINE | Admitting: EMERGENCY MEDICINE

## 2020-03-02 ENCOUNTER — NURSE TRIAGE (OUTPATIENT)
Dept: CALL CENTER | Facility: HOSPITAL | Age: 60
End: 2020-03-02

## 2020-03-02 VITALS
TEMPERATURE: 98 F | DIASTOLIC BLOOD PRESSURE: 74 MMHG | RESPIRATION RATE: 16 BRPM | HEART RATE: 73 BPM | HEIGHT: 70 IN | SYSTOLIC BLOOD PRESSURE: 116 MMHG | OXYGEN SATURATION: 93 % | BODY MASS INDEX: 38.51 KG/M2 | WEIGHT: 268.96 LBS

## 2020-03-02 DIAGNOSIS — R09.1 PLEURISY: ICD-10-CM

## 2020-03-02 DIAGNOSIS — R07.89 ATYPICAL CHEST PAIN: Primary | ICD-10-CM

## 2020-03-02 LAB
ALBUMIN SERPL-MCNC: 4.5 G/DL (ref 3.5–5.2)
ALBUMIN/GLOB SERPL: 1.5 G/DL
ALP SERPL-CCNC: 39 U/L (ref 39–117)
ALT SERPL W P-5'-P-CCNC: 39 U/L (ref 1–41)
ANION GAP SERPL CALCULATED.3IONS-SCNC: 13 MMOL/L (ref 5–15)
AST SERPL-CCNC: 29 U/L (ref 1–40)
BASOPHILS # BLD AUTO: 0.04 10*3/MM3 (ref 0–0.2)
BASOPHILS NFR BLD AUTO: 0.3 % (ref 0–1.5)
BILIRUB SERPL-MCNC: 0.3 MG/DL (ref 0.2–1.2)
BUN BLD-MCNC: 18 MG/DL (ref 8–23)
BUN/CREAT SERPL: 18.9 (ref 7–25)
CALCIUM SPEC-SCNC: 10 MG/DL (ref 8.6–10.5)
CHLORIDE SERPL-SCNC: 98 MMOL/L (ref 98–107)
CO2 SERPL-SCNC: 28 MMOL/L (ref 22–29)
CREAT BLD-MCNC: 0.95 MG/DL (ref 0.76–1.27)
DEPRECATED RDW RBC AUTO: 44.6 FL (ref 37–54)
EOSINOPHIL # BLD AUTO: 0.13 10*3/MM3 (ref 0–0.4)
EOSINOPHIL NFR BLD AUTO: 1.1 % (ref 0.3–6.2)
ERYTHROCYTE [DISTWIDTH] IN BLOOD BY AUTOMATED COUNT: 13.5 % (ref 12.3–15.4)
GFR SERPL CREATININE-BSD FRML MDRD: 80 ML/MIN/1.73
GLOBULIN UR ELPH-MCNC: 3 GM/DL
GLUCOSE BLD-MCNC: 269 MG/DL (ref 65–99)
HCT VFR BLD AUTO: 44.4 % (ref 37.5–51)
HGB BLD-MCNC: 15.1 G/DL (ref 13–17.7)
HOLD SPECIMEN: NORMAL
HOLD SPECIMEN: NORMAL
IMM GRANULOCYTES # BLD AUTO: 0.07 10*3/MM3 (ref 0–0.05)
IMM GRANULOCYTES NFR BLD AUTO: 0.6 % (ref 0–0.5)
LYMPHOCYTES # BLD AUTO: 3.38 10*3/MM3 (ref 0.7–3.1)
LYMPHOCYTES NFR BLD AUTO: 28 % (ref 19.6–45.3)
MCH RBC QN AUTO: 30.8 PG (ref 26.6–33)
MCHC RBC AUTO-ENTMCNC: 34 G/DL (ref 31.5–35.7)
MCV RBC AUTO: 90.6 FL (ref 79–97)
MONOCYTES # BLD AUTO: 1.03 10*3/MM3 (ref 0.1–0.9)
MONOCYTES NFR BLD AUTO: 8.5 % (ref 5–12)
NEUTROPHILS # BLD AUTO: 7.43 10*3/MM3 (ref 1.7–7)
NEUTROPHILS NFR BLD AUTO: 61.5 % (ref 42.7–76)
NRBC BLD AUTO-RTO: 0 /100 WBC (ref 0–0.2)
NT-PROBNP SERPL-MCNC: 30.7 PG/ML (ref 5–900)
PLATELET # BLD AUTO: 289 10*3/MM3 (ref 140–450)
PMV BLD AUTO: 10.5 FL (ref 6–12)
POTASSIUM BLD-SCNC: 3.9 MMOL/L (ref 3.5–5.2)
PROT SERPL-MCNC: 7.5 G/DL (ref 6–8.5)
RBC # BLD AUTO: 4.9 10*6/MM3 (ref 4.14–5.8)
SODIUM BLD-SCNC: 139 MMOL/L (ref 136–145)
TROPONIN T SERPL-MCNC: 0.02 NG/ML (ref 0–0.03)
WBC NRBC COR # BLD: 12.08 10*3/MM3 (ref 3.4–10.8)
WHOLE BLOOD HOLD SPECIMEN: NORMAL
WHOLE BLOOD HOLD SPECIMEN: NORMAL

## 2020-03-02 PROCEDURE — 99284 EMERGENCY DEPT VISIT MOD MDM: CPT

## 2020-03-02 PROCEDURE — 25010000002 KETOROLAC TROMETHAMINE PER 15 MG: Performed by: EMERGENCY MEDICINE

## 2020-03-02 PROCEDURE — 71046 X-RAY EXAM CHEST 2 VIEWS: CPT

## 2020-03-02 PROCEDURE — 93005 ELECTROCARDIOGRAM TRACING: CPT | Performed by: EMERGENCY MEDICINE

## 2020-03-02 PROCEDURE — 84484 ASSAY OF TROPONIN QUANT: CPT | Performed by: EMERGENCY MEDICINE

## 2020-03-02 PROCEDURE — 93005 ELECTROCARDIOGRAM TRACING: CPT

## 2020-03-02 PROCEDURE — 83880 ASSAY OF NATRIURETIC PEPTIDE: CPT | Performed by: EMERGENCY MEDICINE

## 2020-03-02 PROCEDURE — 80053 COMPREHEN METABOLIC PANEL: CPT | Performed by: EMERGENCY MEDICINE

## 2020-03-02 PROCEDURE — 96374 THER/PROPH/DIAG INJ IV PUSH: CPT

## 2020-03-02 PROCEDURE — 93010 ELECTROCARDIOGRAM REPORT: CPT | Performed by: INTERNAL MEDICINE

## 2020-03-02 PROCEDURE — 85025 COMPLETE CBC W/AUTO DIFF WBC: CPT | Performed by: EMERGENCY MEDICINE

## 2020-03-02 RX ORDER — METAXALONE 800 MG/1
800 TABLET ORAL 3 TIMES DAILY PRN
Qty: 15 TABLET | Refills: 0 | OUTPATIENT
Start: 2020-03-02 | End: 2020-12-18

## 2020-03-02 RX ORDER — SODIUM CHLORIDE 0.9 % (FLUSH) 0.9 %
10 SYRINGE (ML) INJECTION AS NEEDED
Status: DISCONTINUED | OUTPATIENT
Start: 2020-03-02 | End: 2020-03-02 | Stop reason: HOSPADM

## 2020-03-02 RX ORDER — KETOROLAC TROMETHAMINE 15 MG/ML
15 INJECTION, SOLUTION INTRAMUSCULAR; INTRAVENOUS ONCE
Status: COMPLETED | OUTPATIENT
Start: 2020-03-02 | End: 2020-03-02

## 2020-03-02 RX ORDER — METAXALONE 800 MG/1
800 TABLET ORAL ONCE
Status: COMPLETED | OUTPATIENT
Start: 2020-03-02 | End: 2020-03-02

## 2020-03-02 RX ORDER — IBUPROFEN 800 MG/1
800 TABLET ORAL EVERY 8 HOURS PRN
Qty: 15 TABLET | Refills: 0 | OUTPATIENT
Start: 2020-03-02

## 2020-03-02 RX ORDER — METAXALONE 800 MG/1
800 TABLET ORAL 3 TIMES DAILY PRN
Qty: 15 TABLET | Refills: 0 | OUTPATIENT
Start: 2020-03-02 | End: 2020-03-02 | Stop reason: SDUPTHER

## 2020-03-02 RX ORDER — IBUPROFEN 800 MG/1
800 TABLET ORAL EVERY 8 HOURS PRN
Qty: 15 TABLET | Refills: 0 | OUTPATIENT
Start: 2020-03-02 | End: 2020-03-02 | Stop reason: SDUPTHER

## 2020-03-02 RX ADMIN — KETOROLAC TROMETHAMINE 15 MG: 15 INJECTION, SOLUTION INTRAMUSCULAR; INTRAVENOUS at 16:23

## 2020-03-02 RX ADMIN — METAXALONE 800 MG: 800 TABLET ORAL at 16:20

## 2020-03-02 NOTE — ED TRIAGE NOTES
Pt states that he started having chest pain about three hours ago.  States it feels like a stabbing pain.   Denies any heart issues in the past.

## 2020-03-02 NOTE — ED PROVIDER NOTES
EMERGENCY DEPARTMENT ENCOUNTER    CHIEF COMPLAINT  Chief Complaint: Chest Pain  History given by: Patient  History limited by:   Room Number: 15/15  PMD: Grant Benitez MD      HPI:  Pt is a 60 y.o. male who presents complaining of chest pain that began today around 1200. He states that pain is in the L chest and describes the pain as a stabbing. Pain was sudden in onset and intermittent. The pain is pleuritic. Pt denies any SOA.       PAST MEDICAL HISTORY  Active Ambulatory Problems     Diagnosis Date Noted   • Chronic fatigue 02/12/2016   • Erectile dysfunction 02/12/2016   • Low testosterone 02/12/2016   • Morbid obesity due to excess calories (CMS/[facility]) 02/12/2016   • Vitamin D deficiency 02/12/2016   • Dyslipidemia 02/12/2016   • Shortness of breath 04/26/2016   • Benign essential HTN 04/26/2016   • Pityriasis versicolor 04/26/2016   • BC on CPAP 04/26/2016   • Umbilical hernia without obstruction or gangrene 04/26/2016   • Diabetes mellitus type 2, uncontrolled (CMS/[facility]) 06/04/2016   • Diabetic peripheral neuropathy (CMS/[facility]) 06/04/2016   • ED (erectile dysfunction) of non-organic origin 06/04/2016   • Essential hypertriglyceridemia 06/04/2016   • Osteoarthritis of left knee 12/15/2016   • Status post total knee replacement, left 07/06/2017   • Primary osteoarthritis of left knee 11/07/2017   • OA (osteoarthritis) of knee 12/20/2017   • Calcific Achilles tendonitis 04/30/2018   • Chronic heel pain, left 04/30/2018   • Tosin's deformity of left heel 04/30/2018   • Achilles tendon tear, left, initial encounter 05/16/2018   • Primary hypothyroidism 11/01/2018   • Chronic bilateral low back pain without sciatica 11/01/2018   • Chronic pain of left knee 12/07/2018     Resolved Ambulatory Problems     Diagnosis Date Noted   • Accelerated hypertension 02/12/2016   • Diabetes mellitus type 2, controlled, without complications (CMS/[facility]) 02/12/2016   • Acute bronchitis 02/12/2016   • 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      Past Medical History:   Diagnosis Date   • Anxiety    • Arthritis    • Dizziness    • Fatigue    • High cholesterol    • Hypertension    • Hypothyroidism    • Neuropathy    • Sleep apnea    • Type 2 diabetes mellitus (CMS/[facility])        PAST SURGICAL HISTORY  Past Surgical History:   Procedure Laterality Date   • ACHILLES TENDON REPAIR Left 2009   • ACHILLES TENDON SURGERY Left 5/22/2018    Procedure: Left Achilles debridement and repair, Achilles tendon lengthening at the calf, partial excision of calcaneus and tendon transfer from great toe to calcaneus;  Surgeon: Maycol Chawla MD;  Location: [facility] OR [facility] [place];  Service: Orthopedics   • EYE SURGERY Left     BASKETBALL INJURY YEARS AGO   • HERNIA REPAIR     • KNEE ARTHROSCOPY Left 7/26/2016    Procedure: KNEE ARTHROSCOPY, PARTIAL medial MENISCECTOMY;  Surgeon: Layton Anderson MD;  Location: [facility] OR [facility] [place];  Service:    • KNEE MINI REVISION Left 12/20/2017    Procedure: LEFT TOTAL KNEE ARTHROPLASTY poly change;  Surgeon: Eloy Fitzgerald MD;  Location: [facility] OR;  Service:    • KNEE SURGERY     • CA TOTAL KNEE ARTHROPLASTY Left 12/15/2016    Procedure: TOTAL KNEE ARTHROPLASTY;  Surgeon: Layton Anderson MD;  Location: [facility] OR;  Service: Orthopedics   • TOTAL KNEE ARTHROPLASTY REVISION Left 2/6/2019    Procedure: TOTAL KNEE ARTHROPLASTY REVISION;  Surgeon: Eloy Fitzgerald MD;  Location: [facility] OR;  Service: Orthopedics       FAMILY HISTORY  Family History   Problem Relation Age of Onset   • Diabetes Mother    • Hypertension Mother    • Heart disease Father    • Diabetes Brother    • Hypertension Brother    • Hypertension Paternal Uncle    • Malig Hyperthermia Neg Hx        SOCIAL HISTORY  Social History     Socioeconomic History   • Marital status:      Spouse name: Not on file   • Number of children: Not on file   • Years of education: Not on file   • Highest education level: Not on file   Tobacco Use   • Smoking status: Never Smoker   •  Smokeless tobacco: Never Used   Substance and Sexual Activity   • Alcohol use: Yes     Comment:  1-2 BEERS/ NIGHT    • Drug use: No   • Sexual activity: Defer       ALLERGIES  Codeine    REVIEW OF SYSTEMS  Review of Systems   Constitutional: Negative for activity change, appetite change and fever.   HENT: Negative for congestion and sore throat.    Eyes: Negative.    Respiratory: Negative for cough and shortness of breath.    Cardiovascular: Positive for chest pain. Negative for leg swelling.   Gastrointestinal: Negative for abdominal pain, diarrhea and vomiting.   Endocrine: Negative.    Genitourinary: Negative for decreased urine volume and dysuria.   Musculoskeletal: Negative for neck pain.   Skin: Negative for rash and wound.   Allergic/Immunologic: Negative.    Neurological: Negative for weakness, numbness and headaches.   Hematological: Negative.    Psychiatric/Behavioral: Negative.    All other systems reviewed and are negative.      PHYSICAL EXAM  ED Triage Vitals   Temp Heart Rate Resp BP SpO2   03/02/20 1438 03/02/20 1437 03/02/20 1437 -- 03/02/20 1437   98 °F (36.7 °C) 90 18  97 %      Temp src Heart Rate Source Patient Position BP Location FiO2 (%)   03/02/20 1438 03/02/20 1437 -- -- --   Tympanic Monitor          Physical Exam   Constitutional: He is oriented to person, place, and time. No distress.   HENT:   Head: Normocephalic and atraumatic.   Eyes: Pupils are equal, round, and reactive to light. EOM are normal.   Neck: Normal range of motion. Neck supple.   Cardiovascular: Normal rate, regular rhythm and normal heart sounds.   Pulmonary/Chest: Effort normal and breath sounds normal. No respiratory distress.   Abdominal: Soft. There is no tenderness. There is no rebound and no guarding.   Musculoskeletal: Normal range of motion. He exhibits no edema.   Neurological: He is alert and oriented to person, place, and time. He has normal sensation and normal strength.   Skin: Skin is warm and dry.    Psychiatric: Mood and affect normal.   Nursing note and vitals reviewed.      LAB RESULTS  Lab Results (last 24 hours)     Procedure Component Value Units Date/Time    BNP [[id number]]  (Normal) Collected:  03/02/20 1517    Specimen:  Blood Updated:  03/02/20 1633     proBNP 30.7 pg/mL     Narrative:       Among patients with dyspnea, NT-proBNP is highly sensitive for the detection of acute congestive heart failure. In addition NT-proBNP of <300 pg/ml effectively rules out acute congestive heart failure with 99% negative predictive value.    Results may be falsely decreased if patient taking Biotin.      CBC & Differential [[id number]] Collected:  03/02/20 1517    Specimen:  Blood Updated:  03/02/20 1614    Narrative:       The following orders were created for panel order CBC & Differential.  Procedure                               Abnormality         Status                     ---------                               -----------         ------                     CBC Auto Differential[[id number]]        Abnormal            Final result                 Please view results for these tests on the individual orders.    Comprehensive Metabolic Panel [[id number]]  (Abnormal) Collected:  03/02/20 1517    Specimen:  Blood Updated:  03/02/20 1640     Glucose 269 mg/dL      BUN 18 mg/dL      Creatinine 0.95 mg/dL      Sodium 139 mmol/L      Potassium 3.9 mmol/L      Chloride 98 mmol/L      CO2 28.0 mmol/L      Calcium 10.0 mg/dL      Total Protein 7.5 g/dL      Albumin 4.50 g/dL      ALT (SGPT) 39 U/L      AST (SGOT) 29 U/L      Alkaline Phosphatase 39 U/L      Total Bilirubin 0.3 mg/dL      eGFR Non African Amer 80 mL/min/1.73      Globulin 3.0 gm/dL      A/G Ratio 1.5 g/dL      BUN/Creatinine Ratio 18.9     Anion Gap 13.0 mmol/L     Narrative:       GFR Normal >60  Chronic Kidney Disease <60  Kidney Failure <15      Troponin [[id number]]  (Normal) Collected:  03/02/20 1517    Specimen:  Blood Updated:  03/02/20 1640      Troponin T 0.021 ng/mL     Narrative:       Troponin T Reference Range:  <= 0.03 ng/mL-   Negative for AMI  >0.03 ng/mL-     Abnormal for myocardial necrosis.  Clinicians would have to utilize clinical acumen, EKG, Troponin and serial changes to determine if it is an Acute Myocardial Infarction or myocardial injury due to an underlying chronic condition.       Results may be falsely decreased if patient taking Biotin.      CBC Auto Differential [[id number]]  (Abnormal) Collected:  03/02/20 1517    Specimen:  Blood Updated:  03/02/20 1614     WBC 12.08 10*3/mm3      RBC 4.90 10*6/mm3      Hemoglobin 15.1 g/dL      Hematocrit 44.4 %      MCV 90.6 fL      MCH 30.8 pg      MCHC 34.0 g/dL      RDW 13.5 %      RDW-SD 44.6 fl      MPV 10.5 fL      Platelets 289 10*3/mm3      Neutrophil % 61.5 %      Lymphocyte % 28.0 %      Monocyte % 8.5 %      Eosinophil % 1.1 %      Basophil % 0.3 %      Immature Grans % 0.6 %      Neutrophils, Absolute 7.43 10*3/mm3      Lymphocytes, Absolute 3.38 10*3/mm3      Monocytes, Absolute 1.03 10*3/mm3      Eosinophils, Absolute 0.13 10*3/mm3      Basophils, Absolute 0.04 10*3/mm3      Immature Grans, Absolute 0.07 10*3/mm3      nRBC 0.0 /100 WBC     Troponin [[id number]]  (Normal) Collected:  03/02/20 1517    Specimen:  Blood Updated:  03/02/20 1745     Troponin T 0.019 ng/mL     Narrative:       Troponin T Reference Range:  <= 0.03 ng/mL-   Negative for AMI  >0.03 ng/mL-     Abnormal for myocardial necrosis.  Clinicians would have to utilize clinical acumen, EKG, Troponin and serial changes to determine if it is an Acute Myocardial Infarction or myocardial injury due to an underlying chronic condition.       Results may be falsely decreased if patient taking Biotin.      Troponin [[id number]]  (Normal) Collected:  03/02/20 1753    Specimen:  Blood Updated:  03/02/20 1926     Troponin T 0.017 ng/mL     Narrative:       Troponin T Reference Range:  <= 0.03 ng/mL-   Negative for AMI  >0.03 ng/mL-      Abnormal for myocardial necrosis.  Clinicians would have to utilize clinical acumen, EKG, Troponin and serial changes to determine if it is an Acute Myocardial Infarction or myocardial injury due to an underlying chronic condition.       Results may be falsely decreased if patient taking Biotin.            I ordered the above labs and reviewed the results    RADIOLOGY  XR Chest 2 View   Final Result   No acute process.       This report was finalized on 3/2/2020 3:57 PM by Dr. Teo Orellana M.D.               I ordered the above noted radiological studies. Interpreted by radiologist. Reviewed by me in PACS.       PROCEDURES  Procedures  EKG          EKG time: 1442  Rhythm/Rate: NSR, 81BPM  P waves and KY: nml  QRS, axis: LAD  ST and T waves:  nml    Interpreted Contemporaneously by me, independently viewed  unchanged compared to prior 11/24/19      PROGRESS AND CONSULTS     5:21 PM  Rechecked with pt. He reports that he has had improvement of pain. Discussed with pt about his initial labs showing negative troponin and plan to get serial troponin based off risk factors. If negative will plan to discharge. Pt understands and agrees with plan. All concerns were addressed.    7:40 PM  Rechecked with pt. He states that he has had no reoccurance of pain. I informed of his unchanged troponin and plan to discharge. Pt understands and agrees with plan. All concerns were addressed.        MEDICAL DECISION MAKING  Results were reviewed/discussed with the patient and they were also made aware of online access. Pt also made aware that some labs, such as cultures, will not be resulted during ER visit and follow up with PMD is necessary.     MDM  Number of Diagnoses or Management Options  Atypical chest pain:      Amount and/or Complexity of Data Reviewed  Clinical lab tests: reviewed and ordered (Troponin negative)  Tests in the radiology section of CPT®: reviewed and ordered  Tests in the medicine section of CPT®:  reviewed and ordered (EKG - See EKG procedure note  )           DIAGNOSIS  Final diagnoses:   Atypical chest pain   Pleurisy       DISPOSITION  DISCHARGE    Patient discharged in stable condition.    Reviewed implications of results, diagnosis, meds, responsibility to follow up, warning signs and symptoms of possible worsening, potential complications and reasons to return to ER.    Patient/Family voiced understanding of above instructions.    Discussed plan for discharge, as there is no emergent indication for admission. Patient referred to primary care provider for BP management due to today's BP. Pt/family is agreeable and understands need for follow up and repeat testing.  Pt is aware that discharge does not mean that nothing is wrong but it indicates no emergency is present that requires admission and they must continue care with follow-up as given below or physician of their choice.     FOLLOW-UP  Grant Benitez MD  [address] [address]  [phone]    Call   For Primary Physician follow-up         Medication List      New Prescriptions    ibuprofen 800 MG tablet  Commonly known as:  ADVIL,MOTRIN  Take 1 tablet by mouth Every 8 (Eight) Hours As Needed for Mild Pain .     metaxalone 800 MG tablet  Commonly known as:  SKELAXIN  Take 1 tablet by mouth 3 (Three) Times a Day As Needed for Muscle Spasms.              Latest Documented Vital Signs:  As of 8:32 PM  BP- 116/74 HR- 73 Temp- 98 °F (36.7 °C) (Tympanic) O2 sat- 93%    --  Documentation assistance provided by lizy Ferrara for Dr Alcaraz.  Information recorded by the scribe was done at my direction and has been verified and validated by me.     Smith Ferrara  03/02/20 [id number]       Smith Ferrara  03/02/20 1943       Vic Alcaraz MD  03/02/20 2032

## 2020-03-02 NOTE — TELEPHONE ENCOUNTER
"Called from the [facility], patient has  Been having sharp chest pain, left side to arm for one hour, no nausea or diaphoresis, will be going to the ED    Reason for Disposition  • Taking a deep breath makes pain worse    Additional Information  • Negative: Severe difficulty breathing (e.g., struggling for each breath, speaks in single words)  • Negative: Difficult to awaken or acting confused (e.g., disoriented, slurred speech)  • Negative: Shock suspected (e.g., cold/pale/clammy skin, too weak to stand, low BP, rapid pulse)  • Negative: [1] Chest pain lasts > 5 minutes AND [2] history of heart disease  (i.e., heart attack, bypass surgery, angina, angioplasty, CHF; not just a heart murmur)  • Negative: [1] Chest pain lasts > 5 minutes AND [2] described as crushing, pressure-like, or heavy  • Negative: [1] Chest pain lasts > 5 minutes AND [2] age > 50  • Negative: [1] Chest pain lasts > 5 minutes AND [2] age > 30 AND [3] at least one cardiac risk factor (i.e., hypertension, diabetes, obesity, smoker or strong family history of heart disease)  • Negative: [1] Chest pain lasts > 5 minutes AND [2] not relieved with nitroglycerin  • Negative: Passed out (i.e., lost consciousness, collapsed and was not responding)  • Negative: Heart beating < 50 beats per minute OR > 140 beats per minute  • Negative: Visible sweat on face or sweat dripping down face  • Negative: Sounds like a life-threatening emergency to the triager  • Negative: Followed a chest injury  • Negative: SEVERE chest pain  • Negative: [1] Intermittent  chest pain or \"angina\" AND [2] increasing in severity or frequency  (Exception: pains lasting a few seconds)  • Negative: Pain also present in shoulder(s) or arm(s) or jaw  (Exception: pain is clearly made worse by movement)  • Negative: Difficulty breathing  • Negative: Dizziness or lightheadedness  • Negative: Coughing up blood  • Negative: Cocaine use within last 3 days  • Negative: History of prior \"blood clot\" in " "leg or lungs (i.e., deep vein thrombosis, pulmonary embolism)  • Negative: Recent illness requiring prolonged bedrest (i.e., immobilization)  • Negative: Hip or leg fracture in past 2 months (e.g., had cast on leg or ankle)  • Negative: Major surgery in the past month  • Negative: Recent long-distance travel with prolonged time in car, bus, plane, or train (i.e., within past 2 weeks; 6 or  more hours duration)  • Negative: Chest pain lasts > 5 minutes (Exceptions: chest pain occurring > 3 days ago and now asymptomatic; same as previously diagnosed heartburn and has accompanying sour taste in mouth)    Answer Assessment - Initial Assessment Questions  1. LOCATION: \"Where does it hurt?\"      Sharp chest pain underneath breast bone , left side, hurts with movement  And deep breathing  2. RADIATION: \"Does the pain go anywhere else?\" (e.g., into neck, jaw, arms, back)      Under arm  3. ONSET: \"When did the chest pain begin?\" (Minutes, hours or days)       2 hours  4. PATTERN \"Does the pain come and go, or has it been constant since it started?\"  \"Does it get worse with exertion?\"       Goes and comes with activity  5. DURATION: \"How long does it last\" (e.g., seconds, minutes, hours)      2 hours  6. SEVERITY: \"How bad is the pain?\"  (e.g., Scale 1-10; mild, moderate, or severe)     - MILD (1-3): doesn't interfere with normal activities      - MODERATE (4-7): interferes with normal activities or awakens from sleep     - SEVERE (8-10): excruciating pain, unable to do any normal activities        8  7. CARDIAC RISK FACTORS: \"Do you have any history of heart problems or risk factors for heart disease?\" (e.g., prior heart attack, angina; high blood pressure, diabetes, being overweight, high cholesterol, smoking, or strong family history of heart disease)      no  8. PULMONARY RISK FACTORS: \"Do you have any history of lung disease?\"  (e.g., blood clots in lung, asthma, emphysema, birth control pills)      no  9. CAUSE: \"What " "do you think is causing the chest pain?\"      unsure  10. OTHER SYMPTOMS: \"Do you have any other symptoms?\" (e.g., dizziness, nausea, vomiting, sweating, fever, difficulty breathing, cough)        no  11. PREGNANCY: \"Is there any chance you are pregnant?\" \"When was your last menstrual period?\"      na    Protocols used: CHEST PAIN-ADULT-AH      "

## 2020-03-10 ENCOUNTER — CONSULT (OUTPATIENT)
Dept: ORTHOPEDIC SURGERY | Facility: CLINIC | Age: 60
End: 2020-03-10

## 2020-03-10 VITALS — BODY MASS INDEX: 38.94 KG/M2 | TEMPERATURE: 97.2 F | HEIGHT: 70 IN | WEIGHT: 272 LBS

## 2020-03-10 DIAGNOSIS — M75.100 TEAR OF ROTATOR CUFF, UNSPECIFIED LATERALITY, UNSPECIFIED TEAR EXTENT, UNSPECIFIED WHETHER TRAUMATIC: ICD-10-CM

## 2020-03-10 DIAGNOSIS — M25.512 CHRONIC PAIN OF BOTH SHOULDERS: Primary | ICD-10-CM

## 2020-03-10 DIAGNOSIS — G89.29 CHRONIC PAIN OF BOTH SHOULDERS: Primary | ICD-10-CM

## 2020-03-10 DIAGNOSIS — M25.511 CHRONIC PAIN OF BOTH SHOULDERS: Primary | ICD-10-CM

## 2020-03-10 PROCEDURE — 20605 DRAIN/INJ JOINT/BURSA W/O US: CPT | Performed by: ORTHOPAEDIC SURGERY

## 2020-03-10 PROCEDURE — 99214 OFFICE O/P EST MOD 30 MIN: CPT | Performed by: ORTHOPAEDIC SURGERY

## 2020-03-10 PROCEDURE — 73030 X-RAY EXAM OF SHOULDER: CPT | Performed by: ORTHOPAEDIC SURGERY

## 2020-03-10 RX ADMIN — METHYLPREDNISOLONE ACETATE 80 MG: 80 INJECTION, SUSPENSION INTRA-ARTICULAR; INTRALESIONAL; INTRAMUSCULAR; SOFT TISSUE at 15:18

## 2020-03-10 RX ADMIN — LIDOCAINE HYDROCHLORIDE 2 ML: 20 INJECTION, SOLUTION EPIDURAL; INFILTRATION; INTRACAUDAL; PERINEURAL at 15:18

## 2020-03-10 NOTE — PROGRESS NOTES
"   New Bilateral Shoulder      Patient: Aaron Latif        YOB: 1960    Medical Record Number: [id number]        Chief Complaints: bilateral shoulder pain      History of Present Illness: This is a 60-year-old male who presents complaining of bilateral shoulder pain right is equal to left he is right-hand dominant he states he seen Dr. Anderson in the past couple years ago for left shoulder which he said was more of a frozen shoulder and is bothered him since that time.  Is also had a total knee by Dr. Fitzgerald he states he has no night pain because he takes a sleeping pill he is currently not working his symptoms are severe intermittent aching worse with activity better with ice past medical history smart for sleep apnea and diabetes      Allergies:   Allergies   Allergen Reactions   • Codeine Other (See Comments)     UNKNOWN       Medications:   Home Medications:  Current Outpatient Medications on File Prior to Visit   Medication Sig   • amLODIPine (NORVASC) 10 MG tablet TAKE ONE TABLET BY MOUTH DAILY   • atorvastatin (LIPITOR) 20 MG tablet Take 1 tablet by mouth Daily.   • BD HYPODERMIC NEEDLE 18G X 1\" misc USE WEEKLY WITH TESTOSTERONE INJECTION   • Blood Glucose Monitoring Suppl (ACCU-CHEK GUIDE ME) w/Device kit 1 kit Daily.   • DULoxetine (CYMBALTA) 60 MG capsule TAKE ONE CAPSULE BY MOUTH DAILY   • FARXIGA 10 MG tablet Take 10 mg by mouth Every Morning.   • fenofibrate micronized (ANTARA) 130 MG capsule Take 1 capsule by mouth Every Morning Before Breakfast.   • hydroCHLOROthiazide (HYDRODIURIL) 25 MG tablet TAKE ONE TABLET BY MOUTH EVERY MORNING   • ibuprofen (ADVIL,MOTRIN) 800 MG tablet Take 1 tablet by mouth Every 8 (Eight) Hours As Needed for Mild Pain .   • Insulin Pen Needle 31G X 8 MM misc Use once a day   • JANUMET XR  MG tablet Take 2 tablets by mouth Every Evening.   • Lancets (ONETOUCH ULTRASOFT) lancets Check blood glucose twice daily   • levothyroxine (SYNTHROID) 75 MCG tablet " "Take 1 tablet by mouth Daily.   • metaxalone (SKELAXIN) 800 MG tablet Take 1 tablet by mouth 3 (Three) Times a Day As Needed for Muscle Spasms.   • metoprolol succinate XL (TOPROL-XL) 50 MG 24 hr tablet TAKE ONE TABLET BY MOUTH DAILY   • Needle, Disp, (BD DISP NEEDLE) 23G X 1\" misc USE WEEKLY FOR INSULIN INJECTION   • omega-3 acid ethyl esters (LOVAZA) 1 g capsule Take 2 capsules by mouth 2 (Two) Times a Day.   • ONETOUCH VERIO test strip Blood glucose twice daily use as instructed   • oxyCODONE-acetaminophen (PERCOCET)  MG per tablet Take 1 tablet by mouth Every 8 (Eight) Hours As Needed for Moderate Pain .   • pioglitazone (ACTOS) 30 MG tablet Take 1 tablet by mouth Daily.   • pregabalin (LYRICA) 200 MG capsule Take 1 capsule by mouth 3 (Three) Times a Day.   • QUEtiapine (SEROquel) 25 MG tablet TAKE ONE TABLET BY MOUTH ONCE NIGHTLY   • Syringe 23G X 1\" 3 ML misc Use weekly for testosterone injection   • tadalafil (CIALIS) 5 MG tablet Take 1 tablet by mouth Daily As Needed for Erectile Dysfunction.   • Testosterone Cypionate (DEPOTESTOTERONE CYPIONATE) 200 MG/ML injection Inject 1.5 mL into the appropriate muscle as directed by prescriber 1 (One) Time Per Week.   • TRESIBA FLEXTOUCH 200 UNIT/ML solution pen-injector pen injection 175 units every morning   • vitamin D (ERGOCALCIFEROL) 1.25 MG (64440 UT) capsule capsule TAKE TWO CAPSULE BY MOUTH ONCE WEEKLY     No current facility-administered medications on file prior to visit.      Current Medications:  Scheduled Meds:  Continuous Infusions:  No current facility-administered medications for this visit.   PRN Meds:.    Past Medical History:   Diagnosis Date   • Anxiety    • Arthritis    • Diabetic peripheral neuropathy (CMS/HCC)     TYPE 2   • Dizziness    • Erectile dysfunction    • Fatigue    • High cholesterol    • Hypertension    • Hypothyroidism    • Low testosterone    • Neuropathy    • Sleep apnea     WEARS CPAP   • Type 2 diabetes mellitus (CMS/HCC)  "   • Vitamin D deficiency         Past Surgical History:   Procedure Laterality Date   • ACHILLES TENDON REPAIR Left 2009   • ACHILLES TENDON SURGERY Left 5/22/2018    Procedure: Left Achilles debridement and repair, Achilles tendon lengthening at the calf, partial excision of calcaneus and tendon transfer from great toe to calcaneus;  Surgeon: Maycol Chawla MD;  Location: [facility];  Service: Orthopedics   • EYE SURGERY Left     BASKETBALL INJURY YEARS AGO   • HERNIA REPAIR     • KNEE ARTHROSCOPY Left 7/26/2016    Procedure: KNEE ARTHROSCOPY, PARTIAL medial MENISCECTOMY;  Surgeon: Layton Anderson MD;  Location: [facility] OR [facility];  Service:    • KNEE MINI REVISION Left 12/20/2017    Procedure: LEFT TOTAL KNEE ARTHROPLASTY poly change;  Surgeon: Eloy Fitzgerald MD;  Location: [facility];  Service:    • KNEE SURGERY     • TN TOTAL KNEE ARTHROPLASTY Left 12/15/2016    Procedure: TOTAL KNEE ARTHROPLASTY;  Surgeon: Layton Anderson MD;  Location: [facility];  Service: Orthopedics   • TOTAL KNEE ARTHROPLASTY REVISION Left 2/6/2019    Procedure: TOTAL KNEE ARTHROPLASTY REVISION;  Surgeon: Eloy Fitzgerald MD;  Location: [facility];  Service: Orthopedics        Social History     Occupational History   • Not on file   Tobacco Use   • Smoking status: Never Smoker   • Smokeless tobacco: Never Used   Substance and Sexual Activity   • Alcohol use: Yes     Comment:  1-2 BEERS/ NIGHT    • Drug use: No   • Sexual activity: Defer      Social History     Social History Narrative   • Not on file        Family History   Problem Relation Age of Onset   • Diabetes Mother    • Hypertension Mother    • Heart disease Father    • Diabetes Brother    • Hypertension Brother    • Hypertension Paternal Uncle    • Malig Hyperthermia Neg Hx              Review of Systems: 14 point review of systems are remarkable for pertinent positives listed in the chart by the patient the remainder negative    Review of Systems      Physical  "Exam: 60 y.o. male  General Appearance:    Alert, cooperative, in no acute distress                   Vitals:    03/10/20 1504   Temp: 97.2 °F (36.2 °C)   TempSrc: Temporal   Weight: 123 kg (272 lb)   Height: 177.8 cm (70\")   PainSc: 0-No pain   PainLoc: Shoulder      Patient is alert and read ×3 no acute distress appears her above-listed at height weight and age.  Affect is normal respiratory rate is normal unlabored. Heart rate regular rate rhythm, sclera, dentition and hearing are normal for the purpose of this exam.    Ortho Exam  Physical exam of the right shoulder reveals no overlying skin changes no lymphedema no lymphadenopathy.  Patient has active flexion 180 with mild symptoms abduction is similar external rotation is to 50 and internal rotation to the upper lumbar spine with mild symptoms.  Patient has good rotator cuff strength 4+ over 5 with isometric strength testing with pain.  Patient has a positive impingement and a positive Lugo sign.  Patient has good cervical range of motion which is full and asymptomatic no radicular symptoms.  Patient has a normal elbow exam.  Good distal pulses are present  Patient has pain with overhead activity and a positive Neer sign and a positive empty can sign , a positive drop arm and a definitive painful arc    Physical exam of the left shoulder reveals no overlying skin changes no lymphedema no lymphadenopathy.  Patient has active flexion 180 with mild symptoms abduction is similar external rotation is to 50 and internal rotation to the upper lumbar spine with mild symptoms.  Patient has good rotator cuff strength 4+ over 5 with isometric strength testing with pain.  Patient has a positive impingement and a positive Lugo sign.  Patient has good cervical range of motion which is full and asymptomatic no radicular symptoms.  Patient has a normal elbow exam.  Good distal pulses are presentPatient has pain with overhead activity and a positive Neer sign and a " positive empty can sign  They have a positive drop arm any definitive painful arc    Medium Joint Arthrocentesis: R acromioclavicular  Date/Time: 3/10/2020 3:18 PM  Consent given by: patient  Site marked: site marked  Timeout: Immediately prior to procedure a time out was called to verify the correct patient, procedure, equipment, support staff and site/side marked as required   Supporting Documentation  Indications: pain   Procedure Details  Location: shoulder - R acromioclavicular  Needle gauge: 21.  Approach: superior  Medications administered: 80 mg methylPREDNISolone acetate 80 MG/ML; 2 mL lidocaine PF 2% 2 %  Patient tolerance: patient tolerated the procedure well with no immediate complications                Radiology:   AP, Scapular Y and Axillary Lateral of the right and left shoulder were ordered/reviewed to evauate shoulder pain.  I do have comparative films of the left one he does have some acromioclavicular arthritis bilaterally which is very symmetric otherwise no acute bony pathology  Imaging Results (Most Recent)     Procedure Component Value Units Date/Time    XR Shoulder 2+ View Bilateral [[id number]] Resulted:  03/10/20 1456     Updated:  03/10/20 1501    Impression:       Ordering physician's impression is located in the Encounter Note dated 03/10/20. X-ray performed in the DR room.          Assessment/Plan: Bilateral shoulder pain I think this is rotator cuff in some origin plan is to proceed with an injection as he is on insulin therefore we will do one shoulder he would like to do the right and then we can stagger the second 1 x 1 to 2 weeks depending on his blood sugars  Cortisone Injection. See procedure note.  Cortisone Injection for DIAGNOSTIC and THERAPUTIC purposes.

## 2020-03-13 RX ORDER — LIDOCAINE HYDROCHLORIDE 20 MG/ML
2 INJECTION, SOLUTION EPIDURAL; INFILTRATION; INTRACAUDAL; PERINEURAL
Status: COMPLETED | OUTPATIENT
Start: 2020-03-10 | End: 2020-03-10

## 2020-03-13 RX ORDER — METHYLPREDNISOLONE ACETATE 80 MG/ML
80 INJECTION, SUSPENSION INTRA-ARTICULAR; INTRALESIONAL; INTRAMUSCULAR; SOFT TISSUE
Status: COMPLETED | OUTPATIENT
Start: 2020-03-10 | End: 2020-03-10

## 2020-03-16 DIAGNOSIS — G89.4 CHRONIC PAIN SYNDROME: ICD-10-CM

## 2020-03-16 RX ORDER — DULOXETIN HYDROCHLORIDE 60 MG/1
CAPSULE, DELAYED RELEASE ORAL
Qty: 30 CAPSULE | Refills: 0 | Status: SHIPPED | OUTPATIENT
Start: 2020-03-16 | End: 2020-03-17

## 2020-03-16 RX ORDER — OXYCODONE AND ACETAMINOPHEN 10; 325 MG/1; MG/1
1 TABLET ORAL EVERY 8 HOURS PRN
Qty: 90 TABLET | Refills: 0 | Status: SHIPPED | OUTPATIENT
Start: 2020-03-16 | End: 2020-04-14 | Stop reason: SDUPTHER

## 2020-03-16 NOTE — TELEPHONE ENCOUNTER
PT CALLED FOR MED REFILL ON      oxyCODONE-acetaminophen (PERCOCET)  MG per tablet      [facility] [place], [place] - [address] AT [facility] [address] - [phone]  - [phone]   [phone]    PT CALL BACK  [phone]

## 2020-03-17 ENCOUNTER — CLINICAL SUPPORT (OUTPATIENT)
Dept: ORTHOPEDIC SURGERY | Facility: CLINIC | Age: 60
End: 2020-03-17

## 2020-03-17 VITALS — BODY MASS INDEX: 38.94 KG/M2 | HEIGHT: 70 IN | TEMPERATURE: 97.1 F | WEIGHT: 272 LBS

## 2020-03-17 DIAGNOSIS — M75.42 IMPINGEMENT SYNDROME OF LEFT SHOULDER: Primary | ICD-10-CM

## 2020-03-17 PROCEDURE — 20610 DRAIN/INJ JOINT/BURSA W/O US: CPT | Performed by: ORTHOPAEDIC SURGERY

## 2020-03-17 PROCEDURE — 20605 DRAIN/INJ JOINT/BURSA W/O US: CPT | Performed by: ORTHOPAEDIC SURGERY

## 2020-03-17 RX ORDER — INSULIN DEGLUDEC 200 U/ML
INJECTION, SOLUTION SUBCUTANEOUS
Qty: 27 PEN | Refills: 4 | Status: SHIPPED | OUTPATIENT
Start: 2020-03-17 | End: 2020-12-17

## 2020-03-17 RX ORDER — DULOXETIN HYDROCHLORIDE 60 MG/1
CAPSULE, DELAYED RELEASE ORAL
Qty: 30 CAPSULE | Refills: 0 | Status: SHIPPED | OUTPATIENT
Start: 2020-03-17 | End: 2020-05-18

## 2020-03-17 RX ADMIN — METHYLPREDNISOLONE ACETATE 80 MG: 80 INJECTION, SUSPENSION INTRA-ARTICULAR; INTRALESIONAL; INTRAMUSCULAR; SOFT TISSUE at 14:07

## 2020-03-17 NOTE — PROGRESS NOTES
"  Patient: Aaron Latif  YOB: 1960  Date of Service: 3/17/2020    Chief Complaints: Left shoulder pain    Subjective:    History of Present Illness: Pt is seen in the office today with complaints of left shoulder pain I saw him a week ago with bilateral shoulder he is a diabetic so I injected only the right he states the right one feels great and his sugars have stayed very stable.  He is here for injection of the left.          Allergies:   Allergies   Allergen Reactions   • Codeine Other (See Comments)     UNKNOWN       Medications:   Home Medications:  Current Outpatient Medications on File Prior to Visit   Medication Sig   • amLODIPine (NORVASC) 10 MG tablet TAKE ONE TABLET BY MOUTH DAILY   • atorvastatin (LIPITOR) 20 MG tablet Take 1 tablet by mouth Daily.   • BD HYPODERMIC NEEDLE 18G X 1\" misc USE WEEKLY WITH TESTOSTERONE INJECTION   • Blood Glucose Monitoring Suppl (ACCU-CHEK GUIDE ME) w/Device kit 1 kit Daily.   • DULoxetine (CYMBALTA) 60 MG capsule TAKE ONE CAPSULE BY MOUTH DAILY   • FARXIGA 10 MG tablet Take 10 mg by mouth Every Morning.   • fenofibrate micronized (ANTARA) 130 MG capsule Take 1 capsule by mouth Every Morning Before Breakfast.   • hydroCHLOROthiazide (HYDRODIURIL) 25 MG tablet TAKE ONE TABLET BY MOUTH EVERY MORNING   • ibuprofen (ADVIL,MOTRIN) 800 MG tablet Take 1 tablet by mouth Every 8 (Eight) Hours As Needed for Mild Pain .   • Insulin Pen Needle 31G X 8 MM misc Use once a day   • JANUMET XR  MG tablet Take 2 tablets by mouth Every Evening.   • Lancets (ONETOUCH ULTRASOFT) lancets Check blood glucose twice daily   • levothyroxine (SYNTHROID) 75 MCG tablet Take 1 tablet by mouth Daily.   • metaxalone (SKELAXIN) 800 MG tablet Take 1 tablet by mouth 3 (Three) Times a Day As Needed for Muscle Spasms.   • metoprolol succinate XL (TOPROL-XL) 50 MG 24 hr tablet TAKE ONE TABLET BY MOUTH DAILY   • Needle, Disp, (BD DISP NEEDLE) 23G X 1\" misc USE WEEKLY FOR INSULIN INJECTION " "  • omega-3 acid ethyl esters (LOVAZA) 1 g capsule Take 2 capsules by mouth 2 (Two) Times a Day.   • ONETOUCH VERIO test strip Blood glucose twice daily use as instructed   • oxyCODONE-acetaminophen (PERCOCET)  MG per tablet Take 1 tablet by mouth Every 8 (Eight) Hours As Needed for Moderate Pain .   • pioglitazone (ACTOS) 30 MG tablet Take 1 tablet by mouth Daily.   • pregabalin (LYRICA) 200 MG capsule Take 1 capsule by mouth 3 (Three) Times a Day.   • QUEtiapine (SEROquel) 25 MG tablet TAKE ONE TABLET BY MOUTH ONCE NIGHTLY   • Syringe 23G X 1\" 3 ML misc Use weekly for testosterone injection   • tadalafil (CIALIS) 5 MG tablet Take 1 tablet by mouth Daily As Needed for Erectile Dysfunction.   • Testosterone Cypionate (DEPOTESTOTERONE CYPIONATE) 200 MG/ML injection Inject 1.5 mL into the appropriate muscle as directed by prescriber 1 (One) Time Per Week.   • vitamin D (ERGOCALCIFEROL) 1.25 MG (68690 UT) capsule capsule TAKE TWO CAPSULE BY MOUTH ONCE WEEKLY   • [DISCONTINUED] DULoxetine (CYMBALTA) 60 MG capsule TAKE ONE CAPSULE BY MOUTH DAILY   • [DISCONTINUED] TRESIBA FLEXTOUCH 200 UNIT/ML solution pen-injector pen injection 175 units every morning     No current facility-administered medications on file prior to visit.      Current Medications:  Scheduled Meds:  Continuous Infusions:  No current facility-administered medications for this visit.   PRN Meds:.    I have reviewed the patient's medical history in detail and updated the computerized patient record.  Review and summarization of old records include:    Past Medical History:   Diagnosis Date   • Anxiety    • Arthritis    • Diabetic peripheral neuropathy (CMS/HCC)     TYPE 2   • Dizziness    • Erectile dysfunction    • Fatigue    • High cholesterol    • Hypertension    • Hypothyroidism    • Low testosterone    • Neuropathy    • Sleep apnea     WEARS CPAP   • Type 2 diabetes mellitus (CMS/HCC)    • Vitamin D deficiency         Past Surgical History: "   Procedure Laterality Date   • ACHILLES TENDON REPAIR Left 2009   • ACHILLES TENDON SURGERY Left 5/22/2018    Procedure: Left Achilles debridement and repair, Achilles tendon lengthening at the calf, partial excision of calcaneus and tendon transfer from great toe to calcaneus;  Surgeon: Maycol Chawla MD;  Location: [facility];  Service: Orthopedics   • EYE SURGERY Left     BASKETBALL INJURY YEARS AGO   • HERNIA REPAIR     • KNEE ARTHROSCOPY Left 7/26/2016    Procedure: KNEE ARTHROSCOPY, PARTIAL medial MENISCECTOMY;  Surgeon: Layton Anderson MD;  Location: [facility];  Service:    • KNEE MINI REVISION Left 12/20/2017    Procedure: LEFT TOTAL KNEE ARTHROPLASTY poly change;  Surgeon: Eloy Fitzgerald MD;  Location: [facility];  Service:    • KNEE SURGERY     • MA TOTAL KNEE ARTHROPLASTY Left 12/15/2016    Procedure: TOTAL KNEE ARTHROPLASTY;  Surgeon: Layton Anderson MD;  Location: [facility];  Service: Orthopedics   • TOTAL KNEE ARTHROPLASTY REVISION Left 2/6/2019    Procedure: TOTAL KNEE ARTHROPLASTY REVISION;  Surgeon: Eloy Fitzgerald MD;  Location: [facility];  Service: Orthopedics        Social History     Occupational History   • Not on file   Tobacco Use   • Smoking status: Never Smoker   • Smokeless tobacco: Never Used   Substance and Sexual Activity   • Alcohol use: Yes     Comment:  1-2 BEERS/ NIGHT    • Drug use: No   • Sexual activity: Defer    Social History     Social History Narrative   • Not on file        Family History   Problem Relation Age of Onset   • Diabetes Mother    • Hypertension Mother    • Heart disease Father    • Diabetes Brother    • Hypertension Brother    • Hypertension Paternal Uncle    • Malig Hyperthermia Neg Hx        ROS: 14 point review of systems was performed and was negative except for documented findings in HPI and today's encounter.     Allergies:   Allergies   Allergen Reactions   • Codeine Other (See Comments)     UNKNOWN     Constitutional:   Denies fever, shaking or chills   Eyes:  Denies change in visual acuity   HENT:  Denies nasal congestion or sore throat   Respiratory:  Denies cough or shortness of breath   Cardiovascular:  Denies chest pain or severe LE edema   GI:  Denies abdominal pain, nausea, vomiting, bloody stools or diarrhea   Musculoskeletal:  Numbness, tingling, or loss of motor function only as noted above in history of present illness.  : Denies painful urination or hematuria  Integument:  Denies rash, lesion or ulceration   Neurologic:  Denies headache or focal weakness  Endocrine:  Denies lymphadenopathy  Psych:  Denies confusion or change in mental status   Hem:  Denies active bleeding      Physical Exam: 60 y.o. male  Wt Readings from Last 3 Encounters:   03/10/20 123 kg (272 lb)   03/02/20 122 kg (268 lb 15.4 oz)   02/18/20 122 kg (270 lb)       There is no height or weight on file to calculate BMI.  No height and weight on file for this encounter.  There were no vitals filed for this visit.  Vital signs reviewed.   General Appearance:    Alert, cooperative, in no acute distress                  Eyes: conjunctiva clear  ENT: external ears and nose atraumatic  CV: no peripheral edema  Resp: normal respiratory effort  Skin: no rashes or wounds; normal turgor  Psych: mood and affect appropriate  Lymph: no nodes appreciated  Neuro: gross sensation intact  Vascular:  Palpable peripheral pulse in noted extremity    Ortho exam        Exam is unchanged         Assessment: Left shoulder impingement    Plan: Injection  Follow up as indicated.  Ice, elevate, and rest as needed.  Ashia Kimball M.D.        Large Joint Arthrocentesis: L glenohumeral  Date/Time: 3/17/2020 2:07 PM  Consent given by: patient  Site marked: site marked  Timeout: Immediately prior to procedure a time out was called to verify the correct patient, procedure, equipment, support staff and site/side marked as required   Supporting Documentation  Indications: pain    Procedure Details  Location: shoulder - L glenohumeral  Preparation: Patient was prepped and draped in the usual sterile fashion  Needle size: 22 G  Approach: posterior  Medications administered: 80 mg methylPREDNISolone acetate 80 MG/ML; 4 mL lidocaine (cardiac)  Patient tolerance: patient tolerated the procedure well with no immediate complications    Large Joint Arthrocentesis  Date/Time: 3/17/2020 2:07 PM  Consent given by: patient  Site marked: site marked  Timeout: Immediately prior to procedure a time out was called to verify the correct patient, procedure, equipment, support staff and site/side marked as required   Supporting Documentation  Indications: pain   Procedure Details  Location: shoulder - Shoulder joint: L AC Joint.  Preparation: Patient was prepped and draped in the usual sterile fashion  Needle size: 22 G  Approach: posterior  Medications administered: 4 mL lidocaine (cardiac); 80 mg methylPREDNISolone acetate 80 MG/ML  Patient tolerance: patient tolerated the procedure well with no immediate complications

## 2020-03-19 RX ORDER — METHYLPREDNISOLONE ACETATE 80 MG/ML
80 INJECTION, SUSPENSION INTRA-ARTICULAR; INTRALESIONAL; INTRAMUSCULAR; SOFT TISSUE
Status: COMPLETED | OUTPATIENT
Start: 2020-03-17 | End: 2020-03-17

## 2020-03-20 ENCOUNTER — OFFICE VISIT (OUTPATIENT)
Dept: ORTHOPEDIC SURGERY | Facility: CLINIC | Age: 60
End: 2020-03-20

## 2020-03-20 VITALS — HEIGHT: 70 IN | TEMPERATURE: 97.7 F | BODY MASS INDEX: 38.08 KG/M2 | WEIGHT: 266 LBS

## 2020-03-20 DIAGNOSIS — Z96.652 HISTORY OF TOTAL KNEE ARTHROPLASTY, LEFT: Primary | ICD-10-CM

## 2020-03-20 PROCEDURE — 73562 X-RAY EXAM OF KNEE 3: CPT | Performed by: ORTHOPAEDIC SURGERY

## 2020-03-20 PROCEDURE — 99212 OFFICE O/P EST SF 10 MIN: CPT | Performed by: ORTHOPAEDIC SURGERY

## 2020-03-20 NOTE — PROGRESS NOTES
"Patient: Aaron Latif  YOB: 1960 60 y.o. male  Medical Record Number: [id number]    Chief Complaints:   Chief Complaint   Patient presents with   • Left Knee - Follow-up       History of Present Illness:Aaron Latif is a 60 y.o. male who presents for follow-up of left knee revision he is now 1 year out he feels much better minimal pain is getting around quite well.  Has occasional popping or clicking but it is not necessarily painful.    Allergies:   Allergies   Allergen Reactions   • Codeine Other (See Comments)     UNKNOWN       Medications:   Current Outpatient Medications   Medication Sig Dispense Refill   • amLODIPine (NORVASC) 10 MG tablet TAKE ONE TABLET BY MOUTH DAILY 30 tablet 4   • atorvastatin (LIPITOR) 20 MG tablet Take 1 tablet by mouth Daily. 90 tablet 3   • BD HYPODERMIC NEEDLE 18G X 1\" misc USE WEEKLY WITH TESTOSTERONE INJECTION 25 each 0   • Blood Glucose Monitoring Suppl (ACCU-CHEK GUIDE ME) w/Device kit 1 kit Daily. 1 kit 1   • DULoxetine (CYMBALTA) 60 MG capsule TAKE ONE CAPSULE BY MOUTH DAILY 30 capsule 0   • FARXIGA 10 MG tablet Take 10 mg by mouth Every Morning. 90 tablet 3   • fenofibrate micronized (ANTARA) 130 MG capsule Take 1 capsule by mouth Every Morning Before Breakfast. 90 capsule 3   • hydroCHLOROthiazide (HYDRODIURIL) 25 MG tablet TAKE ONE TABLET BY MOUTH EVERY MORNING 90 tablet 1   • ibuprofen (ADVIL,MOTRIN) 800 MG tablet Take 1 tablet by mouth Every 8 (Eight) Hours As Needed for Mild Pain . 15 tablet 0   • Insulin Pen Needle 31G X 8 MM misc Use once a day 100 each 3   • JANUMET XR  MG tablet Take 2 tablets by mouth Every Evening. 180 tablet 3   • Lancets (ONETOUCH ULTRASOFT) lancets Check blood glucose twice daily 60 each 11   • levothyroxine (SYNTHROID) 75 MCG tablet Take 1 tablet by mouth Daily. 90 tablet 3   • metaxalone (SKELAXIN) 800 MG tablet Take 1 tablet by mouth 3 (Three) Times a Day As Needed for Muscle Spasms. 15 tablet 0   • metoprolol " "succinate XL (TOPROL-XL) 50 MG 24 hr tablet TAKE ONE TABLET BY MOUTH DAILY 90 tablet 0   • Needle, Disp, (BD DISP NEEDLE) 23G X 1\" misc USE WEEKLY FOR INSULIN INJECTION 10 each 2   • omega-3 acid ethyl esters (LOVAZA) 1 g capsule Take 2 capsules by mouth 2 (Two) Times a Day. 360 capsule 3   • ONETOUCH VERIO test strip Blood glucose twice daily use as instructed 60 each 12   • oxyCODONE-acetaminophen (PERCOCET)  MG per tablet Take 1 tablet by mouth Every 8 (Eight) Hours As Needed for Moderate Pain . 90 tablet 0   • pioglitazone (ACTOS) 30 MG tablet Take 1 tablet by mouth Daily. 90 tablet 3   • pregabalin (LYRICA) 200 MG capsule Take 1 capsule by mouth 3 (Three) Times a Day. 6 capsule 0   • QUEtiapine (SEROquel) 25 MG tablet TAKE ONE TABLET BY MOUTH ONCE NIGHTLY 90 tablet 0   • Syringe 23G X 1\" 3 ML misc Use weekly for testosterone injection 10 each 3   • tadalafil (CIALIS) 5 MG tablet Take 1 tablet by mouth Daily As Needed for Erectile Dysfunction. 30 tablet 5   • Testosterone Cypionate (DEPOTESTOTERONE CYPIONATE) 200 MG/ML injection Inject 1.5 mL into the appropriate muscle as directed by prescriber 1 (One) Time Per Week. 6 mL 5   • TRESIBA FLEXTOUCH 200 UNIT/ML solution pen-injector pen injection INJECT 175 UNITS ONCE EVERY MORNING. 27 pen 4   • vitamin D (ERGOCALCIFEROL) 1.25 MG (15820 UT) capsule capsule TAKE TWO CAPSULE BY MOUTH ONCE WEEKLY 26 capsule 2     No current facility-administered medications for this visit.          The following portions of the patient's history were reviewed and updated as appropriate: allergies, current medications, past family history, past medical history, past social history, past surgical history and problem list.    Review of Systems:   A 14 point review of systems was performed. All systems negative except pertinent positives/negative listed in HPI above    Physical Exam:   Vitals:    03/20/20 1257   Temp: 97.7 °F (36.5 °C)   Weight: 121 kg (266 lb)   Height: 177.8 cm " "(70\")   PainSc: 0-No pain       General: A and O x 3, ASA, NAD    SCLERA:    Normal    DENTITION:   Normal  Knee:  left    ALIGNMENT:     Neutral  ,   Patella  tracks  Midline without crepitance    GAIT:    Nonantalgic    SKIN:    Well healed midline incision, no erythema or fluctuance    RANGE OF MOTION:   0  -  120   DEG    STRENGTH:   5  / 5    LIGAMENTS:    No varus / valgus instability.   No  Flexion   instability.       DISTAL PULSES:    Paplable    DISTAL SENSATION :   Intact    LYMPHATICS:     No   lymphadenopathy    OTHER:     No   Effusion      Calf soft / nontender ,   Negative Zaid's sign            Radiology:  Xrays 3views left knee (ap,lateral, sunrise) were ordered and reviewed for evaluation of knee pain demonstratinga well positioned knee replacement without evidence of wear, loosening or osteolysis  todays xrays were compared to previous xrays and demonstrate no change    Assessment/Plan:  Left TKA revision - doing well RTO 1 year, weight mgt, quad strengthening exercises.  "

## 2020-04-09 RX ORDER — AMLODIPINE BESYLATE 10 MG/1
TABLET ORAL
Qty: 90 TABLET | Refills: 3 | Status: SHIPPED | OUTPATIENT
Start: 2020-04-09 | End: 2021-04-05 | Stop reason: SDUPTHER

## 2020-04-14 DIAGNOSIS — G89.4 CHRONIC PAIN SYNDROME: ICD-10-CM

## 2020-04-14 NOTE — TELEPHONE ENCOUNTER
PT CALLED REQUESTING A REFILL FOR oxyCODONE-acetaminophen (PERCOCET)  MG per tablet    [facility] CONFIRMED     PT CALL BACK   [phone]

## 2020-04-15 RX ORDER — OXYCODONE AND ACETAMINOPHEN 10; 325 MG/1; MG/1
1 TABLET ORAL EVERY 8 HOURS PRN
Qty: 90 TABLET | Refills: 0 | Status: SHIPPED | OUTPATIENT
Start: 2020-04-15 | End: 2020-05-15 | Stop reason: SDUPTHER

## 2020-05-05 RX ORDER — METOPROLOL SUCCINATE 50 MG/1
TABLET, EXTENDED RELEASE ORAL
Qty: 90 TABLET | Refills: 0 | Status: SHIPPED | OUTPATIENT
Start: 2020-05-05 | End: 2020-08-03

## 2020-05-07 RX ORDER — QUETIAPINE FUMARATE 25 MG/1
TABLET, FILM COATED ORAL
Qty: 90 TABLET | Refills: 0 | Status: SHIPPED | OUTPATIENT
Start: 2020-05-07 | End: 2020-07-13

## 2020-05-13 ENCOUNTER — RESULTS ENCOUNTER (OUTPATIENT)
Dept: ENDOCRINOLOGY | Age: 60
End: 2020-05-13

## 2020-05-13 DIAGNOSIS — R79.89 LOW TESTOSTERONE: ICD-10-CM

## 2020-05-13 DIAGNOSIS — E66.01 MORBID OBESITY DUE TO EXCESS CALORIES (HCC): ICD-10-CM

## 2020-05-13 DIAGNOSIS — I10 BENIGN ESSENTIAL HTN: ICD-10-CM

## 2020-05-13 DIAGNOSIS — R53.82 CHRONIC FATIGUE: ICD-10-CM

## 2020-05-13 DIAGNOSIS — E78.1 ESSENTIAL HYPERTRIGLYCERIDEMIA: ICD-10-CM

## 2020-05-13 DIAGNOSIS — E55.9 VITAMIN D DEFICIENCY: ICD-10-CM

## 2020-05-13 DIAGNOSIS — N52.9 ERECTILE DYSFUNCTION, UNSPECIFIED ERECTILE DYSFUNCTION TYPE: ICD-10-CM

## 2020-05-13 DIAGNOSIS — E78.5 DYSLIPIDEMIA: ICD-10-CM

## 2020-05-13 DIAGNOSIS — E11.65 UNCONTROLLED TYPE 2 DIABETES MELLITUS WITH HYPERGLYCEMIA (HCC): ICD-10-CM

## 2020-05-13 DIAGNOSIS — N52.8 OTHER MALE ERECTILE DYSFUNCTION: ICD-10-CM

## 2020-05-13 DIAGNOSIS — E03.9 PRIMARY HYPOTHYROIDISM: ICD-10-CM

## 2020-05-15 DIAGNOSIS — G89.4 CHRONIC PAIN SYNDROME: ICD-10-CM

## 2020-05-15 NOTE — TELEPHONE ENCOUNTER
Patient called in requesting a re-fill for oxyCODONE-acetaminophen (Percocet)  MG per tablet    [facility] [address] [facility]    Patient call back [phone]

## 2020-05-16 DIAGNOSIS — G89.4 CHRONIC PAIN SYNDROME: Primary | ICD-10-CM

## 2020-05-18 RX ORDER — DULOXETIN HYDROCHLORIDE 60 MG/1
CAPSULE, DELAYED RELEASE ORAL
Qty: 30 CAPSULE | Refills: 0 | Status: SHIPPED | OUTPATIENT
Start: 2020-05-18 | End: 2020-06-15

## 2020-05-20 RX ORDER — OXYCODONE AND ACETAMINOPHEN 10; 325 MG/1; MG/1
1 TABLET ORAL EVERY 8 HOURS PRN
Qty: 90 TABLET | Refills: 0 | Status: SHIPPED | OUTPATIENT
Start: 2020-05-20 | End: 2020-06-17 | Stop reason: SDUPTHER

## 2020-05-20 RX ORDER — PREGABALIN 200 MG/1
CAPSULE ORAL
Qty: 90 CAPSULE | Refills: 4 | Status: SHIPPED | OUTPATIENT
Start: 2020-05-20 | End: 2020-10-19 | Stop reason: SDUPTHER

## 2020-05-27 ENCOUNTER — TELEPHONE (OUTPATIENT)
Dept: ORTHOPEDIC SURGERY | Facility: CLINIC | Age: 60
End: 2020-05-27

## 2020-05-27 NOTE — TELEPHONE ENCOUNTER
Spoke to patient & rescheduled him from next Thurs 6/04 to see ABHISHEK tomorrow 5/28 at [place] at 0830. Patient has EP date / time / address & check-in number to call upon arrival.

## 2020-05-27 NOTE — TELEPHONE ENCOUNTER
FYI: Patient saw ABHISHEK 3/17 for LEFT Shldr & LEFT AC Joint - patient stated he was told to call if not doing any better. Patient does not have any improvement & scheduled a FU with ABHISHEK next Thurs 6/04 at [place] to discuss options. Patient can be reached at [phone]. Thanks /srh

## 2020-05-28 ENCOUNTER — OFFICE VISIT (OUTPATIENT)
Dept: ORTHOPEDIC SURGERY | Facility: CLINIC | Age: 60
End: 2020-05-28

## 2020-05-28 VITALS — WEIGHT: 274 LBS | HEIGHT: 70 IN | TEMPERATURE: 97.3 F | BODY MASS INDEX: 39.22 KG/M2

## 2020-05-28 DIAGNOSIS — M75.102 TEAR OF LEFT ROTATOR CUFF, UNSPECIFIED TEAR EXTENT, UNSPECIFIED WHETHER TRAUMATIC: Primary | ICD-10-CM

## 2020-05-28 DIAGNOSIS — IMO0002 BURSITIS/TENDONITIS, SHOULDER: ICD-10-CM

## 2020-05-28 PROCEDURE — 99212 OFFICE O/P EST SF 10 MIN: CPT | Performed by: ORTHOPAEDIC SURGERY

## 2020-05-28 PROCEDURE — 20610 DRAIN/INJ JOINT/BURSA W/O US: CPT | Performed by: ORTHOPAEDIC SURGERY

## 2020-05-28 RX ORDER — METHYLPREDNISOLONE ACETATE 80 MG/ML
80 INJECTION, SUSPENSION INTRA-ARTICULAR; INTRALESIONAL; INTRAMUSCULAR; SOFT TISSUE
Status: COMPLETED | OUTPATIENT
Start: 2020-05-28 | End: 2020-05-28

## 2020-05-28 RX ADMIN — METHYLPREDNISOLONE ACETATE 80 MG: 80 INJECTION, SUSPENSION INTRA-ARTICULAR; INTRALESIONAL; INTRAMUSCULAR; SOFT TISSUE at 08:43

## 2020-05-28 NOTE — PROGRESS NOTES
Left Shoulder Follow Up      Patient: Aaron Latif        YOB: 1960            Chief Complaints: Left Shoulder pain, right shoulder pain      History of Present Illness: Patient is here follow-up bilateral shoulder pain the right was doing quite well left one is really brought him before he cannot lift it at all.  He feels like he is in a have to change jobs he did get relief from last injection but it was only temporary      Physical Exam: 60 y.o. male  General Appearance:    Alert, cooperative, in no acute distress                 There were no vitals filed for this visit.     Patient is alert and read ×3 no acute distress appears her above-listed at height weight and age.  Affect is normal respiratory rate is normal unlabored. Heart rate regular rate rhythm, sclera, dentition and hearing are normal for the purpose of this exam.      Ortho Exam      Physical exam of the right shoulder reveals no overlying skin changes no lymphedema no lymphadenopathy.  Patient has active flexion 180 with mild symptoms abduction is similar external rotation is to 50 and internal rotation to the upper lumbar spine with mild symptoms.  Patient has good rotator cuff strength 4+ over 5 with isometric strength testing with pain.  Patient has a positive impingement and a positive Lugo sign.  Patient has good cervical range of motion which is full and asymptomatic no radicular symptoms.  Patient has a normal elbow exam.  Good distal pulses are present  Patient has pain with overhead activity and a positive Neer sign and a positive empty can sign , a positive drop arm and a definitive painful arc    Left shoulder can only active flex about 130 with pain passively and get him to 180 rotator cuff strength is 4 to 4+/5 with Iceman strength testing his pain is limiting his function    Previous x-rays show some acromioclavicular arthritis    Assessment/Plan:    Right shoulder pain which is doing better left shoulder pain  which is doing worse I would like to try 1 more subacromial injection the last we did was and acromial clavicular injection he has no symptoms in that area.  If he fails to improve with this I would pursue an MRI      Large Joint Arthrocentesis: L subacromial bursa  Date/Time: 5/28/2020 8:43 AM  Consent given by: patient  Site marked: site marked  Timeout: Immediately prior to procedure a time out was called to verify the correct patient, procedure, equipment, support staff and site/side marked as required   Supporting Documentation  Indications: pain   Procedure Details  Location: shoulder - L subacromial bursa  Preparation: Patient was prepped and draped in the usual sterile fashion  Needle size: 22 G  Approach: posterior  Medications administered: 4 mL lidocaine (cardiac); 80 mg methylPREDNISolone acetate 80 MG/ML  Patient tolerance: patient tolerated the procedure well with no immediate complications

## 2020-06-15 RX ORDER — DULOXETIN HYDROCHLORIDE 60 MG/1
CAPSULE, DELAYED RELEASE ORAL
Qty: 30 CAPSULE | Refills: 0 | Status: SHIPPED | OUTPATIENT
Start: 2020-06-15 | End: 2020-07-13

## 2020-06-17 DIAGNOSIS — G89.4 CHRONIC PAIN SYNDROME: ICD-10-CM

## 2020-06-17 RX ORDER — OXYCODONE AND ACETAMINOPHEN 10; 325 MG/1; MG/1
1 TABLET ORAL EVERY 8 HOURS PRN
Qty: 90 TABLET | Refills: 0 | Status: SHIPPED | OUTPATIENT
Start: 2020-06-17 | End: 2020-07-16 | Stop reason: SDUPTHER

## 2020-06-17 NOTE — TELEPHONE ENCOUNTER
PATIENT REQUESTED A REFILL ON:   oxyCODONE-acetaminophen (Percocet)  MG per tablet         PATIENT CAN BE REACHED ON:[phone]    PHARMACY PREFERRED:[facility] - [place] [address] [address] AT [facility] [address] - [phone]  - [phone] FX

## 2020-06-19 ENCOUNTER — TELEPHONE (OUTPATIENT)
Dept: INTERNAL MEDICINE | Facility: CLINIC | Age: 60
End: 2020-06-19

## 2020-07-13 RX ORDER — DULOXETIN HYDROCHLORIDE 60 MG/1
CAPSULE, DELAYED RELEASE ORAL
Qty: 30 CAPSULE | Refills: 0 | Status: SHIPPED | OUTPATIENT
Start: 2020-07-13 | End: 2020-08-12

## 2020-07-13 RX ORDER — QUETIAPINE FUMARATE 25 MG/1
TABLET, FILM COATED ORAL
Qty: 90 TABLET | Refills: 0 | Status: SHIPPED | OUTPATIENT
Start: 2020-07-13 | End: 2020-10-05

## 2020-07-13 RX ORDER — LEVOTHYROXINE SODIUM 0.07 MG/1
TABLET ORAL
Qty: 90 TABLET | Refills: 0 | Status: SHIPPED | OUTPATIENT
Start: 2020-07-13 | End: 2020-10-19

## 2020-07-16 DIAGNOSIS — G89.4 CHRONIC PAIN SYNDROME: ICD-10-CM

## 2020-07-16 RX ORDER — OXYCODONE AND ACETAMINOPHEN 10; 325 MG/1; MG/1
1 TABLET ORAL EVERY 8 HOURS PRN
Qty: 90 TABLET | Refills: 0 | Status: SHIPPED | OUTPATIENT
Start: 2020-07-16 | End: 2020-08-17 | Stop reason: SDUPTHER

## 2020-07-16 NOTE — TELEPHONE ENCOUNTER
Caller: Aaron Latif    Relationship: Self    Best call back number:[phone]    Medication needed:   Requested Prescriptions     Pending Prescriptions Disp Refills   • oxyCODONE-acetaminophen (Percocet)  MG per tablet 90 tablet 0     Sig: Take 1 tablet by mouth Every 8 (Eight) Hours As Needed for Moderate Pain .     What is the patient's preferred pharmacy: [facility] [address] [place] [address] [address] AT [facility] [address] - [phone]  - [phone] FX

## 2020-07-21 RX ORDER — HYDROCHLOROTHIAZIDE 25 MG/1
TABLET ORAL
Qty: 90 TABLET | Refills: 0 | Status: SHIPPED | OUTPATIENT
Start: 2020-07-21 | End: 2020-11-02

## 2020-08-03 RX ORDER — METOPROLOL SUCCINATE 50 MG/1
TABLET, EXTENDED RELEASE ORAL
Qty: 90 TABLET | Refills: 0 | Status: SHIPPED | OUTPATIENT
Start: 2020-08-03 | End: 2020-11-02

## 2020-08-12 RX ORDER — DULOXETIN HYDROCHLORIDE 60 MG/1
CAPSULE, DELAYED RELEASE ORAL
Qty: 30 CAPSULE | Refills: 0 | Status: SHIPPED | OUTPATIENT
Start: 2020-08-12 | End: 2020-09-08

## 2020-08-14 ENCOUNTER — APPOINTMENT (OUTPATIENT)
Dept: GENERAL RADIOLOGY | Facility: HOSPITAL | Age: 60
End: 2020-08-14

## 2020-08-14 ENCOUNTER — HOSPITAL ENCOUNTER (EMERGENCY)
Facility: HOSPITAL | Age: 60
Discharge: HOME | End: 2020-08-14
Attending: EMERGENCY MEDICINE | Admitting: EMERGENCY MEDICINE

## 2020-08-14 VITALS
SYSTOLIC BLOOD PRESSURE: 121 MMHG | WEIGHT: 270 LBS | BODY MASS INDEX: 38.65 KG/M2 | OXYGEN SATURATION: 95 % | DIASTOLIC BLOOD PRESSURE: 86 MMHG | HEART RATE: 76 BPM | HEIGHT: 70 IN | RESPIRATION RATE: 18 BRPM | TEMPERATURE: 97.3 F

## 2020-08-14 DIAGNOSIS — R07.89 LEFT-SIDED CHEST WALL PAIN: Primary | ICD-10-CM

## 2020-08-14 LAB
ALBUMIN SERPL-MCNC: 4.2 G/DL (ref 3.5–5.2)
ALBUMIN/GLOB SERPL: 1.4 G/DL
ALP SERPL-CCNC: 34 U/L (ref 39–117)
ALT SERPL W P-5'-P-CCNC: 56 U/L (ref 1–41)
ANION GAP SERPL CALCULATED.3IONS-SCNC: 13.8 MMOL/L (ref 5–15)
AST SERPL-CCNC: 36 U/L (ref 1–40)
BASOPHILS # BLD AUTO: 0.05 10*3/MM3 (ref 0–0.2)
BASOPHILS NFR BLD AUTO: 0.6 % (ref 0–1.5)
BILIRUB SERPL-MCNC: 0.4 MG/DL (ref 0–1.2)
BUN SERPL-MCNC: 17 MG/DL (ref 8–23)
BUN/CREAT SERPL: 18.5 (ref 7–25)
CALCIUM SPEC-SCNC: 9.7 MG/DL (ref 8.6–10.5)
CHLORIDE SERPL-SCNC: 95 MMOL/L (ref 98–107)
CO2 SERPL-SCNC: 29.2 MMOL/L (ref 22–29)
CREAT SERPL-MCNC: 0.92 MG/DL (ref 0.76–1.27)
DEPRECATED RDW RBC AUTO: 45.4 FL (ref 37–54)
EOSINOPHIL # BLD AUTO: 0.13 10*3/MM3 (ref 0–0.4)
EOSINOPHIL NFR BLD AUTO: 1.6 % (ref 0.3–6.2)
ERYTHROCYTE [DISTWIDTH] IN BLOOD BY AUTOMATED COUNT: 14 % (ref 12.3–15.4)
GFR SERPL CREATININE-BSD FRML MDRD: 83 ML/MIN/1.73
GLOBULIN UR ELPH-MCNC: 2.9 GM/DL
GLUCOSE SERPL-MCNC: 166 MG/DL (ref 65–99)
HCT VFR BLD AUTO: 42 % (ref 37.5–51)
HGB BLD-MCNC: 14.4 G/DL (ref 13–17.7)
HOLD SPECIMEN: NORMAL
HOLD SPECIMEN: NORMAL
IMM GRANULOCYTES # BLD AUTO: 0.04 10*3/MM3 (ref 0–0.05)
IMM GRANULOCYTES NFR BLD AUTO: 0.5 % (ref 0–0.5)
INR PPP: 0.96 (ref 0.9–1.1)
LIPASE SERPL-CCNC: 27 U/L (ref 13–60)
LYMPHOCYTES # BLD AUTO: 3.09 10*3/MM3 (ref 0.7–3.1)
LYMPHOCYTES NFR BLD AUTO: 37.1 % (ref 19.6–45.3)
MCH RBC QN AUTO: 30.9 PG (ref 26.6–33)
MCHC RBC AUTO-ENTMCNC: 34.3 G/DL (ref 31.5–35.7)
MCV RBC AUTO: 90.1 FL (ref 79–97)
MONOCYTES # BLD AUTO: 0.74 10*3/MM3 (ref 0.1–0.9)
MONOCYTES NFR BLD AUTO: 8.9 % (ref 5–12)
NEUTROPHILS NFR BLD AUTO: 4.29 10*3/MM3 (ref 1.7–7)
NEUTROPHILS NFR BLD AUTO: 51.3 % (ref 42.7–76)
NRBC BLD AUTO-RTO: 0 /100 WBC (ref 0–0.2)
PLATELET # BLD AUTO: 253 10*3/MM3 (ref 140–450)
PMV BLD AUTO: 10.4 FL (ref 6–12)
POTASSIUM SERPL-SCNC: 3.8 MMOL/L (ref 3.5–5.2)
PROT SERPL-MCNC: 7.1 G/DL (ref 6–8.5)
PROTHROMBIN TIME: 12.7 SECONDS (ref 11.7–14.2)
RBC # BLD AUTO: 4.66 10*6/MM3 (ref 4.14–5.8)
SODIUM SERPL-SCNC: 138 MMOL/L (ref 136–145)
TROPONIN T SERPL-MCNC: 0.02 NG/ML (ref 0–0.03)
TROPONIN T SERPL-MCNC: 0.02 NG/ML (ref 0–0.03)
WBC # BLD AUTO: 8.34 10*3/MM3 (ref 3.4–10.8)
WHOLE BLOOD HOLD SPECIMEN: NORMAL
WHOLE BLOOD HOLD SPECIMEN: NORMAL

## 2020-08-14 PROCEDURE — 96375 TX/PRO/DX INJ NEW DRUG ADDON: CPT

## 2020-08-14 PROCEDURE — 83690 ASSAY OF LIPASE: CPT | Performed by: EMERGENCY MEDICINE

## 2020-08-14 PROCEDURE — 93005 ELECTROCARDIOGRAM TRACING: CPT | Performed by: EMERGENCY MEDICINE

## 2020-08-14 PROCEDURE — 85025 COMPLETE CBC W/AUTO DIFF WBC: CPT | Performed by: EMERGENCY MEDICINE

## 2020-08-14 PROCEDURE — 71045 X-RAY EXAM CHEST 1 VIEW: CPT

## 2020-08-14 PROCEDURE — 80053 COMPREHEN METABOLIC PANEL: CPT | Performed by: EMERGENCY MEDICINE

## 2020-08-14 PROCEDURE — 85610 PROTHROMBIN TIME: CPT | Performed by: EMERGENCY MEDICINE

## 2020-08-14 PROCEDURE — 25010000002 LORAZEPAM PER 2 MG: Performed by: EMERGENCY MEDICINE

## 2020-08-14 PROCEDURE — 99284 EMERGENCY DEPT VISIT MOD MDM: CPT

## 2020-08-14 PROCEDURE — 96374 THER/PROPH/DIAG INJ IV PUSH: CPT

## 2020-08-14 PROCEDURE — 25010000002 MORPHINE PER 10 MG: Performed by: EMERGENCY MEDICINE

## 2020-08-14 PROCEDURE — 93005 ELECTROCARDIOGRAM TRACING: CPT

## 2020-08-14 PROCEDURE — 93010 ELECTROCARDIOGRAM REPORT: CPT | Performed by: INTERNAL MEDICINE

## 2020-08-14 PROCEDURE — 84484 ASSAY OF TROPONIN QUANT: CPT | Performed by: EMERGENCY MEDICINE

## 2020-08-14 RX ORDER — SODIUM CHLORIDE 0.9 % (FLUSH) 0.9 %
10 SYRINGE (ML) INJECTION AS NEEDED
Status: DISCONTINUED | OUTPATIENT
Start: 2020-08-14 | End: 2020-08-14 | Stop reason: HOSPADM

## 2020-08-14 RX ORDER — METHOCARBAMOL 500 MG/1
500 TABLET, FILM COATED ORAL 4 TIMES DAILY PRN
Qty: 15 TABLET | Refills: 0 | Status: SHIPPED | OUTPATIENT
Start: 2020-08-14 | End: 2020-12-18

## 2020-08-14 RX ORDER — MORPHINE SULFATE 2 MG/ML
4 INJECTION, SOLUTION INTRAMUSCULAR; INTRAVENOUS ONCE
Status: COMPLETED | OUTPATIENT
Start: 2020-08-14 | End: 2020-08-14

## 2020-08-14 RX ORDER — LORAZEPAM 2 MG/ML
1 INJECTION INTRAMUSCULAR ONCE
Status: COMPLETED | OUTPATIENT
Start: 2020-08-14 | End: 2020-08-14

## 2020-08-14 RX ADMIN — MORPHINE SULFATE 4 MG: 2 INJECTION, SOLUTION INTRAMUSCULAR; INTRAVENOUS at 12:15

## 2020-08-14 RX ADMIN — LORAZEPAM 1 MG: 2 INJECTION INTRAMUSCULAR; INTRAVENOUS at 12:15

## 2020-08-14 NOTE — ED PROVIDER NOTES
EMERGENCY DEPARTMENT ENCOUNTER    Room Number:  23/23  Date of encounter:  8/14/2020  PCP: Grant Benitez MD  Historian: Patient    Patient was placed in face mask during triage process. Patient was wearing facemask when I entered the room and throughout our encounter. I wore full protective equipment throughout this patient encounter including a face mask, eye protection, and gloves. Hand hygiene was performed before donning protective equipment and again following doffing of PPE after leaving the room.    HPI:  Chief Complaint: Chest pain  A complete HPI/ROS/PMH/PSH/SH/FH are unobtainable due to: N/A   Context: Aaron Latif is a 60 y.o. obese male with a history of diabetes and peripheral neuropathy who presents to the ED c/o intermittent recurrent sharp, stabbing pain in the left side of his chest under his breast.  Worse with palpation, deep breaths and movement of his left upper extremity.  No associated shortness of breath though it does hurt more when he takes deep breath.  No recent cough or hemoptysis.  No history of similar.  No known trauma.  In between the episodes of discomfort which are happening every 30 to 60 seconds, there is some soreness.  This began abruptly 1 hour prior to arrival while the patient was shopping at [employer].  Moderately severe discomfort with intermittent sharp pains which are very brief in nature.  Aspirin prior to arrival via EMS.  [place] well this morning.  Denies nausea, vomiting, cough, fever, black or bloody stools, dysuria, hematuria, rash.    MEDICAL HISTORY REVIEW  No prior coronary artery disease reported    PAST MEDICAL HISTORY  Active Ambulatory Problems     Diagnosis Date Noted   • Chronic fatigue 02/12/2016   • Erectile dysfunction 02/12/2016   • Low testosterone 02/12/2016   • Morbid obesity due to excess calories (CMS/[facility]) 02/12/2016   • Vitamin D deficiency 02/12/2016   • Dyslipidemia 02/12/2016   • Shortness of breath 04/26/2016   • Benign essential HTN  04/26/2016   • Pityriasis versicolor 04/26/2016   • BC on CPAP 04/26/2016   • Umbilical hernia without obstruction or gangrene 04/26/2016   • Diabetes mellitus type 2, uncontrolled (CMS/[facility]) 06/04/2016   • Diabetic peripheral neuropathy (CMS/[facility]) 06/04/2016   • ED (erectile dysfunction) of non-organic origin 06/04/2016   • Essential hypertriglyceridemia 06/04/2016   • Osteoarthritis of left knee 12/15/2016   • Status post total knee replacement, left 07/06/2017   • Primary osteoarthritis of left knee 11/07/2017   • OA (osteoarthritis) of knee 12/20/2017   • Calcific Achilles tendonitis 04/30/2018   • Chronic heel pain, left 04/30/2018   • Tosin's deformity of left heel 04/30/2018   • Achilles tendon tear, left, initial encounter 05/16/2018   • Primary hypothyroidism 11/01/2018   • Chronic bilateral low back pain without sciatica 11/01/2018   • Chronic pain of left knee 12/07/2018     Resolved Ambulatory Problems     Diagnosis Date Noted   • Accelerated hypertension 02/12/2016   • Diabetes mellitus type 2, controlled, without complications (CMS/[facility]) 02/12/2016   • Acute bronchitis 02/12/2016   • 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     Past Medical History:   Diagnosis Date   • Anxiety    • Arthritis    • Dizziness    • Fatigue    • High cholesterol    • Hypertension    • Hypothyroidism    • Neuropathy    • Sleep apnea    • Type 2 diabetes mellitus (CMS/[facility])          PAST SURGICAL HISTORY  Past Surgical History:   Procedure Laterality Date   • ACHILLES TENDON REPAIR Left 2009   • ACHILLES TENDON SURGERY Left 5/22/2018    Procedure: Left Achilles debridement and repair, Achilles tendon lengthening at the calf, partial excision of calcaneus and tendon transfer from great toe to calcaneus;  Surgeon: Maycol Chawla MD;  Location: [facility];  Service: Orthopedics   • EYE SURGERY Left     BASKETBALL INJURY YEARS AGO   • HERNIA REPAIR     • KNEE ARTHROSCOPY Left 7/26/2016    Procedure: KNEE ARTHROSCOPY, PARTIAL medial  MENISCECTOMY;  Surgeon: Layton Anderson MD;  Location: [facility] OR OSC;  Service:    • KNEE MINI REVISION Left 12/20/2017    Procedure: LEFT TOTAL KNEE ARTHROPLASTY poly change;  Surgeon: Eloy Fitzgerald MD;  Location: [facility] MAIN OR;  Service:    • KNEE SURGERY     • MS TOTAL KNEE ARTHROPLASTY Left 12/15/2016    Procedure: TOTAL KNEE ARTHROPLASTY;  Surgeon: Layton Anderson MD;  Location: [facility] MAIN OR;  Service: Orthopedics   • TOTAL KNEE ARTHROPLASTY REVISION Left 2/6/2019    Procedure: TOTAL KNEE ARTHROPLASTY REVISION;  Surgeon: Eloy Fitzgerald MD;  Location: [facility] MAIN OR;  Service: Orthopedics         FAMILY HISTORY  Family History   Problem Relation Age of Onset   • Diabetes Mother    • Hypertension Mother    • Heart disease Father    • Diabetes Brother    • Hypertension Brother    • Hypertension Paternal Uncle    • Malig Hyperthermia Neg Hx          SOCIAL HISTORY  Social History     Socioeconomic History   • Marital status:      Spouse name: Not on file   • Number of children: Not on file   • Years of education: Not on file   • Highest education level: Not on file   Tobacco Use   • Smoking status: Never Smoker   • Smokeless tobacco: Never Used   Substance and Sexual Activity   • Alcohol use: Yes     Comment:  1-2 BEERS/ NIGHT    • Drug use: No   • Sexual activity: Defer         ALLERGIES  Codeine        REVIEW OF SYSTEMS  Review of Systems     All systems reviewed and negative except for those discussed in HPI.       PHYSICAL EXAM    I have reviewed the triage vital signs and nursing notes.    ED Triage Vitals [08/14/20 1113]   Temp Heart Rate Resp BP SpO2   97.3 °F (36.3 °C) 78 14 130/67 94 %      Temp src Heart Rate Source Patient Position BP Location FiO2 (%)   Oral -- -- -- --       Physical Exam    Physical Exam   Constitutional: No distress.  Nontoxic  HENT:  Head: Normocephalic and atraumatic.   Oropharynx: Mucous membranes are moist.   Eyes: No scleral icterus. No conjunctival pallor.  Neck:  Painless range of motion noted. Neck supple.   Cardiovascular: Normal rate, regular rhythm and intact distal pulses.  Pulmonary/Chest: No respiratory distress. There are no wheezes, no rhonchi, and no rales.   No rash noted of the chest.  Atraumatic external exam of the chest.  There is soft tissue discomfort with palpation of the left chest with no crepitus, erythema, rash, induration or flail segment appreciated under the left breast.  Abdominal: Protuberant but soft. There is no tenderness. There is no rebound and no guarding.   Musculoskeletal: Moves all extremities equally. There is no pedal edema or calf tenderness.   Neurological: Alert.  Baseline strength and sensation noted.   Skin: Skin is pink, warm, and dry. No pallor.   Psychiatric: Mood and affect normal.   Nursing note and vitals reviewed.    LAB RESULTS  Recent Results (from the past 24 hour(s))   Light Blue Top    Collection Time: 08/14/20 11:42 AM   Result Value Ref Range    Extra Tube hold for add-on    Green Top (Gel)    Collection Time: 08/14/20 11:42 AM   Result Value Ref Range    Extra Tube Hold for add-ons.    Lavender Top    Collection Time: 08/14/20 11:42 AM   Result Value Ref Range    Extra Tube hold for add-on    Gold Top - SST    Collection Time: 08/14/20 11:42 AM   Result Value Ref Range    Extra Tube Hold for add-ons.    Comprehensive Metabolic Panel    Collection Time: 08/14/20 11:42 AM   Result Value Ref Range    Glucose 166 (H) 65 - 99 mg/dL    BUN 17 8 - 23 mg/dL    Creatinine 0.92 0.76 - 1.27 mg/dL    Sodium 138 136 - 145 mmol/L    Potassium 3.8 3.5 - 5.2 mmol/L    Chloride 95 (L) 98 - 107 mmol/L    CO2 29.2 (H) 22.0 - 29.0 mmol/L    Calcium 9.7 8.6 - 10.5 mg/dL    Total Protein 7.1 6.0 - 8.5 g/dL    Albumin 4.20 3.50 - 5.20 g/dL    ALT (SGPT) 56 (H) 1 - 41 U/L    AST (SGOT) 36 1 - 40 U/L    Alkaline Phosphatase 34 (L) 39 - 117 U/L    Total Bilirubin 0.4 0.0 - 1.2 mg/dL    eGFR Non African Amer 83 >60 mL/min/1.73    Globulin 2.9  gm/dL    A/G Ratio 1.4 g/dL    BUN/Creatinine Ratio 18.5 7.0 - 25.0    Anion Gap 13.8 5.0 - 15.0 mmol/L   Protime-INR    Collection Time: 08/14/20 11:42 AM   Result Value Ref Range    Protime 12.7 11.7 - 14.2 Seconds    INR 0.96 0.90 - 1.10   Lipase    Collection Time: 08/14/20 11:42 AM   Result Value Ref Range    Lipase 27 13 - 60 U/L   Troponin    Collection Time: 08/14/20 11:42 AM   Result Value Ref Range    Troponin T 0.022 0.000 - 0.030 ng/mL   CBC Auto Differential    Collection Time: 08/14/20 11:42 AM   Result Value Ref Range    WBC 8.34 3.40 - 10.80 10*3/mm3    RBC 4.66 4.14 - 5.80 10*6/mm3    Hemoglobin 14.4 13.0 - 17.7 g/dL    Hematocrit 42.0 37.5 - 51.0 %    MCV 90.1 79.0 - 97.0 fL    MCH 30.9 26.6 - 33.0 pg    MCHC 34.3 31.5 - 35.7 g/dL    RDW 14.0 12.3 - 15.4 %    RDW-SD 45.4 37.0 - 54.0 fl    MPV 10.4 6.0 - 12.0 fL    Platelets 253 140 - 450 10*3/mm3    Neutrophil % 51.3 42.7 - 76.0 %    Lymphocyte % 37.1 19.6 - 45.3 %    Monocyte % 8.9 5.0 - 12.0 %    Eosinophil % 1.6 0.3 - 6.2 %    Basophil % 0.6 0.0 - 1.5 %    Immature Grans % 0.5 0.0 - 0.5 %    Neutrophils, Absolute 4.29 1.70 - 7.00 10*3/mm3    Lymphocytes, Absolute 3.09 0.70 - 3.10 10*3/mm3    Monocytes, Absolute 0.74 0.10 - 0.90 10*3/mm3    Eosinophils, Absolute 0.13 0.00 - 0.40 10*3/mm3    Basophils, Absolute 0.05 0.00 - 0.20 10*3/mm3    Immature Grans, Absolute 0.04 0.00 - 0.05 10*3/mm3    nRBC 0.0 0.0 - 0.2 /100 WBC   Troponin    Collection Time: 08/14/20  2:11 PM   Result Value Ref Range    Troponin T 0.016 0.000 - 0.030 ng/mL       Ordered the above labs and independently reviewed the results.        RADIOLOGY  Xr Chest 1 View    Result Date: 8/14/2020  XR CHEST 1 VW-  HISTORY: Male who is 60 years-old,  chest pain  TECHNIQUE: Frontal view of the chest  COMPARISON: 03/02/2020  FINDINGS: Heart, mediastinum and pulmonary vasculature are unremarkable. No focal pulmonary consolidation, pleural effusion, or pneumothorax. No acute osseous process.       No evidence for acute pulmonary process. Follow-up as clinical indications persist.  This report was finalized on 8/14/2020 12:38 PM by Dr. Jigar Terrazas M.D.        I ordered the above noted radiological studies. Reviewed by me and discussed with radiologist.  See dictation for official radiology interpretation.      PROCEDURES    Procedures        MEDICATIONS GIVEN IN ER    Medications   sodium chloride 0.9 % flush 10 mL (has no administration in time range)   LORazepam (ATIVAN) injection 1 mg (1 mg Intravenous Given 8/14/20 1215)   morphine injection 4 mg (4 mg Intravenous Given 8/14/20 1215)         PROGRESS, DATA ANALYSIS, CONSULTS, AND MEDICAL DECISION MAKING    My differential diagnosis for chest pain includes but is not limited to:  Muscle strain, costochondritis, myositis, pleurisy, rib fracture, intercostal neuritis, herpes zoster, tumor, myocardial infarction, coronary syndrome, unstable angina, angina, aortic dissection, mitral valve prolapse, pericarditis, palpitations, pulmonary embolus, pneumonia, pneumothorax, lung cancer, GERD, esophagitis, esophageal spasm      All labs have been independently reviewed by me.  All radiology studies have been reviewed by me and discussed with radiologist dictating the report.   EKG's independently viewed and interpreted by me.  Discussion below represents my analysis of pertinent findings related to patient's condition, differential diagnosis, treatment plan and final disposition.      ED Course as of Aug 14 1507   Fri Aug 14, 2020   1145 EKG           EKG time: 1121  Rhythm/Rate: Sinus, 80  P waves and DE: ZAIN within normal limits  QRS, axis: Narrow QRS complex  ST and T waves: No STEMI; QTC within normal limits    Interpreted Contemporaneously by me, independently viewed  Comparison: Unchanged as compared to 3/2/2020      [RS]   1317 Troponin level consistent with prior.   Troponin T: 0.022 [RS]   1317 No acute life threat identified.  Suspect chest wall  source of discomfort such as neuropathic pain.  Plan delta troponin to ensure no ACS at this time.    [RS]   1505 Patient's pain completely resolved.  Likely neuropathic in nature.  No evidence of ACS today.  Patient agreeable discharge plan.    [RS]      ED Course User Index  [RS] Jaiden Conroy MD       AS OF 15:07 VITALS:    BP - 121/86  HR - 76  TEMP - 97.3 °F (36.3 °C) (Oral)  O2 SATS - 95%        DIAGNOSIS  Final diagnoses:   Left-sided chest wall pain         DISPOSITION  DISCHARGE    Patient discharged in stable condition.    Reviewed implications of results, diagnosis, meds, responsibility to follow up, warning signs and symptoms of possible worsening, potential complications and reasons to return to ER.    Patient/Family voiced understanding of above instructions.    Discussed plan for discharge, as there is no emergent indication for admission. Patient referred to primary care provider for regular health maintenance. Pt/family is agreeable and understands need for follow up and possible repeat testing.  Pt is aware that discharge does not mean that nothing is wrong but it indicates no emergency is present that requires admission and they must continue care with follow-up as given below or physician of their choice.     FOLLOW-UP  Grant Benitez MD  [address] [place] [address]  [phone]    Schedule an appointment as soon as possible for a visit   As needed         Medication List      New Prescriptions    methocarbamol 500 MG tablet  Commonly known as:  ROBAXIN  Take 1 tablet by mouth 4 (Four) Times a Day As Needed for Muscle Spasms.             Jaiden Conroy MD  08/14/20 4820

## 2020-08-14 NOTE — ED NOTES
"Pt to ED with L anterior CP, pt states pain is pinpoint, constant dull pain but then \"sharp spasms\". Pt denies nausea, SOA. Given 324mg ASA pTA per EMS     Patient was placed in face mask in first look. Patient was wearing facemask when I entered the room and throughout our encounter. I wore full protective equipment throughout this patient encounter including a face mask, eye shield, gown and gloves. Hand hygiene was performed before donning protective equipment and after removal when leaving the room.       Mervat Hanks, RN  08/14/20 1153    "

## 2020-08-17 DIAGNOSIS — G89.4 CHRONIC PAIN SYNDROME: ICD-10-CM

## 2020-08-17 NOTE — TELEPHONE ENCOUNTER
PATIENT CALLED FOR MED REFILL  oxyCODONE-acetaminophen (Percocet)  MG per tablet    HE HAS 3-4 LEFT    [facility] [place] [address] [facility] [place], [place] - [facility] [address] AT [facility] [address] - [phone]  - [phone]   [phone]    PATIENT CALL BACK NUMBER [phone]

## 2020-08-18 RX ORDER — OXYCODONE AND ACETAMINOPHEN 10; 325 MG/1; MG/1
1 TABLET ORAL EVERY 8 HOURS PRN
Qty: 20 TABLET | Refills: 0 | Status: SHIPPED | OUTPATIENT
Start: 2020-08-18 | End: 2020-08-21 | Stop reason: SDUPTHER

## 2020-08-21 ENCOUNTER — OFFICE VISIT (OUTPATIENT)
Dept: INTERNAL MEDICINE | Facility: CLINIC | Age: 60
End: 2020-08-21

## 2020-08-21 VITALS
DIASTOLIC BLOOD PRESSURE: 90 MMHG | BODY MASS INDEX: 40.52 KG/M2 | WEIGHT: 283 LBS | HEART RATE: 74 BPM | OXYGEN SATURATION: 98 % | TEMPERATURE: 98.6 F | HEIGHT: 70 IN | SYSTOLIC BLOOD PRESSURE: 136 MMHG

## 2020-08-21 DIAGNOSIS — G89.29 CHRONIC RIGHT SHOULDER PAIN: ICD-10-CM

## 2020-08-21 DIAGNOSIS — G89.4 CHRONIC PAIN SYNDROME: ICD-10-CM

## 2020-08-21 DIAGNOSIS — E55.9 VITAMIN D DEFICIENCY: ICD-10-CM

## 2020-08-21 DIAGNOSIS — Z99.89 OSA ON CPAP: ICD-10-CM

## 2020-08-21 DIAGNOSIS — E11.9 TYPE 2 DIABETES MELLITUS WITHOUT COMPLICATION, WITHOUT LONG-TERM CURRENT USE OF INSULIN (HCC): ICD-10-CM

## 2020-08-21 DIAGNOSIS — G47.33 OSA ON CPAP: ICD-10-CM

## 2020-08-21 DIAGNOSIS — M25.511 CHRONIC RIGHT SHOULDER PAIN: ICD-10-CM

## 2020-08-21 DIAGNOSIS — E03.9 PRIMARY HYPOTHYROIDISM: ICD-10-CM

## 2020-08-21 DIAGNOSIS — E78.1 ESSENTIAL HYPERTRIGLYCERIDEMIA: Primary | ICD-10-CM

## 2020-08-21 DIAGNOSIS — E66.01 MORBID OBESITY DUE TO EXCESS CALORIES (HCC): ICD-10-CM

## 2020-08-21 LAB
ALBUMIN SERPL-MCNC: 4.8 G/DL (ref 3.5–5.2)
ALBUMIN/GLOB SERPL: 2.1 G/DL
ALP SERPL-CCNC: 40 U/L (ref 39–117)
ALT SERPL-CCNC: 57 U/L (ref 1–41)
AST SERPL-CCNC: 43 U/L (ref 1–40)
BILIRUB SERPL-MCNC: 0.5 MG/DL (ref 0–1.2)
BUN SERPL-MCNC: 14 MG/DL (ref 8–23)
BUN/CREAT SERPL: 15.7 (ref 7–25)
CALCIUM SERPL-MCNC: 9.7 MG/DL (ref 8.6–10.5)
CHLORIDE SERPL-SCNC: 97 MMOL/L (ref 98–107)
CHOLEST SERPL-MCNC: 101 MG/DL (ref 0–200)
CO2 SERPL-SCNC: 30.8 MMOL/L (ref 22–29)
CREAT SERPL-MCNC: 0.89 MG/DL (ref 0.76–1.27)
GLOBULIN SER CALC-MCNC: 2.3 GM/DL
GLUCOSE SERPL-MCNC: 186 MG/DL (ref 65–99)
HBA1C MFR BLD: 8 % (ref 4.8–5.6)
HDLC SERPL-MCNC: 34 MG/DL (ref 40–60)
LDLC SERPL CALC-MCNC: 36 MG/DL (ref 0–100)
LDLC/HDLC SERPL: 1.05 {RATIO}
POTASSIUM SERPL-SCNC: 3.9 MMOL/L (ref 3.5–5.2)
PROT SERPL-MCNC: 7.1 G/DL (ref 6–8.5)
SODIUM SERPL-SCNC: 139 MMOL/L (ref 136–145)
T4 FREE SERPL-MCNC: 1.48 NG/DL (ref 0.93–1.7)
TRIGL SERPL-MCNC: 156 MG/DL (ref 0–150)
TSH SERPL DL<=0.005 MIU/L-ACNC: 1.68 UIU/ML (ref 0.27–4.2)
VLDLC SERPL CALC-MCNC: 31.2 MG/DL

## 2020-08-21 PROCEDURE — 99214 OFFICE O/P EST MOD 30 MIN: CPT | Performed by: INTERNAL MEDICINE

## 2020-08-21 RX ORDER — OXYCODONE AND ACETAMINOPHEN 10; 325 MG/1; MG/1
1 TABLET ORAL EVERY 8 HOURS PRN
Qty: 90 TABLET | Refills: 0 | Status: SHIPPED | OUTPATIENT
Start: 2020-08-21 | End: 2020-09-16 | Stop reason: SDUPTHER

## 2020-08-21 NOTE — PROGRESS NOTES
"Subjective   Aaron Latif is a 60 y.o. male.  Patient presents with chief complaint of ongoing issues with chronic severe knee pain, hypertension, hyperlipidemia, hypothyroidism, morbid obesity, type 2 diabetes mellitus, chronic right shoulder pain secondary to rotator cuff dysfunction here for follow-up evaluation and treatment.  I am going to do a CMP lipid panel and an A1c today in addition patient has a follow-up with his orthopedic surgeon regarding both his shoulder and his knee.  In all likelihood he will need a total knee replacement on the right since he is Marvin had one on the left.      /90   Pulse 74   Temp 98.6 °F (37 °C)   Ht 177.8 cm (70\")   Wt 128 kg (283 lb)   SpO2 98%   BMI 40.61 kg/m²     Body mass index is 40.61 kg/m².    History of Present Illness ongoing issues with chronic severe pain left shoulder and knee    The following portions of the patient's history were reviewed and updated as appropriate: allergies, current medications, past family history, past medical history, past social history, past surgical history and problem list.    Review of Systems   HENT: Negative.    Respiratory: Negative.    Cardiovascular: Negative.    Gastrointestinal: Negative.    Musculoskeletal: Positive for arthralgias.   Neurological: Negative.    Psychiatric/Behavioral: Negative.        Objective   Physical Exam   Constitutional: He appears well-developed and well-nourished.   HENT:   Head: Normocephalic and atraumatic.   Cardiovascular: Normal rate, regular rhythm and normal heart sounds.   Pulmonary/Chest: Effort normal and breath sounds normal.   Abdominal: Soft. Bowel sounds are normal.   Musculoskeletal:   Right shoulder pain with range of motion and right knee pain with flexion and extension   Neurological: He is alert.   Psychiatric: He has a normal mood and affect. His behavior is normal.   Nursing note and vitals reviewed.        Assessment/Plan   aAron was seen today for hypertension and " diabetes.    Diagnoses and all orders for this visit:    Essential hypertriglyceridemia  Comments:  Moderately well-controlled we will continue to monitor    BC on CPAP  Comments:  Patient states that he is very compliant with his sleep machine    Vitamin D deficiency  Comments:  No change in therapy at this time    Morbid obesity due to excess calories (CMS/[facility])  Comments:  I highly recommended a low carbohydrate diet with intermittent fasting    Primary hypothyroidism  Comments:  I go to check a TSH and T4 level  Orders:  -     TSH; Future  -     T4, free; Future  -     T4, free  -     TSH    Type 2 diabetes mellitus without complication, without long-term current use of insulin (CMS/[facility])  Comments:  I have requested a CMP and an A1c be done today  Orders:  -     Comprehensive metabolic panel; Future  -     Hemoglobin A1c; Future  -     Lipid Panel With LDL/HDL Ratio; Future  -     Lipid Panel With LDL/HDL Ratio  -     Hemoglobin A1c  -     Comprehensive metabolic panel    Chronic right shoulder pain  Comments:  Follow-up with Dr. Sheri Kimball    Chronic pain syndrome  Comments:  Percocet 10/325  Orders:  -     oxyCODONE-acetaminophen (Percocet)  MG per tablet; Take 1 tablet by mouth Every 8 (Eight) Hours As Needed for Moderate Pain .

## 2020-09-08 RX ORDER — DULOXETIN HYDROCHLORIDE 60 MG/1
CAPSULE, DELAYED RELEASE ORAL
Qty: 30 CAPSULE | Refills: 0 | Status: SHIPPED | OUTPATIENT
Start: 2020-09-08 | End: 2020-10-26 | Stop reason: SDUPTHER

## 2020-09-16 ENCOUNTER — TELEPHONE (OUTPATIENT)
Dept: INTERNAL MEDICINE | Facility: CLINIC | Age: 60
End: 2020-09-16

## 2020-09-16 DIAGNOSIS — G89.4 CHRONIC PAIN SYNDROME: ICD-10-CM

## 2020-09-16 RX ORDER — OXYCODONE AND ACETAMINOPHEN 10; 325 MG/1; MG/1
1 TABLET ORAL EVERY 8 HOURS PRN
Qty: 90 TABLET | Refills: 0 | Status: SHIPPED | OUTPATIENT
Start: 2020-09-16 | End: 2020-10-19 | Stop reason: SDUPTHER

## 2020-09-16 NOTE — TELEPHONE ENCOUNTER
PATIENT IS CALLING TO GET A REFILL OF:    oxyCODONE-acetaminophen (Percocet)  MG per tablet  HE HAS 2 DAY SUPPLY. HE IS USING THE PHARMACY ON FILE.    [facility]#: [phone]

## 2020-09-18 ENCOUNTER — TELEPHONE (OUTPATIENT)
Dept: INTERNAL MEDICINE | Facility: CLINIC | Age: 60
End: 2020-09-18

## 2020-09-18 NOTE — TELEPHONE ENCOUNTER
Patient called in to follow up on oxyCODONE-acetaminophen (Percocet)  MG per tablet      Sent to [facility] [address] [place] [address], [place] - [address] AT [facility] [address] - [phone]  - [phone]   [phone]        Patient call back [phone]

## 2020-09-18 NOTE — TELEPHONE ENCOUNTER
Left patient detailed message, Rx sent into pharmacy on 09/16/2020. Confirmation received pharmacy did receive. Patient needs to check with his pharmacy

## 2020-09-25 ENCOUNTER — TELEPHONE (OUTPATIENT)
Dept: INTERNAL MEDICINE | Facility: CLINIC | Age: 60
End: 2020-09-25

## 2020-09-25 NOTE — TELEPHONE ENCOUNTER
Call made to patient to inform patient of his last flu vaccine. He will go to his local pharmacy to have this done.

## 2020-09-25 NOTE — TELEPHONE ENCOUNTER
PT CALLED STATING HE IS UNSURE IF HE RECEIVED A FLU SHOT OR NOT. IF NOT PT WOULD LIKE TO SCHEDULE.     PLEASE ADVISE     PT CALL BACK   [phone]

## 2020-10-04 DIAGNOSIS — R79.89 LOW TESTOSTERONE: ICD-10-CM

## 2020-10-04 DIAGNOSIS — R53.82 CHRONIC FATIGUE: ICD-10-CM

## 2020-10-04 DIAGNOSIS — N52.8 OTHER MALE ERECTILE DYSFUNCTION: ICD-10-CM

## 2020-10-04 DIAGNOSIS — E78.5 DYSLIPIDEMIA: ICD-10-CM

## 2020-10-04 DIAGNOSIS — E66.01 MORBID OBESITY DUE TO EXCESS CALORIES (HCC): ICD-10-CM

## 2020-10-04 DIAGNOSIS — E55.9 VITAMIN D DEFICIENCY: ICD-10-CM

## 2020-10-05 RX ORDER — QUETIAPINE FUMARATE 25 MG/1
TABLET, FILM COATED ORAL
Qty: 90 TABLET | Refills: 0 | Status: SHIPPED | OUTPATIENT
Start: 2020-10-05 | End: 2021-01-07 | Stop reason: SDUPTHER

## 2020-10-05 RX ORDER — ERGOCALCIFEROL 1.25 MG/1
CAPSULE ORAL
Qty: 26 CAPSULE | Refills: 1 | Status: SHIPPED | OUTPATIENT
Start: 2020-10-05 | End: 2021-04-26

## 2020-10-17 DIAGNOSIS — G89.4 CHRONIC PAIN SYNDROME: ICD-10-CM

## 2020-10-19 DIAGNOSIS — G89.4 CHRONIC PAIN SYNDROME: ICD-10-CM

## 2020-10-19 RX ORDER — LEVOTHYROXINE SODIUM 0.07 MG/1
TABLET ORAL
Qty: 90 TABLET | Refills: 0 | Status: SHIPPED | OUTPATIENT
Start: 2020-10-19 | End: 2021-01-04

## 2020-10-19 RX ORDER — OXYCODONE AND ACETAMINOPHEN 10; 325 MG/1; MG/1
1 TABLET ORAL EVERY 8 HOURS PRN
Qty: 90 TABLET | Refills: 0 | Status: SHIPPED | OUTPATIENT
Start: 2020-10-19 | End: 2020-11-12 | Stop reason: SDUPTHER

## 2020-10-19 RX ORDER — PREGABALIN 200 MG/1
CAPSULE ORAL
Qty: 90 CAPSULE | Refills: 1 | Status: SHIPPED | OUTPATIENT
Start: 2020-10-19 | End: 2020-12-18

## 2020-10-19 RX ORDER — PREGABALIN 200 MG/1
200 CAPSULE ORAL 3 TIMES DAILY
Qty: 90 CAPSULE | Refills: 4 | Status: SHIPPED | OUTPATIENT
Start: 2020-10-19 | End: 2021-04-12 | Stop reason: SDUPTHER

## 2020-10-19 NOTE — TELEPHONE ENCOUNTER
Caller: Aaron Latif    Relationship: Self    Best call back number: [phone]    Medication needed:   Requested Prescriptions     Pending Prescriptions Disp Refills   • oxyCODONE-acetaminophen (Percocet)  MG per tablet 90 tablet 0     Sig: Take 1 tablet by mouth Every 8 (Eight) Hours As Needed for Moderate Pain .   • pregabalin (LYRICA) 200 MG capsule 90 capsule 4     Sig: Take 1 capsule by mouth 3 (Three) Times a Day.       When do you need the refill by: ASAP - PT WILL BE OUT TODAY    What details did the patient provide when requesting the medication: PHARMACY ALSO SEND REQUEST FOR PREGABALIN    Does the patient have less than a 3 day supply:  [x] Yes  [] No    What is the patient's preferred pharmacy: [facility] [address] AT [facility] [address] - [phone] PH - [phone] FX

## 2020-10-26 RX ORDER — DULOXETIN HYDROCHLORIDE 60 MG/1
60 CAPSULE, DELAYED RELEASE ORAL DAILY
Qty: 30 CAPSULE | Refills: 0 | Status: SHIPPED | OUTPATIENT
Start: 2020-10-26 | End: 2020-11-16

## 2020-10-30 ENCOUNTER — OFFICE VISIT (OUTPATIENT)
Dept: INTERNAL MEDICINE | Facility: CLINIC | Age: 60
End: 2020-10-30

## 2020-10-30 VITALS
HEART RATE: 77 BPM | TEMPERATURE: 97.2 F | DIASTOLIC BLOOD PRESSURE: 90 MMHG | SYSTOLIC BLOOD PRESSURE: 130 MMHG | HEIGHT: 70 IN | BODY MASS INDEX: 41.09 KG/M2 | OXYGEN SATURATION: 97 % | WEIGHT: 287 LBS

## 2020-10-30 DIAGNOSIS — F52.21 ED (ERECTILE DYSFUNCTION) OF NON-ORGANIC ORIGIN: ICD-10-CM

## 2020-10-30 DIAGNOSIS — G44.059 HEADACHE , SHORT UNILAT NEURALGIFORM, W/CONJUNCTIVAL INJECTION/TEARING: ICD-10-CM

## 2020-10-30 DIAGNOSIS — I10 BENIGN ESSENTIAL HTN: Primary | ICD-10-CM

## 2020-10-30 DIAGNOSIS — Z99.89 OSA ON CPAP: ICD-10-CM

## 2020-10-30 DIAGNOSIS — G47.33 OSA ON CPAP: ICD-10-CM

## 2020-10-30 DIAGNOSIS — E78.1 ESSENTIAL HYPERTRIGLYCERIDEMIA: ICD-10-CM

## 2020-10-30 DIAGNOSIS — E11.9 TYPE 2 DIABETES MELLITUS WITHOUT COMPLICATION, WITHOUT LONG-TERM CURRENT USE OF INSULIN (HCC): ICD-10-CM

## 2020-10-30 LAB
ALBUMIN SERPL-MCNC: 4.5 G/DL (ref 3.5–5.2)
ALBUMIN/GLOB SERPL: 1.7 G/DL
ALP SERPL-CCNC: 38 U/L (ref 39–117)
ALT SERPL-CCNC: 52 U/L (ref 1–41)
AST SERPL-CCNC: 39 U/L (ref 1–40)
BASOPHILS # BLD AUTO: 0.07 10*3/MM3 (ref 0–0.2)
BASOPHILS NFR BLD AUTO: 0.9 % (ref 0–1.5)
BILIRUB SERPL-MCNC: 0.5 MG/DL (ref 0–1.2)
BUN SERPL-MCNC: 16 MG/DL (ref 8–23)
BUN/CREAT SERPL: 17.4 (ref 7–25)
CALCIUM SERPL-MCNC: 9.8 MG/DL (ref 8.6–10.5)
CHLORIDE SERPL-SCNC: 99 MMOL/L (ref 98–107)
CO2 SERPL-SCNC: 30.3 MMOL/L (ref 22–29)
CREAT SERPL-MCNC: 0.92 MG/DL (ref 0.76–1.27)
EOSINOPHIL # BLD AUTO: 0.18 10*3/MM3 (ref 0–0.4)
EOSINOPHIL NFR BLD AUTO: 2.2 % (ref 0.3–6.2)
ERYTHROCYTE [DISTWIDTH] IN BLOOD BY AUTOMATED COUNT: 13.9 % (ref 12.3–15.4)
GLOBULIN SER CALC-MCNC: 2.6 GM/DL
GLUCOSE SERPL-MCNC: 118 MG/DL (ref 65–99)
HBA1C MFR BLD: 7.7 % (ref 4.8–5.6)
HCT VFR BLD AUTO: 45.3 % (ref 37.5–51)
HGB BLD-MCNC: 15.4 G/DL (ref 13–17.7)
IMM GRANULOCYTES # BLD AUTO: 0.03 10*3/MM3 (ref 0–0.05)
IMM GRANULOCYTES NFR BLD AUTO: 0.4 % (ref 0–0.5)
LYMPHOCYTES # BLD AUTO: 3.22 10*3/MM3 (ref 0.7–3.1)
LYMPHOCYTES NFR BLD AUTO: 39.2 % (ref 19.6–45.3)
MCH RBC QN AUTO: 30.6 PG (ref 26.6–33)
MCHC RBC AUTO-ENTMCNC: 34 G/DL (ref 31.5–35.7)
MCV RBC AUTO: 90.1 FL (ref 79–97)
MONOCYTES # BLD AUTO: 0.68 10*3/MM3 (ref 0.1–0.9)
MONOCYTES NFR BLD AUTO: 8.3 % (ref 5–12)
NEUTROPHILS # BLD AUTO: 4.04 10*3/MM3 (ref 1.7–7)
NEUTROPHILS NFR BLD AUTO: 49 % (ref 42.7–76)
NRBC BLD AUTO-RTO: 0 /100 WBC (ref 0–0.2)
PLATELET # BLD AUTO: 281 10*3/MM3 (ref 140–450)
POTASSIUM SERPL-SCNC: 4.1 MMOL/L (ref 3.5–5.2)
PROT SERPL-MCNC: 7.1 G/DL (ref 6–8.5)
RBC # BLD AUTO: 5.03 10*6/MM3 (ref 4.14–5.8)
SODIUM SERPL-SCNC: 140 MMOL/L (ref 136–145)
WBC # BLD AUTO: 8.22 10*3/MM3 (ref 3.4–10.8)

## 2020-10-30 PROCEDURE — 99214 OFFICE O/P EST MOD 30 MIN: CPT | Performed by: INTERNAL MEDICINE

## 2020-10-30 NOTE — PROGRESS NOTES
"Teresa Latif is a 60 y.o. male.  The patient presents with a 1 week history of intermittent headaches with variable dizziness sensations of weakness associated with his history of hypertension, hyperlipidemia, obstructive sleep apnea, type 2 diabetes mellitus, morbid obesity very strong family history of vascular disease with both his uncle and his father dying of heart attacks.  He has not had any visual changes with these headaches but they are focal and are above his left eye.  I have instructed the patient to keep a blood pressure log throughout the weekend along with a blood sugar log throughout the weekend and I am ordering a CT of the brain to rule out any vascular abnormalities.      /90   Pulse 77   Temp 97.2 °F (36.2 °C)   Ht 177.8 cm (70\")   Wt 130 kg (287 lb)   SpO2 97%   BMI 41.18 kg/m²     Body mass index is 41.18 kg/m².    History of Present Illness new onset dizziness with focal headaches    The following portions of the patient's history were reviewed and updated as appropriate: allergies, current medications, past family history, past medical history, past social history, past surgical history and problem list.    Review of Systems   Constitutional: Negative.    HENT: Negative.    Respiratory: Negative.    Cardiovascular: Negative.    Gastrointestinal: Negative.    Genitourinary: Negative.    Musculoskeletal: Positive for arthralgias.   Neurological: Positive for dizziness and headache.   Psychiatric/Behavioral: Negative.        Objective   Physical Exam  Vitals signs and nursing note reviewed.   Constitutional:       Appearance: Normal appearance.   HENT:      Head: Normocephalic and atraumatic.   Neck:      Musculoskeletal: Normal range of motion and neck supple.   Cardiovascular:      Rate and Rhythm: Normal rate and regular rhythm.      Pulses: Normal pulses.      Heart sounds: Normal heart sounds.   Pulmonary:      Effort: Pulmonary effort is normal.      Breath " sounds: Normal breath sounds.   Abdominal:      General: Abdomen is flat. Bowel sounds are normal.      Palpations: Abdomen is soft.   Musculoskeletal: Normal range of motion.   Skin:     General: Skin is warm and dry.   Neurological:      General: No focal deficit present.      Mental Status: He is alert and oriented to person, place, and time. Mental status is at baseline.   Psychiatric:         Mood and Affect: Mood normal.         Behavior: Behavior normal.         Thought Content: Thought content normal.         Judgment: Judgment normal.           Assessment/Plan   Diagnoses and all orders for this visit:    1. Benign essential HTN (Primary)  Comments:  I have instructed the patient to keep a blood pressure log over the weekend  Orders:  -     Comprehensive metabolic panel; Future  -     CBC w AUTO Differential; Future    2. Essential hypertriglyceridemia  Comments:  I am going to check a lipid level    3. BC on CPAP  Comments:  The patient is going to talk to his pulmonary physician about adjusting his CPAP    4. Type 2 diabetes mellitus without complication, without long-term current use of insulin (CMS/[facility])  Comments:  I am going to check an A1c today  Orders:  -     Hemoglobin A1c; Future    5. ED (erectile dysfunction) of non-organic origin  Comments:  No change in therapy warranted    6. Headache , short unilat neuralgiform, w/conjunctival injection/tearing  Comments:  CT of the head without contrast  Orders:  -     CT Head Without Contrast; Future

## 2020-11-02 RX ORDER — HYDROCHLOROTHIAZIDE 25 MG/1
TABLET ORAL
Qty: 90 TABLET | Refills: 0 | Status: SHIPPED | OUTPATIENT
Start: 2020-11-02 | End: 2021-01-20

## 2020-11-02 RX ORDER — METOPROLOL SUCCINATE 50 MG/1
TABLET, EXTENDED RELEASE ORAL
Qty: 90 TABLET | Refills: 0 | Status: SHIPPED | OUTPATIENT
Start: 2020-11-02 | End: 2021-02-04

## 2020-11-12 ENCOUNTER — TELEPHONE (OUTPATIENT)
Dept: INTERNAL MEDICINE | Facility: CLINIC | Age: 60
End: 2020-11-12

## 2020-11-12 DIAGNOSIS — G89.4 CHRONIC PAIN SYNDROME: ICD-10-CM

## 2020-11-12 RX ORDER — OXYCODONE AND ACETAMINOPHEN 10; 325 MG/1; MG/1
1 TABLET ORAL EVERY 8 HOURS PRN
Qty: 90 TABLET | Refills: 0 | Status: SHIPPED | OUTPATIENT
Start: 2020-11-12 | End: 2020-12-14 | Stop reason: SDUPTHER

## 2020-11-12 NOTE — TELEPHONE ENCOUNTER
PATIENT IS REQUESTING A REFILL ON THE oxyCODONE-acetaminophen (Percocet)  MG per tablet AND WOULD LIKE TO KNOW IF DR. BLACK WILL PRESCRIBE 2 MONTH RX INSTEAD OF 1 MONTH?  HAS ENOUGH TIL Sunday.    VERIFIED [facility] AT [facility]    PLEASE ADVISE: [phone]

## 2020-11-16 RX ORDER — DULOXETIN HYDROCHLORIDE 60 MG/1
CAPSULE, DELAYED RELEASE ORAL
Qty: 30 CAPSULE | Refills: 0 | Status: SHIPPED | OUTPATIENT
Start: 2020-11-16 | End: 2020-12-14

## 2020-12-14 ENCOUNTER — TELEPHONE (OUTPATIENT)
Dept: INTERNAL MEDICINE | Facility: CLINIC | Age: 60
End: 2020-12-14

## 2020-12-14 DIAGNOSIS — G89.4 CHRONIC PAIN SYNDROME: ICD-10-CM

## 2020-12-14 RX ORDER — DULOXETIN HYDROCHLORIDE 60 MG/1
CAPSULE, DELAYED RELEASE ORAL
Qty: 30 CAPSULE | Refills: 0 | Status: SHIPPED | OUTPATIENT
Start: 2020-12-14 | End: 2021-01-07 | Stop reason: SDUPTHER

## 2020-12-14 NOTE — TELEPHONE ENCOUNTER
Caller: Aaron Latif    Relationship: Self    Best call back number: [phone]    Medication needed: oxyCODONE-acetaminophen (Percocet)  MG per tablet    When do you need the refill by: ASAP     What details did the patient provide when requesting the medication: 6 pills left   Does the patient have less than a 3 day supply:  [] Yes  [x] No    What is the patient's preferred pharmacy:  [facility] [address] - [phone]  - [phone] FX  [phone]

## 2020-12-15 RX ORDER — OXYCODONE AND ACETAMINOPHEN 10; 325 MG/1; MG/1
1 TABLET ORAL EVERY 8 HOURS PRN
Qty: 90 TABLET | Refills: 0 | Status: SHIPPED | OUTPATIENT
Start: 2020-12-15 | End: 2021-01-11 | Stop reason: SDUPTHER

## 2020-12-17 RX ORDER — INSULIN DEGLUDEC 200 U/ML
INJECTION, SOLUTION SUBCUTANEOUS
Qty: 27 PEN | Refills: 4 | Status: SHIPPED | OUTPATIENT
Start: 2020-12-17 | End: 2021-04-28

## 2020-12-18 ENCOUNTER — OFFICE VISIT (OUTPATIENT)
Dept: ENDOCRINOLOGY | Age: 60
End: 2020-12-18

## 2020-12-18 VITALS
WEIGHT: 267.2 LBS | DIASTOLIC BLOOD PRESSURE: 82 MMHG | HEIGHT: 71 IN | BODY MASS INDEX: 37.41 KG/M2 | SYSTOLIC BLOOD PRESSURE: 128 MMHG

## 2020-12-18 DIAGNOSIS — E78.5 DYSLIPIDEMIA: ICD-10-CM

## 2020-12-18 DIAGNOSIS — E11.65 UNCONTROLLED TYPE 2 DIABETES MELLITUS WITH HYPERGLYCEMIA (HCC): Primary | ICD-10-CM

## 2020-12-18 DIAGNOSIS — E66.01 MORBID OBESITY DUE TO EXCESS CALORIES (HCC): ICD-10-CM

## 2020-12-18 PROCEDURE — 99214 OFFICE O/P EST MOD 30 MIN: CPT | Performed by: INTERNAL MEDICINE

## 2020-12-18 RX ORDER — DULAGLUTIDE 1.5 MG/.5ML
1.5 INJECTION, SOLUTION SUBCUTANEOUS WEEKLY
Qty: 2 ML | Refills: 11 | Status: SHIPPED | OUTPATIENT
Start: 2020-12-18 | End: 2021-12-18

## 2020-12-18 NOTE — PROGRESS NOTES
60 y.o.    Patient Care Team:  Grant Benitez MD as PCP - General (Internal Medicine)  Parris Luna MD as Consulting Physician (Endocrinology)  Velvet Rose MD as Consulting Physician (Neurology)  Eloy Fitzgerald MD as Surgeon (Orthopedic Surgery)    Chief Complaint:    Follow up/ type 2 dm former brittanie patient   Subjective     HPI  60-year-old white male is here for the follow-up of type 2 diabetes mellitus, used to see Dr. Luna in the past.  Transferred his care to me on 12/18/2020    Type 2 dm - Diagnosed about more than 10 years ago.   Today in clinic pt reports being on tresiba 175 units daily, farxiga 10 mg po daily, janumet 1 tab two times a day.   FBG - 200 - 250  Pre meals - 200 - 300  Checks BG - 1 - 2 times a day.  Sensor - doesn't have one.   Dm retinopathy - no known hx,Last eye exam - in the last 1 year  Dm nephropathy - no  Dm neuropathy - yes,Dm neuropathy meds - on lyrica  CAD -no  CVA -no  Episodes of hypoglycemia - no  Pt is physically active. weight has been stable.   Pt tries to follow DM diet for most part.   On Ace inb.    Hyperlipidemia - On Lipitor 20 mg po daily     Hypothyroidism - on levothyroxine 75 mcg oral daily.     I have reviewed the patient's medical history in detail and updated the computerized patient record.      Interval History      The following portions of the patient's history were reviewed and updated as appropriate: allergies, current medications, past family history, past medical history, past social history, past surgical history and problem list.    Past Medical History:   Diagnosis Date   • Anxiety    • Arthritis    • Diabetic peripheral neuropathy (CMS/HCC)     TYPE 2   • Dizziness    • Erectile dysfunction    • Fatigue    • High cholesterol    • Hypertension    • Hypothyroidism    • Low testosterone    • Neuropathy    • Sleep apnea     WEARS CPAP   • Type 2 diabetes mellitus (CMS/HCC)    • Vitamin D deficiency      Family History   Problem  "Relation Age of Onset   • Diabetes Mother    • Hypertension Mother    • Heart disease Father    • Diabetes Brother    • Hypertension Brother    • Hypertension Paternal Uncle    • Malig Hyperthermia Neg Hx      Social History     Socioeconomic History   • Marital status:      Spouse name: Not on file   • Number of children: Not on file   • Years of education: Not on file   • Highest education level: Not on file   Tobacco Use   • Smoking status: Never Smoker   • Smokeless tobacco: Never Used   Substance and Sexual Activity   • Alcohol use: Yes     Comment:  1-2 BEERS/ NIGHT    • Drug use: No   • Sexual activity: Defer     Allergies   Allergen Reactions   • Codeine Other (See Comments)     UNKNOWN       Current Outpatient Medications:   •  amLODIPine (NORVASC) 10 MG tablet, TAKE ONE TABLET BY MOUTH DAILY, Disp: 90 tablet, Rfl: 3  •  atorvastatin (LIPITOR) 20 MG tablet, Take 1 tablet by mouth Daily., Disp: 90 tablet, Rfl: 3  •  BD HYPODERMIC NEEDLE 18G X 1\" misc, USE WEEKLY WITH TESTOSTERONE INJECTION, Disp: 25 each, Rfl: 0  •  Blood Glucose Monitoring Suppl (ACCU-CHEK GUIDE ME) w/Device kit, 1 kit Daily., Disp: 1 kit, Rfl: 1  •  DULoxetine (CYMBALTA) 60 MG capsule, TAKE ONE CAPSULE BY MOUTH DAILY, Disp: 30 capsule, Rfl: 0  •  FARXIGA 10 MG tablet, Take 10 mg by mouth Every Morning., Disp: 90 tablet, Rfl: 3  •  fenofibrate micronized (ANTARA) 130 MG capsule, Take 1 capsule by mouth Every Morning Before Breakfast., Disp: 90 capsule, Rfl: 3  •  hydroCHLOROthiazide (HYDRODIURIL) 25 MG tablet, TAKE ONE TABLET BY MOUTH EVERY MORNING, Disp: 90 tablet, Rfl: 0  •  ibuprofen (ADVIL,MOTRIN) 800 MG tablet, Take 1 tablet by mouth Every 8 (Eight) Hours As Needed for Mild Pain ., Disp: 15 tablet, Rfl: 0  •  Insulin Pen Needle 31G X 8 MM misc, Use once a day, Disp: 100 each, Rfl: 3  •  JANUMET XR  MG tablet, Take 2 tablets by mouth Every Evening., Disp: 180 tablet, Rfl: 3  •  Lancets (ONETOUCH ULTRASOFT) lancets, Check " "blood glucose twice daily, Disp: 60 each, Rfl: 11  •  levothyroxine (SYNTHROID, LEVOTHROID) 75 MCG tablet, TAKE ONE TABLET BY MOUTH DAILY, Disp: 90 tablet, Rfl: 0  •  metoprolol succinate XL (TOPROL-XL) 50 MG 24 hr tablet, TAKE ONE TABLET BY MOUTH DAILY, Disp: 90 tablet, Rfl: 0  •  Needle, Disp, (BD DISP NEEDLE) 23G X 1\" misc, USE WEEKLY FOR INSULIN INJECTION, Disp: 10 each, Rfl: 2  •  omega-3 acid ethyl esters (LOVAZA) 1 g capsule, Take 2 capsules by mouth 2 (Two) Times a Day., Disp: 360 capsule, Rfl: 3  •  ONETOUCH VERIO test strip, Blood glucose twice daily use as instructed, Disp: 60 each, Rfl: 12  •  oxyCODONE-acetaminophen (Percocet)  MG per tablet, Take 1 tablet by mouth Every 8 (Eight) Hours As Needed for Moderate Pain ., Disp: 90 tablet, Rfl: 0  •  pregabalin (LYRICA) 200 MG capsule, Take 1 capsule by mouth 3 (Three) Times a Day., Disp: 90 capsule, Rfl: 4  •  QUEtiapine (SEROquel) 25 MG tablet, TAKE ONE TABLET BY MOUTH ONCE NIGHTLY, Disp: 90 tablet, Rfl: 0  •  Syringe 23G X 1\" 3 ML misc, Use weekly for testosterone injection, Disp: 10 each, Rfl: 3  •  Tresiba FlexTouch 200 UNIT/ML solution pen-injector pen injection, INJECT 175 UNITS INTO THE SKIN EVERY MORNING, Disp: 27 pen, Rfl: 4  •  vitamin D (ERGOCALCIFEROL) 1.25 MG (26649 UT) capsule capsule, TAKE TWO CAPSULES BY MOUTH ONCE WEEKLY, Disp: 26 capsule, Rfl: 1  •  Continuous Blood Gluc  (FreeStyle Nora 2 [place] Systm) device, 1 Device Daily., Disp: 1 Device, Rfl: 0  •  Continuous Blood Gluc Sensor (FreeStyle Nora 2 Sensor Systm) misc, 1 Device Every 14 (Fourteen) Days., Disp: 2 each, Rfl: 3  •  Dulaglutide (Trulicity) 1.5 MG/0.5ML solution pen-injector, Inject 1.5 mg under the skin into the appropriate area as directed 1 (One) Time Per Week., Disp: 2 mL, Rfl: 11  •  tadalafil (CIALIS) 5 MG tablet, Take 1 tablet by mouth Daily As Needed for Erectile Dysfunction., Disp: 30 tablet, Rfl: 5        Review of Systems   Constitutional: Positive " "for appetite change and fatigue. Negative for fever.   Eyes: Negative for visual disturbance.   Respiratory: Negative for shortness of breath.    Cardiovascular: Negative for palpitations and leg swelling.   Gastrointestinal: Negative for abdominal pain and vomiting.   Endocrine: Positive for polydipsia. Negative for polyuria.   Musculoskeletal: Negative for joint swelling and neck pain.   Skin: Negative for rash.   Neurological: Negative for weakness and numbness.   Psychiatric/Behavioral: Negative for behavioral problems.     I have reviewed and confirmed the accuracy of the ROS as documented by the MA/LPN/RN Yajaira Garcia MD          Objective       Vitals:    12/18/20 1338   BP: 128/82   Weight: 121 kg (267 lb 3.2 oz)   Height: 180.3 cm (71\")     Body mass index is 37.27 kg/m².      Physical Exam  Vitals signs reviewed.   Constitutional:       Appearance: Normal appearance. He is not diaphoretic.   HENT:      Head: Normocephalic and atraumatic.   Eyes:      General: No scleral icterus.     Conjunctiva/sclera: Conjunctivae normal.   Neck:      Musculoskeletal: Normal range of motion and neck supple.      Thyroid: No thyromegaly.      Comments: Wide neck  Cardiovascular:      Rate and Rhythm: Normal rate.   Pulmonary:      Effort: Pulmonary effort is normal. No respiratory distress.   Abdominal:      General: There is no distension.      Palpations: Abdomen is soft.      Tenderness: There is no abdominal tenderness.      Comments: Central obesity   Musculoskeletal:         General: No tenderness or deformity.   Skin:     General: Skin is warm and dry.   Neurological:      Mental Status: He is alert and oriented to person, place, and time. Mental status is at baseline.      Gait: Gait normal.   Psychiatric:         Mood and Affect: Mood normal.         Behavior: Behavior normal.         Results Review:     I reviewed the patient's new clinical results and mentioned them above in HPI and in plan as " well.    Medical records reviewed  Summary:Done      Office Visit on 10/30/2020   Component Date Value Ref Range Status   • WBC 10/30/2020 8.22  3.40 - 10.80 10*3/mm3 Final   • RBC 10/30/2020 5.03  4.14 - 5.80 10*6/mm3 Final   • Hemoglobin 10/30/2020 15.4  13.0 - 17.7 g/dL Final   • Hematocrit 10/30/2020 45.3  37.5 - 51.0 % Final   • MCV 10/30/2020 90.1  79.0 - 97.0 fL Final   • MCH 10/30/2020 30.6  26.6 - 33.0 pg Final   • MCHC 10/30/2020 34.0  31.5 - 35.7 g/dL Final   • RDW 10/30/2020 13.9  12.3 - 15.4 % Final   • Platelets 10/30/2020 281  140 - 450 10*3/mm3 Final   • Neutrophil Rel % 10/30/2020 49.0  42.7 - 76.0 % Final   • Lymphocyte Rel % 10/30/2020 39.2  19.6 - 45.3 % Final   • Monocyte Rel % 10/30/2020 8.3  5.0 - 12.0 % Final   • Eosinophil Rel % 10/30/2020 2.2  0.3 - 6.2 % Final   • Basophil Rel % 10/30/2020 0.9  0.0 - 1.5 % Final   • Neutrophils Absolute 10/30/2020 4.04  1.70 - 7.00 10*3/mm3 Final   • Lymphocytes Absolute 10/30/2020 3.22* 0.70 - 3.10 10*3/mm3 Final   • Monocytes Absolute 10/30/2020 0.68  0.10 - 0.90 10*3/mm3 Final   • Eosinophils Absolute 10/30/2020 0.18  0.00 - 0.40 10*3/mm3 Final   • Basophils Absolute 10/30/2020 0.07  0.00 - 0.20 10*3/mm3 Final   • Immature Granulocyte Rel % 10/30/2020 0.4  0.0 - 0.5 % Final   • Immature Grans Absolute 10/30/2020 0.03  0.00 - 0.05 10*3/mm3 Final   • nRBC 10/30/2020 0.0  0.0 - 0.2 /100 WBC Final   • Hemoglobin A1C 10/30/2020 7.70* 4.80 - 5.60 % Final    Comment: Hemoglobin A1C Ranges:  Increased Risk for Diabetes  5.7% to 6.4%  Diabetes                     >= 6.5%  Diabetic Goal                < 7.0%     • Glucose 10/30/2020 118* 65 - 99 mg/dL Final   • BUN 10/30/2020 16  8 - 23 mg/dL Final   • Creatinine 10/30/2020 0.92  0.76 - 1.27 mg/dL Final   • eGFR Non African Am 10/30/2020 83  >60 mL/min/1.73 Final   • eGFR African Am 10/30/2020 101  >60 mL/min/1.73 Final   • BUN/Creatinine Ratio 10/30/2020 17.4  7.0 - 25.0 Final   • Sodium 10/30/2020 140  136 -  145 mmol/L Final   • Potassium 10/30/2020 4.1  3.5 - 5.2 mmol/L Final   • Chloride 10/30/2020 99  98 - 107 mmol/L Final   • Total CO2 10/30/2020 30.3* 22.0 - 29.0 mmol/L Final   • Calcium 10/30/2020 9.8  8.6 - 10.5 mg/dL Final   • Total Protein 10/30/2020 7.1  6.0 - 8.5 g/dL Final   • Albumin 10/30/2020 4.50  3.50 - 5.20 g/dL Final   • Globulin 10/30/2020 2.6  gm/dL Final   • A/G Ratio 10/30/2020 1.7  g/dL Final   • Total Bilirubin 10/30/2020 0.5  0.0 - 1.2 mg/dL Final   • Alkaline Phosphatase 10/30/2020 38* 39 - 117 U/L Final   • AST (SGOT) 10/30/2020 39  1 - 40 U/L Final   • ALT (SGPT) 10/30/2020 52* 1 - 41 U/L Final     Lab Results   Component Value Date    HGBA1C 7.70 (H) 10/30/2020    HGBA1C 8.00 (H) 08/21/2020    HGBA1C 7.60 (H) 01/14/2020     Lab Results   Component Value Date    MICROALBUR 7.4 01/14/2020    CREATININE 0.92 10/30/2020     Imaging Results (Most Recent)     None                Assessment and Plan:    Diagnoses and all orders for this visit:    1. Uncontrolled type 2 diabetes mellitus with hyperglycemia (CMS/[facility]) (Primary)  -     TSH; Future  -     T4, Free; Future  -     Hemoglobin A1c; Future  -     Basic Metabolic Panel; Future  -     Lipid Panel; Future  -     Vitamin B12 & Folate; Future  -     Vitamin D 25 Hydroxy; Future    2. Dyslipidemia  -     TSH; Future  -     T4, Free; Future  -     Hemoglobin A1c; Future  -     Basic Metabolic Panel; Future  -     Lipid Panel; Future  -     Vitamin B12 & Folate; Future  -     Vitamin D 25 Hydroxy; Future    3. Morbid obesity due to excess calories (CMS/HCC)  -     TSH; Future  -     T4, Free; Future  -     Hemoglobin A1c; Future  -     Basic Metabolic Panel; Future  -     Lipid Panel; Future  -     Vitamin B12 & Folate; Future  -     Vitamin D 25 Hydroxy; Future    Other orders  -     Dulaglutide (Trulicity) 1.5 MG/0.5ML solution pen-injector; Inject 1.5 mg under the skin into the appropriate area as directed 1 (One) Time Per Week.  Dispense: 2 mL;  "Refill: 11  -     Continuous Blood Gluc  (FreeStyle Nora 2 [place] Systm) device; 1 Device Daily.  Dispense: 1 Device; Refill: 0  -     Continuous Blood Gluc Sensor (FreeStyle Nora 2 Sensor Systm) misc; 1 Device Every 14 (Fourteen) Days.  Dispense: 2 each; Refill: 3        Type 2 diabetes mellitus-poorly controlled  Suspect that his blood sugars would be severely elevated  Continue Farxiga, Janumet, Toujeo  Add Trulicity  Discontinue pioglitazone  Placed continuous glucose monitoring on the patient to further assess his blood glucose trends and make further adjustments to his medications    Morbid obesity  Discussed with the patient about the importance of sleep study  Gave handout in AVS about calorie counting and exercise regimen to help with the weight loss.    Hyperlipidemia  Continue Lipitor and fenofibrate  Okay to stop the fish oil  Reviewed Lab results with the patient.               Yajaira Garcia MD  12/18/20    EMR Dragon / transcription disclaimer:     \"Dictated utilizing Dragon dictation\".      "

## 2020-12-18 NOTE — PATIENT INSTRUCTIONS
Continue tresiba 175 units daily.   Continue farxiga nad Janumet.   Stop the pioglitazone    Add trulicity 1.5 mg subq once a week.       Cholesterol   Continue lipitor and fenofibrate and stop the fish oil.

## 2020-12-19 LAB
HBA1C MFR BLD: 10.2 % (ref 4.8–5.6)
T4 FREE SERPL-MCNC: 1.45 NG/DL (ref 0.93–1.7)
TSH SERPL DL<=0.005 MIU/L-ACNC: 0.96 UIU/ML (ref 0.27–4.2)

## 2020-12-23 RX ORDER — DULOXETIN HYDROCHLORIDE 60 MG/1
CAPSULE, DELAYED RELEASE ORAL
Qty: 30 CAPSULE | Refills: 0 | Status: SHIPPED | OUTPATIENT
Start: 2020-12-23 | End: 2021-02-18 | Stop reason: SDUPTHER

## 2021-01-03 RX ORDER — QUETIAPINE FUMARATE 25 MG/1
TABLET, FILM COATED ORAL
Qty: 90 TABLET | Refills: 0 | OUTPATIENT
Start: 2021-01-03

## 2021-01-04 RX ORDER — LEVOTHYROXINE SODIUM 0.07 MG/1
TABLET ORAL
Qty: 90 TABLET | Refills: 0 | Status: SHIPPED | OUTPATIENT
Start: 2021-01-04 | End: 2021-04-12

## 2021-01-05 RX ORDER — QUETIAPINE FUMARATE 25 MG/1
TABLET, FILM COATED ORAL
Qty: 90 TABLET | Refills: 0 | OUTPATIENT
Start: 2021-01-05

## 2021-01-06 RX ORDER — QUETIAPINE FUMARATE 25 MG/1
TABLET, FILM COATED ORAL
Qty: 90 TABLET | Refills: 0 | OUTPATIENT
Start: 2021-01-06

## 2021-01-07 ENCOUNTER — PRIOR AUTHORIZATION (OUTPATIENT)
Dept: ENDOCRINOLOGY | Age: 61
End: 2021-01-07

## 2021-01-07 ENCOUNTER — OFFICE VISIT (OUTPATIENT)
Dept: INTERNAL MEDICINE | Facility: CLINIC | Age: 61
End: 2021-01-07

## 2021-01-07 VITALS
HEART RATE: 80 BPM | WEIGHT: 266.8 LBS | HEIGHT: 71 IN | TEMPERATURE: 96.6 F | DIASTOLIC BLOOD PRESSURE: 80 MMHG | OXYGEN SATURATION: 98 % | BODY MASS INDEX: 37.35 KG/M2 | RESPIRATION RATE: 16 BRPM | SYSTOLIC BLOOD PRESSURE: 118 MMHG

## 2021-01-07 DIAGNOSIS — E78.1 ESSENTIAL HYPERTRIGLYCERIDEMIA: ICD-10-CM

## 2021-01-07 DIAGNOSIS — E11.9 TYPE 2 DIABETES MELLITUS WITHOUT COMPLICATION, WITHOUT LONG-TERM CURRENT USE OF INSULIN (HCC): ICD-10-CM

## 2021-01-07 DIAGNOSIS — G89.29 CHRONIC BILATERAL LOW BACK PAIN WITHOUT SCIATICA: ICD-10-CM

## 2021-01-07 DIAGNOSIS — G47.33 OSA ON CPAP: ICD-10-CM

## 2021-01-07 DIAGNOSIS — E78.5 DYSLIPIDEMIA: ICD-10-CM

## 2021-01-07 DIAGNOSIS — M54.50 CHRONIC BILATERAL LOW BACK PAIN WITHOUT SCIATICA: ICD-10-CM

## 2021-01-07 DIAGNOSIS — I10 BENIGN ESSENTIAL HTN: Primary | ICD-10-CM

## 2021-01-07 DIAGNOSIS — Z99.89 OSA ON CPAP: ICD-10-CM

## 2021-01-07 PROCEDURE — 99214 OFFICE O/P EST MOD 30 MIN: CPT | Performed by: INTERNAL MEDICINE

## 2021-01-07 RX ORDER — QUETIAPINE FUMARATE 25 MG/1
25 TABLET, FILM COATED ORAL NIGHTLY
Qty: 90 TABLET | Refills: 0 | Status: SHIPPED | OUTPATIENT
Start: 2021-01-07 | End: 2021-04-05 | Stop reason: SDUPTHER

## 2021-01-07 NOTE — PROGRESS NOTES
"Teresa Latif is a 60 y.o. male.  The patient presents with a chief complaint of essential hypertension, hypertriglyceridemia, dyslipidemia, type 2 diabetes mellitus, obstructive sleep apnea on CPAP, chronic low back pain secondary to lumbar disc disease and advanced arthritis of the lumbar spine here for follow-up evaluation and treatment.  The patient has had long-term difficulty sleeping which apparently is amenable to taking Seroquel 25 mg at night without any deleterious side effects.      /80 (BP Location: Left arm, Patient Position: Sitting, Cuff Size: Adult)   Pulse 80   Temp 96.6 °F (35.9 °C) (Oral)   Resp 16   Ht 180.3 cm (71\")   Wt 121 kg (266 lb 12.8 oz)   SpO2 98%   BMI 37.21 kg/m²     Body mass index is 37.21 kg/m².    History of Present Illness chronic issues with ongoing back pain    The following portions of the patient's history were reviewed and updated as appropriate: allergies, current medications, past family history, past medical history, past social history, past surgical history and problem list.    Review of Systems   Constitutional: Negative.    HENT: Negative.    Respiratory: Negative.    Cardiovascular: Negative.    Gastrointestinal: Negative.    Musculoskeletal: Positive for arthralgias and back pain.   Neurological: Positive for numbness.   Psychiatric/Behavioral: Positive for sleep disturbance.       Objective   Physical Exam  Vitals signs and nursing note reviewed.   Constitutional:       Appearance: Normal appearance.   HENT:      Head: Normocephalic and atraumatic.   Cardiovascular:      Rate and Rhythm: Normal rate and regular rhythm.      Heart sounds: Normal heart sounds.   Pulmonary:      Effort: Pulmonary effort is normal.      Breath sounds: Normal breath sounds.   Abdominal:      General: Abdomen is flat. Bowel sounds are normal.      Palpations: Abdomen is soft.   Musculoskeletal:      Comments: Chronic moderate to severe low back pain secondary " to lumbar disc disease and advanced arthritis of his lumbar spine   Neurological:      General: No focal deficit present.      Mental Status: He is alert and oriented to person, place, and time.      Comments: Patient has a neuropathy of his feet that includes an odd combination of numbness and burning pain at the same time   Psychiatric:         Mood and Affect: Mood normal.         Behavior: Behavior normal.           Assessment/Plan   Diagnoses and all orders for this visit:    1. Benign essential HTN (Primary)  Comments:  Well-controlled no change in regimen warranted    2. Essential hypertriglyceridemia  Comments:  Continue current treatment regimen    3. Dyslipidemia  Comments:  No change in therapy required at this time continue atorvastatin    4. Type 2 diabetes mellitus without complication, without long-term current use of insulin (CMS/[facility])  Comments:  Continue work with endocrinology    5. BC on CPAP  Comments:  Very compliant according to the patient with his CPAP machine    6. Chronic bilateral low back pain without sciatica  Comments:  I am referring the patient to pain management for long-term pain care  Orders:  -     Ambulatory Referral to Pain Management    Other orders  -     QUEtiapine (SEROquel) 25 MG tablet; Take 1 tablet by mouth Every Night.  Dispense: 90 tablet; Refill: 0

## 2021-01-11 DIAGNOSIS — G89.4 CHRONIC PAIN SYNDROME: ICD-10-CM

## 2021-01-11 RX ORDER — OXYCODONE AND ACETAMINOPHEN 10; 325 MG/1; MG/1
1 TABLET ORAL EVERY 8 HOURS PRN
Qty: 90 TABLET | Refills: 0 | Status: SHIPPED | OUTPATIENT
Start: 2021-01-11 | End: 2021-02-04 | Stop reason: SDUPTHER

## 2021-01-11 NOTE — TELEPHONE ENCOUNTER
Pt calling for a refill on his Oxycodone-acetaminophen 10/325mg.  Please send to [facility] [address] [place] [address], [place] - [address] AT [facility] [address] - [phone]  - [phone] FX    Last refill 12/15/2020  Last OV  1/7/2021

## 2021-01-12 ENCOUNTER — TREATMENT (OUTPATIENT)
Dept: ENDOCRINOLOGY | Age: 61
End: 2021-01-12

## 2021-01-12 DIAGNOSIS — E11.65 UNCONTROLLED TYPE 2 DIABETES MELLITUS WITH HYPERGLYCEMIA (HCC): ICD-10-CM

## 2021-01-12 PROCEDURE — 95250 CONT GLUC MNTR PHYS/QHP EQP: CPT | Performed by: INTERNAL MEDICINE

## 2021-01-12 PROCEDURE — 95251 CONT GLUC MNTR ANALYSIS I&R: CPT | Performed by: INTERNAL MEDICINE

## 2021-01-12 NOTE — PROGRESS NOTES
A continuous glucose monitor (Freestyle Nora Pro CGM) was placed on 12/18/20. The device was activated, and the patient was educated on proper use of the device. The patient will return in 14 days for removal of the device and interpretation of the results.    Continues glucose monitoring data    Patient wore continuous glucose monitoring-free style nora Pro from December 18-January 1  Average blood glucose reading was 214  Estimated HbA1c 8-9%  Likelihood of low blood glucose levels was low   likelihood of high blood glucose levels was high  Blood glucose trends showed consistent high blood sugars throughout the day with no significant peaks    Current treatment regimen   Tresiba 175 units daily  Farxiga and Janumet  Trulicity once a week    Changes to the treatment regimen  Change Tresiba to Toujeo  Start Toujeo 100 units in the morning and 90 units at bedtime  Continue Farxiga and Janumet  Continue Trulicity    Please call the patient about these changes and change the insulin regimen of the prescriptions.  Please discontinue the Tresiba when Toujeo is being sent in for the patient.

## 2021-01-18 NOTE — PROGRESS NOTES
Called and spoke with patient about the freestyle mazin pro cgm results and medication change  Patient voice understanding  Patient will finish up his tresiba and will let us know he is ready for he toujeo

## 2021-01-20 RX ORDER — HYDROCHLOROTHIAZIDE 25 MG/1
TABLET ORAL
Qty: 90 TABLET | Refills: 0 | Status: SHIPPED | OUTPATIENT
Start: 2021-01-20 | End: 2021-04-26 | Stop reason: SDUPTHER

## 2021-01-29 RX ORDER — BLOOD SUGAR DIAGNOSTIC
STRIP MISCELLANEOUS
Qty: 150 EACH | Refills: 11 | Status: SHIPPED | OUTPATIENT
Start: 2021-01-29

## 2021-01-29 RX ORDER — LANCETS
EACH MISCELLANEOUS
Qty: 100 EACH | Refills: 10 | Status: SHIPPED | OUTPATIENT
Start: 2021-01-29

## 2021-02-01 RX ORDER — SITAGLIPTIN AND METFORMIN HYDROCHLORIDE 1000; 50 MG/1; MG/1
TABLET, FILM COATED, EXTENDED RELEASE ORAL
Qty: 180 TABLET | Refills: 1 | Status: SHIPPED | OUTPATIENT
Start: 2021-02-01 | End: 2021-04-28

## 2021-02-02 RX ORDER — METOPROLOL SUCCINATE 50 MG/1
TABLET, EXTENDED RELEASE ORAL
Qty: 90 TABLET | Refills: 0 | OUTPATIENT
Start: 2021-02-02

## 2021-02-04 ENCOUNTER — OFFICE VISIT (OUTPATIENT)
Dept: FAMILY MEDICINE CLINIC | Facility: CLINIC | Age: 61
End: 2021-02-04

## 2021-02-04 VITALS
OXYGEN SATURATION: 97 % | HEART RATE: 95 BPM | TEMPERATURE: 96 F | SYSTOLIC BLOOD PRESSURE: 132 MMHG | WEIGHT: 258 LBS | RESPIRATION RATE: 15 BRPM | DIASTOLIC BLOOD PRESSURE: 80 MMHG | BODY MASS INDEX: 35.98 KG/M2

## 2021-02-04 DIAGNOSIS — I10 BENIGN ESSENTIAL HTN: ICD-10-CM

## 2021-02-04 DIAGNOSIS — G89.29 CHRONIC PAIN OF LEFT KNEE: ICD-10-CM

## 2021-02-04 DIAGNOSIS — G89.4 CHRONIC PAIN SYNDROME: ICD-10-CM

## 2021-02-04 DIAGNOSIS — G89.29 CHRONIC PAIN OF BOTH SHOULDERS: Primary | ICD-10-CM

## 2021-02-04 DIAGNOSIS — Z23 NEED FOR VACCINATION: ICD-10-CM

## 2021-02-04 DIAGNOSIS — M25.562 CHRONIC PAIN OF LEFT KNEE: ICD-10-CM

## 2021-02-04 DIAGNOSIS — M25.511 CHRONIC PAIN OF BOTH SHOULDERS: Primary | ICD-10-CM

## 2021-02-04 DIAGNOSIS — M25.512 CHRONIC PAIN OF BOTH SHOULDERS: Primary | ICD-10-CM

## 2021-02-04 PROCEDURE — 99214 OFFICE O/P EST MOD 30 MIN: CPT | Performed by: FAMILY MEDICINE

## 2021-02-04 RX ORDER — OXYCODONE AND ACETAMINOPHEN 10; 325 MG/1; MG/1
1 TABLET ORAL EVERY 8 HOURS PRN
Qty: 90 TABLET | Refills: 0 | Status: SHIPPED | OUTPATIENT
Start: 2021-02-04 | End: 2021-03-05 | Stop reason: SDUPTHER

## 2021-02-04 RX ORDER — LISINOPRIL 10 MG/1
10 TABLET ORAL DAILY
Qty: 90 TABLET | Refills: 1 | Status: SHIPPED | OUTPATIENT
Start: 2021-02-04 | End: 2021-08-18

## 2021-02-04 NOTE — PROGRESS NOTES
Chief Complaint  Establish Care and Med Refill    Subjective    History of Present Illness {CC  Problem List  Visit  Diagnosis   Encounters  Notes  Medications  Labs  Result Review Imaging  Media :23}     Aaron Latif presents to [facility] for Establish Care and Med Refill.  History of Present Illness     Here today to establish care. Previously seen by Dr. Grant Benitez. Will be moving to [place] to live closer to his son in about 6 months time. Hoping for provision of primary care until that time.    Has longstanding history of obesity and diabetes. Is currently on several medications to treat his diabetes, managed by an endocrinologist. Feels that his diabetes is under decent control at this time, though notes that he is still working on diet. No recent bouts of hypoglycemia. Denies any dizziness, chest pain, shortness of breath.    Has a longstanding history of chronic pain with osteoarthritis in bilateral knees. He has had 3 surgeries on his left knee with total knee replacement. Feels that his right knee is starting to hurt more frequently, likely due to changed gait with his left knee surgeries. Also has chronic bilateral shoulder pain. Sounds like it is likely rotator cuff related by history. Has pain in anterior shoulders bilaterally with radiation into the posterior doubt as our visit types. Difficult for him to abduct past 90 degrees. Has weakness with internal and external rotation. Has done well with shoulder injections in the past is at home by himself most days, does not get much activity. Is not doing any specific shoulder exercises. Also has some chronic low back pain. Denies doing any sort of exercises to help with any of this.    Blood pressure is well controlled today, though he is on an interesting regimen. Is taking amlodipine, hydrochlorothiazide, and metoprolol. Was previously on lisinopril, not sure why he was stopped. This was changed to  metoprolol by his previous endocrinologist. Review of recent labs show normal creatinine and kidney function..    Objective     Vital Signs:   /80   Pulse 95   Temp 96 °F (35.6 °C)   Resp 15   Wt 117 kg (258 lb)   SpO2 97%   BMI 35.98 kg/m²   Physical Exam  Vitals signs and nursing note reviewed.   Constitutional:       General: He is not in acute distress.     Appearance: Normal appearance. He is well-developed. He is not ill-appearing.   HENT:      Right Ear: Tympanic membrane, ear canal and external ear normal.      Left Ear: Tympanic membrane, ear canal and external ear normal.      Nose: Nose normal.      Mouth/Throat:      Mouth: Mucous membranes are moist.      Pharynx: Oropharynx is clear.   Eyes:      Extraocular Movements: Extraocular movements intact.      Conjunctiva/sclera: Conjunctivae normal.      Pupils: Pupils are equal, round, and reactive to light.   Neck:      Musculoskeletal: Normal range of motion and neck supple.   Cardiovascular:      Rate and Rhythm: Normal rate and regular rhythm.      Pulses: Normal pulses.      Heart sounds: Normal heart sounds. No murmur.   Pulmonary:      Effort: Pulmonary effort is normal. No respiratory distress.      Breath sounds: Normal breath sounds. No wheezing.   Abdominal:      General: Bowel sounds are normal. There is no distension.      Palpations: Abdomen is soft.      Tenderness: There is no abdominal tenderness. There is no guarding or rebound.   Musculoskeletal: Normal range of motion.         General: No tenderness.      Comments: Tender in B shoulders anteriorly and posteriorly, decreased ROM due to pain in abduction, flexion, and extension, slightly decreased strength in internal and external rotation due to pain.     Surgical scar over L knee, pain with flexion and extension, no effusion. Joint line tenderness in R knee in both medial and lateral joint line. Strength intact.   Skin:     General: Skin is warm and dry.   Neurological:       General: No focal deficit present.      Mental Status: He is alert and oriented to person, place, and time.      Sensory: No sensory deficit.   Psychiatric:         Mood and Affect: Mood normal.         Behavior: Behavior normal.          Result Review  Data Reviewed:{ Labs  Result Review  Imaging  Med Tab  Media :23}                   Assessment and Plan {CC Problem List  Visit Diagnosis  ROS  Review (Popup)  Health Maintenance  Quality  BestPractice  Medications  SmartSets  SnapShot Encounters  Media :23}   Diagnoses and all orders for this visit:    1. Chronic pain of both shoulders (Primary)  -     oxyCODONE-acetaminophen (Percocet)  MG per tablet; Take 1 tablet by mouth Every 8 (Eight) Hours As Needed for Moderate Pain .  Dispense: 90 tablet; Refill: 0    2. Chronic pain syndrome  Comments:  Percocet 10/325  Orders:  -     oxyCODONE-acetaminophen (Percocet)  MG per tablet; Take 1 tablet by mouth Every 8 (Eight) Hours As Needed for Moderate Pain .  Dispense: 90 tablet; Refill: 0    3. Chronic pain of left knee  -     oxyCODONE-acetaminophen (Percocet)  MG per tablet; Take 1 tablet by mouth Every 8 (Eight) Hours As Needed for Moderate Pain .  Dispense: 90 tablet; Refill: 0    4. Benign essential HTN  -     lisinopril (PRINIVIL,ZESTRIL) 10 MG tablet; Take 1 tablet by mouth Daily.  Dispense: 90 tablet; Refill: 1    5. Need for vaccination  -     Cancel: Shingrix Vaccine    Orders as above. We will refill his oxycodone for now. Will continue to discuss the appropriateness of this regimen going forward. Encouraged him to do some exercises with both shoulders as I think this will improve his pain, and I think sitting around is probably exacerbating it somewhat. Encouraged him to be as active as he can be. Also recommended some stationary biking to help with his knees.    Advised that he stop metoprolol and restart lisinopril for better kidney protection and blood pressure  control.    We discussed getting a shingles vaccine however he like to postpone it until after getting the Covid vaccine.    Recommended follow-up as below. Encouraged communication via [employer] in the meantime.      Follow Up {Instructions Charge Capture  Follow-up Communications :23}     Patient was given instructions and counseling regarding his condition or for health maintenance advice. Please see specific information pulled into the AVS (placed there by myself) if appropriate.    Return in about 2 months (around 4/4/2021).      JAQUAN Mendez MD

## 2021-02-04 NOTE — PATIENT INSTRUCTIONS
[facility] - COVID page      Shoulder Exercises  Ask your health care provider which exercises are safe for you. Do exercises exactly as told by your health care provider and adjust them as directed. It is normal to feel mild stretching, pulling, tightness, or discomfort as you do these exercises. Stop right away if you feel sudden pain or your pain gets worse. Do not begin these exercises until told by your health care provider.  Stretching exercises  External rotation and abduction  This exercise is sometimes called corner stretch. This exercise rotates your arm outward (external rotation) and moves your arm out from your body (abduction).  1.  a doorway with one of your feet slightly in front of the other. This is called a staggered stance. If you cannot reach your forearms to the door frame, stand facing a corner of a room.  2. Choose one of the following positions as told by your health care provider:  ? Place your hands and forearms on the door frame above your head.  ? Place your hands and forearms on the door frame at the height of your head.  ? Place your hands on the door frame at the height of your elbows.  3. Slowly move your weight onto your front foot until you feel a stretch across your chest and in the front of your shoulders. Keep your head and chest upright and keep your abdominal muscles tight.  4. Hold for __________ seconds.  5. To release the stretch, shift your weight to your back foot.  Repeat __________ times. Complete this exercise __________ times a day.  Extension, standing  1. Stand and hold a broomstick, a cane, or a similar object behind your back.  ? Your hands should be a little wider than shoulder width apart.  ? Your palms should face away from your back.  2. Keeping your elbows straight and your shoulder muscles relaxed, move the stick away from your body until you feel a stretch in your shoulders (extension).  ? Avoid shrugging your shoulders while you move the  stick. Keep your shoulder blades tucked down toward the middle of your back.  3. Hold for __________ seconds.  4. Slowly return to the starting position.  Repeat __________ times. Complete this exercise __________ times a day.  Range-of-motion exercises  Pendulum    1. Stand near a wall or a surface that you can hold onto for balance.  2. Bend at the waist and let your left / right arm hang straight down. Use your other arm to support you. Keep your back straight and do not lock your knees.  3. Relax your left / right arm and shoulder muscles, and move your hips and your trunk so your left / right arm swings freely. Your arm should swing because of the motion of your body, not because you are using your arm or shoulder muscles.  4. Keep moving your hips and trunk so your arm swings in the following directions, as told by your health care provider:  ? Side to side.  ? Forward and backward.  ? In clockwise and counterclockwise circles.  5. Continue each motion for __________ seconds, or for as long as told by your health care provider.  6. Slowly return to the starting position.  Repeat __________ times. Complete this exercise __________ times a day.  Shoulder flexion, standing    1. Stand and hold a broomstick, a cane, or a similar object. Place your hands a little more than shoulder width apart on the object. Your left / right hand should be palm up, and your other hand should be palm down.  2. Keep your elbow straight and your shoulder muscles relaxed. Push the stick up with your healthy arm to raise your left / right arm in front of your body, and then over your head until you feel a stretch in your shoulder (flexion).  ? Avoid shrugging your shoulder while you raise your arm. Keep your shoulder blade tucked down toward the middle of your back.  3. Hold for __________ seconds.  4. Slowly return to the starting position.  Repeat __________ times. Complete this exercise __________ times a day.  Shoulder abduction,  standing  1. Stand and hold a broomstick, a cane, or a similar object. Place your hands a little more than shoulder width apart on the object. Your left / right hand should be palm up, and your other hand should be palm down.  2. Keep your elbow straight and your shoulder muscles relaxed. Push the object across your body toward your left / right side. Raise your left / right arm to the side of your body (abduction) until you feel a stretch in your shoulder.  ? Do not raise your arm above shoulder height unless your health care provider tells you to do that.  ? If directed, raise your arm over your head.  ? Avoid shrugging your shoulder while you raise your arm. Keep your shoulder blade tucked down toward the middle of your back.  3. Hold for __________ seconds.  4. Slowly return to the starting position.  Repeat __________ times. Complete this exercise __________ times a day.  Internal rotation    1. Place your left / right hand behind your back, palm up.  2. Use your other hand to dangle an exercise band, a towel, or a similar object over your shoulder. Grasp the band with your left / right hand so you are holding on to both ends.  3. Gently pull up on the band until you feel a stretch in the front of your left / right shoulder. The movement of your arm toward the center of your body is called internal rotation.  ? Avoid shrugging your shoulder while you raise your arm. Keep your shoulder blade tucked down toward the middle of your back.  4. Hold for __________ seconds.  5. Release the stretch by letting go of the band and lowering your hands.  Repeat __________ times. Complete this exercise __________ times a day.  Strengthening exercises  External rotation    1. Sit in a stable chair without armrests.  2. Secure an exercise band to a stable object at elbow height on your left / right side.  3. Place a soft object, such as a folded towel or a small pillow, between your left / right upper arm and your body to move  your elbow about 4 inches (10 cm) away from your side.  4. Hold the end of the exercise band so it is tight and there is no slack.  5. Keeping your elbow pressed against the soft object, slowly move your forearm out, away from your abdomen (external rotation). Keep your body steady so only your forearm moves.  6. Hold for __________ seconds.  7. Slowly return to the starting position.  Repeat __________ times. Complete this exercise __________ times a day.  Shoulder abduction    1. Sit in a stable chair without armrests, or stand up.  2. Hold a __________ weight in your left / right hand, or hold an exercise band with both hands.  3. Start with your arms straight down and your left / right palm facing in, toward your body.  4. Slowly lift your left / right hand out to your side (abduction). Do not lift your hand above shoulder height unless your health care provider tells you that this is safe.  ? Keep your arms straight.  ? Avoid shrugging your shoulder while you do this movement. Keep your shoulder blade tucked down toward the middle of your back.  5. Hold for __________ seconds.  6. Slowly lower your arm, and return to the starting position.  Repeat __________ times. Complete this exercise __________ times a day.  Shoulder extension  1. Sit in a stable chair without armrests, or stand up.  2. Secure an exercise band to a stable object in front of you so it is at shoulder height.  3. Hold one end of the exercise band in each hand. Your palms should face each other.  4. Straighten your elbows and lift your hands up to shoulder height.  5. Step back, away from the secured end of the exercise band, until the band is tight and there is no slack.  6. Squeeze your shoulder blades together as you pull your hands down to the sides of your thighs (extension). Stop when your hands are straight down by your sides. Do not let your hands go behind your body.  7. Hold for __________ seconds.  8. Slowly return to the starting  position.  Repeat __________ times. Complete this exercise __________ times a day.  Shoulder row  1. Sit in a stable chair without armrests, or stand up.  2. Secure an exercise band to a stable object in front of you so it is at waist height.  3. Hold one end of the exercise band in each hand. Position your palms so that your thumbs are facing the ceiling (neutral position).  4. Bend each of your elbows to a 90-degree angle (right angle) and keep your upper arms at your sides.  5. Step back until the band is tight and there is no slack.  6. Slowly pull your elbows back behind you.  7. Hold for __________ seconds.  8. Slowly return to the starting position.  Repeat __________ times. Complete this exercise __________ times a day.  Shoulder press-ups    1. Sit in a stable chair that has armrests. Sit upright, with your feet flat on the floor.  2. Put your hands on the armrests so your elbows are bent and your fingers are pointing forward. Your hands should be about even with the sides of your body.  3. Push down on the armrests and use your arms to lift yourself off the chair. Straighten your elbows and lift yourself up as much as you comfortably can.  ? Move your shoulder blades down, and avoid letting your shoulders move up toward your ears.  ? Keep your feet on the ground. As you get stronger, your feet should support less of your body weight as you lift yourself up.  4. Hold for __________ seconds.  5. Slowly lower yourself back into the chair.  Repeat __________ times. Complete this exercise __________ times a day.  Wall push-ups    1. Stand so you are facing a stable wall. Your feet should be about one arm-length away from the wall.  2. Lean forward and place your palms on the wall at shoulder height.  3. Keep your feet flat on the floor as you bend your elbows and lean forward toward the wall.  4. Hold for __________ seconds.  5. Straighten your elbows to push yourself back to the starting position.  Repeat  __________ times. Complete this exercise __________ times a day.  This information is not intended to replace advice given to you by your health care provider. Make sure you discuss any questions you have with your health care provider.  Document Revised: 04/10/2020 Document Reviewed: 01/17/2020  Elsevier Patient Education © 2020 [employer]

## 2021-02-05 ENCOUNTER — PRIOR AUTHORIZATION (OUTPATIENT)
Dept: ENDOCRINOLOGY | Age: 61
End: 2021-02-05

## 2021-02-05 NOTE — TELEPHONE ENCOUNTER
Prior Authorization has been started for patient medication  janumet   mg       Your request has been approved

## 2021-02-16 RX ORDER — DULOXETIN HYDROCHLORIDE 60 MG/1
CAPSULE, DELAYED RELEASE ORAL
Qty: 30 CAPSULE | Refills: 0 | OUTPATIENT
Start: 2021-02-16

## 2021-02-18 RX ORDER — DULOXETIN HYDROCHLORIDE 60 MG/1
60 CAPSULE, DELAYED RELEASE ORAL DAILY
Qty: 90 CAPSULE | Refills: 1 | Status: SHIPPED | OUTPATIENT
Start: 2021-02-18 | End: 2021-06-15 | Stop reason: SDUPTHER

## 2021-03-05 DIAGNOSIS — G89.29 CHRONIC PAIN OF BOTH SHOULDERS: ICD-10-CM

## 2021-03-05 DIAGNOSIS — M25.512 CHRONIC PAIN OF BOTH SHOULDERS: ICD-10-CM

## 2021-03-05 DIAGNOSIS — M25.511 CHRONIC PAIN OF BOTH SHOULDERS: ICD-10-CM

## 2021-03-05 DIAGNOSIS — G89.4 CHRONIC PAIN SYNDROME: ICD-10-CM

## 2021-03-05 DIAGNOSIS — G89.29 CHRONIC PAIN OF LEFT KNEE: ICD-10-CM

## 2021-03-05 DIAGNOSIS — M25.562 CHRONIC PAIN OF LEFT KNEE: ICD-10-CM

## 2021-03-05 RX ORDER — OXYCODONE AND ACETAMINOPHEN 10; 325 MG/1; MG/1
1 TABLET ORAL EVERY 8 HOURS PRN
Qty: 90 TABLET | Refills: 0 | Status: SHIPPED | OUTPATIENT
Start: 2021-03-05 | End: 2021-04-05 | Stop reason: SDUPTHER

## 2021-03-11 ENCOUNTER — LAB (OUTPATIENT)
Dept: ENDOCRINOLOGY | Age: 61
End: 2021-03-11

## 2021-03-11 DIAGNOSIS — E78.5 DYSLIPIDEMIA: ICD-10-CM

## 2021-03-11 DIAGNOSIS — E66.01 MORBID OBESITY DUE TO EXCESS CALORIES (HCC): ICD-10-CM

## 2021-03-11 DIAGNOSIS — E11.65 UNCONTROLLED TYPE 2 DIABETES MELLITUS WITH HYPERGLYCEMIA (HCC): ICD-10-CM

## 2021-03-12 LAB
25(OH)D3+25(OH)D2 SERPL-MCNC: 58.7 NG/ML (ref 30–100)
BUN SERPL-MCNC: 11 MG/DL (ref 8–23)
BUN/CREAT SERPL: 12.1 (ref 7–25)
CALCIUM SERPL-MCNC: 10.2 MG/DL (ref 8.6–10.5)
CHLORIDE SERPL-SCNC: 94 MMOL/L (ref 98–107)
CHOLEST SERPL-MCNC: 108 MG/DL (ref 0–200)
CO2 SERPL-SCNC: 28 MMOL/L (ref 22–29)
CREAT SERPL-MCNC: 0.91 MG/DL (ref 0.76–1.27)
FOLATE SERPL-MCNC: 12.4 NG/ML (ref 4.78–24.2)
GLUCOSE SERPL-MCNC: 415 MG/DL (ref 65–99)
HBA1C MFR BLD: 11.2 % (ref 4.8–5.6)
HDLC SERPL-MCNC: 28 MG/DL (ref 40–60)
IMP & REVIEW OF LAB RESULTS: NORMAL
LDLC SERPL CALC-MCNC: 33 MG/DL (ref 0–100)
Lab: NORMAL
POTASSIUM SERPL-SCNC: 4.9 MMOL/L (ref 3.5–5.2)
SODIUM SERPL-SCNC: 136 MMOL/L (ref 136–145)
T4 FREE SERPL-MCNC: 1.4 NG/DL (ref 0.93–1.7)
TRIGL SERPL-MCNC: 315 MG/DL (ref 0–150)
TSH SERPL DL<=0.005 MIU/L-ACNC: 1.41 UIU/ML (ref 0.27–4.2)
VIT B12 SERPL-MCNC: 434 PG/ML (ref 211–946)
VLDLC SERPL CALC-MCNC: 47 MG/DL (ref 5–40)

## 2021-03-22 ENCOUNTER — BULK ORDERING (OUTPATIENT)
Dept: CASE MANAGEMENT | Facility: OTHER | Age: 61
End: 2021-03-22

## 2021-03-22 DIAGNOSIS — Z23 IMMUNIZATION DUE: ICD-10-CM

## 2021-03-24 DIAGNOSIS — R79.89 LOW TESTOSTERONE: ICD-10-CM

## 2021-03-24 DIAGNOSIS — R53.82 CHRONIC FATIGUE: ICD-10-CM

## 2021-03-24 DIAGNOSIS — E66.01 MORBID OBESITY DUE TO EXCESS CALORIES (HCC): ICD-10-CM

## 2021-03-24 DIAGNOSIS — E55.9 VITAMIN D DEFICIENCY: ICD-10-CM

## 2021-03-24 DIAGNOSIS — N52.8 OTHER MALE ERECTILE DYSFUNCTION: ICD-10-CM

## 2021-03-24 DIAGNOSIS — E78.5 DYSLIPIDEMIA: ICD-10-CM

## 2021-03-24 NOTE — TELEPHONE ENCOUNTER
[facility] prescription benefits called   usin fenofibrate 130 mg capsules  As of April 1st has to switch to a different     strength  fenofibrate  134 mg    Patient needs a script today for fenofibrate 134 mg  Send to [facility]   90 day supply    Patient is just about out

## 2021-03-25 RX ORDER — FENOFIBRATE 134 MG/1
CAPSULE ORAL
Qty: 90 CAPSULE | Refills: 0 | Status: SHIPPED | OUTPATIENT
Start: 2021-03-25 | End: 2021-04-05

## 2021-03-27 DIAGNOSIS — R53.82 CHRONIC FATIGUE: ICD-10-CM

## 2021-03-27 DIAGNOSIS — E55.9 VITAMIN D DEFICIENCY: ICD-10-CM

## 2021-03-27 DIAGNOSIS — E78.5 DYSLIPIDEMIA: ICD-10-CM

## 2021-03-27 DIAGNOSIS — R79.89 LOW TESTOSTERONE: ICD-10-CM

## 2021-03-27 DIAGNOSIS — N52.8 OTHER MALE ERECTILE DYSFUNCTION: ICD-10-CM

## 2021-03-27 DIAGNOSIS — E66.01 MORBID OBESITY DUE TO EXCESS CALORIES (HCC): ICD-10-CM

## 2021-04-05 DIAGNOSIS — M25.512 CHRONIC PAIN OF BOTH SHOULDERS: ICD-10-CM

## 2021-04-05 DIAGNOSIS — M25.562 CHRONIC PAIN OF LEFT KNEE: ICD-10-CM

## 2021-04-05 DIAGNOSIS — G89.29 CHRONIC PAIN OF BOTH SHOULDERS: ICD-10-CM

## 2021-04-05 DIAGNOSIS — G89.29 CHRONIC PAIN OF LEFT KNEE: ICD-10-CM

## 2021-04-05 DIAGNOSIS — M25.511 CHRONIC PAIN OF BOTH SHOULDERS: ICD-10-CM

## 2021-04-05 DIAGNOSIS — G89.4 CHRONIC PAIN SYNDROME: ICD-10-CM

## 2021-04-05 RX ORDER — AMLODIPINE BESYLATE 10 MG/1
TABLET ORAL
Qty: 90 TABLET | Refills: 2 | OUTPATIENT
Start: 2021-04-05

## 2021-04-05 RX ORDER — ATORVASTATIN CALCIUM 20 MG/1
TABLET, FILM COATED ORAL
Qty: 90 TABLET | Refills: 2 | Status: SHIPPED | OUTPATIENT
Start: 2021-04-05

## 2021-04-05 RX ORDER — DAPAGLIFLOZIN 10 MG/1
TABLET, FILM COATED ORAL
Qty: 90 TABLET | Refills: 2 | Status: SHIPPED | OUTPATIENT
Start: 2021-04-05 | End: 2022-01-04

## 2021-04-05 RX ORDER — QUETIAPINE FUMARATE 25 MG/1
25 TABLET, FILM COATED ORAL NIGHTLY
Qty: 90 TABLET | Refills: 0 | Status: SHIPPED | OUTPATIENT
Start: 2021-04-05 | End: 2021-06-15 | Stop reason: SDUPTHER

## 2021-04-05 RX ORDER — FENOFIBRATE 130 MG/1
CAPSULE ORAL
Qty: 90 CAPSULE | Refills: 2 | Status: SHIPPED | OUTPATIENT
Start: 2021-04-05 | End: 2021-07-14 | Stop reason: SDUPTHER

## 2021-04-05 RX ORDER — OXYCODONE AND ACETAMINOPHEN 10; 325 MG/1; MG/1
1 TABLET ORAL EVERY 8 HOURS PRN
Qty: 90 TABLET | Refills: 0 | Status: SHIPPED | OUTPATIENT
Start: 2021-04-05 | End: 2021-05-05 | Stop reason: SDUPTHER

## 2021-04-05 RX ORDER — AMLODIPINE BESYLATE 10 MG/1
10 TABLET ORAL DAILY
Qty: 90 TABLET | Refills: 3 | Status: SHIPPED | OUTPATIENT
Start: 2021-04-05

## 2021-04-08 ENCOUNTER — OFFICE VISIT (OUTPATIENT)
Dept: FAMILY MEDICINE CLINIC | Facility: CLINIC | Age: 61
End: 2021-04-08

## 2021-04-08 VITALS
BODY MASS INDEX: 34.45 KG/M2 | RESPIRATION RATE: 16 BRPM | OXYGEN SATURATION: 98 % | HEART RATE: 110 BPM | WEIGHT: 247 LBS | TEMPERATURE: 97.7 F | SYSTOLIC BLOOD PRESSURE: 110 MMHG | DIASTOLIC BLOOD PRESSURE: 60 MMHG

## 2021-04-08 DIAGNOSIS — E11.65 UNCONTROLLED TYPE 2 DIABETES MELLITUS WITH HYPERGLYCEMIA (HCC): ICD-10-CM

## 2021-04-08 DIAGNOSIS — I10 BENIGN ESSENTIAL HTN: Primary | ICD-10-CM

## 2021-04-08 DIAGNOSIS — E66.09 CLASS 2 OBESITY DUE TO EXCESS CALORIES WITHOUT SERIOUS COMORBIDITY WITH BODY MASS INDEX (BMI) OF 35.0 TO 35.9 IN ADULT: ICD-10-CM

## 2021-04-08 PROCEDURE — 99214 OFFICE O/P EST MOD 30 MIN: CPT | Performed by: FAMILY MEDICINE

## 2021-04-08 NOTE — PROGRESS NOTES
Chief Complaint  Med Management    Subjective    History of Present Illness {CC  Problem List  Visit  Diagnosis   Encounters  Notes  Medications  Labs  Result Review Imaging  Media :23}     Aaron Latif presents to [facility] for Med Management.  History of Present Illness     Here today with some questions about med management.  Has been working very hard on getting himself in better shape and is recently lost about 40 pounds.  Wondering about possibly adjusting his antihypertensive and diabetic regimen.  Blood pressure is indeed overcontrolled today even with a high pulse.  Blood sugars have been slowly running lower.  A1c was 11.2 at last check, just a month or so ago.  Has appointment with endocrinology upcoming.    Objective     Vital Signs:   /60   Pulse 110   Temp 97.7 °F (36.5 °C)   Resp 16   Wt 112 kg (247 lb)   SpO2 98%   BMI 34.45 kg/m²   Physical Exam  Vitals and nursing note reviewed.   Constitutional:       General: He is not in acute distress.     Appearance: Normal appearance. He is not ill-appearing.   Cardiovascular:      Rate and Rhythm: Normal rate and regular rhythm.      Pulses: Normal pulses.      Heart sounds: Normal heart sounds. No murmur heard.     Pulmonary:      Effort: Pulmonary effort is normal. No respiratory distress.      Breath sounds: Normal breath sounds. No rales.   Neurological:      Mental Status: He is alert and oriented to person, place, and time. Mental status is at baseline.   Psychiatric:         Mood and Affect: Mood normal.         Behavior: Behavior normal.          Result Review  Data Reviewed:{ Labs  Result Review  Imaging  Med Tab  Media :23}                   Assessment and Plan {CC Problem List  Visit Diagnosis  ROS  Review (Popup)  Health Maintenance  Quality  BestPractice  Medications  SmartSets  SnapShot Encounters  Media :23}   Diagnoses and all orders for this visit:    1. Benign essential  HTN (Primary)    2. Uncontrolled type 2 diabetes mellitus with hyperglycemia (CMS/[facility])    3. Class 2 obesity due to excess calories without serious comorbidity with body mass index (BMI) of 35.0 to 35.9 in adult    Encouraged to continue with lifestyle modification as he is making great progress.  Suggested that he could probably taper off of his hydrochlorothiazide safely as long as he continues to monitor his blood pressure.  Would defer to endocrinology as to adjustment of his diabetic regimen.  Would suggest that he gets an A1c checked in another month to check his progress.  Hopefully he will be able to come off of 1 or more of his meds in the future.    Recommended follow-up as below.  Encouraged communication via [employer] meantime.      Follow Up {Instructions Charge Capture  Follow-up Communications :23}     Patient was given instructions and counseling regarding his condition or for health maintenance advice. Please see specific information pulled into the AVS (placed there by myself) if appropriate.    Return in about 2 months (around 6/8/2021), or if symptoms worsen or fail to improve, for Preventive Health Maintenance.      JAQUAN Mendez MD

## 2021-04-12 DIAGNOSIS — G89.4 CHRONIC PAIN SYNDROME: ICD-10-CM

## 2021-04-12 RX ORDER — LEVOTHYROXINE SODIUM 0.07 MG/1
TABLET ORAL
Qty: 90 TABLET | Refills: 1 | Status: SHIPPED | OUTPATIENT
Start: 2021-04-12 | End: 2021-10-01

## 2021-04-12 RX ORDER — PREGABALIN 200 MG/1
200 CAPSULE ORAL 3 TIMES DAILY
Qty: 90 CAPSULE | Refills: 4 | Status: SHIPPED | OUTPATIENT
Start: 2021-04-12 | End: 2021-06-15 | Stop reason: SDUPTHER

## 2021-04-24 DIAGNOSIS — E66.01 MORBID OBESITY DUE TO EXCESS CALORIES (HCC): ICD-10-CM

## 2021-04-24 DIAGNOSIS — R53.82 CHRONIC FATIGUE: ICD-10-CM

## 2021-04-24 DIAGNOSIS — N52.8 OTHER MALE ERECTILE DYSFUNCTION: ICD-10-CM

## 2021-04-24 DIAGNOSIS — E55.9 VITAMIN D DEFICIENCY: ICD-10-CM

## 2021-04-24 DIAGNOSIS — E78.5 DYSLIPIDEMIA: ICD-10-CM

## 2021-04-24 DIAGNOSIS — R79.89 LOW TESTOSTERONE: ICD-10-CM

## 2021-04-26 DIAGNOSIS — R53.82 CHRONIC FATIGUE: ICD-10-CM

## 2021-04-26 DIAGNOSIS — N52.8 OTHER MALE ERECTILE DYSFUNCTION: ICD-10-CM

## 2021-04-26 DIAGNOSIS — E78.5 DYSLIPIDEMIA: ICD-10-CM

## 2021-04-26 DIAGNOSIS — R79.89 LOW TESTOSTERONE: ICD-10-CM

## 2021-04-26 DIAGNOSIS — E66.01 MORBID OBESITY DUE TO EXCESS CALORIES (HCC): ICD-10-CM

## 2021-04-26 DIAGNOSIS — E55.9 VITAMIN D DEFICIENCY: ICD-10-CM

## 2021-04-26 RX ORDER — ERGOCALCIFEROL 1.25 MG/1
CAPSULE ORAL
Qty: 26 CAPSULE | Refills: 1 | Status: SHIPPED | OUTPATIENT
Start: 2021-04-26 | End: 2021-04-26 | Stop reason: SDUPTHER

## 2021-04-26 RX ORDER — ERGOCALCIFEROL 1.25 MG/1
50000 CAPSULE ORAL
Qty: 4 CAPSULE | Refills: 0 | Status: SHIPPED | OUTPATIENT
Start: 2021-04-26 | End: 2021-10-29

## 2021-04-26 RX ORDER — HYDROCHLOROTHIAZIDE 25 MG/1
25 TABLET ORAL EVERY MORNING
Qty: 90 TABLET | Refills: 1 | Status: SHIPPED | OUTPATIENT
Start: 2021-04-26

## 2021-04-28 ENCOUNTER — OFFICE VISIT (OUTPATIENT)
Dept: ENDOCRINOLOGY | Age: 61
End: 2021-04-28

## 2021-04-28 ENCOUNTER — MEDICATION THERAPY MANAGEMENT (OUTPATIENT)
Dept: PHARMACY | Facility: HOSPITAL | Age: 61
End: 2021-04-28

## 2021-04-28 VITALS
WEIGHT: 250.2 LBS | BODY MASS INDEX: 35.03 KG/M2 | DIASTOLIC BLOOD PRESSURE: 80 MMHG | SYSTOLIC BLOOD PRESSURE: 122 MMHG | HEIGHT: 71 IN

## 2021-04-28 DIAGNOSIS — Z79.4 TYPE 2 DIABETES MELLITUS WITH HYPERGLYCEMIA, WITH LONG-TERM CURRENT USE OF INSULIN (HCC): Primary | ICD-10-CM

## 2021-04-28 DIAGNOSIS — E11.65 TYPE 2 DIABETES MELLITUS WITH HYPERGLYCEMIA, WITH LONG-TERM CURRENT USE OF INSULIN (HCC): Primary | ICD-10-CM

## 2021-04-28 PROCEDURE — 99214 OFFICE O/P EST MOD 30 MIN: CPT | Performed by: NURSE PRACTITIONER

## 2021-04-28 RX ORDER — INSULIN GLARGINE 300 U/ML
100 INJECTION, SOLUTION SUBCUTANEOUS 2 TIMES DAILY
Qty: 30 ML | Refills: 1 | Status: SHIPPED | OUTPATIENT
Start: 2021-04-28 | End: 2021-05-05

## 2021-04-28 RX ORDER — METFORMIN HYDROCHLORIDE 500 MG/1
1000 TABLET, EXTENDED RELEASE ORAL
Qty: 30 TABLET | Refills: 11 | Status: SHIPPED | OUTPATIENT
Start: 2021-04-28 | End: 2021-06-15 | Stop reason: SDUPTHER

## 2021-04-28 RX ORDER — INSULIN LISPRO-AABC 100 [IU]/ML
10 INJECTION, SOLUTION SUBCUTANEOUS
Qty: 15 ML | Refills: 1 | Status: SHIPPED | OUTPATIENT
Start: 2021-04-28 | End: 2021-06-22

## 2021-04-28 NOTE — PROGRESS NOTES
"Chief Complaint  Diabetes (type 2 diabetes: check blood sugar every morning)    Subjective          Aaron Latif presents to [facility] ENDOCRINOLOGY  History of Present Illness     1/2021 treatment changes made following mazin pro unload :  start Toujeo 100 units in the morning and 90 units at bedtime  Continue Farxiga and Janumet  Continue Trulicity    He has still been taking the tresiba at 170u daily. He would not take the insulin if his blood sugar was 180-200-he is typically skipping at least 2 doses a week.  Patient has a hard time managing his blood sugars because he \"feels good\" and does not think anything is wrong.    Type 2 dm   Diagnosed about more than 10 years ago.   Today in clinic pt reports being on tresiba 170 units daily, farxiga 10 mg po daily, janumet 1 tab two times a day.   Trulicity 1.5 mg weekly  FBG - >200  Checks BG - once daily  Sensor - not interested  Dm retinopathy - no known hx,Last eye exam - upcoming  Dm nephropathy - no  Dm neuropathy - yes,Dm neuropathy meds - on lyrica  CAD -no  CVA -no  Episodes of hypoglycemia - no  Pt is physically active. Weight is down 40pounds over the last 4 months.   Pt tries to follow DM diet for most part.   On Ace inb.  Lab Results   Component Value Date    HGBA1C 11.20 (H) 03/11/2021        Hyperlipidemia - On Lipitor 20 mg po daily   Lab Results   Component Value Date    CHLPL 108 03/11/2021    TRIG 315 (H) 03/11/2021    HDL 28 (L) 03/11/2021    LDL 33 03/11/2021          Hypothyroidism - on levothyroxine 75 mcg oral daily.  Lab Results   Component Value Date    TSH 1.410 03/11/2021         Objective   Vital Signs:   /80 (BP Location: Right arm, Patient Position: Sitting, Cuff Size: Adult)   Ht 180.3 cm (70.98\")   Wt 113 kg (250 lb 3.2 oz)   BMI 34.91 kg/m²     Physical Exam  Vitals reviewed.   Constitutional:       General: He is not in acute distress.  HENT:      Head: Normocephalic and atraumatic.   Cardiovascular:     "  Rate and Rhythm: Normal rate and regular rhythm.   Pulmonary:      Effort: Pulmonary effort is normal. No respiratory distress.   Musculoskeletal:         General: No signs of injury. Normal range of motion.      Cervical back: Normal range of motion and neck supple.   Skin:     General: Skin is warm and dry.   Neurological:      Mental Status: He is alert and oriented to person, place, and time. Mental status is at baseline.   Psychiatric:         Mood and Affect: Mood normal.         Behavior: Behavior normal.         Thought Content: Thought content normal.         Judgment: Judgment normal.        Result Review :   The following data was reviewed by: DORA Stanley on 04/28/2021:  Common labs    Common Labsle 10/30/20 10/30/20 10/30/20 12/18/20 3/11/21 3/11/21 3/11/21    0951 0951 0951  1002 1002 1002   Glucose   118 (A)   415 (A)    BUN   16   11    Creatinine   0.92   0.91    eGFR Non  Am   83   84    eGFR  Am   101   102    Sodium   140   136    Potassium   4.1   4.9    Chloride   99   94 (A)    Calcium   9.8   10.2    Total Protein   7.1       Albumin   4.50       Total Bilirubin   0.5       Alkaline Phosphatase   38 (A)       AST (SGOT)   39       ALT (SGPT)   52 (A)       WBC 8.22         Hemoglobin 15.4         Hematocrit 45.3         Platelets 281         Total Cholesterol     108     Triglycerides     315 (A)     HDL Cholesterol     28 (A)     LDL Cholesterol      33     Hemoglobin A1C  7.70 (A)  10.20 (A)   11.20 (A)   (A) Abnormal value       Comments are available for some flowsheets but are not being displayed.                     Assessment and Plan    Diagnoses and all orders for this visit:    1. Type 2 diabetes mellitus with hyperglycemia, with long-term current use of insulin (CMS/[facility]) (Primary)  -     Insulin Glargine, 2 Unit Dial, (Toujeo Max SoloStar) 300 UNIT/ML solution pen-injector injection; Inject 100 Units under the skin into the appropriate area as directed 2  (two) times a day.  Dispense: 30 mL; Refill: 1  -     metFORMIN ER (Glucophage XR) 500 MG 24 hr tablet; Take 2 tablets by mouth Daily With Breakfast.  Dispense: 30 tablet; Refill: 11  -     Insulin Lispro-aabc, 1 U Dial, (Juanita LindaPascual) 100 UNIT/ML solution pen-injector; Inject 10 Units under the skin into the appropriate area as directed 3 (Three) Times a Day Before Meals.  Dispense: 15 mL; Refill: 1        Follow Up   Return in about 4 weeks (around 5/26/2021).     High risk of diabetic complications given his worsening hyperglycemia  Patient will be meeting with the pharmacist today for education regarding his medications and to help with refill renewals  Change to toujeo 100 units in the morning and 90 units at night and give samples today  Start rapid at meals.  Limit of samples given we will start 10 units before regular size meals, 5 units if blood sugars less than 100 before eating.  I do feel he will need more than this amount with his meals however since he is not in the habit of checking his blood sugars regularly I would like to start out cautiously and increase as we see his adherence to blood glucose monitoring and his overall diabetic management to prevent risk of hypoglycemia or diabetic complications  We will send Rx for glucagon  Change janumet to metformin since he is tolerating GLP-1  Continue trulicity 1.5 mg weekly and farxiga 10 mg daily  Increase testing to 4x/day , offered a mazin sensor but he is not interested  Patient verbalizes understanding of hypoglycemia signs and symptoms and treatment  Could consider insulin delivery device in the future if he has a hard time maintaining compliance with 4 injections daily    Patient will be moving to [place] at the end of June, advised to discuss endocrinology options with insurance.  We may need to do telephone visits with him until he is settled with a new endocrinologist    Patient was given instructions and counseling regarding his condition  or for health maintenance advice. Please see specific information pulled into the AVS if appropriate.     DORA Stanley

## 2021-04-28 NOTE — PROGRESS NOTES
"Pharmacy [facility] Specialty Medication Note    Medication:  Janumet XR  mg     Dose:  100-2000 mg po every evening   Referring Provider: DORA Stanley  Start Date: 3+ years  PA status/Time Period: good through 02/05/2024      Past Medical History:   Diagnosis Date   • Anxiety    • Arthritis    • Diabetic peripheral neuropathy (CMS/HCC)     TYPE 2   • Dizziness    • Erectile dysfunction    • Fatigue    • High cholesterol    • Hypertension    • Hypothyroidism    • Low testosterone    • Neuropathy    • Sleep apnea     WEARS CPAP   • Type 2 diabetes mellitus (CMS/HCC)    • Vitamin D deficiency      Social History     Socioeconomic History   • Marital status:      Spouse name: Not on file   • Number of children: Not on file   • Years of education: Not on file   • Highest education level: Not on file   Tobacco Use   • Smoking status: Never Smoker   • Smokeless tobacco: Never Used   Substance and Sexual Activity   • Alcohol use: Yes     Comment:  1-2 BEERS/ NIGHT    • Drug use: No   • Sexual activity: Defer     Allergies   Allergen Reactions   • Codeine Other (See Comments)     UNKNOWN     Current Outpatient Medications   Medication Sig Dispense Refill   • amLODIPine (NORVASC) 10 MG tablet Take 1 tablet by mouth Daily. 90 tablet 3   • atorvastatin (LIPITOR) 20 MG tablet TAKE ONE TABLET BY MOUTH DAILY 90 tablet 2   • BD HYPODERMIC NEEDLE 18G X 1\" misc USE WEEKLY WITH TESTOSTERONE INJECTION 25 each 0   • Blood Glucose Monitoring Suppl (ACCU-CHEK GUIDE ME) w/Device kit 1 kit Daily. 1 kit 1   • Continuous Blood Gluc  (FreeStyle Nora 2 Jennings Systm) device 1 Device Daily. 1 Device 0   • Continuous Blood Gluc Sensor (FreeStyle Nora 2 Sensor Systm) misc 1 Device Every 14 (Fourteen) Days. 2 each 3   • Dulaglutide (Trulicity) 1.5 MG/0.5ML solution pen-injector Inject 1.5 mg under the skin into the appropriate area as directed 1 (One) Time Per Week. 2 mL 11   • DULoxetine (CYMBALTA) 60 MG capsule Take 1 " "capsule by mouth Daily. 90 capsule 1   • Farxiga 10 MG tablet TAKE ONE TABLET BY MOUTH EVERY MORNING 90 tablet 2   • fenofibrate micronized (ANTARA) 130 MG capsule TAKE ONE CAPSULE BY MOUTH EVERY MORNING BEFORE BREAKFAST 90 capsule 2   • hydroCHLOROthiazide (HYDRODIURIL) 25 MG tablet Take 1 tablet by mouth Every Morning. 90 tablet 1   • ibuprofen (ADVIL,MOTRIN) 800 MG tablet Take 1 tablet by mouth Every 8 (Eight) Hours As Needed for Mild Pain . 15 tablet 0   • Insulin Glargine, 2 Unit Dial, (Toujeo Max SoloStar) 300 UNIT/ML solution pen-injector injection Inject 100 Units under the skin into the appropriate area as directed 2 (two) times a day. 30 mL 1   • Insulin Lispro-aabc, 1 U Dial, (Lyumjev KwikPen) 100 UNIT/ML solution pen-injector Inject 10 Units under the skin into the appropriate area as directed 3 (Three) Times a Day Before Meals. 15 mL 1   • Insulin Pen Needle 31G X 8 MM misc Use once a day 100 each 3   • Lancets (onetouch ultrasoft) lancets CHECK BLOOD GLUCOSE TWO TIMES A  each 10   • levothyroxine (SYNTHROID, LEVOTHROID) 75 MCG tablet TAKE ONE TABLET BY MOUTH DAILY 90 tablet 1   • lisinopril (PRINIVIL,ZESTRIL) 10 MG tablet Take 1 tablet by mouth Daily. 90 tablet 1   • metFORMIN ER (Glucophage XR) 500 MG 24 hr tablet Take 2 tablets by mouth Daily With Breakfast. 30 tablet 11   • Needle, Disp, (BD DISP NEEDLE) 23G X 1\" misc USE WEEKLY FOR INSULIN INJECTION 10 each 2   • OneTouch Verio test strip CHECK BLOOD GLUCOSE TWO TIMES A DAY AS INSTRUCTED 150 each 11   • oxyCODONE-acetaminophen (Percocet)  MG per tablet Take 1 tablet by mouth Every 8 (Eight) Hours As Needed for Moderate Pain . 90 tablet 0   • pregabalin (LYRICA) 200 MG capsule Take 1 capsule by mouth 3 (Three) Times a Day. 90 capsule 4   • QUEtiapine (SEROquel) 25 MG tablet Take 1 tablet by mouth Every Night. 90 tablet 0   • Syringe 23G X 1\" 3 ML misc Use weekly for testosterone injection 10 each 3   • tadalafil (CIALIS) 5 MG tablet " Take 1 tablet by mouth Daily As Needed for Erectile Dysfunction. 30 tablet 5   • vitamin D (ERGOCALCIFEROL) 1.25 MG (49173 UT) capsule capsule Take 1 capsule by mouth Every 7 (Seven) Days. 4 capsule 0     No current facility-administered medications for this visit.       Labs  Lab Results   Component Value Date    HGBA1C 11.20 (H) 03/11/2021     Lab Results   Component Value Date    GLUCOSE 166 (H) 08/14/2020    CALCIUM 10.2 03/11/2021     03/11/2021    K 4.9 03/11/2021    CO2 28.0 03/11/2021    CL 94 (L) 03/11/2021    BUN 11 03/11/2021    CREATININE 0.91 03/11/2021    EGFRIFAFRI 102 03/11/2021    EGFRIFNONA 84 03/11/2021    BCR 12.1 03/11/2021    ANIONGAP 13.8 08/14/2020     Lab Results   Component Value Date    CHLPL 108 03/11/2021    TRIG 315 (H) 03/11/2021    HDL 28 (L) 03/11/2021    LDL 33 03/11/2021       Assessment  Patient was assessed for Janumet XR therapy today; however, given his increasing HbA1c, Janumet XR was discontinued and changed to metformin ER 1000 mg po daily.  Additionally, Tresiba was discontinued and Insulin glargine 100 units in the AM and 90 units in the PM and Insulin lispro 10 units TID AC was added.  He is also to continue with Farxiga 10 mg po daily and Trulicity 1.5 mg SQ once weekly.  I performed a drug-drug interaction report, the patient denies any allergies to the medications and he was provided education regarding metformin and insulin use.      Plan  APRN, will order insulin glargine,  Insulin lispro, and metformin ER.      Kika Hernández, PharmD, BCCP, BCPS  04/28/21  09:55 EDT

## 2021-04-29 ENCOUNTER — TELEPHONE (OUTPATIENT)
Dept: ENDOCRINOLOGY | Age: 61
End: 2021-04-29

## 2021-04-29 NOTE — TELEPHONE ENCOUNTER
Patient states he went to go  the new medication Insulin Lispro-aabc, 1 U Dial, (Lyumjev KwikPen) 100 UNIT/ML solution pen-injector and it was going to cost him over $100 out of pocket. He is asking if the office has any coupon cards for this medication.

## 2021-05-04 ENCOUNTER — TELEPHONE (OUTPATIENT)
Dept: ENDOCRINOLOGY | Age: 61
End: 2021-05-04

## 2021-05-05 ENCOUNTER — CLINICAL SUPPORT (OUTPATIENT)
Dept: FAMILY MEDICINE CLINIC | Facility: CLINIC | Age: 61
End: 2021-05-05

## 2021-05-05 DIAGNOSIS — G89.4 CHRONIC PAIN SYNDROME: ICD-10-CM

## 2021-05-05 DIAGNOSIS — G89.29 CHRONIC PAIN OF LEFT KNEE: ICD-10-CM

## 2021-05-05 DIAGNOSIS — G89.29 CHRONIC PAIN OF BOTH SHOULDERS: ICD-10-CM

## 2021-05-05 DIAGNOSIS — M25.511 CHRONIC PAIN OF BOTH SHOULDERS: ICD-10-CM

## 2021-05-05 DIAGNOSIS — Z23 NEED FOR VACCINATION: Primary | ICD-10-CM

## 2021-05-05 DIAGNOSIS — M25.562 CHRONIC PAIN OF LEFT KNEE: ICD-10-CM

## 2021-05-05 DIAGNOSIS — M25.512 CHRONIC PAIN OF BOTH SHOULDERS: ICD-10-CM

## 2021-05-05 PROCEDURE — 90750 HZV VACC RECOMBINANT IM: CPT | Performed by: FAMILY MEDICINE

## 2021-05-05 RX ORDER — OXYCODONE AND ACETAMINOPHEN 10; 325 MG/1; MG/1
1 TABLET ORAL EVERY 8 HOURS PRN
Qty: 90 TABLET | Refills: 0 | Status: SHIPPED | OUTPATIENT
Start: 2021-05-05 | End: 2021-06-08 | Stop reason: SDUPTHER

## 2021-05-05 RX ORDER — INSULIN GLARGINE 100 [IU]/ML
100 INJECTION, SOLUTION SUBCUTANEOUS 2 TIMES DAILY
Qty: 60 ML | Refills: 2 | Status: SHIPPED | OUTPATIENT
Start: 2021-05-05

## 2021-05-11 RX ORDER — INSULIN DEGLUDEC INJECTION 100 U/ML
INJECTION, SOLUTION SUBCUTANEOUS
Qty: 30 ML | Refills: 0 | Status: SHIPPED | OUTPATIENT
Start: 2021-05-11

## 2021-06-08 DIAGNOSIS — G89.4 CHRONIC PAIN SYNDROME: ICD-10-CM

## 2021-06-08 DIAGNOSIS — M25.512 CHRONIC PAIN OF BOTH SHOULDERS: ICD-10-CM

## 2021-06-08 DIAGNOSIS — G89.29 CHRONIC PAIN OF LEFT KNEE: ICD-10-CM

## 2021-06-08 DIAGNOSIS — G89.29 CHRONIC PAIN OF BOTH SHOULDERS: ICD-10-CM

## 2021-06-08 DIAGNOSIS — M25.562 CHRONIC PAIN OF LEFT KNEE: ICD-10-CM

## 2021-06-08 DIAGNOSIS — M25.511 CHRONIC PAIN OF BOTH SHOULDERS: ICD-10-CM

## 2021-06-08 RX ORDER — OXYCODONE AND ACETAMINOPHEN 10; 325 MG/1; MG/1
1 TABLET ORAL EVERY 8 HOURS PRN
Qty: 90 TABLET | Refills: 0 | Status: SHIPPED | OUTPATIENT
Start: 2021-06-08 | End: 2021-06-16 | Stop reason: SDUPTHER

## 2021-06-15 DIAGNOSIS — G89.4 CHRONIC PAIN SYNDROME: ICD-10-CM

## 2021-06-15 DIAGNOSIS — Z79.4 TYPE 2 DIABETES MELLITUS WITH HYPERGLYCEMIA, WITH LONG-TERM CURRENT USE OF INSULIN (HCC): ICD-10-CM

## 2021-06-15 DIAGNOSIS — E11.65 TYPE 2 DIABETES MELLITUS WITH HYPERGLYCEMIA, WITH LONG-TERM CURRENT USE OF INSULIN (HCC): ICD-10-CM

## 2021-06-16 ENCOUNTER — PRIOR AUTHORIZATION (OUTPATIENT)
Dept: ENDOCRINOLOGY | Age: 61
End: 2021-06-16

## 2021-06-16 ENCOUNTER — CLINICAL SUPPORT (OUTPATIENT)
Dept: FAMILY MEDICINE CLINIC | Facility: CLINIC | Age: 61
End: 2021-06-16

## 2021-06-16 DIAGNOSIS — G89.4 CHRONIC PAIN SYNDROME: ICD-10-CM

## 2021-06-16 DIAGNOSIS — M25.562 CHRONIC PAIN OF LEFT KNEE: ICD-10-CM

## 2021-06-16 DIAGNOSIS — G89.29 CHRONIC PAIN OF LEFT KNEE: ICD-10-CM

## 2021-06-16 DIAGNOSIS — M25.512 CHRONIC PAIN OF BOTH SHOULDERS: ICD-10-CM

## 2021-06-16 DIAGNOSIS — G89.29 CHRONIC PAIN OF BOTH SHOULDERS: ICD-10-CM

## 2021-06-16 DIAGNOSIS — Z23 NEED FOR TDAP VACCINATION: Primary | ICD-10-CM

## 2021-06-16 DIAGNOSIS — M25.511 CHRONIC PAIN OF BOTH SHOULDERS: ICD-10-CM

## 2021-06-16 PROCEDURE — 90715 TDAP VACCINE 7 YRS/> IM: CPT | Performed by: NURSE PRACTITIONER

## 2021-06-16 PROCEDURE — 90471 IMMUNIZATION ADMIN: CPT | Performed by: NURSE PRACTITIONER

## 2021-06-16 RX ORDER — DULOXETIN HYDROCHLORIDE 60 MG/1
60 CAPSULE, DELAYED RELEASE ORAL DAILY
Qty: 90 CAPSULE | Refills: 1 | Status: SHIPPED | OUTPATIENT
Start: 2021-06-16 | End: 2021-06-22

## 2021-06-16 RX ORDER — METFORMIN HYDROCHLORIDE 500 MG/1
1000 TABLET, EXTENDED RELEASE ORAL
Qty: 30 TABLET | Refills: 11 | Status: SHIPPED | OUTPATIENT
Start: 2021-06-16 | End: 2021-10-18

## 2021-06-16 RX ORDER — OXYCODONE AND ACETAMINOPHEN 10; 325 MG/1; MG/1
1 TABLET ORAL EVERY 8 HOURS PRN
Qty: 90 TABLET | Refills: 0 | Status: SHIPPED | OUTPATIENT
Start: 2021-06-25

## 2021-06-16 RX ORDER — PREGABALIN 200 MG/1
200 CAPSULE ORAL 3 TIMES DAILY
Qty: 90 CAPSULE | Refills: 4 | Status: SHIPPED | OUTPATIENT
Start: 2021-06-16

## 2021-06-16 RX ORDER — QUETIAPINE FUMARATE 25 MG/1
25 TABLET, FILM COATED ORAL NIGHTLY
Qty: 90 TABLET | Refills: 0 | Status: SHIPPED | OUTPATIENT
Start: 2021-06-16 | End: 2021-09-13

## 2021-07-14 DIAGNOSIS — R79.89 LOW TESTOSTERONE: ICD-10-CM

## 2021-07-14 DIAGNOSIS — N52.8 OTHER MALE ERECTILE DYSFUNCTION: ICD-10-CM

## 2021-07-14 DIAGNOSIS — E78.5 DYSLIPIDEMIA: ICD-10-CM

## 2021-07-14 DIAGNOSIS — E55.9 VITAMIN D DEFICIENCY: ICD-10-CM

## 2021-07-14 DIAGNOSIS — R53.82 CHRONIC FATIGUE: ICD-10-CM

## 2021-07-14 DIAGNOSIS — E66.01 MORBID OBESITY DUE TO EXCESS CALORIES (HCC): ICD-10-CM

## 2021-07-15 RX ORDER — FENOFIBRATE 130 MG/1
CAPSULE ORAL
Qty: 90 CAPSULE | Refills: 2 | Status: SHIPPED | OUTPATIENT
Start: 2021-07-15 | End: 2021-07-19 | Stop reason: SDUPTHER

## 2021-07-16 ENCOUNTER — TELEPHONE (OUTPATIENT)
Dept: ENDOCRINOLOGY | Age: 61
End: 2021-07-16

## 2021-07-16 NOTE — TELEPHONE ENCOUNTER
Patient called    He needs a refill on his Fenofibrate 130 mg    His doctor changed the dose to 134mg but his insurance will not pay for it.    He has not had the medication in a week.     Please send it to [facility] [phone]

## 2021-07-19 DIAGNOSIS — E55.9 VITAMIN D DEFICIENCY: ICD-10-CM

## 2021-07-19 DIAGNOSIS — E66.01 MORBID OBESITY DUE TO EXCESS CALORIES (HCC): ICD-10-CM

## 2021-07-19 DIAGNOSIS — E78.5 DYSLIPIDEMIA: ICD-10-CM

## 2021-07-19 DIAGNOSIS — R53.82 CHRONIC FATIGUE: ICD-10-CM

## 2021-07-19 DIAGNOSIS — N52.8 OTHER MALE ERECTILE DYSFUNCTION: ICD-10-CM

## 2021-07-19 DIAGNOSIS — R79.89 LOW TESTOSTERONE: ICD-10-CM

## 2021-07-19 RX ORDER — FENOFIBRATE 130 MG/1
CAPSULE ORAL
Qty: 90 CAPSULE | Refills: 2 | Status: SHIPPED | OUTPATIENT
Start: 2021-07-19 | End: 2021-07-22

## 2021-07-22 RX ORDER — FENOFIBRIC ACID 135 MG/1
135 CAPSULE, DELAYED RELEASE ORAL DAILY
Qty: 90 CAPSULE | Refills: 1 | Status: SHIPPED | OUTPATIENT
Start: 2021-07-22

## 2021-08-18 DIAGNOSIS — I10 BENIGN ESSENTIAL HTN: ICD-10-CM

## 2021-08-18 RX ORDER — LISINOPRIL 10 MG/1
TABLET ORAL
Qty: 90 TABLET | Refills: 1 | Status: SHIPPED | OUTPATIENT
Start: 2021-08-18 | End: 2022-07-27

## 2021-09-07 RX ORDER — QUETIAPINE FUMARATE 25 MG/1
TABLET, FILM COATED ORAL
Qty: 90 TABLET | Refills: 0 | OUTPATIENT
Start: 2021-09-07

## 2021-09-08 RX ORDER — QUETIAPINE FUMARATE 25 MG/1
TABLET, FILM COATED ORAL
Qty: 90 TABLET | Refills: 0 | OUTPATIENT
Start: 2021-09-08

## 2021-09-13 RX ORDER — QUETIAPINE FUMARATE 25 MG/1
TABLET, FILM COATED ORAL
Qty: 90 TABLET | Refills: 0 | Status: SHIPPED | OUTPATIENT
Start: 2021-09-13

## 2021-10-01 RX ORDER — LEVOTHYROXINE SODIUM 0.07 MG/1
TABLET ORAL
Qty: 30 TABLET | OUTPATIENT
Start: 2021-10-01

## 2021-10-01 RX ORDER — LEVOTHYROXINE SODIUM 0.07 MG/1
TABLET ORAL
Qty: 30 TABLET | Refills: 0 | Status: SHIPPED | OUTPATIENT
Start: 2021-10-01 | End: 2021-10-29

## 2021-10-16 DIAGNOSIS — Z79.4 TYPE 2 DIABETES MELLITUS WITH HYPERGLYCEMIA, WITH LONG-TERM CURRENT USE OF INSULIN (HCC): ICD-10-CM

## 2021-10-16 DIAGNOSIS — E11.65 TYPE 2 DIABETES MELLITUS WITH HYPERGLYCEMIA, WITH LONG-TERM CURRENT USE OF INSULIN (HCC): ICD-10-CM

## 2021-10-18 RX ORDER — METFORMIN HYDROCHLORIDE 500 MG/1
TABLET, EXTENDED RELEASE ORAL
Qty: 30 TABLET | Refills: 11 | Status: SHIPPED | OUTPATIENT
Start: 2021-10-18

## 2021-10-28 DIAGNOSIS — R53.82 CHRONIC FATIGUE: ICD-10-CM

## 2021-10-28 DIAGNOSIS — E55.9 VITAMIN D DEFICIENCY: ICD-10-CM

## 2021-10-28 DIAGNOSIS — E78.5 DYSLIPIDEMIA: ICD-10-CM

## 2021-10-28 DIAGNOSIS — R79.89 LOW TESTOSTERONE: ICD-10-CM

## 2021-10-28 DIAGNOSIS — E66.01 MORBID OBESITY DUE TO EXCESS CALORIES (HCC): ICD-10-CM

## 2021-10-28 DIAGNOSIS — N52.8 OTHER MALE ERECTILE DYSFUNCTION: ICD-10-CM

## 2021-10-28 RX ORDER — HYDROCHLOROTHIAZIDE 25 MG/1
TABLET ORAL
Qty: 30 TABLET | OUTPATIENT
Start: 2021-10-28

## 2021-10-29 RX ORDER — ERGOCALCIFEROL 1.25 MG/1
CAPSULE ORAL
Qty: 8 CAPSULE | Refills: 1 | Status: SHIPPED | OUTPATIENT
Start: 2021-10-29 | End: 2021-12-03

## 2021-10-29 RX ORDER — LEVOTHYROXINE SODIUM 0.07 MG/1
TABLET ORAL
Qty: 30 TABLET | Refills: 0 | Status: SHIPPED | OUTPATIENT
Start: 2021-10-29 | End: 2021-11-30

## 2021-11-30 RX ORDER — LEVOTHYROXINE SODIUM 0.07 MG/1
TABLET ORAL
Qty: 30 TABLET | Refills: 0 | Status: SHIPPED | OUTPATIENT
Start: 2021-11-30 | End: 2021-12-13

## 2021-12-02 DIAGNOSIS — N52.8 OTHER MALE ERECTILE DYSFUNCTION: ICD-10-CM

## 2021-12-02 DIAGNOSIS — E55.9 VITAMIN D DEFICIENCY: ICD-10-CM

## 2021-12-02 DIAGNOSIS — E66.01 MORBID OBESITY DUE TO EXCESS CALORIES (HCC): ICD-10-CM

## 2021-12-02 DIAGNOSIS — R53.82 CHRONIC FATIGUE: ICD-10-CM

## 2021-12-02 DIAGNOSIS — R79.89 LOW TESTOSTERONE: ICD-10-CM

## 2021-12-02 DIAGNOSIS — E78.5 DYSLIPIDEMIA: ICD-10-CM

## 2021-12-02 RX ORDER — QUETIAPINE FUMARATE 25 MG/1
TABLET, FILM COATED ORAL
Qty: 30 TABLET | OUTPATIENT
Start: 2021-12-02

## 2021-12-03 RX ORDER — ERGOCALCIFEROL 1.25 MG/1
CAPSULE ORAL
Qty: 8 CAPSULE | Refills: 1 | Status: SHIPPED | OUTPATIENT
Start: 2021-12-03 | End: 2022-02-21

## 2021-12-12 DIAGNOSIS — G89.4 CHRONIC PAIN SYNDROME: ICD-10-CM

## 2021-12-13 RX ORDER — LEVOTHYROXINE SODIUM 0.07 MG/1
TABLET ORAL
Qty: 30 TABLET | Refills: 0 | Status: SHIPPED | OUTPATIENT
Start: 2021-12-13

## 2021-12-13 RX ORDER — PREGABALIN 200 MG/1
CAPSULE ORAL
Qty: 90 CAPSULE | Refills: 1 | OUTPATIENT
Start: 2021-12-13

## 2022-01-04 RX ORDER — DAPAGLIFLOZIN 10 MG/1
1 TABLET, FILM COATED ORAL EVERY MORNING
Qty: 30 TABLET | Refills: 0 | Status: SHIPPED | OUTPATIENT
Start: 2022-01-04

## 2022-02-21 DIAGNOSIS — E66.01 MORBID OBESITY DUE TO EXCESS CALORIES: ICD-10-CM

## 2022-02-21 DIAGNOSIS — R53.82 CHRONIC FATIGUE: ICD-10-CM

## 2022-02-21 DIAGNOSIS — E78.5 DYSLIPIDEMIA: ICD-10-CM

## 2022-02-21 DIAGNOSIS — E55.9 VITAMIN D DEFICIENCY: ICD-10-CM

## 2022-02-21 DIAGNOSIS — R79.89 LOW TESTOSTERONE: ICD-10-CM

## 2022-02-21 DIAGNOSIS — N52.8 OTHER MALE ERECTILE DYSFUNCTION: ICD-10-CM

## 2022-02-21 RX ORDER — ERGOCALCIFEROL 1.25 MG/1
CAPSULE ORAL
Qty: 8 CAPSULE | Refills: 1 | Status: SHIPPED | OUTPATIENT
Start: 2022-02-21 | End: 2022-05-11

## 2022-04-18 DIAGNOSIS — E66.01 MORBID OBESITY DUE TO EXCESS CALORIES: ICD-10-CM

## 2022-04-18 DIAGNOSIS — E78.5 DYSLIPIDEMIA: ICD-10-CM

## 2022-04-18 DIAGNOSIS — R79.89 LOW TESTOSTERONE: ICD-10-CM

## 2022-04-18 DIAGNOSIS — R53.82 CHRONIC FATIGUE: ICD-10-CM

## 2022-04-18 DIAGNOSIS — N52.8 OTHER MALE ERECTILE DYSFUNCTION: ICD-10-CM

## 2022-04-18 DIAGNOSIS — E55.9 VITAMIN D DEFICIENCY: ICD-10-CM

## 2022-04-18 RX ORDER — ERGOCALCIFEROL 1.25 MG/1
CAPSULE ORAL
Qty: 8 CAPSULE | Refills: 1 | OUTPATIENT
Start: 2022-04-18

## 2022-05-10 DIAGNOSIS — R53.82 CHRONIC FATIGUE: ICD-10-CM

## 2022-05-10 DIAGNOSIS — R79.89 LOW TESTOSTERONE: ICD-10-CM

## 2022-05-10 DIAGNOSIS — E78.5 DYSLIPIDEMIA: ICD-10-CM

## 2022-05-10 DIAGNOSIS — E66.01 MORBID OBESITY DUE TO EXCESS CALORIES: ICD-10-CM

## 2022-05-10 DIAGNOSIS — E55.9 VITAMIN D DEFICIENCY: ICD-10-CM

## 2022-05-10 DIAGNOSIS — N52.8 OTHER MALE ERECTILE DYSFUNCTION: ICD-10-CM

## 2022-05-11 RX ORDER — ERGOCALCIFEROL 1.25 MG/1
CAPSULE ORAL
Qty: 8 CAPSULE | Refills: 1 | Status: SHIPPED | OUTPATIENT
Start: 2022-05-11 | End: 2022-06-13

## 2022-06-12 DIAGNOSIS — R79.89 LOW TESTOSTERONE: ICD-10-CM

## 2022-06-12 DIAGNOSIS — R53.82 CHRONIC FATIGUE: ICD-10-CM

## 2022-06-12 DIAGNOSIS — N52.8 OTHER MALE ERECTILE DYSFUNCTION: ICD-10-CM

## 2022-06-12 DIAGNOSIS — E55.9 VITAMIN D DEFICIENCY: ICD-10-CM

## 2022-06-12 DIAGNOSIS — E78.5 DYSLIPIDEMIA: ICD-10-CM

## 2022-06-12 DIAGNOSIS — E66.01 MORBID OBESITY DUE TO EXCESS CALORIES: ICD-10-CM

## 2022-06-13 RX ORDER — ERGOCALCIFEROL 1.25 MG/1
CAPSULE ORAL
Qty: 8 CAPSULE | Refills: 1 | Status: SHIPPED | OUTPATIENT
Start: 2022-06-13

## 2022-07-12 NOTE — TELEPHONE ENCOUNTER
How Severe Is The Scar?: moderate Informed pt can shower immediately. He says that the hospital replaced cally before he left with regular gauze Patient does not have a cally dressing any longer. Therapist took it off this morning to look at it this morning and replaced with regular gauze and ace wrap.    What Are Your Concerns? (Check All That Apply): texture Is This A New Presentation, Or A Follow-Up?: Facial Scar Additional History: Referred by Dr. Bond. Also concerned with aging her nasolabial folds are less full.

## 2022-07-26 DIAGNOSIS — I10 BENIGN ESSENTIAL HTN: ICD-10-CM

## 2022-07-27 RX ORDER — LISINOPRIL 10 MG/1
TABLET ORAL
Qty: 15 TABLET | Refills: 0 | Status: SHIPPED | OUTPATIENT
Start: 2022-07-27 | End: 2022-08-09

## 2022-08-09 DIAGNOSIS — I10 BENIGN ESSENTIAL HTN: ICD-10-CM

## 2022-08-09 RX ORDER — LISINOPRIL 10 MG/1
TABLET ORAL
Qty: 10 TABLET | Refills: 0 | Status: SHIPPED | OUTPATIENT
Start: 2022-08-09

## 2022-08-18 DIAGNOSIS — I10 BENIGN ESSENTIAL HTN: ICD-10-CM

## 2022-08-18 RX ORDER — LISINOPRIL 10 MG/1
TABLET ORAL
Qty: 10 TABLET | Refills: 0 | OUTPATIENT
Start: 2022-08-18

## 2022-10-03 DIAGNOSIS — Z79.4 TYPE 2 DIABETES MELLITUS WITH HYPERGLYCEMIA, WITH LONG-TERM CURRENT USE OF INSULIN: ICD-10-CM

## 2022-10-03 DIAGNOSIS — E11.65 TYPE 2 DIABETES MELLITUS WITH HYPERGLYCEMIA, WITH LONG-TERM CURRENT USE OF INSULIN: ICD-10-CM

## 2022-10-03 RX ORDER — METFORMIN HYDROCHLORIDE 500 MG/1
TABLET, EXTENDED RELEASE ORAL
Qty: 60 TABLET | Refills: 5 | OUTPATIENT
Start: 2022-10-03

## 2022-10-03 NOTE — TELEPHONE ENCOUNTER
Antihyperglycemics Protocol Failed 10/03/2022 06:13 AM   Protocol Details  Lipid panel in past year    GFR >30ml/min in past year    HgA1c in past 6 months    Recent or future visit with authorizing provider

## 2022-10-09 DIAGNOSIS — Z79.4 TYPE 2 DIABETES MELLITUS WITH HYPERGLYCEMIA, WITH LONG-TERM CURRENT USE OF INSULIN: ICD-10-CM

## 2022-10-09 DIAGNOSIS — E11.65 TYPE 2 DIABETES MELLITUS WITH HYPERGLYCEMIA, WITH LONG-TERM CURRENT USE OF INSULIN: ICD-10-CM

## 2022-10-10 RX ORDER — METFORMIN HYDROCHLORIDE 500 MG/1
TABLET, EXTENDED RELEASE ORAL
Qty: 30 TABLET | Refills: 11 | OUTPATIENT
Start: 2022-10-10

## 2024-03-01 NOTE — TELEPHONE ENCOUNTER
This is an addendum note for 7/12/17.  I have called Mr. Latif today to discuss his recent visit with the infectious disease consultant.  He saw Dr. Walton and Dr. Walton found no evidence of secondary infection.  Dr. Walton felt that patient should consult the orthopedic department at [facility], [place].  He will be seeing Dr. Eduardo Dumont.  The patient was encouraged to see Dr. Dumont soon secondary to his current pain syndrome which is requiring use of opiate medication.  He is currently receiving his opiates from Dr. Anderson.  I am not certain that Dr. Anderson will be willing to continue to refill his opiates if she is going to get a second orthopedic opinion.  The patient's infectious disease consultant stated that he felt his pain was secondary to his flexion instability.  He felt the pain would not resolve until this problem was corrected.   [JVD] : no jugular venous distention  [Respiratory Effort] : normal respiratory effort [Normal Rate and Rhythm] : normal rate and rhythm [No Edema] : No edema [No Rash or Lesion] : No rash or lesion [Oriented to Person] : oriented to person [Alert] : alert [Oriented to Place] : oriented to place [Oriented to Time] : oriented to time [Calm] : calm [de-identified] : Soft, nondistended.  Mild epigastric tenderness to deep palpation; lower abdomen benign. [de-identified] : No distress [de-identified] : Normocephalic [de-identified] : Moves all extremities

## 2024-06-05 NOTE — TELEPHONE ENCOUNTER
Called patient letting him know that the requested RX is ready for  @ the  at our [place] office.   Controlled, ccm

## (undated) DEVICE — STPLR SKIN VISISTAT WD 35CT

## (undated) DEVICE — INTENDED FOR TISSUE SEPARATION, AND OTHER PROCEDURES THAT REQUIRE A SHARP SURGICAL BLADE TO PUNCTURE OR CUT.: Brand: BARD-PARKER ® CARBON RIB-BACK BLADES

## (undated) DEVICE — MAT FLR ABSORBENT LG 4FT 10 2.5FT

## (undated) DEVICE — MEDI-VAC YANKAUER SUCTION HANDLE W/BULBOUS TIP: Brand: CARDINAL HEALTH

## (undated) DEVICE — GLV SURG SENSICARE W/ALOE PF LF 8 STRL

## (undated) DEVICE — SPNG GZ WOVN 4X4IN 12PLY 10/BX STRL

## (undated) DEVICE — APPL DURAPREP IODOPHOR APL 26ML

## (undated) DEVICE — GLV SURG TRIUMPH CLASSIC PF LTX 8 STRL

## (undated) DEVICE — DRSNG GZ PETROLTM XEROFORM CURAD 1X8IN STRL

## (undated) DEVICE — DRSNG ADAPTIC 3X8

## (undated) DEVICE — 3M™ IOBAN™ 2 ANTIMICROBIAL INCISE DRAPE 6640EZ: Brand: IOBAN™ 2

## (undated) DEVICE — PREMIUM WET SKIN PREP TRAY: Brand: MEDLINE INDUSTRIES, INC.

## (undated) DEVICE — BNDG ELAS ELITE V/CLOSE 6IN 5YD LF STRL

## (undated) DEVICE — PREP SOL POVIDONE/IODINE BT 4OZ

## (undated) DEVICE — PK KN TOTL 40

## (undated) DEVICE — GLV SURG SENSICARE W/ALOE PF LF 7.5 STRL

## (undated) DEVICE — ENCORE® LATEX ORTHO SIZE 7.5, STERILE LATEX POWDER-FREE SURGICAL GLOVE: Brand: ENCORE

## (undated) DEVICE — INTENT TO BE USED WITH SUTURE MATERIAL FOR TISSUE CLOSURE: Brand: RICHARD-ALLAN® NEEDLE 1/2 CIRCLE TAPER

## (undated) DEVICE — DRSNG WND GZ CURAD OIL EMULSION 3X8IN LF STRL 1PK

## (undated) DEVICE — PK ORTHO MINOR TOWER 40

## (undated) DEVICE — PICO 7 10CM X 30CM: Brand: PICO™ 7

## (undated) DEVICE — SYS PERFUS SEP PLATLT W TIPS CUST

## (undated) DEVICE — GENESIS TROCHLEAR PIN 1/8 X 3: Brand: GENESIS

## (undated) DEVICE — SUT VIC 0 CT1 36IN J946H

## (undated) DEVICE — UNDERCAST PADDING: Brand: DEROYAL

## (undated) DEVICE — SPNG LAP 18X18IN LF STRL PK/5

## (undated) DEVICE — GAUZE,SPONGE,FLUFF,6"X6.75",STRL,10/TRAY: Brand: MEDLINE

## (undated) DEVICE — SUT VIC 1 CT1 36IN J947H

## (undated) DEVICE — SUT VIC 3/0 SH 27IN J416H

## (undated) DEVICE — GOWN,NON-REINFORCED,SIRUS,SET IN SLV,XXL: Brand: MEDLINE

## (undated) DEVICE — NDL SPINE 22G 31/2IN BLK

## (undated) DEVICE — APPL CHLORAPREP W/TINT 26ML ORNG

## (undated) DEVICE — BNDG ELAS ELITE V/CLOSE 4IN 5YD LF STRL

## (undated) DEVICE — SUT ETHLN 4/0 PS2 PLSTC 1667G

## (undated) DEVICE — THIN OFFSET (13.0 X 0.38 X 39.0MM)

## (undated) DEVICE — TOWEL,OR,DSP,ST,BLUE,STD,4/PK,20PK/CS: Brand: MEDLINE

## (undated) DEVICE — ANTIBACTERIAL UNDYED BRAIDED (POLYGLACTIN 910), SYNTHETIC ABSORBABLE SUTURE: Brand: COATED VICRYL

## (undated) DEVICE — GLV SURG SENSICARE PI PF LF 7 GRN STRL

## (undated) DEVICE — SKIN PREP TRAY W/CHG: Brand: MEDLINE INDUSTRIES, INC.

## (undated) DEVICE — DISPOSABLE TOURNIQUET CUFF SINGLE BLADDER, SINGLE PORT AND QUICK CONNECT CONNECTOR: Brand: COLOR CUFF

## (undated) DEVICE — DUAL CUT SAGITTAL BLADE

## (undated) DEVICE — PAD,ABDOMINAL,8"X10",ST,LF: Brand: MEDLINE

## (undated) DEVICE — 3M™ IOBAN™ 2 ANTIMICROBIAL INCISE DRAPE 6650EZ: Brand: IOBAN™ 2

## (undated) DEVICE — KT DRN EVAC WND PVC PCH WTROC RND 10F400

## (undated) DEVICE — GLV SURG BIOGEL LTX PF 8

## (undated) DEVICE — GLV SURG SENSICARE MICRO PF LF 7 STRL

## (undated) DEVICE — ELECTRD NDL EZ CLN MOD 2.75IN